# Patient Record
Sex: MALE | Race: WHITE | NOT HISPANIC OR LATINO
[De-identification: names, ages, dates, MRNs, and addresses within clinical notes are randomized per-mention and may not be internally consistent; named-entity substitution may affect disease eponyms.]

---

## 2017-01-12 ENCOUNTER — APPOINTMENT (OUTPATIENT)
Dept: RHEUMATOLOGY | Facility: CLINIC | Age: 72
End: 2017-01-12

## 2017-01-12 VITALS
WEIGHT: 170 LBS | DIASTOLIC BLOOD PRESSURE: 60 MMHG | HEART RATE: 112 BPM | BODY MASS INDEX: 23.8 KG/M2 | OXYGEN SATURATION: 97 % | HEIGHT: 71 IN | SYSTOLIC BLOOD PRESSURE: 148 MMHG

## 2017-01-13 ENCOUNTER — APPOINTMENT (OUTPATIENT)
Dept: GERIATRICS | Facility: CLINIC | Age: 72
End: 2017-01-13

## 2017-01-14 ENCOUNTER — INPATIENT (INPATIENT)
Facility: HOSPITAL | Age: 72
LOS: 5 days | Discharge: HOME | DRG: 871 | End: 2017-01-20
Attending: HOSPITALIST | Admitting: HOSPITALIST
Payer: MEDICARE

## 2017-01-14 VITALS
SYSTOLIC BLOOD PRESSURE: 140 MMHG | HEART RATE: 114 BPM | OXYGEN SATURATION: 98 % | RESPIRATION RATE: 18 BRPM | DIASTOLIC BLOOD PRESSURE: 75 MMHG

## 2017-01-14 DIAGNOSIS — R50.9 FEVER, UNSPECIFIED: ICD-10-CM

## 2017-01-14 DIAGNOSIS — D72.829 ELEVATED WHITE BLOOD CELL COUNT, UNSPECIFIED: ICD-10-CM

## 2017-01-14 DIAGNOSIS — I10 ESSENTIAL (PRIMARY) HYPERTENSION: ICD-10-CM

## 2017-01-14 DIAGNOSIS — F32.9 MAJOR DEPRESSIVE DISORDER, SINGLE EPISODE, UNSPECIFIED: ICD-10-CM

## 2017-01-14 DIAGNOSIS — F10.239 ALCOHOL DEPENDENCE WITH WITHDRAWAL, UNSPECIFIED: ICD-10-CM

## 2017-01-14 DIAGNOSIS — M10.9 GOUT, UNSPECIFIED: ICD-10-CM

## 2017-01-14 LAB
ALBUMIN SERPL ELPH-MCNC: 3.6 G/DL — SIGNIFICANT CHANGE UP (ref 3.3–5)
ALP SERPL-CCNC: 100 U/L — SIGNIFICANT CHANGE UP (ref 40–120)
ALT FLD-CCNC: 5 U/L RC — LOW (ref 10–45)
ANION GAP SERPL CALC-SCNC: 20 MMOL/L — HIGH (ref 5–17)
APPEARANCE UR: CLEAR — SIGNIFICANT CHANGE UP
APTT BLD: 31 SEC — SIGNIFICANT CHANGE UP (ref 27.5–37.4)
AST SERPL-CCNC: 16 U/L — SIGNIFICANT CHANGE UP (ref 10–40)
BASOPHILS # BLD AUTO: 0 K/UL — SIGNIFICANT CHANGE UP (ref 0–0.2)
BASOPHILS NFR BLD AUTO: 0.1 % — SIGNIFICANT CHANGE UP (ref 0–2)
BILIRUB SERPL-MCNC: 0.9 MG/DL — SIGNIFICANT CHANGE UP (ref 0.2–1.2)
BILIRUB UR-MCNC: NEGATIVE — SIGNIFICANT CHANGE UP
BUN SERPL-MCNC: 19 MG/DL — SIGNIFICANT CHANGE UP (ref 7–23)
CALCIUM SERPL-MCNC: 9.2 MG/DL — SIGNIFICANT CHANGE UP (ref 8.4–10.5)
CHLORIDE SERPL-SCNC: 95 MMOL/L — LOW (ref 96–108)
CK MB BLD-MCNC: 2.6 % — SIGNIFICANT CHANGE UP (ref 0–3.5)
CK MB CFR SERPL CALC: 1 NG/ML — SIGNIFICANT CHANGE UP (ref 0–6.7)
CK SERPL-CCNC: 38 U/L — SIGNIFICANT CHANGE UP (ref 30–200)
CO2 SERPL-SCNC: 19 MMOL/L — LOW (ref 22–31)
COLOR SPEC: YELLOW — SIGNIFICANT CHANGE UP
CREAT SERPL-MCNC: 1.18 MG/DL — SIGNIFICANT CHANGE UP (ref 0.5–1.3)
DIFF PNL FLD: NEGATIVE — SIGNIFICANT CHANGE UP
EOSINOPHIL # BLD AUTO: 0 K/UL — SIGNIFICANT CHANGE UP (ref 0–0.5)
EOSINOPHIL NFR BLD AUTO: 0.1 % — SIGNIFICANT CHANGE UP (ref 0–6)
ETHANOL SERPL-MCNC: SIGNIFICANT CHANGE UP MG/DL (ref 0–10)
GAS PNL BLDV: SIGNIFICANT CHANGE UP
GLUCOSE SERPL-MCNC: 152 MG/DL — HIGH (ref 70–99)
GLUCOSE UR QL: NEGATIVE — SIGNIFICANT CHANGE UP
HCT VFR BLD CALC: 31.4 % — LOW (ref 39–50)
HGB BLD-MCNC: 11.3 G/DL — LOW (ref 13–17)
INR BLD: 1.18 RATIO — HIGH (ref 0.88–1.16)
KETONES UR-MCNC: NEGATIVE — SIGNIFICANT CHANGE UP
LEUKOCYTE ESTERASE UR-ACNC: NEGATIVE — SIGNIFICANT CHANGE UP
LYMPHOCYTES # BLD AUTO: 12.6 % — LOW (ref 13–44)
LYMPHOCYTES # BLD AUTO: 2.2 K/UL — SIGNIFICANT CHANGE UP (ref 1–3.3)
MCHC RBC-ENTMCNC: 34.3 PG — HIGH (ref 27–34)
MCHC RBC-ENTMCNC: 35.9 GM/DL — SIGNIFICANT CHANGE UP (ref 32–36)
MCV RBC AUTO: 95.7 FL — SIGNIFICANT CHANGE UP (ref 80–100)
MONOCYTES # BLD AUTO: 1.1 K/UL — HIGH (ref 0–0.9)
MONOCYTES NFR BLD AUTO: 6.6 % — SIGNIFICANT CHANGE UP (ref 2–14)
NEUTROPHILS # BLD AUTO: 14 K/UL — HIGH (ref 1.8–7.4)
NEUTROPHILS NFR BLD AUTO: 80.6 % — HIGH (ref 43–77)
NITRITE UR-MCNC: NEGATIVE — SIGNIFICANT CHANGE UP
PH UR: 6 — SIGNIFICANT CHANGE UP (ref 4.8–8)
PLATELET # BLD AUTO: 210 K/UL — SIGNIFICANT CHANGE UP (ref 150–400)
POTASSIUM SERPL-MCNC: 4 MMOL/L — SIGNIFICANT CHANGE UP (ref 3.5–5.3)
POTASSIUM SERPL-SCNC: 4 MMOL/L — SIGNIFICANT CHANGE UP (ref 3.5–5.3)
PROT SERPL-MCNC: 7.1 G/DL — SIGNIFICANT CHANGE UP (ref 6–8.3)
PROT UR-MCNC: 100 MG/DL
PROTHROM AB SERPL-ACNC: 12.9 SEC — SIGNIFICANT CHANGE UP (ref 10–13.1)
RBC # BLD: 3.28 M/UL — LOW (ref 4.2–5.8)
RBC # FLD: 14 % — SIGNIFICANT CHANGE UP (ref 10.3–14.5)
SODIUM SERPL-SCNC: 134 MMOL/L — LOW (ref 135–145)
SP GR SPEC: 1.02 — SIGNIFICANT CHANGE UP (ref 1.01–1.02)
TROPONIN T SERPL-MCNC: <0.01 NG/ML — SIGNIFICANT CHANGE UP (ref 0–0.06)
UROBILINOGEN FLD QL: NEGATIVE — SIGNIFICANT CHANGE UP
WBC # BLD: 17.4 K/UL — HIGH (ref 3.8–10.5)
WBC # FLD AUTO: 17.4 K/UL — HIGH (ref 3.8–10.5)

## 2017-01-14 PROCEDURE — 71010: CPT | Mod: 26

## 2017-01-14 PROCEDURE — 99285 EMERGENCY DEPT VISIT HI MDM: CPT | Mod: 25,GC

## 2017-01-14 PROCEDURE — 93010 ELECTROCARDIOGRAM REPORT: CPT

## 2017-01-14 PROCEDURE — 99223 1ST HOSP IP/OBS HIGH 75: CPT

## 2017-01-14 RX ORDER — ONDANSETRON 8 MG/1
4 TABLET, FILM COATED ORAL EVERY 6 HOURS
Qty: 0 | Refills: 0 | Status: DISCONTINUED | OUTPATIENT
Start: 2017-01-14 | End: 2017-01-20

## 2017-01-14 RX ORDER — THIAMINE MONONITRATE (VIT B1) 100 MG
100 TABLET ORAL DAILY
Qty: 0 | Refills: 0 | Status: DISCONTINUED | OUTPATIENT
Start: 2017-01-14 | End: 2017-01-15

## 2017-01-14 RX ORDER — SODIUM CHLORIDE 9 MG/ML
1000 INJECTION, SOLUTION INTRAVENOUS
Qty: 0 | Refills: 0 | Status: DISCONTINUED | OUTPATIENT
Start: 2017-01-14 | End: 2017-01-17

## 2017-01-14 RX ORDER — ACETAMINOPHEN 500 MG
1000 TABLET ORAL ONCE
Qty: 0 | Refills: 0 | Status: COMPLETED | OUTPATIENT
Start: 2017-01-14 | End: 2017-01-14

## 2017-01-14 RX ORDER — ALLOPURINOL 300 MG
100 TABLET ORAL DAILY
Qty: 0 | Refills: 0 | Status: DISCONTINUED | OUTPATIENT
Start: 2017-01-14 | End: 2017-01-20

## 2017-01-14 RX ORDER — HEPARIN SODIUM 5000 [USP'U]/ML
5000 INJECTION INTRAVENOUS; SUBCUTANEOUS EVERY 8 HOURS
Qty: 0 | Refills: 0 | Status: DISCONTINUED | OUTPATIENT
Start: 2017-01-14 | End: 2017-01-15

## 2017-01-14 RX ORDER — CITALOPRAM 10 MG/1
10 TABLET, FILM COATED ORAL DAILY
Qty: 0 | Refills: 0 | Status: DISCONTINUED | OUTPATIENT
Start: 2017-01-14 | End: 2017-01-20

## 2017-01-14 RX ORDER — LOSARTAN POTASSIUM 100 MG/1
50 TABLET, FILM COATED ORAL DAILY
Qty: 0 | Refills: 0 | Status: DISCONTINUED | OUTPATIENT
Start: 2017-01-14 | End: 2017-01-20

## 2017-01-14 RX ORDER — ACETAMINOPHEN 500 MG
650 TABLET ORAL EVERY 6 HOURS
Qty: 0 | Refills: 0 | Status: DISCONTINUED | OUTPATIENT
Start: 2017-01-14 | End: 2017-01-16

## 2017-01-14 RX ORDER — SODIUM CHLORIDE 9 MG/ML
2000 INJECTION INTRAMUSCULAR; INTRAVENOUS; SUBCUTANEOUS ONCE
Qty: 0 | Refills: 0 | Status: COMPLETED | OUTPATIENT
Start: 2017-01-14 | End: 2017-01-14

## 2017-01-14 RX ORDER — SODIUM CHLORIDE 9 MG/ML
1000 INJECTION INTRAMUSCULAR; INTRAVENOUS; SUBCUTANEOUS
Qty: 0 | Refills: 0 | Status: DISCONTINUED | OUTPATIENT
Start: 2017-01-14 | End: 2017-01-17

## 2017-01-14 RX ORDER — FOLIC ACID 0.8 MG
1 TABLET ORAL DAILY
Qty: 0 | Refills: 0 | Status: DISCONTINUED | OUTPATIENT
Start: 2017-01-14 | End: 2017-01-20

## 2017-01-14 RX ORDER — IBUPROFEN 200 MG
400 TABLET ORAL EVERY 6 HOURS
Qty: 0 | Refills: 0 | Status: DISCONTINUED | OUTPATIENT
Start: 2017-01-14 | End: 2017-01-15

## 2017-01-14 RX ORDER — AMLODIPINE BESYLATE 2.5 MG/1
10 TABLET ORAL DAILY
Qty: 0 | Refills: 0 | Status: DISCONTINUED | OUTPATIENT
Start: 2017-01-14 | End: 2017-01-20

## 2017-01-14 RX ADMIN — Medication 650 MILLIGRAM(S): at 22:13

## 2017-01-14 RX ADMIN — HEPARIN SODIUM 5000 UNIT(S): 5000 INJECTION INTRAVENOUS; SUBCUTANEOUS at 22:13

## 2017-01-14 RX ADMIN — Medication 2 MILLIGRAM(S): at 14:53

## 2017-01-14 RX ADMIN — SODIUM CHLORIDE 2000 MILLILITER(S): 9 INJECTION INTRAMUSCULAR; INTRAVENOUS; SUBCUTANEOUS at 14:53

## 2017-01-14 RX ADMIN — Medication 400 MILLIGRAM(S): at 14:05

## 2017-01-14 RX ADMIN — SODIUM CHLORIDE 100 MILLILITER(S): 9 INJECTION, SOLUTION INTRAVENOUS at 23:06

## 2017-01-14 RX ADMIN — Medication 1000 MILLIGRAM(S): at 14:13

## 2017-01-14 NOTE — ED PROVIDER NOTE - OBJECTIVE STATEMENT
72M hx gout on prednisone presents with weakness and fever. Pt has been feeling weak for the past few days. Pt seen by Rheumatologist a few days ago for check up on gout without any issues. Since then patient has been having generalized weakness. Pt normally drinks a few drinks a day. Stopped a few days ago. No he is jittery, tachycardic and febril. No other complaints besides feeling jittery and weak

## 2017-01-14 NOTE — ED ADULT NURSE NOTE - OBJECTIVE STATEMENT
Pt presents to the ER A+Ox3 complaining of generalized weakness, right knee pain, left shoulder pain, intermittent nausea, vomiting x1, inability to ambulatem and decreased PO intake xThursday. Pt is tachypneic, tremors visible without arms extended. Pt denies chest pain, shortness of breath, palpitations, difficulty/painful urination. Pt states drinks every day one drink with dinner and drinks after dinner. Last drink 3-4 days ago. Pt presents to the ER A+Ox3 complaining of generalized weakness, low grade fever, right knee pain, left shoulder pain, intermittent nausea, vomiting x1, inability to ambulatem and decreased PO intake xThursday. Pt is tachypneic, tremors visible without arms extended. Pt denies chest pain, shortness of breath, palpitations, difficulty/painful urination. Pt states drinks every day one drink with dinner and drinks after dinner. Last drink 3-4 days ago. MD Russo at bedside; pt placed on CIWA precautions.

## 2017-01-14 NOTE — H&P ADULT. - ASSESSMENT
71 YO patient with h/o ETOH abuse, gout, admitted to the hospital with jittery feeling , malaise and was found to have leucocytosis, fever with no clear source of infection if any

## 2017-01-14 NOTE — ED ADULT NURSE REASSESSMENT NOTE - NS ED NURSE REASSESS COMMENT FT1
patient straight cathed for urine using aseptic technique, sterile equipment utilized. Drained 250 ml of concentrated, dark urine, free from sediment. Patient tolerated well. Second RN at bedside.  Patient states he is feeling well. Denies HA, abd pain, NVD, auditory, visual or tactile disturbances.  UA & C&S drawn and sent to lab.  Comfort and safety provided. Wife remains at bedside.

## 2017-01-14 NOTE — ED PROVIDER NOTE - ATTENDING CONTRIBUTION TO CARE
I performed a face to face evaluation of this patient and performed a history and physical examination on the patient.  I agree with the resident's history, physical examination, and plan of the patient. patient with malaise and knee pain. h/o etoh abuse not drinking x 203 days, now ciwa 9.  dX: etoh withdrawal. knee pain likely due to gout, not concerned for septic joint. will workup malaise.

## 2017-01-14 NOTE — H&P ADULT. - MUSCULOSKELETAL COMMENTS
Left knee healed surgical scar with no erythema or swelling noted, no gross swelling of the knee joints

## 2017-01-14 NOTE — H&P ADULT. - PROBLEM SELECTOR PLAN 2
Unclear of the source  Can be secondary to ETOH withdrawal, but will aggressively look for a source  will get Blood CX , Urine CX , Amylase, Lipase   f/up for any recurrence   If there is any fever with no source , might get Xrays of the knees

## 2017-01-14 NOTE — ED PROVIDER NOTE - MEDICAL DECISION MAKING DETAILS
72M hx gout and alcoholism presents with weakness tachycardia and fever. Will obtain labs ciwa admit

## 2017-01-14 NOTE — H&P ADULT. - PROBLEM SELECTOR PLAN 1
CIWA 9-10   will start IVF with Thiamine/ MVI/Folic Acid   will place him on ETOH Withdrawal protocol   get ETOH level   Will Start Lorazepam protocol

## 2017-01-14 NOTE — H&P ADULT. - HISTORY OF PRESENT ILLNESS
72M hx gout on prednisone presents with weakness and fever. Pt has been feeling weak for the past few days. Pt seen by Rheumatologist a few days ago for check up on gout without any issues. Since then patient has been having generalized weakness. Pt normally drinks a few drinks a day. Stopped a few days ago. No he is jittery, tachycardic and febril. No other complaints besides feeling jittery and weak    In ED :   Tm= 101.2F   HR=     BP : 125-140/65/75    RR: 18-26  SPO2 = 100% ( 2 L NC) 72M hx gout on prednisone which he completed one day ago, ETOH abuse with one glass of wine and one glass of liquor daily ( last drink was 4 days ago), HTN came to the hospital due to general weakness, feeling unwell and jittery.  Patient states that his knees were bothering him and he saw his rheumatologist a few days with no change in his medication, he states that he has been on low dose steroid for about a month for the gout.  He states to feel warm with no clear fever, no knee swelling,  no urinary complaint, no sick contact, no nausea, no vomiting, nodiarrhea, no current back pain, no headache.  He states that he stopped drinking 3-4 days ago because he did not feel well.  In the ED he was found to have fever, tachycardia and leucocytosis with no clear source.  Patient has received 2 L of NS and Received Lorazepam 2 mg IM X one as well as Acetaminophen IV.  When seen patient states that he feels ok except that he feels cold and jittery and has the hands shakes with no hallucinations, no vomiting.    In ED :   Tm= 101.2F   HR=     BP : 125-140/65/75    RR: 18-26  SPO2 = 100% ( 2 L NC)

## 2017-01-14 NOTE — ED PROVIDER NOTE - PROGRESS NOTE DETAILS
Dr. Reeder (Attending Physician)  Signout received from Dr. Parrish. Elevated WBC and fever to 101.2, but no source of infection CXR and Urine clear.  Tremulous and In EtOH withdrawal but do not suspect CNS infection.  Will admit.

## 2017-01-15 LAB
ALBUMIN SERPL ELPH-MCNC: 2.6 G/DL — LOW (ref 3.3–5)
ALBUMIN SERPL ELPH-MCNC: 3 G/DL — LOW (ref 3.3–5)
ALP SERPL-CCNC: 76 U/L — SIGNIFICANT CHANGE UP (ref 40–120)
ALP SERPL-CCNC: 89 U/L — SIGNIFICANT CHANGE UP (ref 40–120)
ALT FLD-CCNC: 6 U/L RC — LOW (ref 10–45)
ALT FLD-CCNC: <4 U/L — LOW (ref 10–45)
AMYLASE P1 CFR SERPL: 43 U/L — SIGNIFICANT CHANGE UP (ref 25–125)
ANION GAP SERPL CALC-SCNC: 19 MMOL/L — HIGH (ref 5–17)
ANION GAP SERPL CALC-SCNC: 21 MMOL/L — HIGH (ref 5–17)
APTT BLD: 33 SEC — SIGNIFICANT CHANGE UP (ref 27.5–37.4)
AST SERPL-CCNC: 19 U/L — SIGNIFICANT CHANGE UP (ref 10–40)
AST SERPL-CCNC: 21 U/L — SIGNIFICANT CHANGE UP (ref 10–40)
BASOPHILS # BLD AUTO: 0 K/UL — SIGNIFICANT CHANGE UP (ref 0–0.2)
BASOPHILS # BLD AUTO: 0.02 K/UL — SIGNIFICANT CHANGE UP (ref 0–0.2)
BASOPHILS NFR BLD AUTO: 0.2 % — SIGNIFICANT CHANGE UP (ref 0–2)
BASOPHILS NFR BLD AUTO: 0.3 % — SIGNIFICANT CHANGE UP (ref 0–2)
BILIRUB SERPL-MCNC: 0.6 MG/DL — SIGNIFICANT CHANGE UP (ref 0.2–1.2)
BILIRUB SERPL-MCNC: 0.8 MG/DL — SIGNIFICANT CHANGE UP (ref 0.2–1.2)
BLD GP AB SCN SERPL QL: NEGATIVE — SIGNIFICANT CHANGE UP
BUN SERPL-MCNC: 16 MG/DL — SIGNIFICANT CHANGE UP (ref 7–23)
BUN SERPL-MCNC: 18 MG/DL — SIGNIFICANT CHANGE UP (ref 7–23)
CALCIUM SERPL-MCNC: 8.6 MG/DL — SIGNIFICANT CHANGE UP (ref 8.4–10.5)
CALCIUM SERPL-MCNC: 8.7 MG/DL — SIGNIFICANT CHANGE UP (ref 8.4–10.5)
CHLORIDE SERPL-SCNC: 100 MMOL/L — SIGNIFICANT CHANGE UP (ref 96–108)
CHLORIDE SERPL-SCNC: 101 MMOL/L — SIGNIFICANT CHANGE UP (ref 96–108)
CHOLEST SERPL-MCNC: 113 MG/DL — SIGNIFICANT CHANGE UP (ref 10–199)
CO2 SERPL-SCNC: 15 MMOL/L — LOW (ref 22–31)
CO2 SERPL-SCNC: 16 MMOL/L — LOW (ref 22–31)
CREAT SERPL-MCNC: 0.94 MG/DL — SIGNIFICANT CHANGE UP (ref 0.5–1.3)
CREAT SERPL-MCNC: 1.18 MG/DL — SIGNIFICANT CHANGE UP (ref 0.5–1.3)
CULTURE RESULTS: NO GROWTH — SIGNIFICANT CHANGE UP
EOSINOPHIL # BLD AUTO: 0 K/UL — SIGNIFICANT CHANGE UP (ref 0–0.5)
EOSINOPHIL # BLD AUTO: 0.04 K/UL — SIGNIFICANT CHANGE UP (ref 0–0.5)
EOSINOPHIL NFR BLD AUTO: 0 % — SIGNIFICANT CHANGE UP (ref 0–6)
EOSINOPHIL NFR BLD AUTO: 0.3 % — SIGNIFICANT CHANGE UP (ref 0–6)
GLUCOSE SERPL-MCNC: 70 MG/DL — SIGNIFICANT CHANGE UP (ref 70–99)
GLUCOSE SERPL-MCNC: 82 MG/DL — SIGNIFICANT CHANGE UP (ref 70–99)
HCT VFR BLD CALC: 26.5 % — LOW (ref 39–50)
HCT VFR BLD CALC: 29.2 % — LOW (ref 39–50)
HDLC SERPL-MCNC: 59 MG/DL — SIGNIFICANT CHANGE UP (ref 40–125)
HGB BLD-MCNC: 9.3 G/DL — LOW (ref 13–17)
HGB BLD-MCNC: 9.8 G/DL — LOW (ref 13–17)
IMM GRANULOCYTES NFR BLD AUTO: 0.3 % — SIGNIFICANT CHANGE UP (ref 0–1.5)
INR BLD: 1.27 RATIO — HIGH (ref 0.88–1.16)
LIDOCAIN IGE QN: 32 U/L — SIGNIFICANT CHANGE UP (ref 7–60)
LIPID PNL WITH DIRECT LDL SERPL: 42 MG/DL — SIGNIFICANT CHANGE UP
LYMPHOCYTES # BLD AUTO: 19 % — SIGNIFICANT CHANGE UP (ref 13–44)
LYMPHOCYTES # BLD AUTO: 2.8 K/UL — SIGNIFICANT CHANGE UP (ref 1–3.3)
LYMPHOCYTES # BLD AUTO: 2.91 K/UL — SIGNIFICANT CHANGE UP (ref 1–3.3)
LYMPHOCYTES # BLD AUTO: 21.9 % — SIGNIFICANT CHANGE UP (ref 13–44)
MAGNESIUM SERPL-MCNC: 1.3 MG/DL — LOW (ref 1.6–2.6)
MAGNESIUM SERPL-MCNC: 1.8 MG/DL — SIGNIFICANT CHANGE UP (ref 1.6–2.6)
MCHC RBC-ENTMCNC: 32 PG — SIGNIFICANT CHANGE UP (ref 27–34)
MCHC RBC-ENTMCNC: 33.6 GM/DL — SIGNIFICANT CHANGE UP (ref 32–36)
MCHC RBC-ENTMCNC: 34.6 PG — HIGH (ref 27–34)
MCHC RBC-ENTMCNC: 35.2 GM/DL — SIGNIFICANT CHANGE UP (ref 32–36)
MCV RBC AUTO: 95.4 FL — SIGNIFICANT CHANGE UP (ref 80–100)
MCV RBC AUTO: 98.4 FL — SIGNIFICANT CHANGE UP (ref 80–100)
MONOCYTES # BLD AUTO: 1.08 K/UL — HIGH (ref 0–0.9)
MONOCYTES # BLD AUTO: 1.4 K/UL — HIGH (ref 0–0.9)
MONOCYTES NFR BLD AUTO: 8.1 % — SIGNIFICANT CHANGE UP (ref 2–14)
MONOCYTES NFR BLD AUTO: 9.8 % — SIGNIFICANT CHANGE UP (ref 2–14)
NEUTROPHILS # BLD AUTO: 10.3 K/UL — HIGH (ref 1.8–7.4)
NEUTROPHILS # BLD AUTO: 9.2 K/UL — HIGH (ref 1.8–7.4)
NEUTROPHILS NFR BLD AUTO: 69.2 % — SIGNIFICANT CHANGE UP (ref 43–77)
NEUTROPHILS NFR BLD AUTO: 70.8 % — SIGNIFICANT CHANGE UP (ref 43–77)
PHOSPHATE SERPL-MCNC: 2.4 MG/DL — LOW (ref 2.5–4.5)
PHOSPHATE SERPL-MCNC: 4 MG/DL — SIGNIFICANT CHANGE UP (ref 2.5–4.5)
PLATELET # BLD AUTO: 220 K/UL — SIGNIFICANT CHANGE UP (ref 150–400)
PLATELET # BLD AUTO: 266 K/UL — SIGNIFICANT CHANGE UP (ref 150–400)
POTASSIUM SERPL-MCNC: 3.8 MMOL/L — SIGNIFICANT CHANGE UP (ref 3.5–5.3)
POTASSIUM SERPL-MCNC: 3.9 MMOL/L — SIGNIFICANT CHANGE UP (ref 3.5–5.3)
POTASSIUM SERPL-SCNC: 3.8 MMOL/L — SIGNIFICANT CHANGE UP (ref 3.5–5.3)
POTASSIUM SERPL-SCNC: 3.9 MMOL/L — SIGNIFICANT CHANGE UP (ref 3.5–5.3)
PROT SERPL-MCNC: 6.1 G/DL — SIGNIFICANT CHANGE UP (ref 6–8.3)
PROT SERPL-MCNC: 6.4 G/DL — SIGNIFICANT CHANGE UP (ref 6–8.3)
PROTHROM AB SERPL-ACNC: 13.8 SEC — HIGH (ref 10–13.1)
RAPID RVP RESULT: SIGNIFICANT CHANGE UP
RBC # BLD: 2.69 M/UL — LOW (ref 4.2–5.8)
RBC # BLD: 3.06 M/UL — LOW (ref 4.2–5.8)
RBC # FLD: 13.5 % — SIGNIFICANT CHANGE UP (ref 10.3–14.5)
RBC # FLD: 14.3 % — SIGNIFICANT CHANGE UP (ref 10.3–14.5)
RH IG SCN BLD-IMP: POSITIVE — SIGNIFICANT CHANGE UP
SODIUM SERPL-SCNC: 136 MMOL/L — SIGNIFICANT CHANGE UP (ref 135–145)
SODIUM SERPL-SCNC: 136 MMOL/L — SIGNIFICANT CHANGE UP (ref 135–145)
SPECIMEN SOURCE: SIGNIFICANT CHANGE UP
TOTAL CHOLESTEROL/HDL RATIO MEASUREMENT: 1.9 RATIO — LOW (ref 3.4–9.6)
TRIGL SERPL-MCNC: 62 MG/DL — SIGNIFICANT CHANGE UP (ref 10–149)
WBC # BLD: 13.29 K/UL — HIGH (ref 3.8–10.5)
WBC # BLD: 14.5 K/UL — HIGH (ref 3.8–10.5)
WBC # FLD AUTO: 13.29 K/UL — HIGH (ref 3.8–10.5)
WBC # FLD AUTO: 14.5 K/UL — HIGH (ref 3.8–10.5)

## 2017-01-15 PROCEDURE — 70450 CT HEAD/BRAIN W/O DYE: CPT | Mod: 26

## 2017-01-15 PROCEDURE — 73522 X-RAY EXAM HIPS BI 3-4 VIEWS: CPT | Mod: 26

## 2017-01-15 PROCEDURE — 99233 SBSQ HOSP IP/OBS HIGH 50: CPT

## 2017-01-15 PROCEDURE — 90792 PSYCH DIAG EVAL W/MED SRVCS: CPT | Mod: GC

## 2017-01-15 PROCEDURE — 71010: CPT | Mod: 26

## 2017-01-15 PROCEDURE — 99223 1ST HOSP IP/OBS HIGH 75: CPT | Mod: GC

## 2017-01-15 RX ORDER — DEXMEDETOMIDINE HYDROCHLORIDE IN 0.9% SODIUM CHLORIDE 4 UG/ML
0.2 INJECTION INTRAVENOUS
Qty: 200 | Refills: 0 | Status: DISCONTINUED | OUTPATIENT
Start: 2017-01-15 | End: 2017-01-17

## 2017-01-15 RX ORDER — ACETAMINOPHEN 500 MG
1000 TABLET ORAL ONCE
Qty: 0 | Refills: 0 | Status: DISCONTINUED | OUTPATIENT
Start: 2017-01-15 | End: 2017-01-16

## 2017-01-15 RX ORDER — PIPERACILLIN AND TAZOBACTAM 4; .5 G/20ML; G/20ML
3.38 INJECTION, POWDER, LYOPHILIZED, FOR SOLUTION INTRAVENOUS ONCE
Qty: 0 | Refills: 0 | Status: DISCONTINUED | OUTPATIENT
Start: 2017-01-15 | End: 2017-01-16

## 2017-01-15 RX ORDER — ACETAMINOPHEN 500 MG
1000 TABLET ORAL ONCE
Qty: 0 | Refills: 0 | Status: COMPLETED | OUTPATIENT
Start: 2017-01-15 | End: 2017-01-15

## 2017-01-15 RX ORDER — SODIUM,POTASSIUM PHOSPHATES 278-250MG
1 POWDER IN PACKET (EA) ORAL ONCE
Qty: 0 | Refills: 0 | Status: COMPLETED | OUTPATIENT
Start: 2017-01-15 | End: 2017-01-15

## 2017-01-15 RX ORDER — METOPROLOL TARTRATE 50 MG
12.5 TABLET ORAL EVERY 12 HOURS
Qty: 0 | Refills: 0 | Status: DISCONTINUED | OUTPATIENT
Start: 2017-01-15 | End: 2017-01-20

## 2017-01-15 RX ORDER — METOPROLOL TARTRATE 50 MG
12.5 TABLET ORAL ONCE
Qty: 0 | Refills: 0 | Status: COMPLETED | OUTPATIENT
Start: 2017-01-15 | End: 2017-01-15

## 2017-01-15 RX ORDER — MAGNESIUM SULFATE 500 MG/ML
1 VIAL (ML) INJECTION ONCE
Qty: 0 | Refills: 0 | Status: COMPLETED | OUTPATIENT
Start: 2017-01-15 | End: 2017-01-15

## 2017-01-15 RX ORDER — METOPROLOL TARTRATE 50 MG
TABLET ORAL
Qty: 0 | Refills: 0 | Status: DISCONTINUED | OUTPATIENT
Start: 2017-01-15 | End: 2017-01-20

## 2017-01-15 RX ORDER — PIPERACILLIN AND TAZOBACTAM 4; .5 G/20ML; G/20ML
3.38 INJECTION, POWDER, LYOPHILIZED, FOR SOLUTION INTRAVENOUS EVERY 8 HOURS
Qty: 0 | Refills: 0 | Status: DISCONTINUED | OUTPATIENT
Start: 2017-01-15 | End: 2017-01-18

## 2017-01-15 RX ORDER — VANCOMYCIN HCL 1 G
1000 VIAL (EA) INTRAVENOUS EVERY 12 HOURS
Qty: 0 | Refills: 0 | Status: DISCONTINUED | OUTPATIENT
Start: 2017-01-15 | End: 2017-01-17

## 2017-01-15 RX ORDER — THIAMINE MONONITRATE (VIT B1) 100 MG
500 TABLET ORAL EVERY 8 HOURS
Qty: 0 | Refills: 0 | Status: COMPLETED | OUTPATIENT
Start: 2017-01-15 | End: 2017-01-18

## 2017-01-15 RX ORDER — ENOXAPARIN SODIUM 100 MG/ML
40 INJECTION SUBCUTANEOUS EVERY 24 HOURS
Qty: 0 | Refills: 0 | Status: DISCONTINUED | OUTPATIENT
Start: 2017-01-15 | End: 2017-01-20

## 2017-01-15 RX ADMIN — SODIUM CHLORIDE 100 MILLILITER(S): 9 INJECTION INTRAMUSCULAR; INTRAVENOUS; SUBCUTANEOUS at 22:46

## 2017-01-15 RX ADMIN — Medication 250 MILLIGRAM(S): at 22:45

## 2017-01-15 RX ADMIN — Medication 1 MILLIGRAM(S): at 12:24

## 2017-01-15 RX ADMIN — Medication 1 MILLIGRAM(S): at 10:22

## 2017-01-15 RX ADMIN — Medication 1 MILLIGRAM(S): at 09:46

## 2017-01-15 RX ADMIN — Medication 105 MILLIGRAM(S): at 22:55

## 2017-01-15 RX ADMIN — Medication 2 MILLIGRAM(S): at 19:17

## 2017-01-15 RX ADMIN — Medication 100 GRAM(S): at 12:24

## 2017-01-15 RX ADMIN — Medication 650 MILLIGRAM(S): at 05:19

## 2017-01-15 RX ADMIN — Medication 100 MILLIGRAM(S): at 12:24

## 2017-01-15 RX ADMIN — Medication 1 MILLIGRAM(S): at 16:32

## 2017-01-15 RX ADMIN — ENOXAPARIN SODIUM 40 MILLIGRAM(S): 100 INJECTION SUBCUTANEOUS at 12:24

## 2017-01-15 RX ADMIN — Medication 105 MILLIGRAM(S): at 15:55

## 2017-01-15 RX ADMIN — AMLODIPINE BESYLATE 10 MILLIGRAM(S): 2.5 TABLET ORAL at 05:19

## 2017-01-15 RX ADMIN — HEPARIN SODIUM 5000 UNIT(S): 5000 INJECTION INTRAVENOUS; SUBCUTANEOUS at 05:18

## 2017-01-15 RX ADMIN — Medication 2 MILLIGRAM(S): at 12:34

## 2017-01-15 RX ADMIN — Medication 400 MILLIGRAM(S): at 19:17

## 2017-01-15 RX ADMIN — DEXMEDETOMIDINE HYDROCHLORIDE IN 0.9% SODIUM CHLORIDE 3.75 MICROGRAM(S)/KG/HR: 4 INJECTION INTRAVENOUS at 22:46

## 2017-01-15 RX ADMIN — Medication 12.5 MILLIGRAM(S): at 11:11

## 2017-01-15 RX ADMIN — CITALOPRAM 10 MILLIGRAM(S): 10 TABLET, FILM COATED ORAL at 12:24

## 2017-01-15 RX ADMIN — Medication 1 TABLET(S): at 12:24

## 2017-01-15 RX ADMIN — SODIUM CHLORIDE 100 MILLILITER(S): 9 INJECTION INTRAMUSCULAR; INTRAVENOUS; SUBCUTANEOUS at 10:22

## 2017-01-15 RX ADMIN — PIPERACILLIN AND TAZOBACTAM 25 GRAM(S): 4; .5 INJECTION, POWDER, LYOPHILIZED, FOR SOLUTION INTRAVENOUS at 22:45

## 2017-01-15 RX ADMIN — LOSARTAN POTASSIUM 50 MILLIGRAM(S): 100 TABLET, FILM COATED ORAL at 05:19

## 2017-01-16 LAB
ALBUMIN SERPL ELPH-MCNC: 2.6 G/DL — LOW (ref 3.3–5)
ALP SERPL-CCNC: 74 U/L — SIGNIFICANT CHANGE UP (ref 40–120)
ALT FLD-CCNC: 4 U/L RC — LOW (ref 10–45)
ANION GAP SERPL CALC-SCNC: 17 MMOL/L — SIGNIFICANT CHANGE UP (ref 5–17)
AST SERPL-CCNC: 20 U/L — SIGNIFICANT CHANGE UP (ref 10–40)
BILIRUB SERPL-MCNC: 0.8 MG/DL — SIGNIFICANT CHANGE UP (ref 0.2–1.2)
BUN SERPL-MCNC: 19 MG/DL — SIGNIFICANT CHANGE UP (ref 7–23)
CALCIUM SERPL-MCNC: 8.6 MG/DL — SIGNIFICANT CHANGE UP (ref 8.4–10.5)
CHLORIDE SERPL-SCNC: 100 MMOL/L — SIGNIFICANT CHANGE UP (ref 96–108)
CO2 SERPL-SCNC: 17 MMOL/L — LOW (ref 22–31)
CREAT SERPL-MCNC: 1.22 MG/DL — SIGNIFICANT CHANGE UP (ref 0.5–1.3)
GLUCOSE 1H P GLC SERPL-MCNC: 77 MG/DL — SIGNIFICANT CHANGE UP (ref 70–134)
GLUCOSE SERPL-MCNC: 108 MG/DL — HIGH (ref 70–99)
HCT VFR BLD CALC: 30.7 % — LOW (ref 39–50)
HGB BLD-MCNC: 10.1 G/DL — LOW (ref 13–17)
MAGNESIUM SERPL-MCNC: 1.8 MG/DL — SIGNIFICANT CHANGE UP (ref 1.6–2.6)
MCHC RBC-ENTMCNC: 32.8 PG — SIGNIFICANT CHANGE UP (ref 27–34)
MCHC RBC-ENTMCNC: 33 GM/DL — SIGNIFICANT CHANGE UP (ref 32–36)
MCV RBC AUTO: 99.3 FL — SIGNIFICANT CHANGE UP (ref 80–100)
PHOSPHATE SERPL-MCNC: 4.3 MG/DL — SIGNIFICANT CHANGE UP (ref 2.5–4.5)
PLATELET # BLD AUTO: 244 K/UL — SIGNIFICANT CHANGE UP (ref 150–400)
POTASSIUM SERPL-MCNC: 4.4 MMOL/L — SIGNIFICANT CHANGE UP (ref 3.5–5.3)
POTASSIUM SERPL-SCNC: 4.4 MMOL/L — SIGNIFICANT CHANGE UP (ref 3.5–5.3)
PROT SERPL-MCNC: 6.1 G/DL — SIGNIFICANT CHANGE UP (ref 6–8.3)
RBC # BLD: 3.09 M/UL — LOW (ref 4.2–5.8)
RBC # FLD: 13.5 % — SIGNIFICANT CHANGE UP (ref 10.3–14.5)
SODIUM SERPL-SCNC: 134 MMOL/L — LOW (ref 135–145)
WBC # BLD: 16.3 K/UL — HIGH (ref 3.8–10.5)
WBC # FLD AUTO: 16.3 K/UL — HIGH (ref 3.8–10.5)

## 2017-01-16 PROCEDURE — 99291 CRITICAL CARE FIRST HOUR: CPT

## 2017-01-16 RX ORDER — ACETAMINOPHEN 500 MG
1000 TABLET ORAL ONCE
Qty: 0 | Refills: 0 | Status: COMPLETED | OUTPATIENT
Start: 2017-01-16 | End: 2017-01-16

## 2017-01-16 RX ADMIN — Medication 105 MILLIGRAM(S): at 21:24

## 2017-01-16 RX ADMIN — Medication 100 MILLIGRAM(S): at 13:03

## 2017-01-16 RX ADMIN — Medication 20 MILLIGRAM(S): at 05:52

## 2017-01-16 RX ADMIN — PIPERACILLIN AND TAZOBACTAM 25 GRAM(S): 4; .5 INJECTION, POWDER, LYOPHILIZED, FOR SOLUTION INTRAVENOUS at 14:28

## 2017-01-16 RX ADMIN — Medication 250 MILLIGRAM(S): at 17:36

## 2017-01-16 RX ADMIN — CITALOPRAM 10 MILLIGRAM(S): 10 TABLET, FILM COATED ORAL at 17:35

## 2017-01-16 RX ADMIN — ENOXAPARIN SODIUM 40 MILLIGRAM(S): 100 INJECTION SUBCUTANEOUS at 13:01

## 2017-01-16 RX ADMIN — Medication 400 MILLIGRAM(S): at 05:52

## 2017-01-16 RX ADMIN — Medication 1 TABLET(S): at 13:02

## 2017-01-16 RX ADMIN — Medication 105 MILLIGRAM(S): at 05:54

## 2017-01-16 RX ADMIN — PIPERACILLIN AND TAZOBACTAM 25 GRAM(S): 4; .5 INJECTION, POWDER, LYOPHILIZED, FOR SOLUTION INTRAVENOUS at 21:24

## 2017-01-16 RX ADMIN — Medication 1 MILLIGRAM(S): at 13:03

## 2017-01-16 RX ADMIN — Medication 250 MILLIGRAM(S): at 05:52

## 2017-01-16 RX ADMIN — Medication 20 MILLIGRAM(S): at 17:35

## 2017-01-16 RX ADMIN — PIPERACILLIN AND TAZOBACTAM 25 GRAM(S): 4; .5 INJECTION, POWDER, LYOPHILIZED, FOR SOLUTION INTRAVENOUS at 05:53

## 2017-01-16 RX ADMIN — Medication 105 MILLIGRAM(S): at 14:28

## 2017-01-16 RX ADMIN — Medication 12.5 MILLIGRAM(S): at 17:35

## 2017-01-17 LAB
ALBUMIN SERPL ELPH-MCNC: 2.5 G/DL — LOW (ref 3.3–5)
ALP SERPL-CCNC: 66 U/L — SIGNIFICANT CHANGE UP (ref 40–120)
ALT FLD-CCNC: 8 U/L RC — LOW (ref 10–45)
ANION GAP SERPL CALC-SCNC: 17 MMOL/L — SIGNIFICANT CHANGE UP (ref 5–17)
APTT BLD: 31 SEC — SIGNIFICANT CHANGE UP (ref 27.5–37.4)
AST SERPL-CCNC: 13 U/L — SIGNIFICANT CHANGE UP (ref 10–40)
BASOPHILS # BLD AUTO: 0 K/UL — SIGNIFICANT CHANGE UP (ref 0–0.2)
BASOPHILS NFR BLD AUTO: 0.2 % — SIGNIFICANT CHANGE UP (ref 0–2)
BILIRUB SERPL-MCNC: 0.2 MG/DL — SIGNIFICANT CHANGE UP (ref 0.2–1.2)
BUN SERPL-MCNC: 24 MG/DL — HIGH (ref 7–23)
CALCIUM SERPL-MCNC: 8.7 MG/DL — SIGNIFICANT CHANGE UP (ref 8.4–10.5)
CHLORIDE SERPL-SCNC: 100 MMOL/L — SIGNIFICANT CHANGE UP (ref 96–108)
CO2 SERPL-SCNC: 16 MMOL/L — LOW (ref 22–31)
CREAT SERPL-MCNC: 0.93 MG/DL — SIGNIFICANT CHANGE UP (ref 0.5–1.3)
EOSINOPHIL # BLD AUTO: 0 K/UL — SIGNIFICANT CHANGE UP (ref 0–0.5)
EOSINOPHIL NFR BLD AUTO: 0.1 % — SIGNIFICANT CHANGE UP (ref 0–6)
GLUCOSE SERPL-MCNC: 189 MG/DL — HIGH (ref 70–99)
HCT VFR BLD CALC: 28.3 % — LOW (ref 39–50)
HGB BLD-MCNC: 9.6 G/DL — LOW (ref 13–17)
INR BLD: 1.15 RATIO — SIGNIFICANT CHANGE UP (ref 0.88–1.16)
LYMPHOCYTES # BLD AUTO: 1.4 K/UL — SIGNIFICANT CHANGE UP (ref 1–3.3)
LYMPHOCYTES # BLD AUTO: 10.9 % — LOW (ref 13–44)
MAGNESIUM SERPL-MCNC: 1.8 MG/DL — SIGNIFICANT CHANGE UP (ref 1.6–2.6)
MCHC RBC-ENTMCNC: 32.8 PG — SIGNIFICANT CHANGE UP (ref 27–34)
MCHC RBC-ENTMCNC: 33.8 GM/DL — SIGNIFICANT CHANGE UP (ref 32–36)
MCV RBC AUTO: 97.2 FL — SIGNIFICANT CHANGE UP (ref 80–100)
MONOCYTES # BLD AUTO: 0.5 K/UL — SIGNIFICANT CHANGE UP (ref 0–0.9)
MONOCYTES NFR BLD AUTO: 3.8 % — SIGNIFICANT CHANGE UP (ref 2–14)
NEUTROPHILS # BLD AUTO: 10.7 K/UL — HIGH (ref 1.8–7.4)
NEUTROPHILS NFR BLD AUTO: 84.8 % — HIGH (ref 43–77)
PHOSPHATE SERPL-MCNC: 3.3 MG/DL — SIGNIFICANT CHANGE UP (ref 2.5–4.5)
PLATELET # BLD AUTO: 339 K/UL — SIGNIFICANT CHANGE UP (ref 150–400)
POTASSIUM SERPL-MCNC: 4.1 MMOL/L — SIGNIFICANT CHANGE UP (ref 3.5–5.3)
POTASSIUM SERPL-SCNC: 4.1 MMOL/L — SIGNIFICANT CHANGE UP (ref 3.5–5.3)
PROT SERPL-MCNC: 6.1 G/DL — SIGNIFICANT CHANGE UP (ref 6–8.3)
PROTHROM AB SERPL-ACNC: 12.5 SEC — SIGNIFICANT CHANGE UP (ref 10–13.1)
RBC # BLD: 2.91 M/UL — LOW (ref 4.2–5.8)
RBC # FLD: 12.8 % — SIGNIFICANT CHANGE UP (ref 10.3–14.5)
SODIUM SERPL-SCNC: 133 MMOL/L — LOW (ref 135–145)
VANCOMYCIN TROUGH SERPL-MCNC: 15.1 UG/ML — SIGNIFICANT CHANGE UP (ref 10–20)
WBC # BLD: 12.6 K/UL — HIGH (ref 3.8–10.5)
WBC # FLD AUTO: 12.6 K/UL — HIGH (ref 3.8–10.5)

## 2017-01-17 PROCEDURE — 99232 SBSQ HOSP IP/OBS MODERATE 35: CPT

## 2017-01-17 PROCEDURE — 99222 1ST HOSP IP/OBS MODERATE 55: CPT | Mod: GC

## 2017-01-17 PROCEDURE — 99233 SBSQ HOSP IP/OBS HIGH 50: CPT | Mod: GC

## 2017-01-17 RX ORDER — SODIUM CHLORIDE 9 MG/ML
1000 INJECTION INTRAMUSCULAR; INTRAVENOUS; SUBCUTANEOUS
Qty: 0 | Refills: 0 | Status: DISCONTINUED | OUTPATIENT
Start: 2017-01-17 | End: 2017-01-17

## 2017-01-17 RX ADMIN — Medication 250 MILLIGRAM(S): at 05:02

## 2017-01-17 RX ADMIN — Medication 105 MILLIGRAM(S): at 05:01

## 2017-01-17 RX ADMIN — CITALOPRAM 10 MILLIGRAM(S): 10 TABLET, FILM COATED ORAL at 13:12

## 2017-01-17 RX ADMIN — LOSARTAN POTASSIUM 50 MILLIGRAM(S): 100 TABLET, FILM COATED ORAL at 05:27

## 2017-01-17 RX ADMIN — Medication 0.5 MILLIGRAM(S): at 23:25

## 2017-01-17 RX ADMIN — Medication 12.5 MILLIGRAM(S): at 17:39

## 2017-01-17 RX ADMIN — Medication 20 MILLIGRAM(S): at 05:27

## 2017-01-17 RX ADMIN — Medication 105 MILLIGRAM(S): at 13:12

## 2017-01-17 RX ADMIN — Medication 1 MILLIGRAM(S): at 12:03

## 2017-01-17 RX ADMIN — Medication 105 MILLIGRAM(S): at 23:25

## 2017-01-17 RX ADMIN — PIPERACILLIN AND TAZOBACTAM 25 GRAM(S): 4; .5 INJECTION, POWDER, LYOPHILIZED, FOR SOLUTION INTRAVENOUS at 13:12

## 2017-01-17 RX ADMIN — ENOXAPARIN SODIUM 40 MILLIGRAM(S): 100 INJECTION SUBCUTANEOUS at 12:03

## 2017-01-17 RX ADMIN — Medication 1 TABLET(S): at 12:03

## 2017-01-17 RX ADMIN — Medication 0.5 MILLIGRAM(S): at 17:39

## 2017-01-17 RX ADMIN — SODIUM CHLORIDE 50 MILLILITER(S): 9 INJECTION INTRAMUSCULAR; INTRAVENOUS; SUBCUTANEOUS at 04:14

## 2017-01-17 RX ADMIN — PIPERACILLIN AND TAZOBACTAM 25 GRAM(S): 4; .5 INJECTION, POWDER, LYOPHILIZED, FOR SOLUTION INTRAVENOUS at 05:27

## 2017-01-17 RX ADMIN — Medication 12.5 MILLIGRAM(S): at 05:27

## 2017-01-17 RX ADMIN — PIPERACILLIN AND TAZOBACTAM 25 GRAM(S): 4; .5 INJECTION, POWDER, LYOPHILIZED, FOR SOLUTION INTRAVENOUS at 23:25

## 2017-01-17 RX ADMIN — Medication 20 MILLIGRAM(S): at 17:39

## 2017-01-17 RX ADMIN — AMLODIPINE BESYLATE 10 MILLIGRAM(S): 2.5 TABLET ORAL at 05:27

## 2017-01-17 RX ADMIN — Medication 100 MILLIGRAM(S): at 12:03

## 2017-01-18 LAB
ANION GAP SERPL CALC-SCNC: 16 MMOL/L — SIGNIFICANT CHANGE UP (ref 5–17)
BUN SERPL-MCNC: 30 MG/DL — HIGH (ref 7–23)
CALCIUM SERPL-MCNC: 9.2 MG/DL — SIGNIFICANT CHANGE UP (ref 8.4–10.5)
CHLORIDE SERPL-SCNC: 103 MMOL/L — SIGNIFICANT CHANGE UP (ref 96–108)
CO2 SERPL-SCNC: 17 MMOL/L — LOW (ref 22–31)
CREAT SERPL-MCNC: 1.08 MG/DL — SIGNIFICANT CHANGE UP (ref 0.5–1.3)
FERRITIN SERPL-MCNC: 296.7 NG/ML — SIGNIFICANT CHANGE UP (ref 30–400)
GLUCOSE SERPL-MCNC: 185 MG/DL — HIGH (ref 70–99)
HCT VFR BLD CALC: 26.1 % — LOW (ref 39–50)
HCT VFR BLD CALC: 26.3 % — LOW (ref 39–50)
HGB BLD-MCNC: 8.7 G/DL — LOW (ref 13–17)
HGB BLD-MCNC: 9 G/DL — LOW (ref 13–17)
IRON SATN MFR SERPL: 22 % — SIGNIFICANT CHANGE UP (ref 16–55)
IRON SATN MFR SERPL: 50 UG/DL — SIGNIFICANT CHANGE UP (ref 45–165)
MAGNESIUM SERPL-MCNC: 1.8 MG/DL — SIGNIFICANT CHANGE UP (ref 1.6–2.6)
MCHC RBC-ENTMCNC: 31 PG — SIGNIFICANT CHANGE UP (ref 27–34)
MCHC RBC-ENTMCNC: 32.7 PG — SIGNIFICANT CHANGE UP (ref 27–34)
MCHC RBC-ENTMCNC: 33.3 GM/DL — SIGNIFICANT CHANGE UP (ref 32–36)
MCHC RBC-ENTMCNC: 34.4 GM/DL — SIGNIFICANT CHANGE UP (ref 32–36)
MCV RBC AUTO: 92.9 FL — SIGNIFICANT CHANGE UP (ref 80–100)
MCV RBC AUTO: 95 FL — SIGNIFICANT CHANGE UP (ref 80–100)
PHOSPHATE SERPL-MCNC: 2.6 MG/DL — SIGNIFICANT CHANGE UP (ref 2.5–4.5)
PLATELET # BLD AUTO: 416 K/UL — HIGH (ref 150–400)
PLATELET # BLD AUTO: 417 K/UL — HIGH (ref 150–400)
POTASSIUM SERPL-MCNC: 4.1 MMOL/L — SIGNIFICANT CHANGE UP (ref 3.5–5.3)
POTASSIUM SERPL-SCNC: 4.1 MMOL/L — SIGNIFICANT CHANGE UP (ref 3.5–5.3)
RBC # BLD: 2.77 M/UL — LOW (ref 4.2–5.8)
RBC # BLD: 2.81 M/UL — LOW (ref 4.2–5.8)
RBC # FLD: 12.9 % — SIGNIFICANT CHANGE UP (ref 10.3–14.5)
RBC # FLD: 13.6 % — SIGNIFICANT CHANGE UP (ref 10.3–14.5)
SODIUM SERPL-SCNC: 136 MMOL/L — SIGNIFICANT CHANGE UP (ref 135–145)
TIBC SERPL-MCNC: 223 UG/DL — SIGNIFICANT CHANGE UP (ref 220–430)
UIBC SERPL-MCNC: 173 UG/DL — SIGNIFICANT CHANGE UP (ref 110–370)
WBC # BLD: 11.3 K/UL — HIGH (ref 3.8–10.5)
WBC # BLD: 12.69 K/UL — HIGH (ref 3.8–10.5)
WBC # FLD AUTO: 11.3 K/UL — HIGH (ref 3.8–10.5)
WBC # FLD AUTO: 12.69 K/UL — HIGH (ref 3.8–10.5)

## 2017-01-18 PROCEDURE — 99233 SBSQ HOSP IP/OBS HIGH 50: CPT | Mod: GC

## 2017-01-18 RX ORDER — MAGNESIUM SULFATE 500 MG/ML
1 VIAL (ML) INJECTION ONCE
Qty: 0 | Refills: 0 | Status: COMPLETED | OUTPATIENT
Start: 2017-01-18 | End: 2017-01-18

## 2017-01-18 RX ADMIN — Medication 0.5 MILLIGRAM(S): at 05:59

## 2017-01-18 RX ADMIN — Medication 100 GRAM(S): at 13:05

## 2017-01-18 RX ADMIN — PIPERACILLIN AND TAZOBACTAM 25 GRAM(S): 4; .5 INJECTION, POWDER, LYOPHILIZED, FOR SOLUTION INTRAVENOUS at 05:59

## 2017-01-18 RX ADMIN — AMLODIPINE BESYLATE 10 MILLIGRAM(S): 2.5 TABLET ORAL at 05:59

## 2017-01-18 RX ADMIN — Medication 12.5 MILLIGRAM(S): at 05:58

## 2017-01-18 RX ADMIN — Medication 1 MILLIGRAM(S): at 13:05

## 2017-01-18 RX ADMIN — Medication 1 TABLET(S): at 13:04

## 2017-01-18 RX ADMIN — Medication 100 MILLIGRAM(S): at 13:04

## 2017-01-18 RX ADMIN — Medication 0.5 MILLIGRAM(S): at 13:04

## 2017-01-18 RX ADMIN — CITALOPRAM 10 MILLIGRAM(S): 10 TABLET, FILM COATED ORAL at 13:04

## 2017-01-18 RX ADMIN — ENOXAPARIN SODIUM 40 MILLIGRAM(S): 100 INJECTION SUBCUTANEOUS at 13:05

## 2017-01-18 RX ADMIN — Medication 105 MILLIGRAM(S): at 05:59

## 2017-01-18 RX ADMIN — Medication 12.5 MILLIGRAM(S): at 18:28

## 2017-01-18 RX ADMIN — LOSARTAN POTASSIUM 50 MILLIGRAM(S): 100 TABLET, FILM COATED ORAL at 05:59

## 2017-01-18 RX ADMIN — Medication 20 MILLIGRAM(S): at 05:59

## 2017-01-18 RX ADMIN — Medication 0.5 MILLIGRAM(S): at 23:46

## 2017-01-18 NOTE — DISCHARGE NOTE ADULT - CARE PLAN
Principal Discharge DX:	Alcohol withdrawal  Goal:	Resolve  Instructions for follow-up, activity and diet:	You were brought to the hospital because you were experiencing symptoms of withdrawing from hospital. You were briefly in the Medical Intensive Care Unit for close monitoring and transferred to a general medicine floors. Your symptoms improved. You were seen by a , who provided you resources to help you cut down on drinking after discharge from the hospital.  Secondary Diagnosis:	Gout  Goal:	Resolve  Instructions for follow-up, activity and diet:	While you were in the hospital, you had a gout flare and was placed on steroids. Please continue with taking allopurinol at home. Please follow up with your rheumatologist outpatient for further monitoring. Principal Discharge DX:	Alcohol withdrawal  Goal:	Resolve  Instructions for follow-up, activity and diet:	You were brought to the hospital because you were experiencing symptoms of withdrawing from hospital. You were briefly in the Medical Intensive Care Unit for close monitoring and transferred to a general medicine floors. Your symptoms improved. You were seen by a , who provided you resources to help you cut down on drinking after discharge from the hospital, such as going to meetings.  Secondary Diagnosis:	Gout  Goal:	Resolve  Instructions for follow-up, activity and diet:	While you were in the hospital, you had a gout flare and was placed on steroids. Please continue tapering off of Prednisone as prescribed, and  your prescription for the taper at your pharmacy. Please continue with taking allopurinol at home. Please follow up with your rheumatologist outpatient for further monitoring. Principal Discharge DX:	Alcohol withdrawal  Goal:	Resolve  Instructions for follow-up, activity and diet:	You were brought to the hospital because you were experiencing symptoms of withdrawing from hospital. You were briefly in the Medical Intensive Care Unit for close monitoring and transferred to a general medicine floors. Your symptoms improved. You were seen by a , who provided you resources to help you cut down on drinking after discharge from the hospital, such as going to meetings. Please take your magnesium supplements (sent to the pharmacy) to replenish your magnesium levels.  Secondary Diagnosis:	Gout  Goal:	Resolve  Instructions for follow-up, activity and diet:	While you were in the hospital, you had a gout flare and was placed on steroids. Please continue tapering off of Prednisone as prescribed, and  your prescription for the taper at your pharmacy. Take 20 mg on 1/21 and take 10mg on 1/22, 1/23, and 1/24. Please continue with taking allopurinol at home. Please follow up with Dr. Neda Llamas at [address] [place] [address] within 1-2 weeks of discharge. Bring your discharge paperwork with you so she can adjust your steroid regimen as she sees fit. Principal Discharge DX:	Alcohol withdrawal  Goal:	Resolve  Instructions for follow-up, activity and diet:	You were brought to the hospital because you were experiencing symptoms of withdrawing from hospital. You were briefly in the Medical Intensive Care Unit for close monitoring and transferred to a general medicine floors. Your symptoms improved. You were seen by a , who provided you resources to help you cut down on drinking after discharge from the hospital, such as going to meetings. Please take your magnesium supplements (sent to the pharmacy) to replenish your magnesium levels.  Secondary Diagnosis:	Gout  Goal:	Resolve  Instructions for follow-up, activity and diet:	While you were in the hospital, you had a gout flare and was placed on steroids. Please continue tapering off of Prednisone as prescribed, and  your prescription for the taper at your pharmacy. Take 20 mg on 1/21 and take 10mg on 1/22, 1/23, and 1/24. Please continue with taking allopurinol at home. Please follow up with Dr. Neda Llamas at [address] [place] [address] within 1-2 weeks of discharge. Bring your discharge paperwork with you so she can adjust your steroid regimen as she sees fit.

## 2017-01-18 NOTE — DISCHARGE NOTE ADULT - CARE PROVIDERS DIRECT ADDRESSES
[email or web address]

## 2017-01-18 NOTE — DISCHARGE NOTE ADULT - PLAN OF CARE
Resolve You were brought to the hospital because you were experiencing symptoms of withdrawing from hospital. You were briefly in the Medical Intensive Care Unit for close monitoring and transferred to a general medicine floors. Your symptoms improved. You were seen by a , who provided you resources to help you cut down on drinking after discharge from the hospital. While you were in the hospital, you had a gout flare and was placed on steroids. Please continue with taking allopurinol at home. Please follow up with your rheumatologist outpatient for further monitoring. You were brought to the hospital because you were experiencing symptoms of withdrawing from hospital. You were briefly in the Medical Intensive Care Unit for close monitoring and transferred to a general medicine floors. Your symptoms improved. You were seen by a , who provided you resources to help you cut down on drinking after discharge from the hospital, such as going to meetings. While you were in the hospital, you had a gout flare and was placed on steroids. Please continue tapering off of Prednisone as prescribed, and  your prescription for the taper at your pharmacy. Please continue with taking allopurinol at home. Please follow up with your rheumatologist outpatient for further monitoring. You were brought to the hospital because you were experiencing symptoms of withdrawing from hospital. You were briefly in the Medical Intensive Care Unit for close monitoring and transferred to a general medicine floors. Your symptoms improved. You were seen by a , who provided you resources to help you cut down on drinking after discharge from the hospital, such as going to meetings. Please take your magnesium supplements (sent to the pharmacy) to replenish your magnesium levels. While you were in the hospital, you had a gout flare and was placed on steroids. Please continue tapering off of Prednisone as prescribed, and  your prescription for the taper at your pharmacy. Take 20 mg on 1/21 and take 10mg on 1/22, 1/23, and 1/24. Please continue with taking allopurinol at home. Please follow up with Dr. Neda Llamas at [address] [place] [address] within 1-2 weeks of discharge. Bring your discharge paperwork with you so she can adjust your steroid regimen as she sees fit.

## 2017-01-18 NOTE — DISCHARGE NOTE ADULT - CARE PROVIDER_API CALL
Neda Llamas), Internal Medicine  [address] [place] [address]  Phone: [phone]  Fax: [phone]

## 2017-01-18 NOTE — DISCHARGE NOTE ADULT - HOSPITAL COURSE
HPI		  72M hx gout on prednisone which he completed one day ago, ETOH abuse with one glass of wine and one glass of liquor daily ( last drink was 4 days ago), HTN came to the hospital due to general weakness, feeling unwell and jittery.  Patient states that his knees were bothering him and he saw his rheumatologist a few days with no change in his medication, he states that he has been on low dose steroid for about a month for the gout.  He states to feel warm with no clear fever, no knee swelling,  no urinary complaint, no sick contact, no nausea, no vomiting, nodiarrhea, no current back pain, no headache.  He states that he stopped drinking 3-4 days ago because he did not feel well.  In the ED he was found to have fever, tachycardia and leucocytosis with no clear source.  Patient has received 2 L of NS and Received Lorazepam 2 mg IM X one as well as Acetaminophen IV.  When seen patient states that he feels ok except that he feels cold and jittery and has the hands shakes with no hallucinations, no vomiting.    In ED :   Tm= 101.2F   HR=     BP : 125-140/65/75    RR: 18-26  SPO2 = 100% ( 2 L NC)  72M hx gout on prednisone presents with weakness and fever. Pt has been feeling weak for the past few days. Pt seen by Rheumatologist a few days ago for check up on gout without any issues. Since then patient has been having generalized weakness. Pt normally drinks a few drinks a day. Stopped a few days ago. No he is jittery, tachycardic and febril. No other complaints besides feeling jittery and weak    In ED :   Tm= 101.2F   HR=     BP : 125-140/65/75    RR: 18-26  SPO2 = 100% ( 2 L NC)    Hospital Course  Patient was initially transferred to the MICU for alcohol withdrawal and a CIWA score of 21. Patient did not end up requiring intubation in the MICU, and did not experience seizures. Pt was pancultured and put on Vanc/Zosyn for possible infectious cause of fever and elevated white count. However, all infectious workup returned negative. Patient was briefly on precedex in the MICU for agitation. Pt was also started on Solumedrol for a gout flare. Pt gradually returned to baseline and was transferred to the medicine floors for further monitoring. On the medicine floors, pt's CIWA decreased to 1. Antibiotics and steroids were discontinued as pt remained afebrile with negative blood cultures and gout flare had resolved. Pt was tapered off Ativan. As per family, pt reportedly in denial regarding his alcoholism and abuse. Social work was consulted to discuss resources available to help pt with drinking. HPI		  72M hx gout on prednisone which he completed one day ago, ETOH abuse with one glass of wine and one glass of liquor daily ( last drink was 4 days ago), HTN came to the hospital due to general weakness, feeling unwell and jittery.  Patient states that his knees were bothering him and he saw his rheumatologist a few days with no change in his medication, he states that he has been on low dose steroid for about a month for the gout.  He states to feel warm with no clear fever, no knee swelling,  no urinary complaint, no sick contact, no nausea, no vomiting, nodiarrhea, no current back pain, no headache.  He states that he stopped drinking 3-4 days ago because he did not feel well.  In the ED he was found to have fever, tachycardia and leucocytosis with no clear source.  Patient has received 2 L of NS and Received Lorazepam 2 mg IM X one as well as Acetaminophen IV.  When seen patient states that he feels ok except that he feels cold and jittery and has the hands shakes with no hallucinations, no vomiting.    In ED :   Tm= 101.2F   HR=     BP : 125-140/65/75    RR: 18-26  SPO2 = 100% ( 2 L NC)  72M hx gout on prednisone presents with weakness and fever. Pt has been feeling weak for the past few days. Pt seen by Rheumatologist a few days ago for check up on gout without any issues. Since then patient has been having generalized weakness. Pt normally drinks a few drinks a day. Stopped a few days ago. No he is jittery, tachycardic and febril. No other complaints besides feeling jittery and weak    In ED :   Tm= 101.2F   HR=     BP : 125-140/65/75    RR: 18-26  SPO2 = 100% ( 2 L NC)    Hospital Course  Patient was initially transferred to the MICU for alcohol withdrawal and a CIWA score of 21. Patient did not end up requiring intubation in the MICU, and did not experience seizures. Pt was pancultured and put on Vanc/Zosyn for possible infectious cause of fever and elevated white count. However, all infectious workup returned negative. Patient was briefly on precedex in the MICU for agitation. Pt was also started on Solumedrol for a gout flare. Pt gradually returned to baseline and was transferred to the medicine floors for further monitoring. On the medicine floors, pt's CIWA decreased to 1. Antibiotics and steroids were discontinued as pt remained afebrile with negative blood cultures and gout flare had resolved. Pt was tapered off Ativan. As per family, pt reportedly in denial regarding his alcoholism and abuse. Social work was consulted to discuss resources available to help pt with drinking. Patient willing to seek help and attend meetings for alcoholism. Pt started on Prednisone taper (20mg x 3 days and 10mg x 3 days) as pt is on chronic steroids. Pt will be discharged with taper. Pt seen by physical therapy, and will be sent home with a rolling walker. HPI		  72M hx gout on prednisone which he completed one day ago, ETOH abuse with one glass of wine and one glass of liquor daily ( last drink was 4 days ago), HTN came to the hospital due to general weakness, feeling unwell and jittery.  Patient states that his knees were bothering him and he saw his rheumatologist a few days with no change in his medication, he states that he has been on low dose steroid for about a month for the gout.  He states to feel warm with no clear fever, no knee swelling,  no urinary complaint, no sick contact, no nausea, no vomiting, nodiarrhea, no current back pain, no headache.  He states that he stopped drinking 3-4 days ago because he did not feel well.  In the ED he was found to have fever, tachycardia and leucocytosis with no clear source.  Patient has received 2 L of NS and Received Lorazepam 2 mg IM X one as well as Acetaminophen IV.  When seen patient states that he feels ok except that he feels cold and jittery and has the hands shakes with no hallucinations, no vomiting.    In ED :   Tm= 101.2F   HR=     BP : 125-140/65/75    RR: 18-26  SPO2 = 100% ( 2 L NC)  72M hx gout on prednisone presents with weakness and fever. Pt has been feeling weak for the past few days. Pt seen by Rheumatologist a few days ago for check up on gout without any issues. Since then patient has been having generalized weakness. Pt normally drinks a few drinks a day. Stopped a few days ago. No he is jittery, tachycardic and febril. No other complaints besides feeling jittery and weak    In ED :   Tm= 101.2F   HR=     BP : 125-140/65/75    RR: 18-26  SPO2 = 100% ( 2 L NC)    Hospital Course  Patient was initially transferred to the MICU for alcohol withdrawal and a CIWA score of 21. Patient did not end up requiring intubation in the MICU, and did not experience seizures. Pt was pancultured and put on Vanc/Zosyn for possible infectious cause of fever and elevated white count. However, all infectious workup returned negative. Patient was briefly on precedex in the MICU for agitation. Pt was also started on Solumedrol for a gout flare. Pt gradually returned to baseline and was transferred to the medicine floors for further monitoring. On the medicine floors, pt's CIWA decreased to 1. Antibiotics and steroids were discontinued as pt remained afebrile with negative blood cultures and gout flare had resolved. Pt was tapered off Ativan. As per family, pt reportedly in denial regarding his alcoholism and abuse. Social work was consulted to discuss resources available to help pt with drinking. Patient willing to seek help and attend meetings for alcoholism. Pt started on Prednisone taper (20mg x 3 days and 10mg x 3 days) as pt is on chronic steroids. Pt will be discharged with taper and PO magnesium tablets. Pt seen by physical therapy, and will be sent home with a rolling walker.

## 2017-01-18 NOTE — DISCHARGE NOTE ADULT - PATIENT PORTAL LINK FT
“You can access the Follow[employer] Patient Portal, offered by [facility], by registering with the following website: [email or web address]”

## 2017-01-18 NOTE — DISCHARGE NOTE ADULT - MEDICATION SUMMARY - MEDICATIONS TO TAKE
I will START or STAY ON the medications listed below when I get home from the hospital:    Rolling Walker   -- Diagnosis: Idiopathic gout, right elbow  ICD Code:M10.021   -- Indication: For Physical Therapy    predniSONE 10 mg oral tablet  -- 1 tab(s) by mouth once a day Take from 1/22-1/24  -- It is very important that you take or use this exactly as directed.  Do not skip doses or discontinue unless directed by your doctor.  Obtain medical advice before taking any non-prescription drugs as some may affect the action of this medication.  Take with food or milk.    -- Indication: For Gout    predniSONE 20 mg oral tablet  -- 1 tab(s) by mouth once a day until 1/21  -- It is very important that you take or use this exactly as directed.  Do not skip doses or discontinue unless directed by your doctor.  Obtain medical advice before taking any non-prescription drugs as some may affect the action of this medication.  Take with food or milk.    -- Indication: For Gout    losartan 50 mg oral tablet  -- 1 tab(s) by mouth once a day  -- Indication: For Hypertension    citalopram 10 mg oral tablet  -- 1 tab(s) by mouth once a day  -- Indication: For Depression, unspecified depression type    allopurinol 100 mg oral tablet  -- 100 milligram(s) by mouth once a day  -- Indication: For Gout    amLODIPine 10 mg oral tablet  -- 1 tab(s) by mouth once a day  -- Indication: For Hypertension    magnesium oxide 140 mg oral capsule  -- 1 cap(s) by mouth once a day  -- Indication: For Low magnesium

## 2017-01-18 NOTE — DISCHARGE NOTE ADULT - NS AS DC FOLLOWUP STROKE INST
Influenza vaccination (VIS Pub Date: August 19, 2014)/Smoking Cessation Influenza vaccination (VIS Pub Date: August 19, 2014) Smoking Cessation

## 2017-01-18 NOTE — DISCHARGE NOTE ADULT - ADDITIONAL INSTRUCTIONS
Please follow up with Dr. Neda Llamas within 1-2 weeks of discharge regarding your steroid regimen, and bring your discharge paperwork with you.

## 2017-01-19 LAB
ANION GAP SERPL CALC-SCNC: 16 MMOL/L — SIGNIFICANT CHANGE UP (ref 5–17)
BUN SERPL-MCNC: 29 MG/DL — HIGH (ref 7–23)
CALCIUM SERPL-MCNC: 9.2 MG/DL — SIGNIFICANT CHANGE UP (ref 8.4–10.5)
CHLORIDE SERPL-SCNC: 103 MMOL/L — SIGNIFICANT CHANGE UP (ref 96–108)
CO2 SERPL-SCNC: 20 MMOL/L — LOW (ref 22–31)
CREAT SERPL-MCNC: 0.99 MG/DL — SIGNIFICANT CHANGE UP (ref 0.5–1.3)
GLUCOSE SERPL-MCNC: 114 MG/DL — HIGH (ref 70–99)
HCT VFR BLD CALC: 25.7 % — LOW (ref 39–50)
HGB BLD-MCNC: 8.7 G/DL — LOW (ref 13–17)
MAGNESIUM SERPL-MCNC: 1.8 MG/DL — SIGNIFICANT CHANGE UP (ref 1.6–2.6)
MCHC RBC-ENTMCNC: 31.6 PG — SIGNIFICANT CHANGE UP (ref 27–34)
MCHC RBC-ENTMCNC: 33.9 GM/DL — SIGNIFICANT CHANGE UP (ref 32–36)
MCV RBC AUTO: 93.5 FL — SIGNIFICANT CHANGE UP (ref 80–100)
PLATELET # BLD AUTO: 523 K/UL — HIGH (ref 150–400)
POTASSIUM SERPL-MCNC: 3.8 MMOL/L — SIGNIFICANT CHANGE UP (ref 3.5–5.3)
POTASSIUM SERPL-SCNC: 3.8 MMOL/L — SIGNIFICANT CHANGE UP (ref 3.5–5.3)
RBC # BLD: 2.75 M/UL — LOW (ref 4.2–5.8)
RBC # FLD: 14 % — SIGNIFICANT CHANGE UP (ref 10.3–14.5)
SODIUM SERPL-SCNC: 139 MMOL/L — SIGNIFICANT CHANGE UP (ref 135–145)
WBC # BLD: 11.82 K/UL — HIGH (ref 3.8–10.5)
WBC # FLD AUTO: 11.82 K/UL — HIGH (ref 3.8–10.5)

## 2017-01-19 PROCEDURE — 99233 SBSQ HOSP IP/OBS HIGH 50: CPT | Mod: GC

## 2017-01-19 RX ADMIN — Medication 0.5 MILLIGRAM(S): at 11:44

## 2017-01-19 RX ADMIN — Medication 20 MILLIGRAM(S): at 17:35

## 2017-01-19 RX ADMIN — Medication 1 TABLET(S): at 11:44

## 2017-01-19 RX ADMIN — CITALOPRAM 10 MILLIGRAM(S): 10 TABLET, FILM COATED ORAL at 11:44

## 2017-01-19 RX ADMIN — Medication 12.5 MILLIGRAM(S): at 06:32

## 2017-01-19 RX ADMIN — AMLODIPINE BESYLATE 10 MILLIGRAM(S): 2.5 TABLET ORAL at 06:32

## 2017-01-19 RX ADMIN — Medication 100 MILLIGRAM(S): at 11:44

## 2017-01-19 RX ADMIN — Medication 1 MILLIGRAM(S): at 11:44

## 2017-01-19 RX ADMIN — LOSARTAN POTASSIUM 50 MILLIGRAM(S): 100 TABLET, FILM COATED ORAL at 06:32

## 2017-01-19 RX ADMIN — ENOXAPARIN SODIUM 40 MILLIGRAM(S): 100 INJECTION SUBCUTANEOUS at 11:44

## 2017-01-19 RX ADMIN — Medication 12.5 MILLIGRAM(S): at 17:35

## 2017-01-19 NOTE — PHYSICAL THERAPY INITIAL EVALUATION ADULT - GAIT DEVIATIONS NOTED, PT EVAL
decreased weight-shifting ability/decreased stride length/min unsteady, no LOB, decreased stance time L LE/decreased step length/decreased radha

## 2017-01-19 NOTE — PHYSICAL THERAPY INITIAL EVALUATION ADULT - ADDITIONAL COMMENTS
Pt admitted to MICU for alcohol withdrawal and CIWA score of >20. Blood and urine cx negative, CXR negative. + gout flare up

## 2017-01-19 NOTE — PHYSICAL THERAPY INITIAL EVALUATION ADULT - LIVES WITH, PROFILE
spouse/at home, lives in an apt with 10 steps and 1 railing. Pt was I without a device PTA at home, lives in an apt with 10 steps and 1 railing. Pt was I without a device PTA. Daughter lives next door/spouse

## 2017-01-19 NOTE — PHYSICAL THERAPY INITIAL EVALUATION ADULT - PERTINENT HX OF CURRENT PROBLEM, REHAB EVAL
Pt with h/o ETOH abuse with one glass of wine and one glass of liquor daily ( last drink was 4 days ago), HTN came to the hospital due to general weakness, feeling unwell and jittery.  Patient states that his knees were bothering him and he saw his rheumatologist a few days with no change in his medication, he states that he has been on low dose steroid for about a month for the gout. In ED, pt with tachycardia, fever, and leukocytosis with no clear source.

## 2017-01-20 VITALS
RESPIRATION RATE: 18 BRPM | HEART RATE: 86 BPM | OXYGEN SATURATION: 98 % | DIASTOLIC BLOOD PRESSURE: 64 MMHG | TEMPERATURE: 98 F | SYSTOLIC BLOOD PRESSURE: 159 MMHG

## 2017-01-20 LAB
ANION GAP SERPL CALC-SCNC: 13 MMOL/L — SIGNIFICANT CHANGE UP (ref 5–17)
BUN SERPL-MCNC: 26 MG/DL — HIGH (ref 7–23)
CALCIUM SERPL-MCNC: 8.9 MG/DL — SIGNIFICANT CHANGE UP (ref 8.4–10.5)
CHLORIDE SERPL-SCNC: 102 MMOL/L — SIGNIFICANT CHANGE UP (ref 96–108)
CO2 SERPL-SCNC: 22 MMOL/L — SIGNIFICANT CHANGE UP (ref 22–31)
CREAT SERPL-MCNC: 0.77 MG/DL — SIGNIFICANT CHANGE UP (ref 0.5–1.3)
CULTURE RESULTS: SIGNIFICANT CHANGE UP
CULTURE RESULTS: SIGNIFICANT CHANGE UP
GLUCOSE SERPL-MCNC: 152 MG/DL — HIGH (ref 70–99)
HCT VFR BLD CALC: 28.1 % — LOW (ref 39–50)
HGB BLD-MCNC: 9.4 G/DL — LOW (ref 13–17)
MAGNESIUM SERPL-MCNC: 1.6 MG/DL — SIGNIFICANT CHANGE UP (ref 1.6–2.6)
MCHC RBC-ENTMCNC: 31.6 PG — SIGNIFICANT CHANGE UP (ref 27–34)
MCHC RBC-ENTMCNC: 33.5 GM/DL — SIGNIFICANT CHANGE UP (ref 32–36)
MCV RBC AUTO: 94.6 FL — SIGNIFICANT CHANGE UP (ref 80–100)
PHOSPHATE SERPL-MCNC: 3.8 MG/DL — SIGNIFICANT CHANGE UP (ref 2.5–4.5)
PLATELET # BLD AUTO: 504 K/UL — HIGH (ref 150–400)
POTASSIUM SERPL-MCNC: 4.4 MMOL/L — SIGNIFICANT CHANGE UP (ref 3.5–5.3)
POTASSIUM SERPL-SCNC: 4.4 MMOL/L — SIGNIFICANT CHANGE UP (ref 3.5–5.3)
RBC # BLD: 2.97 M/UL — LOW (ref 4.2–5.8)
RBC # FLD: 13.9 % — SIGNIFICANT CHANGE UP (ref 10.3–14.5)
SODIUM SERPL-SCNC: 137 MMOL/L — SIGNIFICANT CHANGE UP (ref 135–145)
SPECIMEN SOURCE: SIGNIFICANT CHANGE UP
SPECIMEN SOURCE: SIGNIFICANT CHANGE UP
WBC # BLD: 9.57 K/UL — SIGNIFICANT CHANGE UP (ref 3.8–10.5)
WBC # FLD AUTO: 9.57 K/UL — SIGNIFICANT CHANGE UP (ref 3.8–10.5)

## 2017-01-20 PROCEDURE — 84484 ASSAY OF TROPONIN QUANT: CPT

## 2017-01-20 PROCEDURE — 80053 COMPREHEN METABOLIC PANEL: CPT

## 2017-01-20 PROCEDURE — 80307 DRUG TEST PRSMV CHEM ANLYZR: CPT

## 2017-01-20 PROCEDURE — 96374 THER/PROPH/DIAG INJ IV PUSH: CPT | Mod: XU

## 2017-01-20 PROCEDURE — 86850 RBC ANTIBODY SCREEN: CPT

## 2017-01-20 PROCEDURE — 83690 ASSAY OF LIPASE: CPT

## 2017-01-20 PROCEDURE — 82330 ASSAY OF CALCIUM: CPT

## 2017-01-20 PROCEDURE — 82728 ASSAY OF FERRITIN: CPT

## 2017-01-20 PROCEDURE — 82950 GLUCOSE TEST: CPT

## 2017-01-20 PROCEDURE — 70450 CT HEAD/BRAIN W/O DYE: CPT

## 2017-01-20 PROCEDURE — 82947 ASSAY GLUCOSE BLOOD QUANT: CPT

## 2017-01-20 PROCEDURE — 87040 BLOOD CULTURE FOR BACTERIA: CPT

## 2017-01-20 PROCEDURE — 99285 EMERGENCY DEPT VISIT HI MDM: CPT | Mod: 25

## 2017-01-20 PROCEDURE — 86900 BLOOD TYPING SEROLOGIC ABO: CPT

## 2017-01-20 PROCEDURE — 87633 RESP VIRUS 12-25 TARGETS: CPT

## 2017-01-20 PROCEDURE — 81001 URINALYSIS AUTO W/SCOPE: CPT

## 2017-01-20 PROCEDURE — 85610 PROTHROMBIN TIME: CPT

## 2017-01-20 PROCEDURE — 87798 DETECT AGENT NOS DNA AMP: CPT

## 2017-01-20 PROCEDURE — 97161 PT EVAL LOW COMPLEX 20 MIN: CPT

## 2017-01-20 PROCEDURE — 83735 ASSAY OF MAGNESIUM: CPT

## 2017-01-20 PROCEDURE — 82553 CREATINE MB FRACTION: CPT

## 2017-01-20 PROCEDURE — 82435 ASSAY OF BLOOD CHLORIDE: CPT

## 2017-01-20 PROCEDURE — 82550 ASSAY OF CK (CPK): CPT

## 2017-01-20 PROCEDURE — 71045 X-RAY EXAM CHEST 1 VIEW: CPT

## 2017-01-20 PROCEDURE — 82803 BLOOD GASES ANY COMBINATION: CPT

## 2017-01-20 PROCEDURE — 83605 ASSAY OF LACTIC ACID: CPT

## 2017-01-20 PROCEDURE — 83550 IRON BINDING TEST: CPT

## 2017-01-20 PROCEDURE — 93005 ELECTROCARDIOGRAM TRACING: CPT | Mod: XU

## 2017-01-20 PROCEDURE — 87486 CHLMYD PNEUM DNA AMP PROBE: CPT

## 2017-01-20 PROCEDURE — 51701 INSERT BLADDER CATHETER: CPT

## 2017-01-20 PROCEDURE — 80202 ASSAY OF VANCOMYCIN: CPT

## 2017-01-20 PROCEDURE — 87581 M.PNEUMON DNA AMP PROBE: CPT

## 2017-01-20 PROCEDURE — 87086 URINE CULTURE/COLONY COUNT: CPT

## 2017-01-20 PROCEDURE — 84100 ASSAY OF PHOSPHORUS: CPT

## 2017-01-20 PROCEDURE — 85027 COMPLETE CBC AUTOMATED: CPT

## 2017-01-20 PROCEDURE — 73522 X-RAY EXAM HIPS BI 3-4 VIEWS: CPT

## 2017-01-20 PROCEDURE — 96372 THER/PROPH/DIAG INJ SC/IM: CPT | Mod: XU

## 2017-01-20 PROCEDURE — 80048 BASIC METABOLIC PNL TOTAL CA: CPT

## 2017-01-20 PROCEDURE — 84132 ASSAY OF SERUM POTASSIUM: CPT

## 2017-01-20 PROCEDURE — 85730 THROMBOPLASTIN TIME PARTIAL: CPT

## 2017-01-20 PROCEDURE — 85014 HEMATOCRIT: CPT

## 2017-01-20 PROCEDURE — 99239 HOSP IP/OBS DSCHRG MGMT >30: CPT

## 2017-01-20 PROCEDURE — 84295 ASSAY OF SERUM SODIUM: CPT

## 2017-01-20 PROCEDURE — 86901 BLOOD TYPING SEROLOGIC RH(D): CPT

## 2017-01-20 PROCEDURE — 82150 ASSAY OF AMYLASE: CPT

## 2017-01-20 PROCEDURE — 80061 LIPID PANEL: CPT

## 2017-01-20 RX ORDER — MAGNESIUM OXIDE 400 MG ORAL TABLET 241.3 MG
1 TABLET ORAL
Qty: 30 | Refills: 0 | OUTPATIENT
Start: 2017-01-20 | End: 2017-02-19

## 2017-01-20 RX ORDER — MAGNESIUM SULFATE 500 MG/ML
2 VIAL (ML) INJECTION ONCE
Qty: 0 | Refills: 0 | Status: COMPLETED | OUTPATIENT
Start: 2017-01-20 | End: 2017-01-20

## 2017-01-20 RX ADMIN — Medication 50 GRAM(S): at 12:12

## 2017-01-20 RX ADMIN — AMLODIPINE BESYLATE 10 MILLIGRAM(S): 2.5 TABLET ORAL at 06:10

## 2017-01-20 RX ADMIN — Medication 100 MILLIGRAM(S): at 12:13

## 2017-01-20 RX ADMIN — Medication 1 TABLET(S): at 12:12

## 2017-01-20 RX ADMIN — ENOXAPARIN SODIUM 40 MILLIGRAM(S): 100 INJECTION SUBCUTANEOUS at 12:13

## 2017-01-20 RX ADMIN — Medication 1 MILLIGRAM(S): at 12:12

## 2017-01-20 RX ADMIN — Medication 12.5 MILLIGRAM(S): at 06:09

## 2017-01-20 RX ADMIN — Medication 20 MILLIGRAM(S): at 06:10

## 2017-01-20 RX ADMIN — LOSARTAN POTASSIUM 50 MILLIGRAM(S): 100 TABLET, FILM COATED ORAL at 06:09

## 2017-01-20 RX ADMIN — CITALOPRAM 10 MILLIGRAM(S): 10 TABLET, FILM COATED ORAL at 12:13

## 2017-01-24 ENCOUNTER — OUTPATIENT (OUTPATIENT)
Dept: OUTPATIENT SERVICES | Facility: HOSPITAL | Age: 72
LOS: 1 days | Discharge: HOME | End: 2017-01-24

## 2017-01-25 DIAGNOSIS — F10.20 ALCOHOL DEPENDENCE, UNCOMPLICATED: ICD-10-CM

## 2017-01-27 ENCOUNTER — APPOINTMENT (OUTPATIENT)
Dept: GERIATRICS | Facility: CLINIC | Age: 72
End: 2017-01-27

## 2017-01-27 VITALS
HEART RATE: 68 BPM | OXYGEN SATURATION: 98 % | SYSTOLIC BLOOD PRESSURE: 130 MMHG | DIASTOLIC BLOOD PRESSURE: 68 MMHG | WEIGHT: 157.05 LBS | BODY MASS INDEX: 21.99 KG/M2 | HEIGHT: 71 IN | RESPIRATION RATE: 16 BRPM | TEMPERATURE: 98.5 F

## 2017-01-27 RX ORDER — COLCHICINE 0.6 MG/1
0.6 TABLET, FILM COATED ORAL EVERY OTHER DAY
Qty: 45 | Refills: 0 | Status: DISCONTINUED | COMMUNITY
Start: 2017-01-12 | End: 2017-01-27

## 2017-01-28 PROBLEM — M10.9 GOUT, UNSPECIFIED: Chronic | Status: ACTIVE | Noted: 2017-01-14

## 2017-01-28 PROBLEM — I10 ESSENTIAL (PRIMARY) HYPERTENSION: Chronic | Status: ACTIVE | Noted: 2017-01-14

## 2017-01-28 PROBLEM — F32.9 MAJOR DEPRESSIVE DISORDER, SINGLE EPISODE, UNSPECIFIED: Chronic | Status: ACTIVE | Noted: 2017-01-14

## 2017-02-03 ENCOUNTER — OTHER (OUTPATIENT)
Age: 72
End: 2017-02-03

## 2017-02-08 ENCOUNTER — RX RENEWAL (OUTPATIENT)
Age: 72
End: 2017-02-08

## 2017-02-17 ENCOUNTER — APPOINTMENT (OUTPATIENT)
Dept: GERIATRICS | Facility: CLINIC | Age: 72
End: 2017-02-17

## 2017-02-17 ENCOUNTER — LABORATORY RESULT (OUTPATIENT)
Age: 72
End: 2017-02-17

## 2017-02-17 VITALS
BODY MASS INDEX: 21.87 KG/M2 | WEIGHT: 156.25 LBS | TEMPERATURE: 97.6 F | OXYGEN SATURATION: 93 % | HEART RATE: 103 BPM | RESPIRATION RATE: 17 BRPM | HEIGHT: 71 IN | SYSTOLIC BLOOD PRESSURE: 120 MMHG | DIASTOLIC BLOOD PRESSURE: 70 MMHG

## 2017-02-18 LAB
ANION GAP SERPL CALC-SCNC: 17 MMOL/L
BASOPHILS # BLD AUTO: 0.04 K/UL
BASOPHILS NFR BLD AUTO: 0.3 %
BUN SERPL-MCNC: 23 MG/DL
CALCIUM SERPL-MCNC: 9.8 MG/DL
CHLORIDE SERPL-SCNC: 100 MMOL/L
CO2 SERPL-SCNC: 22 MMOL/L
CREAT SERPL-MCNC: 1.4 MG/DL
EOSINOPHIL # BLD AUTO: 0.17 K/UL
EOSINOPHIL NFR BLD AUTO: 1.1 %
GLUCOSE SERPL-MCNC: 101 MG/DL
HCT VFR BLD CALC: 31.9 %
HGB BLD-MCNC: 10.5 G/DL
IMM GRANULOCYTES NFR BLD AUTO: 0.3 %
LYMPHOCYTES # BLD AUTO: 5.99 K/UL
LYMPHOCYTES NFR BLD AUTO: 39.3 %
MAN DIFF?: NORMAL
MCHC RBC-ENTMCNC: 31.3 PG
MCHC RBC-ENTMCNC: 32.9 GM/DL
MCV RBC AUTO: 94.9 FL
MONOCYTES # BLD AUTO: 1.15 K/UL
MONOCYTES NFR BLD AUTO: 7.6 %
NEUTROPHILS # BLD AUTO: 7.83 K/UL
NEUTROPHILS NFR BLD AUTO: 51.4 %
PLATELET # BLD AUTO: 475 K/UL
POTASSIUM SERPL-SCNC: 5 MMOL/L
RBC # BLD: 3.36 M/UL
RBC # FLD: 14.5 %
SODIUM SERPL-SCNC: 139 MMOL/L
URATE SERPL-MCNC: 8 MG/DL
WBC # FLD AUTO: 15.23 K/UL

## 2017-02-21 ENCOUNTER — RESULT REVIEW (OUTPATIENT)
Age: 72
End: 2017-02-21

## 2017-02-24 ENCOUNTER — RX RENEWAL (OUTPATIENT)
Age: 72
End: 2017-02-24

## 2017-03-07 ENCOUNTER — APPOINTMENT (OUTPATIENT)
Dept: RHEUMATOLOGY | Facility: CLINIC | Age: 72
End: 2017-03-07

## 2017-03-07 VITALS
HEART RATE: 103 BPM | DIASTOLIC BLOOD PRESSURE: 78 MMHG | HEIGHT: 71 IN | OXYGEN SATURATION: 96 % | SYSTOLIC BLOOD PRESSURE: 148 MMHG

## 2017-03-07 RX ORDER — PREDNISONE 5 MG/1
5 TABLET ORAL
Qty: 39 | Refills: 0 | Status: DISCONTINUED | COMMUNITY
Start: 2017-02-17 | End: 2017-03-07

## 2017-03-09 ENCOUNTER — APPOINTMENT (OUTPATIENT)
Dept: RHEUMATOLOGY | Facility: CLINIC | Age: 72
End: 2017-03-09

## 2017-03-09 VITALS
TEMPERATURE: 97.9 F | WEIGHT: 156 LBS | HEIGHT: 71 IN | DIASTOLIC BLOOD PRESSURE: 70 MMHG | SYSTOLIC BLOOD PRESSURE: 134 MMHG | BODY MASS INDEX: 21.84 KG/M2 | OXYGEN SATURATION: 97 % | HEART RATE: 66 BPM

## 2017-03-09 LAB
B PERT IGG+IGM PNL SER: NORMAL
COLOR FLD: NORMAL
CRYSTALS SNV QL MICRO: NORMAL
EOSINOPHIL # FLD MANUAL: 1 %
FLUID INTAKE SUBSTANCE CLASS: NORMAL
LYMPHOCYTES # FLD MANUAL: 20 %
MONOS+MACROS NFR FLD MANUAL: 14 %
NEUTS SEG # FLD MANUAL: 65 %
RBC # FLD MANUAL: ABNORMAL /UL
TOTAL CELLS COUNTED FLD: 790 /UL
TUBE TYPE: NORMAL

## 2017-03-09 RX ORDER — PREDNISONE 5 MG/1
5 TABLET ORAL
Qty: 50 | Refills: 0 | Status: DISCONTINUED | COMMUNITY
Start: 2017-03-07 | End: 2017-03-09

## 2017-03-10 LAB
ALBUMIN SERPL ELPH-MCNC: 4.1 G/DL
ALP BLD-CCNC: 94 U/L
ALT SERPL-CCNC: 10 U/L
ANION GAP SERPL CALC-SCNC: 20 MMOL/L
AST SERPL-CCNC: 17 U/L
BASOPHILS # BLD AUTO: 0.02 K/UL
BASOPHILS NFR BLD AUTO: 0.2 %
BILIRUB SERPL-MCNC: 0.2 MG/DL
BUN SERPL-MCNC: 37 MG/DL
CALCIUM SERPL-MCNC: 9.6 MG/DL
CHLORIDE SERPL-SCNC: 102 MMOL/L
CO2 SERPL-SCNC: 21 MMOL/L
CREAT SERPL-MCNC: 1.53 MG/DL
EOSINOPHIL # BLD AUTO: 0.03 K/UL
EOSINOPHIL NFR BLD AUTO: 0.2 %
GLUCOSE SERPL-MCNC: 122 MG/DL
HCT VFR BLD CALC: 31.8 %
HGB BLD-MCNC: 9.9 G/DL
IMM GRANULOCYTES NFR BLD AUTO: 0.2 %
LYMPHOCYTES # BLD AUTO: 3.54 K/UL
LYMPHOCYTES NFR BLD AUTO: 27.5 %
MAN DIFF?: NORMAL
MCHC RBC-ENTMCNC: 30.7 PG
MCHC RBC-ENTMCNC: 31.1 GM/DL
MCV RBC AUTO: 98.8 FL
MONOCYTES # BLD AUTO: 0.21 K/UL
MONOCYTES NFR BLD AUTO: 1.6 %
NEUTROPHILS # BLD AUTO: 9.05 K/UL
NEUTROPHILS NFR BLD AUTO: 70.3 %
PLATELET # BLD AUTO: 392 K/UL
POTASSIUM SERPL-SCNC: 5 MMOL/L
PROT SERPL-MCNC: 6.8 G/DL
RBC # BLD: 3.22 M/UL
RBC # FLD: 15.5 %
SODIUM SERPL-SCNC: 143 MMOL/L
URATE SERPL-MCNC: 8.3 MG/DL
WBC # FLD AUTO: 12.88 K/UL

## 2017-03-13 LAB — BACTERIA FLD CULT: NORMAL

## 2017-03-22 ENCOUNTER — APPOINTMENT (OUTPATIENT)
Dept: GERIATRICS | Facility: CLINIC | Age: 72
End: 2017-03-22

## 2017-03-22 ENCOUNTER — LABORATORY RESULT (OUTPATIENT)
Age: 72
End: 2017-03-22

## 2017-03-22 VITALS
SYSTOLIC BLOOD PRESSURE: 122 MMHG | DIASTOLIC BLOOD PRESSURE: 58 MMHG | HEIGHT: 71 IN | BODY MASS INDEX: 22.96 KG/M2 | OXYGEN SATURATION: 97 % | WEIGHT: 164 LBS | RESPIRATION RATE: 17 BRPM | HEART RATE: 94 BPM | TEMPERATURE: 99.3 F

## 2017-03-22 DIAGNOSIS — M54.17 RADICULOPATHY, LUMBOSACRAL REGION: ICD-10-CM

## 2017-03-22 RX ORDER — PREDNISONE 5 MG/1
5 TABLET ORAL
Qty: 30 | Refills: 0 | Status: DISCONTINUED | COMMUNITY
Start: 2017-03-09 | End: 2017-03-22

## 2017-03-23 LAB
25(OH)D3 SERPL-MCNC: 34.3 NG/ML
ALBUMIN SERPL ELPH-MCNC: 4 G/DL
ALP BLD-CCNC: 94 U/L
ALT SERPL-CCNC: 11 U/L
ANION GAP SERPL CALC-SCNC: 13 MMOL/L
APPEARANCE: CLEAR
AST SERPL-CCNC: 18 U/L
BASOPHILS # BLD AUTO: 0.02 K/UL
BASOPHILS NFR BLD AUTO: 0.1 %
BILIRUB SERPL-MCNC: 0.3 MG/DL
BILIRUBIN URINE: NEGATIVE
BLOOD URINE: NEGATIVE
BUN SERPL-MCNC: 19 MG/DL
CALCIUM SERPL-MCNC: 9.5 MG/DL
CHLORIDE SERPL-SCNC: 104 MMOL/L
CO2 SERPL-SCNC: 24 MMOL/L
COLOR: YELLOW
CREAT SERPL-MCNC: 1.4 MG/DL
CREAT SPEC-SCNC: 84 MG/DL
EOSINOPHIL # BLD AUTO: 0.11 K/UL
EOSINOPHIL NFR BLD AUTO: 0.7 %
FERRITIN SERPL-MCNC: 56.3 NG/ML
FOLATE SERPL-MCNC: 19.1 NG/ML
GLUCOSE QUALITATIVE U: NORMAL MG/DL
GLUCOSE SERPL-MCNC: 100 MG/DL
HBA1C MFR BLD HPLC: 5.7 %
HCT VFR BLD CALC: 33.2 %
HGB BLD-MCNC: 10.3 G/DL
IMM GRANULOCYTES NFR BLD AUTO: 0.2 %
IRON SERPL-MCNC: 37 UG/DL
KETONES URINE: NEGATIVE
LEUKOCYTE ESTERASE URINE: NEGATIVE
LYMPHOCYTES # BLD AUTO: 5.62 K/UL
LYMPHOCYTES NFR BLD AUTO: 35.2 %
MAGNESIUM SERPL-MCNC: 1.6 MG/DL
MAN DIFF?: NORMAL
MCHC RBC-ENTMCNC: 30.8 PG
MCHC RBC-ENTMCNC: 31 GM/DL
MCV RBC AUTO: 99.4 FL
MICROALBUMIN 24H UR DL<=1MG/L-MCNC: 3 MG/DL
MICROALBUMIN/CREAT 24H UR-RTO: 36 UG/MG
MONOCYTES # BLD AUTO: 1.17 K/UL
MONOCYTES NFR BLD AUTO: 7.3 %
NEUTROPHILS # BLD AUTO: 9.02 K/UL
NEUTROPHILS NFR BLD AUTO: 56.5 %
NITRITE URINE: NEGATIVE
PH URINE: 6.5
PHOSPHATE SERPL-MCNC: 4.1 MG/DL
PLATELET # BLD AUTO: 347 K/UL
POTASSIUM SERPL-SCNC: 4.6 MMOL/L
PROT SERPL-MCNC: 6.5 G/DL
PROTEIN URINE: NEGATIVE MG/DL
RBC # BLD: 3.34 M/UL
RBC # BLD: 3.34 M/UL
RBC # FLD: 16.5 %
RETICS # AUTO: 2.6 %
RETICS AGGREG/RBC NFR: 85.8 K/UL
SODIUM SERPL-SCNC: 141 MMOL/L
SPECIFIC GRAVITY URINE: 1.02
TRANSFERRIN SERPL-MCNC: 203 MG/DL
TSH SERPL-ACNC: 1.11 UIU/ML
URATE SERPL-MCNC: 7.2 MG/DL
UROBILINOGEN URINE: 1 MG/DL
VIT B12 SERPL-MCNC: 375 PG/ML
WBC # FLD AUTO: 15.97 K/UL

## 2017-03-27 LAB — METHYLMALONATE SERPL-SCNC: 322 NMOL/L

## 2017-04-06 ENCOUNTER — APPOINTMENT (OUTPATIENT)
Dept: RHEUMATOLOGY | Facility: CLINIC | Age: 72
End: 2017-04-06

## 2017-04-21 ENCOUNTER — APPOINTMENT (OUTPATIENT)
Dept: RHEUMATOLOGY | Facility: CLINIC | Age: 72
End: 2017-04-21

## 2017-04-21 VITALS
SYSTOLIC BLOOD PRESSURE: 124 MMHG | OXYGEN SATURATION: 98 % | DIASTOLIC BLOOD PRESSURE: 65 MMHG | WEIGHT: 164 LBS | HEART RATE: 72 BPM | HEIGHT: 71 IN | BODY MASS INDEX: 22.96 KG/M2

## 2017-04-26 ENCOUNTER — APPOINTMENT (OUTPATIENT)
Dept: GERIATRICS | Facility: CLINIC | Age: 72
End: 2017-04-26

## 2017-04-26 VITALS
DIASTOLIC BLOOD PRESSURE: 62 MMHG | HEART RATE: 89 BPM | OXYGEN SATURATION: 98 % | SYSTOLIC BLOOD PRESSURE: 122 MMHG | TEMPERATURE: 98 F | HEIGHT: 71 IN | BODY MASS INDEX: 23.1 KG/M2 | RESPIRATION RATE: 17 BRPM | WEIGHT: 165.04 LBS

## 2017-04-26 RX ORDER — MECOBALAMIN 1000 MCG
1 TABLET,CHEWABLE ORAL
Refills: 0 | Status: DISCONTINUED | COMMUNITY
Start: 2017-04-21 | End: 2017-04-26

## 2017-04-26 RX ORDER — CHLORHEXIDINE GLUCONATE 4 %
1000 LIQUID (ML) TOPICAL DAILY
Qty: 90 | Refills: 1 | Status: DISCONTINUED | COMMUNITY
Start: 2017-03-23 | End: 2017-04-26

## 2017-05-07 ENCOUNTER — FORM ENCOUNTER (OUTPATIENT)
Age: 72
End: 2017-05-07

## 2017-05-08 ENCOUNTER — APPOINTMENT (OUTPATIENT)
Dept: ULTRASOUND IMAGING | Facility: CLINIC | Age: 72
End: 2017-05-08

## 2017-05-08 ENCOUNTER — OUTPATIENT (OUTPATIENT)
Dept: OUTPATIENT SERVICES | Facility: HOSPITAL | Age: 72
LOS: 1 days | End: 2017-05-08
Payer: MEDICARE

## 2017-05-08 DIAGNOSIS — F17.200 NICOTINE DEPENDENCE, UNSPECIFIED, UNCOMPLICATED: ICD-10-CM

## 2017-05-08 PROCEDURE — 76775 US EXAM ABDO BACK WALL LIM: CPT

## 2017-05-09 ENCOUNTER — APPOINTMENT (OUTPATIENT)
Dept: GERIATRICS | Facility: CLINIC | Age: 72
End: 2017-05-09

## 2017-05-09 ENCOUNTER — FORM ENCOUNTER (OUTPATIENT)
Age: 72
End: 2017-05-09

## 2017-05-09 VITALS
HEIGHT: 71 IN | WEIGHT: 169 LBS | HEART RATE: 89 BPM | RESPIRATION RATE: 17 BRPM | DIASTOLIC BLOOD PRESSURE: 62 MMHG | BODY MASS INDEX: 23.66 KG/M2 | TEMPERATURE: 98.2 F | SYSTOLIC BLOOD PRESSURE: 130 MMHG | OXYGEN SATURATION: 97 %

## 2017-05-09 RX ORDER — GLUCOSAMINE/MSM/CHONDROIT SULF 500-166.6
1000 TABLET ORAL DAILY
Refills: 0 | Status: DISCONTINUED | COMMUNITY
Start: 2017-04-26 | End: 2017-05-09

## 2017-05-10 ENCOUNTER — APPOINTMENT (OUTPATIENT)
Dept: ULTRASOUND IMAGING | Facility: CLINIC | Age: 72
End: 2017-05-10

## 2017-05-10 ENCOUNTER — OUTPATIENT (OUTPATIENT)
Dept: OUTPATIENT SERVICES | Facility: HOSPITAL | Age: 72
LOS: 1 days | End: 2017-05-10
Payer: MEDICARE

## 2017-05-10 DIAGNOSIS — R60.0 LOCALIZED EDEMA: ICD-10-CM

## 2017-05-10 PROCEDURE — 93970 EXTREMITY STUDY: CPT

## 2017-05-23 ENCOUNTER — APPOINTMENT (OUTPATIENT)
Dept: GERIATRICS | Facility: CLINIC | Age: 72
End: 2017-05-23

## 2017-05-23 VITALS
DIASTOLIC BLOOD PRESSURE: 60 MMHG | RESPIRATION RATE: 15 BRPM | BODY MASS INDEX: 23.82 KG/M2 | WEIGHT: 170.13 LBS | OXYGEN SATURATION: 99 % | HEART RATE: 63 BPM | HEIGHT: 71 IN | SYSTOLIC BLOOD PRESSURE: 120 MMHG | TEMPERATURE: 98.6 F

## 2017-05-23 DIAGNOSIS — Z86.59 PERSONAL HISTORY OF OTHER MENTAL AND BEHAVIORAL DISORDERS: ICD-10-CM

## 2017-05-23 DIAGNOSIS — D53.9 NUTRITIONAL ANEMIA, UNSPECIFIED: ICD-10-CM

## 2017-05-23 DIAGNOSIS — R91.1 SOLITARY PULMONARY NODULE: ICD-10-CM

## 2017-05-30 ENCOUNTER — RX RENEWAL (OUTPATIENT)
Age: 72
End: 2017-05-30

## 2017-06-12 ENCOUNTER — OUTPATIENT (OUTPATIENT)
Dept: OUTPATIENT SERVICES | Facility: HOSPITAL | Age: 72
LOS: 1 days | End: 2017-06-12
Payer: MEDICARE

## 2017-06-12 ENCOUNTER — APPOINTMENT (OUTPATIENT)
Dept: CT IMAGING | Facility: CLINIC | Age: 72
End: 2017-06-12

## 2017-06-12 DIAGNOSIS — Z00.8 ENCOUNTER FOR OTHER GENERAL EXAMINATION: ICD-10-CM

## 2017-06-12 PROCEDURE — 71250 CT THORAX DX C-: CPT

## 2017-06-19 ENCOUNTER — LABORATORY RESULT (OUTPATIENT)
Age: 72
End: 2017-06-19

## 2017-06-25 ENCOUNTER — OUTPATIENT (OUTPATIENT)
Dept: OUTPATIENT SERVICES | Facility: HOSPITAL | Age: 72
LOS: 1 days | End: 2017-06-25
Payer: MEDICARE

## 2017-06-25 ENCOUNTER — APPOINTMENT (OUTPATIENT)
Dept: NUCLEAR MEDICINE | Facility: IMAGING CENTER | Age: 72
End: 2017-06-25

## 2017-06-25 DIAGNOSIS — Z00.8 ENCOUNTER FOR OTHER GENERAL EXAMINATION: ICD-10-CM

## 2017-06-25 PROCEDURE — 78815 PET IMAGE W/CT SKULL-THIGH: CPT

## 2017-06-25 PROCEDURE — A9552: CPT

## 2017-06-26 ENCOUNTER — APPOINTMENT (OUTPATIENT)
Dept: GERIATRICS | Facility: CLINIC | Age: 72
End: 2017-06-26

## 2017-06-28 ENCOUNTER — APPOINTMENT (OUTPATIENT)
Dept: RHEUMATOLOGY | Facility: CLINIC | Age: 72
End: 2017-06-28

## 2017-06-28 VITALS
HEIGHT: 71 IN | BODY MASS INDEX: 22.96 KG/M2 | SYSTOLIC BLOOD PRESSURE: 135 MMHG | WEIGHT: 164 LBS | HEART RATE: 84 BPM | DIASTOLIC BLOOD PRESSURE: 78 MMHG | OXYGEN SATURATION: 97 % | TEMPERATURE: 98.6 F

## 2017-06-28 DIAGNOSIS — Z71.89 OTHER SPECIFIED COUNSELING: ICD-10-CM

## 2017-06-28 DIAGNOSIS — M25.561 PAIN IN RIGHT KNEE: ICD-10-CM

## 2017-06-28 DIAGNOSIS — M25.572 PAIN IN LEFT ANKLE AND JOINTS OF LEFT FOOT: ICD-10-CM

## 2017-06-28 RX ORDER — LIDOCAINE HYDROCHLORIDE 10 MG/ML
1 INJECTION, SOLUTION INFILTRATION; PERINEURAL
Refills: 0 | Status: COMPLETED | OUTPATIENT
Start: 2017-06-28

## 2017-06-28 RX ORDER — METHYLPRED ACET/NACL,ISO-OS/PF 40 MG/ML
40 VIAL (ML) INJECTION
Qty: 1 | Refills: 0 | Status: COMPLETED | OUTPATIENT
Start: 2017-06-28

## 2017-06-28 RX ADMIN — LIDOCAINE HYDROCHLORIDE %: 10 INJECTION, SOLUTION INFILTRATION; PERINEURAL at 00:00

## 2017-06-28 RX ADMIN — METHYLPREDNISOLONE ACETATE 1 MG/ML: 40 INJECTION, SUSPENSION INTRA-ARTICULAR; INTRALESIONAL; INTRAMUSCULAR; SOFT TISSUE at 00:00

## 2017-07-07 ENCOUNTER — RX RENEWAL (OUTPATIENT)
Age: 72
End: 2017-07-07

## 2017-07-12 ENCOUNTER — APPOINTMENT (OUTPATIENT)
Dept: CT IMAGING | Facility: HOSPITAL | Age: 72
End: 2017-07-12

## 2017-07-18 ENCOUNTER — APPOINTMENT (OUTPATIENT)
Dept: THORACIC SURGERY | Facility: CLINIC | Age: 72
End: 2017-07-18

## 2017-07-18 VITALS
DIASTOLIC BLOOD PRESSURE: 75 MMHG | OXYGEN SATURATION: 99 % | BODY MASS INDEX: 22.96 KG/M2 | WEIGHT: 164 LBS | SYSTOLIC BLOOD PRESSURE: 143 MMHG | RESPIRATION RATE: 14 BRPM | HEART RATE: 60 BPM | HEIGHT: 71 IN

## 2017-07-31 ENCOUNTER — OUTPATIENT (OUTPATIENT)
Dept: OUTPATIENT SERVICES | Facility: HOSPITAL | Age: 72
LOS: 1 days | End: 2017-07-31
Payer: MEDICARE

## 2017-07-31 VITALS
RESPIRATION RATE: 16 BRPM | SYSTOLIC BLOOD PRESSURE: 140 MMHG | OXYGEN SATURATION: 99 % | HEIGHT: 70.5 IN | TEMPERATURE: 99 F | WEIGHT: 166.89 LBS | DIASTOLIC BLOOD PRESSURE: 80 MMHG | HEART RATE: 97 BPM

## 2017-07-31 DIAGNOSIS — R59.0 LOCALIZED ENLARGED LYMPH NODES: ICD-10-CM

## 2017-07-31 DIAGNOSIS — Z90.49 ACQUIRED ABSENCE OF OTHER SPECIFIED PARTS OF DIGESTIVE TRACT: Chronic | ICD-10-CM

## 2017-07-31 DIAGNOSIS — Z98.890 OTHER SPECIFIED POSTPROCEDURAL STATES: Chronic | ICD-10-CM

## 2017-07-31 DIAGNOSIS — Z90.89 ACQUIRED ABSENCE OF OTHER ORGANS: Chronic | ICD-10-CM

## 2017-07-31 LAB
BLD GP AB SCN SERPL QL: NEGATIVE — SIGNIFICANT CHANGE UP
BUN SERPL-MCNC: 28 MG/DL — HIGH (ref 7–23)
CALCIUM SERPL-MCNC: 9.9 MG/DL — SIGNIFICANT CHANGE UP (ref 8.4–10.5)
CHLORIDE SERPL-SCNC: 102 MMOL/L — SIGNIFICANT CHANGE UP (ref 98–107)
CO2 SERPL-SCNC: 22 MMOL/L — SIGNIFICANT CHANGE UP (ref 22–31)
CREAT SERPL-MCNC: 1.42 MG/DL — HIGH (ref 0.5–1.3)
GLUCOSE SERPL-MCNC: 99 MG/DL — SIGNIFICANT CHANGE UP (ref 70–99)
HCT VFR BLD CALC: 33.3 % — LOW (ref 39–50)
HGB BLD-MCNC: 10.7 G/DL — LOW (ref 13–17)
MCHC RBC-ENTMCNC: 30.4 PG — SIGNIFICANT CHANGE UP (ref 27–34)
MCHC RBC-ENTMCNC: 32.1 % — SIGNIFICANT CHANGE UP (ref 32–36)
MCV RBC AUTO: 94.6 FL — SIGNIFICANT CHANGE UP (ref 80–100)
NRBC # FLD: 0 — SIGNIFICANT CHANGE UP
PLATELET # BLD AUTO: 283 K/UL — SIGNIFICANT CHANGE UP (ref 150–400)
PMV BLD: 10.5 FL — SIGNIFICANT CHANGE UP (ref 7–13)
POTASSIUM SERPL-MCNC: 4.4 MMOL/L — SIGNIFICANT CHANGE UP (ref 3.5–5.3)
POTASSIUM SERPL-SCNC: 4.4 MMOL/L — SIGNIFICANT CHANGE UP (ref 3.5–5.3)
RBC # BLD: 3.52 M/UL — LOW (ref 4.2–5.8)
RBC # FLD: 16 % — HIGH (ref 10.3–14.5)
RH IG SCN BLD-IMP: POSITIVE — SIGNIFICANT CHANGE UP
SODIUM SERPL-SCNC: 139 MMOL/L — SIGNIFICANT CHANGE UP (ref 135–145)
WBC # BLD: 14.33 K/UL — HIGH (ref 3.8–10.5)
WBC # FLD AUTO: 14.33 K/UL — HIGH (ref 3.8–10.5)

## 2017-07-31 PROCEDURE — 93010 ELECTROCARDIOGRAM REPORT: CPT

## 2017-07-31 RX ORDER — SODIUM CHLORIDE 9 MG/ML
1000 INJECTION, SOLUTION INTRAVENOUS
Qty: 0 | Refills: 0 | Status: CANCELLED | OUTPATIENT
Start: 2017-08-18 | End: 2017-08-14

## 2017-07-31 RX ORDER — LOSARTAN POTASSIUM 100 MG/1
1 TABLET, FILM COATED ORAL
Qty: 0 | Refills: 0 | COMMUNITY

## 2017-07-31 RX ORDER — SODIUM CHLORIDE 9 MG/ML
3 INJECTION INTRAMUSCULAR; INTRAVENOUS; SUBCUTANEOUS EVERY 8 HOURS
Qty: 0 | Refills: 0 | Status: CANCELLED | OUTPATIENT
Start: 2017-08-18 | End: 2017-08-14

## 2017-07-31 RX ORDER — CITALOPRAM 10 MG/1
1 TABLET, FILM COATED ORAL
Qty: 0 | Refills: 0 | COMMUNITY

## 2017-07-31 RX ORDER — AMLODIPINE BESYLATE 2.5 MG/1
1 TABLET ORAL
Qty: 0 | Refills: 0 | COMMUNITY

## 2017-07-31 RX ORDER — ALLOPURINOL 300 MG
100 TABLET ORAL
Qty: 0 | Refills: 0 | COMMUNITY

## 2017-07-31 NOTE — H&P PST ADULT - RS GEN PE MLT RESP DETAILS PC
no rhonchi/no intercostal retractions/no chest wall tenderness/respirations non-labored/no subcutaneous emphysema/airway patent/clear to auscultation bilaterally/no rales/breath sounds equal/good air movement

## 2017-07-31 NOTE — H&P PST ADULT - LYMPHATIC
anterior cervical R/supraclavicular R/posterior cervical L/anterior cervical L/posterior cervical R/supraclavicular L

## 2017-07-31 NOTE — H&P PST ADULT - NEGATIVE GASTROINTESTINAL SYMPTOMS
no hiccoughs/no abdominal pain/no constipation/no vomiting/no flatulence/no melena/no change in bowel habits/no hematochezia/no diarrhea/no nausea

## 2017-07-31 NOTE — H&P PST ADULT - PROBLEM SELECTOR PLAN 1
Scheduled for bronchoscopy, right video assisted thoracoscopy, lung resection on 08/16/17. Pre op instructions, famotidine, chlorhexidine gluconate soap given and explained. Pt verbalized understanding.

## 2017-07-31 NOTE — H&P PST ADULT - NEGATIVE GENERAL GENITOURINARY SYMPTOMS
no bladder infections/no urine discoloration/no flank pain R/no gas in urine/no incontinence/no flank pain L/no dysuria/normal urinary frequency/no hematuria/no nocturia/no renal colic no hematuria/no urine discoloration/no flank pain R/no incontinence/no bladder infections/no renal colic/normal urinary frequency/no nocturia/no flank pain L/no gas in urine/no dysuria/no urinary hesitancy

## 2017-07-31 NOTE — H&P PST ADULT - NEGATIVE SKIN SYMPTOMS
no change in size/color of mole/no tumor/no dryness/no rash/no pitted nails/no itching/no brittle nails

## 2017-07-31 NOTE — H&P PST ADULT - NEGATIVE GENERAL SYMPTOMS
no malaise/no weight gain/no fatigue/no polyuria/no weight loss/no polydipsia/no sweating/no fever/no chills/no polyphagia

## 2017-07-31 NOTE — H&P PST ADULT - NEGATIVE OPHTHALMOLOGIC SYMPTOMS
no discharge L/no irritation R/no blurred vision R/no lacrimation R/no discharge R/no irritation L/no blurred vision L/no loss of vision L/no diplopia/no pain L/no photophobia/no loss of vision R/no pain R/no lacrimation L

## 2017-07-31 NOTE — H&P PST ADULT - NEGATIVE BREAST SYMPTOMS
no nipple discharge L/no breast lump L/no breast tenderness L/no nipple discharge R/no breast tenderness R/no breast lump R

## 2017-07-31 NOTE — H&P PST ADULT - HISTORY OF PRESENT ILLNESS
71 y/o  male with PMH: HTN, Gout  pulmonary nodule found incidentally on Ct scan of chest 2 months ago 71 y/o  male with PMH: HTN, Gout presents to PST for pre op evaluation with hx of pulmonary nodule found incidentally on Ct scan of chest 2 months ago. Now scheduled for bronchoscopy, right VAT, lung resection on 08/16/17

## 2017-07-31 NOTE — H&P PST ADULT - NSANTHOSAYNRD_GEN_A_CORE
No. MICHOACANO screening performed.  STOP BANG Legend: 0-2 = LOW Risk; 3-4 = INTERMEDIATE Risk; 5-8 = HIGH Risk

## 2017-07-31 NOTE — H&P PST ADULT - PMH
Alcohol abuse  Sober since 01/2016- attending AA meeting weekly  Depression, unspecified depression type    Essential hypertension    Gout    HTN (hypertension)    Localized enlarged lymph nodes    Solitary pulmonary nodule

## 2017-07-31 NOTE — H&P PST ADULT - NEGATIVE CARDIOVASCULAR SYMPTOMS
no peripheral edema/no chest pain/no dyspnea on exertion/no claudication/no orthopnea/no paroxysmal nocturnal dyspnea/no palpitations

## 2017-07-31 NOTE — H&P PST ADULT - PSH
H/O hemorrhoidectomy  1967  H/O left knee surgery  1995  History of appendectomy  1958  History of tonsillectomy  as a child

## 2017-08-01 ENCOUNTER — APPOINTMENT (OUTPATIENT)
Dept: CARDIOLOGY | Facility: CLINIC | Age: 72
End: 2017-08-01
Payer: MEDICARE

## 2017-08-01 VITALS
HEART RATE: 87 BPM | SYSTOLIC BLOOD PRESSURE: 132 MMHG | OXYGEN SATURATION: 98 % | TEMPERATURE: 98.7 F | DIASTOLIC BLOOD PRESSURE: 72 MMHG | BODY MASS INDEX: 23.24 KG/M2 | WEIGHT: 166 LBS | HEIGHT: 71 IN

## 2017-08-01 DIAGNOSIS — R91.1 SOLITARY PULMONARY NODULE: ICD-10-CM

## 2017-08-01 DIAGNOSIS — Z01.810 ENCOUNTER FOR PREPROCEDURAL CARDIOVASCULAR EXAMINATION: ICD-10-CM

## 2017-08-01 PROCEDURE — 99205 OFFICE O/P NEW HI 60 MIN: CPT

## 2017-08-03 ENCOUNTER — APPOINTMENT (OUTPATIENT)
Dept: MRI IMAGING | Facility: IMAGING CENTER | Age: 72
End: 2017-08-03
Payer: MEDICARE

## 2017-08-03 ENCOUNTER — OUTPATIENT (OUTPATIENT)
Dept: OUTPATIENT SERVICES | Facility: HOSPITAL | Age: 72
LOS: 1 days | End: 2017-08-03
Payer: MEDICARE

## 2017-08-03 DIAGNOSIS — R91.1 SOLITARY PULMONARY NODULE: ICD-10-CM

## 2017-08-03 DIAGNOSIS — Z98.890 OTHER SPECIFIED POSTPROCEDURAL STATES: Chronic | ICD-10-CM

## 2017-08-03 DIAGNOSIS — Z90.49 ACQUIRED ABSENCE OF OTHER SPECIFIED PARTS OF DIGESTIVE TRACT: Chronic | ICD-10-CM

## 2017-08-03 DIAGNOSIS — Z90.89 ACQUIRED ABSENCE OF OTHER ORGANS: Chronic | ICD-10-CM

## 2017-08-03 DIAGNOSIS — R59.0 LOCALIZED ENLARGED LYMPH NODES: ICD-10-CM

## 2017-08-03 PROBLEM — F10.10 ALCOHOL ABUSE, UNCOMPLICATED: Chronic | Status: ACTIVE | Noted: 2017-01-14

## 2017-08-03 PROCEDURE — 82565 ASSAY OF CREATININE: CPT

## 2017-08-03 PROCEDURE — 70553 MRI BRAIN STEM W/O & W/DYE: CPT | Mod: 26

## 2017-08-03 PROCEDURE — 70553 MRI BRAIN STEM W/O & W/DYE: CPT

## 2017-08-03 PROCEDURE — A9585: CPT

## 2017-08-04 ENCOUNTER — APPOINTMENT (OUTPATIENT)
Dept: CARDIOLOGY | Facility: CLINIC | Age: 72
End: 2017-08-04
Payer: MEDICARE

## 2017-08-04 PROCEDURE — 93306 TTE W/DOPPLER COMPLETE: CPT

## 2017-08-07 ENCOUNTER — APPOINTMENT (OUTPATIENT)
Dept: CARDIOLOGY | Facility: CLINIC | Age: 72
End: 2017-08-07
Payer: MEDICARE

## 2017-08-07 PROCEDURE — 93015 CV STRESS TEST SUPVJ I&R: CPT

## 2017-08-07 PROCEDURE — 78452 HT MUSCLE IMAGE SPECT MULT: CPT

## 2017-08-07 PROCEDURE — A9500: CPT

## 2017-08-07 RX ORDER — REGADENOSON 0.08 MG/ML
0.4 INJECTION, SOLUTION INTRAVENOUS
Qty: 1 | Refills: 0 | Status: COMPLETED | OUTPATIENT
Start: 2017-08-07

## 2017-08-07 RX ADMIN — REGADENOSON 0.4 MG/5ML: 0.08 INJECTION, SOLUTION INTRAVENOUS at 00:00

## 2017-08-11 RX ORDER — SODIUM CHLORIDE 9 MG/ML
1000 INJECTION, SOLUTION INTRAVENOUS
Qty: 0 | Refills: 0 | Status: DISCONTINUED | OUTPATIENT
Start: 2017-08-14 | End: 2017-08-14

## 2017-08-14 ENCOUNTER — RESULT REVIEW (OUTPATIENT)
Age: 72
End: 2017-08-14

## 2017-08-14 ENCOUNTER — APPOINTMENT (OUTPATIENT)
Dept: THORACIC SURGERY | Facility: HOSPITAL | Age: 72
End: 2017-08-14
Payer: MEDICARE

## 2017-08-14 ENCOUNTER — OUTPATIENT (OUTPATIENT)
Dept: INPATIENT UNIT | Facility: HOSPITAL | Age: 72
LOS: 1 days | Discharge: HOME | End: 2017-08-14
Payer: MEDICARE

## 2017-08-14 VITALS
HEIGHT: 70.5 IN | TEMPERATURE: 99 F | HEART RATE: 66 BPM | WEIGHT: 166.89 LBS | RESPIRATION RATE: 16 BRPM | SYSTOLIC BLOOD PRESSURE: 138 MMHG | OXYGEN SATURATION: 98 % | DIASTOLIC BLOOD PRESSURE: 71 MMHG

## 2017-08-14 VITALS
SYSTOLIC BLOOD PRESSURE: 126 MMHG | RESPIRATION RATE: 15 BRPM | HEART RATE: 57 BPM | DIASTOLIC BLOOD PRESSURE: 61 MMHG | OXYGEN SATURATION: 99 %

## 2017-08-14 DIAGNOSIS — Z98.890 OTHER SPECIFIED POSTPROCEDURAL STATES: Chronic | ICD-10-CM

## 2017-08-14 DIAGNOSIS — Z90.89 ACQUIRED ABSENCE OF OTHER ORGANS: Chronic | ICD-10-CM

## 2017-08-14 DIAGNOSIS — R59.0 LOCALIZED ENLARGED LYMPH NODES: ICD-10-CM

## 2017-08-14 DIAGNOSIS — Z90.49 ACQUIRED ABSENCE OF OTHER SPECIFIED PARTS OF DIGESTIVE TRACT: Chronic | ICD-10-CM

## 2017-08-14 LAB — RH IG SCN BLD-IMP: POSITIVE — SIGNIFICANT CHANGE UP

## 2017-08-14 PROCEDURE — 88305 TISSUE EXAM BY PATHOLOGIST: CPT | Mod: 26

## 2017-08-14 PROCEDURE — 88172 CYTP DX EVAL FNA 1ST EA SITE: CPT | Mod: 26

## 2017-08-14 PROCEDURE — 88364 INSITU HYBRIDIZATION (FISH): CPT | Mod: 26

## 2017-08-14 PROCEDURE — 88360 TUMOR IMMUNOHISTOCHEM/MANUAL: CPT | Mod: 26

## 2017-08-14 PROCEDURE — 88173 CYTOPATH EVAL FNA REPORT: CPT | Mod: 26

## 2017-08-14 PROCEDURE — 88365 INSITU HYBRIDIZATION (FISH): CPT | Mod: 26,59

## 2017-08-14 PROCEDURE — 88342 IMHCHEM/IMCYTCHM 1ST ANTB: CPT | Mod: 26,59

## 2017-08-14 PROCEDURE — 31652 BRONCH EBUS SAMPLNG 1/2 NODE: CPT | Mod: GC

## 2017-08-14 PROCEDURE — 88367 INSITU HYBRIDIZATION AUTO: CPT | Mod: 26

## 2017-08-14 PROCEDURE — 88341 IMHCHEM/IMCYTCHM EA ADD ANTB: CPT | Mod: 26,59

## 2017-08-14 RX ORDER — FENTANYL CITRATE 50 UG/ML
50 INJECTION INTRAVENOUS
Qty: 0 | Refills: 0 | Status: DISCONTINUED | OUTPATIENT
Start: 2017-08-14 | End: 2017-08-14

## 2017-08-14 RX ORDER — ONDANSETRON 8 MG/1
4 TABLET, FILM COATED ORAL ONCE
Qty: 0 | Refills: 0 | Status: DISCONTINUED | OUTPATIENT
Start: 2017-08-14 | End: 2017-08-14

## 2017-08-14 RX ORDER — FENTANYL CITRATE 50 UG/ML
25 INJECTION INTRAVENOUS
Qty: 0 | Refills: 0 | Status: DISCONTINUED | OUTPATIENT
Start: 2017-08-14 | End: 2017-08-14

## 2017-08-14 NOTE — ASU DISCHARGE PLAN (ADULT/PEDIATRIC). - MEDICATION SUMMARY - MEDICATIONS TO TAKE
I will START or STAY ON the medications listed below when I get home from the hospital:    losartan 50 mg oral tablet  -- 1 tab(s) by mouth once a day AM  -- Indication: For Hypertension    colchicine 0.6 mg oral tablet  -- 1 tab(s) by mouth 3 times a day  -- Indication: For Gout    allopurinol 100 mg oral tablet  -- 1 tab(s) by mouth 3 times a day  -- Indication: For Gout    metoprolol succinate 25 mg oral tablet, extended release  -- 1 tab(s) by mouth once a day AM  -- Indication: For Hypertension    Centrum oral tablet  -- 1 tab(s) by mouth once a day LD 8/7/2017  -- Indication: For Vitamin

## 2017-08-14 NOTE — BRIEF OPERATIVE NOTE - PROCEDURE
Endobronchial ultrasound (EBUS) during bronchoscopy  08/14/2017    Active  [facility][note 3]

## 2017-08-14 NOTE — ASU DISCHARGE PLAN (ADULT/PEDIATRIC). - INSTRUCTIONS
Follow up for surgery as discussed with Dr Fsoter.  Please call at anytime if you have any questions or concerns.

## 2017-08-15 ENCOUNTER — TRANSCRIPTION ENCOUNTER (OUTPATIENT)
Age: 72
End: 2017-08-15

## 2017-08-15 LAB — BLD GP AB SCN SERPL QL: NEGATIVE — SIGNIFICANT CHANGE UP

## 2017-08-16 ENCOUNTER — RESULT REVIEW (OUTPATIENT)
Age: 72
End: 2017-08-16

## 2017-08-16 ENCOUNTER — INPATIENT (INPATIENT)
Facility: HOSPITAL | Age: 72
LOS: 4 days | Discharge: HOME | End: 2017-08-21
Attending: THORACIC SURGERY (CARDIOTHORACIC VASCULAR SURGERY) | Admitting: THORACIC SURGERY (CARDIOTHORACIC VASCULAR SURGERY)
Payer: MEDICARE

## 2017-08-16 ENCOUNTER — APPOINTMENT (OUTPATIENT)
Dept: THORACIC SURGERY | Facility: HOSPITAL | Age: 72
End: 2017-08-16
Payer: MEDICARE

## 2017-08-16 VITALS
RESPIRATION RATE: 16 BRPM | HEART RATE: 65 BPM | OXYGEN SATURATION: 100 % | HEIGHT: 70.5 IN | WEIGHT: 166.89 LBS | TEMPERATURE: 99 F | DIASTOLIC BLOOD PRESSURE: 67 MMHG | SYSTOLIC BLOOD PRESSURE: 141 MMHG

## 2017-08-16 DIAGNOSIS — Z90.89 ACQUIRED ABSENCE OF OTHER ORGANS: Chronic | ICD-10-CM

## 2017-08-16 DIAGNOSIS — Z98.890 OTHER SPECIFIED POSTPROCEDURAL STATES: Chronic | ICD-10-CM

## 2017-08-16 DIAGNOSIS — R91.1 SOLITARY PULMONARY NODULE: ICD-10-CM

## 2017-08-16 DIAGNOSIS — Z90.49 ACQUIRED ABSENCE OF OTHER SPECIFIED PARTS OF DIGESTIVE TRACT: Chronic | ICD-10-CM

## 2017-08-16 LAB
HCT VFR BLD CALC: 30.6 % — LOW (ref 39–50)
HGB BLD-MCNC: 9.8 G/DL — LOW (ref 13–17)
MCHC RBC-ENTMCNC: 29.8 PG — SIGNIFICANT CHANGE UP (ref 27–34)
MCHC RBC-ENTMCNC: 32 % — SIGNIFICANT CHANGE UP (ref 32–36)
MCV RBC AUTO: 93 FL — SIGNIFICANT CHANGE UP (ref 80–100)
NRBC # FLD: 0 — SIGNIFICANT CHANGE UP
PLATELET # BLD AUTO: 266 K/UL — SIGNIFICANT CHANGE UP (ref 150–400)
PMV BLD: 10.8 FL — SIGNIFICANT CHANGE UP (ref 7–13)
RBC # BLD: 3.29 M/UL — LOW (ref 4.2–5.8)
RBC # FLD: 16 % — HIGH (ref 10.3–14.5)
WBC # BLD: 20.16 K/UL — HIGH (ref 3.8–10.5)
WBC # FLD AUTO: 20.16 K/UL — HIGH (ref 3.8–10.5)

## 2017-08-16 PROCEDURE — 88341 IMHCHEM/IMCYTCHM EA ADD ANTB: CPT | Mod: 26

## 2017-08-16 PROCEDURE — 32663 THORACOSCOPY W/LOBECTOMY: CPT | Mod: GC

## 2017-08-16 PROCEDURE — 88331 PATH CONSLTJ SURG 1 BLK 1SPC: CPT | Mod: 26

## 2017-08-16 PROCEDURE — 88309 TISSUE EXAM BY PATHOLOGIST: CPT | Mod: 26

## 2017-08-16 PROCEDURE — 71010: CPT | Mod: 26

## 2017-08-16 PROCEDURE — 88342 IMHCHEM/IMCYTCHM 1ST ANTB: CPT | Mod: 26

## 2017-08-16 PROCEDURE — 88305 TISSUE EXAM BY PATHOLOGIST: CPT | Mod: 26

## 2017-08-16 PROCEDURE — 32668 THORACOSCOPY W/W RESECT DIAG: CPT | Mod: GC

## 2017-08-16 PROCEDURE — 99233 SBSQ HOSP IP/OBS HIGH 50: CPT

## 2017-08-16 PROCEDURE — 31622 DX BRONCHOSCOPE/WASH: CPT | Mod: GC

## 2017-08-16 PROCEDURE — 88313 SPECIAL STAINS GROUP 2: CPT | Mod: 26

## 2017-08-16 PROCEDURE — 32674 THORACOSCOPY LYMPH NODE EXC: CPT | Mod: GC

## 2017-08-16 RX ORDER — NICOTINE POLACRILEX 2 MG
1 GUM BUCCAL DAILY
Qty: 0 | Refills: 0 | Status: DISCONTINUED | OUTPATIENT
Start: 2017-08-16 | End: 2017-08-21

## 2017-08-16 RX ORDER — SODIUM CHLORIDE 9 MG/ML
1000 INJECTION, SOLUTION INTRAVENOUS
Qty: 0 | Refills: 0 | Status: DISCONTINUED | OUTPATIENT
Start: 2017-08-16 | End: 2017-08-18

## 2017-08-16 RX ORDER — IPRATROPIUM/ALBUTEROL SULFATE 18-103MCG
3 AEROSOL WITH ADAPTER (GRAM) INHALATION EVERY 6 HOURS
Qty: 0 | Refills: 0 | Status: DISCONTINUED | OUTPATIENT
Start: 2017-08-16 | End: 2017-08-18

## 2017-08-16 RX ORDER — ONDANSETRON 8 MG/1
4 TABLET, FILM COATED ORAL ONCE
Qty: 0 | Refills: 0 | Status: DISCONTINUED | OUTPATIENT
Start: 2017-08-16 | End: 2017-08-21

## 2017-08-16 RX ORDER — NALOXONE HYDROCHLORIDE 4 MG/.1ML
0.1 SPRAY NASAL
Qty: 0 | Refills: 0 | Status: DISCONTINUED | OUTPATIENT
Start: 2017-08-16 | End: 2017-08-21

## 2017-08-16 RX ORDER — CHLORHEXIDINE GLUCONATE 213 G/1000ML
1 SOLUTION TOPICAL DAILY
Qty: 0 | Refills: 0 | Status: DISCONTINUED | OUTPATIENT
Start: 2017-08-16 | End: 2017-08-20

## 2017-08-16 RX ORDER — HYDROMORPHONE HYDROCHLORIDE 2 MG/ML
30 INJECTION INTRAMUSCULAR; INTRAVENOUS; SUBCUTANEOUS
Qty: 0 | Refills: 0 | Status: DISCONTINUED | OUTPATIENT
Start: 2017-08-16 | End: 2017-08-18

## 2017-08-16 RX ORDER — ALLOPURINOL 300 MG
100 TABLET ORAL
Qty: 0 | Refills: 0 | Status: DISCONTINUED | OUTPATIENT
Start: 2017-08-16 | End: 2017-08-17

## 2017-08-16 RX ORDER — DOCUSATE SODIUM 100 MG
100 CAPSULE ORAL THREE TIMES A DAY
Qty: 0 | Refills: 0 | Status: DISCONTINUED | OUTPATIENT
Start: 2017-08-16 | End: 2017-08-21

## 2017-08-16 RX ORDER — ONDANSETRON 8 MG/1
4 TABLET, FILM COATED ORAL EVERY 6 HOURS
Qty: 0 | Refills: 0 | Status: DISCONTINUED | OUTPATIENT
Start: 2017-08-16 | End: 2017-08-21

## 2017-08-16 RX ORDER — FAMOTIDINE 10 MG/ML
20 INJECTION INTRAVENOUS EVERY 12 HOURS
Qty: 0 | Refills: 0 | Status: DISCONTINUED | OUTPATIENT
Start: 2017-08-16 | End: 2017-08-21

## 2017-08-16 RX ORDER — HEPARIN SODIUM 5000 [USP'U]/ML
5000 INJECTION INTRAVENOUS; SUBCUTANEOUS EVERY 8 HOURS
Qty: 0 | Refills: 0 | Status: DISCONTINUED | OUTPATIENT
Start: 2017-08-16 | End: 2017-08-19

## 2017-08-16 RX ORDER — HYDROMORPHONE HYDROCHLORIDE 2 MG/ML
0.25 INJECTION INTRAMUSCULAR; INTRAVENOUS; SUBCUTANEOUS
Qty: 0 | Refills: 0 | Status: DISCONTINUED | OUTPATIENT
Start: 2017-08-16 | End: 2017-08-18

## 2017-08-16 RX ORDER — COLCHICINE 0.6 MG
0.6 TABLET ORAL THREE TIMES A DAY
Qty: 0 | Refills: 0 | Status: DISCONTINUED | OUTPATIENT
Start: 2017-08-16 | End: 2017-08-17

## 2017-08-16 RX ORDER — SENNA PLUS 8.6 MG/1
2 TABLET ORAL AT BEDTIME
Qty: 0 | Refills: 0 | Status: DISCONTINUED | OUTPATIENT
Start: 2017-08-16 | End: 2017-08-21

## 2017-08-16 RX ADMIN — SENNA PLUS 2 TABLET(S): 8.6 TABLET ORAL at 21:43

## 2017-08-16 RX ADMIN — HYDROMORPHONE HYDROCHLORIDE 30 MILLILITER(S): 2 INJECTION INTRAMUSCULAR; INTRAVENOUS; SUBCUTANEOUS at 19:31

## 2017-08-16 RX ADMIN — Medication 3 MILLILITER(S): at 21:37

## 2017-08-16 RX ADMIN — Medication 1 PATCH: at 21:48

## 2017-08-16 RX ADMIN — HEPARIN SODIUM 5000 UNIT(S): 5000 INJECTION INTRAVENOUS; SUBCUTANEOUS at 21:44

## 2017-08-16 RX ADMIN — HYDROMORPHONE HYDROCHLORIDE 30 MILLILITER(S): 2 INJECTION INTRAMUSCULAR; INTRAVENOUS; SUBCUTANEOUS at 18:30

## 2017-08-16 RX ADMIN — Medication 100 MILLIGRAM(S): at 21:44

## 2017-08-16 NOTE — ASU PATIENT PROFILE, ADULT - PMH
Alcohol abuse  Sober since 01/2016- attending AA meeting weekly  Depression, unspecified depression type    Essential hypertension    Gout    HTN (hypertension)    Localized enlarged lymph nodes    Solitary pulmonary nodule Alcohol abuse  Sober since 01/2016- attending AA meeting weekly  Depression, unspecified depression type    Essential hypertension    Gout    HTN (hypertension)    Localized enlarged lymph nodes    Solitary pulmonary nodule    TIA (transient ischemic attack)  1999

## 2017-08-16 NOTE — BRIEF OPERATIVE NOTE - PROCEDURE
Lobectomy, lung, using VATS, with bronchoscopy  08/16/2017  Single incision VATS.  RUL wedge resection followed by completion lobectomy.  Active  [facility]

## 2017-08-16 NOTE — PROGRESS NOTE ADULT - SUBJECTIVE AND OBJECTIVE BOX
RABIA HUGHES            MRN-[id number]         No Known Allergies                 71 y/o  male with PMH: HTN, Gout presents to [facility] for pre op evaluation with hx of pulmonary nodule found incidentally on Ct scan of chest 2 months ago. Now scheduled for bronchoscopy, right VAT, lung resection on 08/16/17       Procedure: Uniportal RVATS.  RUL wedge resection followed by completion lobectomy.  POD# 0    Home Medications:   * Patient Currently Takes Medications as of 31-Jul-2017 14:47 documented in Prescription Writer  · 	allopurinol 100 mg oral tablet: Last Dose Taken:  , 1 tab(s) orally 3 times a day  · 	metoprolol succinate 25 mg oral tablet, extended release: Last Dose Taken:  , 1 tab(s) orally once a day AM  · 	losartan 50 mg oral tablet: Last Dose Taken:  , 1 tab(s) orally once a day AM  · 	colchicine 0.6 mg oral tablet: Last Dose Taken:  , 1 tab(s) orally 3 times a day  · 	Centrum oral tablet: Last Dose Taken:  , 1 tab(s) orally once a day LD 8/7/2017    Issues:  Lung cancer - RUL  Postop pain  HTN  Gout  Hx of ETOH abuse      PAST MEDICAL & SURGICAL HISTORY:  TIA (transient ischemic attack): 1999  Localized enlarged lymph nodes  Solitary pulmonary nodule  HTN (hypertension)  Alcohol abuse: Sober since 01/2016- attending AA meeting weekly  Depression, unspecified depression type  Gout  Essential hypertension  H/O hemorrhoidectomy: 1967  History of tonsillectomy: as a child  History of appendectomy: 1958  H/O left knee surgery: 1995        ICU Vital Signs Last 24 Hrs  T(C): 35.9 (16 Aug 2017 17:55), Max: 37 (16 Aug 2017 10:43)  T(F): 96.6 (16 Aug 2017 17:55), Max: 98.6 (16 Aug 2017 10:43)  HR: 76 (16 Aug 2017 18:30) (65 - 87)  BP: 133/77 (16 Aug 2017 18:30) (133/77 - 145/96)  BP(mean): 89 (16 Aug 2017 18:30) (87 - 105)  ABP: --  ABP(mean): --  RR: 23 (16 Aug 2017 18:30) (16 - 25)  SpO2: 93% (16 Aug 2017 18:30) (93% - 100%)    I&O's Summary    16 Aug 2017 07:01  -  16 Aug 2017 18:55  --------------------------------------------------------  IN: 0 mL / OUT: 10 mL / NET: -10 mL      CAPILLARY BLOOD GLUCOSE      MEDICATIONS  (STANDING):  HYDROmorphone PCA (1 mG/mL) 30 milliLiter(s) PCA Continuous PCA Continuous  heparin  Injectable 5000 Unit(s) SubCutaneous every 8 hours  famotidine    Tablet 20 milliGRAM(s) Oral every 12 hours  docusate sodium 100 milliGRAM(s) Oral three times a day  senna 2 Tablet(s) Oral at bedtime  allopurinol 100 milliGRAM(s) Oral three times a day after meals  colchicine 0.6 milliGRAM(s) Oral three times a day  ALBUTerol/ipratropium for Nebulization 3 milliLiter(s) Nebulizer every 6 hours  nicotine - 21 mG/24Hr(s) Patch 1 patch Transdermal daily    MEDICATIONS  (PRN):  HYDROmorphone  Injectable 0.25 milliGRAM(s) IV Push every 10 minutes PRN Moderate Pain  ondansetron Injectable 4 milliGRAM(s) IV Push once PRN Nausea and/or Vomiting  HYDROmorphone PCA (1 mG/mL) Rescue Clinician Bolus 0.25 milliGRAM(s) IV Push every 15 minutes PRN for Pain Scale GREATER THAN 6  naloxone Injectable 0.1 milliGRAM(s) IV Push every 3 minutes PRN For ANY of the following changes in patient status:  A. RR LESS THAN 10 breaths per minute, B. Oxygen saturation LESS THAN 90%, C. Sedation score of 6  ondansetron Injectable 4 milliGRAM(s) IV Push every 6 hours PRN Nausea      Physical exam:                             General:               Pt is awake, alert, not in any distress                                                  Neuro:                  Nonfocal                             Cardiovascular:     S1 & S2, regular                           Respiratory:         Air entry is fair and equal on both sides, has bilateral conducted sounds                           GI:                       Soft, nondistended and nontender, Bowel sounds active                            Ext:                     No cyanosis or edema     Labs:                                                                           9.8    20.16 )-----------( 266      ( 16 Aug 2017 11:05 )             30.6              CXR: R-CT in place, small R-apical pneumo /space    Plan:    General: 72yMale  s/p Uniportal RVATS.  RUL wedge resection followed by completion lobectomy.  POD#0  progressing well, experiencing  pain with deep breathing.                             Neuro:                                         Pain control with PCA                             Cardiovascular:                                          Continue hemodynamic monitoring.    HTN: Monitor hemodynamic status and start Lopressor / Losartan                            Respiratory:                                         Pt is on 2 L  nasal canula,                                           Comfortable, not in any distress.                                         Using incentive spirometry                                          Monitor chest tube output                                         Chest tube to suction                                                                Continue bronchodilators, pulmonary toilet                            GI                                         On clear liquids                                          Continue GI prophylaxis with Pepcid                                          Continue Zofran / Reglan for nausea - PRN	                                                                 Renal:                                         Continue LR 30CC/hr                                         Monitor I/Os and electrolytes                                         Espinoza                                                  Hem/ Onc:                                                                                  Monitor chest tube output &  signs of bleeding.                                          Follow CBC in AM                           Infectious disease:                                            No signs of infection. Monitor for fever / leukocytosis.                                          All surgical incision / chest tube  sites look clean                            Endocrine                                             Continue Accu-Checks with coverage    Gout:     Restart Allopurinol and Colchicine    Pertinent clinical, laboratory, radiographic, hemodynamic, echocardiographic, respiratory data, microbiologic data and chart were reviewed and analyzed frequently throughout the course of the day and night  Patient seen, examined and plan discussed with CT Surgeon / CTICU team during rounds.    Pt's status discussed with family at bedside, updated status            Jj Ty MD

## 2017-08-16 NOTE — ASU PREOP CHECKLIST - COMMENTS
took allopurinol and metoprolol at 7am with sip of water took allopurinol,losartan and metoprolol at 7am with sip of water

## 2017-08-16 NOTE — BRIEF OPERATIVE NOTE - OPERATION/FINDINGS
Frozen section of wedge resection. suggestive of poorly differentiated malignancy with positive margin.

## 2017-08-17 ENCOUNTER — APPOINTMENT (OUTPATIENT)
Dept: RHEUMATOLOGY | Facility: CLINIC | Age: 72
End: 2017-08-17

## 2017-08-17 LAB
BASOPHILS # BLD AUTO: 0.03 K/UL — SIGNIFICANT CHANGE UP (ref 0–0.2)
BASOPHILS NFR BLD AUTO: 0.2 % — SIGNIFICANT CHANGE UP (ref 0–2)
BUN SERPL-MCNC: 28 MG/DL — HIGH (ref 7–23)
CALCIUM SERPL-MCNC: 8.7 MG/DL — SIGNIFICANT CHANGE UP (ref 8.4–10.5)
CHLORIDE SERPL-SCNC: 102 MMOL/L — SIGNIFICANT CHANGE UP (ref 98–107)
CO2 SERPL-SCNC: 21 MMOL/L — LOW (ref 22–31)
CREAT SERPL-MCNC: 1.38 MG/DL — HIGH (ref 0.5–1.3)
EOSINOPHIL # BLD AUTO: 0.02 K/UL — SIGNIFICANT CHANGE UP (ref 0–0.5)
EOSINOPHIL NFR BLD AUTO: 0.1 % — SIGNIFICANT CHANGE UP (ref 0–6)
GLUCOSE SERPL-MCNC: 167 MG/DL — HIGH (ref 70–99)
HCT VFR BLD CALC: 30.1 % — LOW (ref 39–50)
HGB BLD-MCNC: 9.9 G/DL — LOW (ref 13–17)
IMM GRANULOCYTES # BLD AUTO: 0.06 # — SIGNIFICANT CHANGE UP
IMM GRANULOCYTES NFR BLD AUTO: 0.4 % — SIGNIFICANT CHANGE UP (ref 0–1.5)
LYMPHOCYTES # BLD AUTO: 20.7 % — SIGNIFICANT CHANGE UP (ref 13–44)
LYMPHOCYTES # BLD AUTO: 3.08 K/UL — SIGNIFICANT CHANGE UP (ref 1–3.3)
MCHC RBC-ENTMCNC: 30.7 PG — SIGNIFICANT CHANGE UP (ref 27–34)
MCHC RBC-ENTMCNC: 32.9 % — SIGNIFICANT CHANGE UP (ref 32–36)
MCV RBC AUTO: 93.5 FL — SIGNIFICANT CHANGE UP (ref 80–100)
MONOCYTES # BLD AUTO: 1.43 K/UL — HIGH (ref 0–0.9)
MONOCYTES NFR BLD AUTO: 9.6 % — SIGNIFICANT CHANGE UP (ref 2–14)
NEUTROPHILS # BLD AUTO: 10.28 K/UL — HIGH (ref 1.8–7.4)
NEUTROPHILS NFR BLD AUTO: 69 % — SIGNIFICANT CHANGE UP (ref 43–77)
NRBC # FLD: 0 — SIGNIFICANT CHANGE UP
PLATELET # BLD AUTO: 243 K/UL — SIGNIFICANT CHANGE UP (ref 150–400)
PMV BLD: 10.1 FL — SIGNIFICANT CHANGE UP (ref 7–13)
POTASSIUM SERPL-MCNC: 4.6 MMOL/L — SIGNIFICANT CHANGE UP (ref 3.5–5.3)
POTASSIUM SERPL-SCNC: 4.6 MMOL/L — SIGNIFICANT CHANGE UP (ref 3.5–5.3)
RBC # BLD: 3.22 M/UL — LOW (ref 4.2–5.8)
RBC # FLD: 15.9 % — HIGH (ref 10.3–14.5)
SODIUM SERPL-SCNC: 137 MMOL/L — SIGNIFICANT CHANGE UP (ref 135–145)
WBC # BLD: 14.9 K/UL — HIGH (ref 3.8–10.5)
WBC # FLD AUTO: 14.9 K/UL — HIGH (ref 3.8–10.5)

## 2017-08-17 PROCEDURE — 71010: CPT | Mod: 26

## 2017-08-17 RX ORDER — ACETAMINOPHEN 500 MG
1000 TABLET ORAL ONCE
Qty: 0 | Refills: 0 | Status: COMPLETED | OUTPATIENT
Start: 2017-08-17 | End: 2017-08-17

## 2017-08-17 RX ORDER — ALLOPURINOL 300 MG
300 TABLET ORAL DAILY
Qty: 0 | Refills: 0 | Status: DISCONTINUED | OUTPATIENT
Start: 2017-08-18 | End: 2017-08-21

## 2017-08-17 RX ORDER — LOSARTAN POTASSIUM 100 MG/1
50 TABLET, FILM COATED ORAL DAILY
Qty: 0 | Refills: 0 | Status: DISCONTINUED | OUTPATIENT
Start: 2017-08-18 | End: 2017-08-18

## 2017-08-17 RX ORDER — COLCHICINE 0.6 MG
0.6 TABLET ORAL
Qty: 0 | Refills: 0 | Status: DISCONTINUED | OUTPATIENT
Start: 2017-08-19 | End: 2017-08-21

## 2017-08-17 RX ORDER — METOPROLOL TARTRATE 50 MG
25 TABLET ORAL
Qty: 0 | Refills: 0 | Status: DISCONTINUED | OUTPATIENT
Start: 2017-08-17 | End: 2017-08-21

## 2017-08-17 RX ORDER — ALLOPURINOL 300 MG
300 TABLET ORAL DAILY
Qty: 0 | Refills: 0 | Status: DISCONTINUED | OUTPATIENT
Start: 2017-08-17 | End: 2017-08-17

## 2017-08-17 RX ADMIN — CHLORHEXIDINE GLUCONATE 1 APPLICATION(S): 213 SOLUTION TOPICAL at 07:00

## 2017-08-17 RX ADMIN — HEPARIN SODIUM 5000 UNIT(S): 5000 INJECTION INTRAVENOUS; SUBCUTANEOUS at 05:31

## 2017-08-17 RX ADMIN — HEPARIN SODIUM 5000 UNIT(S): 5000 INJECTION INTRAVENOUS; SUBCUTANEOUS at 13:16

## 2017-08-17 RX ADMIN — FAMOTIDINE 20 MILLIGRAM(S): 10 INJECTION INTRAVENOUS at 17:54

## 2017-08-17 RX ADMIN — HEPARIN SODIUM 5000 UNIT(S): 5000 INJECTION INTRAVENOUS; SUBCUTANEOUS at 21:32

## 2017-08-17 RX ADMIN — Medication 3 MILLILITER(S): at 03:42

## 2017-08-17 RX ADMIN — Medication 3 MILLILITER(S): at 09:17

## 2017-08-17 RX ADMIN — Medication 100 MILLIGRAM(S): at 21:31

## 2017-08-17 RX ADMIN — Medication 100 MILLIGRAM(S): at 13:16

## 2017-08-17 RX ADMIN — Medication 25 MILLIGRAM(S): at 17:54

## 2017-08-17 RX ADMIN — Medication 400 MILLIGRAM(S): at 09:45

## 2017-08-17 RX ADMIN — SODIUM CHLORIDE 30 MILLILITER(S): 9 INJECTION, SOLUTION INTRAVENOUS at 19:34

## 2017-08-17 RX ADMIN — SODIUM CHLORIDE 30 MILLILITER(S): 9 INJECTION, SOLUTION INTRAVENOUS at 07:10

## 2017-08-17 RX ADMIN — Medication 100 MILLIGRAM(S): at 13:17

## 2017-08-17 RX ADMIN — Medication 100 MILLIGRAM(S): at 09:19

## 2017-08-17 RX ADMIN — HYDROMORPHONE HYDROCHLORIDE 30 MILLILITER(S): 2 INJECTION INTRAMUSCULAR; INTRAVENOUS; SUBCUTANEOUS at 19:23

## 2017-08-17 RX ADMIN — Medication 1 PATCH: at 21:32

## 2017-08-17 RX ADMIN — Medication 3 MILLILITER(S): at 15:07

## 2017-08-17 RX ADMIN — HYDROMORPHONE HYDROCHLORIDE 30 MILLILITER(S): 2 INJECTION INTRAMUSCULAR; INTRAVENOUS; SUBCUTANEOUS at 10:57

## 2017-08-17 RX ADMIN — Medication 25 MILLIGRAM(S): at 11:24

## 2017-08-17 RX ADMIN — Medication 100 MILLIGRAM(S): at 05:31

## 2017-08-17 RX ADMIN — Medication 3 MILLILITER(S): at 22:05

## 2017-08-17 RX ADMIN — HYDROMORPHONE HYDROCHLORIDE 30 MILLILITER(S): 2 INJECTION INTRAMUSCULAR; INTRAVENOUS; SUBCUTANEOUS at 07:04

## 2017-08-17 RX ADMIN — SENNA PLUS 2 TABLET(S): 8.6 TABLET ORAL at 21:31

## 2017-08-17 RX ADMIN — Medication 1000 MILLIGRAM(S): at 10:05

## 2017-08-17 RX ADMIN — FAMOTIDINE 20 MILLIGRAM(S): 10 INJECTION INTRAVENOUS at 05:31

## 2017-08-17 RX ADMIN — SODIUM CHLORIDE 30 MILLILITER(S): 9 INJECTION, SOLUTION INTRAVENOUS at 11:18

## 2017-08-17 RX ADMIN — Medication 1 PATCH: at 21:30

## 2017-08-17 NOTE — PROGRESS NOTE ADULT - SUBJECTIVE AND OBJECTIVE BOX
RABIA HUGHES            MRN-[id number]         No Known Allergies             71 y/o  male with PMH: HTN, Gout presents to [facility] for pre op evaluation with hx of pulmonary nodule found incidentally on Ct scan of chest 2 months ago. Now scheduled for bronchoscopy, right VAT, lung resection on 08/16/17       Procedure: Uniportal RVATS.  RUL wedge resection followed by completion lobectomy.  POD# 1    Home Medications:   * Patient Currently Takes Medications as of 31-Jul-2017 14:47 documented in Prescription Writer  · 	allopurinol 100 mg oral tablet: Last Dose Taken:  , 1 tab(s) orally 3 times a day  · 	metoprolol succinate 25 mg oral tablet, extended release: Last Dose Taken:  , 1 tab(s) orally once a day AM  · 	losartan 50 mg oral tablet: Last Dose Taken:  , 1 tab(s) orally once a day AM  · 	colchicine 0.6 mg oral tablet: Last Dose Taken:  , 1 tab(s) orally 3 times a day  · 	Centrum oral tablet: Last Dose Taken:  , 1 tab(s) orally once a day LD 8/7/2017    Issues:  Lung cancer - RUL  Postop pain  HTN  Gout  Hx of ETOH abuse      PAST MEDICAL & SURGICAL HISTORY:  TIA (transient ischemic attack): 1999  Localized enlarged lymph nodes  Solitary pulmonary nodule  HTN (hypertension)  Alcohol abuse: Sober since 01/2016- attending AA meeting weekly  Depression, unspecified depression type  Gout  Essential hypertension  H/O hemorrhoidectomy: 1967  History of tonsillectomy: as a child  History of appendectomy: 1958  H/O left knee surgery: 1995        ICU Vital Signs Last 24 Hrs  T(C): 36.1 (17 Aug 2017 04:00), Max: 37 (16 Aug 2017 10:43)  T(F): 97 (17 Aug 2017 04:00), Max: 98.6 (16 Aug 2017 10:43)  HR: 76 (17 Aug 2017 06:00) (65 - 87)  BP: 110/62 (17 Aug 2017 06:00) (110/62 - 158/68)  BP(mean): 73 (17 Aug 2017 06:00) (73 - 105)  ABP: --  ABP(mean): --  RR: 22 (17 Aug 2017 06:00) (12 - 25)  SpO2: 98% (17 Aug 2017 06:00) (92% - 100%)    I&O's Summary    16 Aug 2017 07:01  -  17 Aug 2017 06:49  --------------------------------------------------------  IN: 660 mL / OUT: 160 mL / NET: 500 mL      CAPILLARY BLOOD GLUCOSE          MEDICATIONS  (STANDING):  HYDROmorphone PCA (1 mG/mL) 30 milliLiter(s) PCA Continuous PCA Continuous  heparin  Injectable 5000 Unit(s) SubCutaneous every 8 hours  famotidine    Tablet 20 milliGRAM(s) Oral every 12 hours  docusate sodium 100 milliGRAM(s) Oral three times a day  senna 2 Tablet(s) Oral at bedtime  allopurinol 100 milliGRAM(s) Oral three times a day after meals  colchicine 0.6 milliGRAM(s) Oral three times a day  ALBUTerol/ipratropium for Nebulization 3 milliLiter(s) Nebulizer every 6 hours  nicotine - 21 mG/24Hr(s) Patch 1 patch Transdermal daily  lactated ringers. 1000 milliLiter(s) (30 mL/Hr) IV Continuous <Continuous>  chlorhexidine 4% Liquid 1 Application(s) Topical daily    MEDICATIONS  (PRN):  HYDROmorphone  Injectable 0.25 milliGRAM(s) IV Push every 10 minutes PRN Moderate Pain  ondansetron Injectable 4 milliGRAM(s) IV Push once PRN Nausea and/or Vomiting  HYDROmorphone PCA (1 mG/mL) Rescue Clinician Bolus 0.25 milliGRAM(s) IV Push every 15 minutes PRN for Pain Scale GREATER THAN 6  naloxone Injectable 0.1 milliGRAM(s) IV Push every 3 minutes PRN For ANY of the following changes in patient status:  A. RR LESS THAN 10 breaths per minute, B. Oxygen saturation LESS THAN 90%, C. Sedation score of 6  ondansetron Injectable 4 milliGRAM(s) IV Push every 6 hours PRN Nausea      Physical exam:   General:               Pt is awake, alert, not in any distress                                                  Neuro:                  Nonfocal                             Cardiovascular:     S1 & S2, regular                           Respiratory:         Air entry is fair and equal on both sides, has bilateral conducted sounds                           GI:                       Soft, nondistended and nontender, Bowel sounds active                            Ext:                     No cyanosis or edema     Labs:                                                                           9.9    14.90 )-----------( 243      ( 17 Aug 2017 05:15 )             30.1             08-17    137  |  102  |  28<H>  ----------------------------<  167<H>  4.6   |  21<L>  |  1.38<H>    Ca    8.7      17 Aug 2017 05:15                        CXR: Small R-apical pneumo / space. R-CT in place    Plan:    General: 72yMale  s/p Uniportal RVATS.  RUL wedge resection followed by completion lobectomy.  POD#1  progressing well, experiencing  pain with deep breathing.                             Neuro:                                         Pain control with PCA                             Cardiovascular:                                          Continue hemodynamic monitoring.    HTN: Monitor hemodynamic status and start Lopressor / Losartan                            Respiratory:                                         Pt is on 2 L  nasal canula,                                           Comfortable, not in any distress.                                         Using incentive spirometry                                          Monitor chest tube output                                         Chest tube to suction - No air leak                                                                Continue bronchodilators, pulmonary toilet                            GI                                         Regular diet                                          Continue GI prophylaxis with Pepcid                                          Continue Zofran / Reglan for nausea - PRN	                                                                 Renal:                                         Continue LR 30CC/hr                                         Monitor I/Os and electrolytes                                                  Hem/ Onc:                                                                                  Monitor chest tube output &  signs of bleeding.                                          Follow CBC in AM                           Infectious disease:                                            No signs of infection. Monitor for fever / leukocytosis.                                          All surgical incision / chest tube  sites look clean                            Endocrine                                             Continue Accu-Checks with coverage    Gout:     Restart Allopurinol and Colchicine    Pertinent clinical, laboratory, radiographic, hemodynamic, echocardiographic, respiratory data, microbiologic data and chart were reviewed and analyzed frequently throughout the course of the day and night  Patient seen, examined and plan discussed with CT Surgeon / CTICU team during rounds.    Pt's status discussed with family at bedside, updated status        Jj Ty MD

## 2017-08-17 NOTE — PROGRESS NOTE ADULT - SUBJECTIVE AND OBJECTIVE BOX
Anesthesia Pain Management Service    SUBJECTIVE: Patient is doing well with IV PCA and no significant problems reported.    Pain Scale Score	At rest: ___ 	With Activity: ___ 	[X ] Refer to charted pain scores    THERAPY:    [ ] IV PCA Morphine		[ ] 5 mg/mL	[ ] 1 mg/mL  [X ] IV PCA Hydromorphone	[ ] 5 mg/mL	[X ] 1 mg/mL  [ ] IV PCA Fentanyl		[ ] 50 micrograms/mL    Demand dose __0.2_ lockout __6_ (minutes) Continuous Rate _0__ Total: _4.65__  mg used (in past 24 hours)      MEDICATIONS  (STANDING):  HYDROmorphone PCA (1 mG/mL) 30 milliLiter(s) PCA Continuous PCA Continuous  heparin  Injectable 5000 Unit(s) SubCutaneous every 8 hours  famotidine    Tablet 20 milliGRAM(s) Oral every 12 hours  docusate sodium 100 milliGRAM(s) Oral three times a day  senna 2 Tablet(s) Oral at bedtime  allopurinol 100 milliGRAM(s) Oral three times a day after meals  colchicine 0.6 milliGRAM(s) Oral three times a day  ALBUTerol/ipratropium for Nebulization 3 milliLiter(s) Nebulizer every 6 hours  nicotine - 21 mG/24Hr(s) Patch 1 patch Transdermal daily  lactated ringers. 1000 milliLiter(s) (30 mL/Hr) IV Continuous <Continuous>  chlorhexidine 4% Liquid 1 Application(s) Topical daily  metoprolol 25 milliGRAM(s) Oral two times a day    MEDICATIONS  (PRN):  HYDROmorphone  Injectable 0.25 milliGRAM(s) IV Push every 10 minutes PRN Moderate Pain  ondansetron Injectable 4 milliGRAM(s) IV Push once PRN Nausea and/or Vomiting  HYDROmorphone PCA (1 mG/mL) Rescue Clinician Bolus 0.25 milliGRAM(s) IV Push every 15 minutes PRN for Pain Scale GREATER THAN 6  naloxone Injectable 0.1 milliGRAM(s) IV Push every 3 minutes PRN For ANY of the following changes in patient status:  A. RR LESS THAN 10 breaths per minute, B. Oxygen saturation LESS THAN 90%, C. Sedation score of 6  ondansetron Injectable 4 milliGRAM(s) IV Push every 6 hours PRN Nausea      OBJECTIVE:    Sedation Score:	[ X] Alert	[ ] Drowsy 	[ ] Arousable	[ ] Asleep	[ ] Unresponsive    Side Effects:	[X ] None	[ ] Nausea	[ ] Vomiting	[ ] Pruritus  		[ ] Other:    Vital Signs Last 24 Hrs  T(C): 36.8 (17 Aug 2017 12:43), Max: 36.8 (17 Aug 2017 11:01)  T(F): 98.3 (17 Aug 2017 12:43), Max: 98.3 (17 Aug 2017 11:01)  HR: 80 (17 Aug 2017 12:43) (68 - 87)  BP: 144/63 (17 Aug 2017 12:43) (110/62 - 158/68)  BP(mean): 84 (17 Aug 2017 10:00) (73 - 105)  RR: 18 (17 Aug 2017 12:43) (12 - 25)  SpO2: 95% (17 Aug 2017 12:43) (92% - 100%)    ASSESSMENT/ PLAN    Therapy to  be:	[ X] Continue   [ ] Discontinued   [ ] Change to prn Analgesics    Documentation and Verification of current medications:   [X] Done	[ ] Not done, not elligible    Comments:    Patient was seen 08-17-17 @ 14:55

## 2017-08-17 NOTE — PROGRESS NOTE ADULT - SUBJECTIVE AND OBJECTIVE BOX
72 M PMH HTN, Gout, ETOH abuse  Sober since 01/2016- attending AA meeting weekly, Depression, w SPN and enlarged LNs which were found incidentally on Ct scan of chest 2 months ago.   POD # 1 ([id number]): Uniportal RVATS.  RUL wedge resection followed by completion lobectomy.    Issues:  Lung cancer - RUL  Postop pain  HTN  Gout  Hx of ETOH abuse    Home Medications:   * Patient Currently Takes Medications as of 31-Jul-2017 14:47 documented in Prescription Writer  · 	allopurinol 100 mg oral tablet: Last Dose Taken:  , 1 tab(s) orally 3 times a day  · 	metoprolol succinate 25 mg oral tablet, extended release: Last Dose Taken:  , 1 tab(s) orally once a day AM  · 	losartan 50 mg oral tablet: Last Dose Taken:  , 1 tab(s) orally once a day AM  · 	colchicine 0.6 mg oral tablet: Last Dose Taken:  , 1 tab(s) orally 3 times a day  · 	Centrum oral tablet: Last Dose Taken:  , 1 tab(s) orally once a day LD 8/7/2017    MEDICATIONS  (STANDING):  HYDROmorphone PCA (1 mG/mL) 30 milliLiter(s) PCA Continuous PCA Continuous  heparin  Injectable 5000 Unit(s) SubCutaneous every 8 hours  famotidine    Tablet 20 milliGRAM(s) Oral every 12 hours  docusate sodium 100 milliGRAM(s) Oral three times a day  senna 2 Tablet(s) Oral at bedtime  allopurinol 100 milliGRAM(s) Oral three times a day after meals  colchicine 0.6 milliGRAM(s) Oral three times a day  ALBUTerol/ipratropium for Nebulization 3 milliLiter(s) Nebulizer every 6 hours  nicotine - 21 mG/24Hr(s) Patch 1 patch Transdermal daily  lactated ringers. 1000 milliLiter(s) (30 mL/Hr) IV Continuous <Continuous>  chlorhexidine 4% Liquid 1 Application(s) Topical daily  acetaminophen  IVPB. 1000 milliGRAM(s) IV Intermittent once    MEDICATIONS  (PRN):  HYDROmorphone  Injectable 0.25 milliGRAM(s) IV Push every 10 minutes PRN Moderate Pain  ondansetron Injectable 4 milliGRAM(s) IV Push once PRN Nausea and/or Vomiting  HYDROmorphone PCA (1 mG/mL) Rescue Clinician Bolus 0.25 milliGRAM(s) IV Push every 15 minutes PRN for Pain Scale GREATER THAN 6  naloxone Injectable 0.1 milliGRAM(s) IV Push every 3 minutes PRN For ANY of the following changes in patient status:  A. RR LESS THAN 10 breaths per minute, B. Oxygen saturation LESS THAN 90%, C. Sedation score of 6  ondansetron Injectable 4 milliGRAM(s) IV Push every 6 hours PRN Nausea    ICU Vital Signs Last 24 Hrs  T(C): 36.3 (17 Aug 2017 08:00), Max: 37 (16 Aug 2017 10:43)  T(F): 97.4 (17 Aug 2017 08:00), Max: 98.6 (16 Aug 2017 10:43)  HR: 85 (17 Aug 2017 10:00) (65 - 87)  BP: 142/63 (17 Aug 2017 10:00) (110/62 - 158/68)  BP(mean): 84 (17 Aug 2017 10:00) (73 - 105)  ABP: --  ABP(mean): --  RR: 21 (17 Aug 2017 10:00) (12 - 25)  SpO2: 95% (17 Aug 2017 10:00) (92% - 100%)    Physical exam:                           General:               Pt is awake, alert, not in any distress                                                  Neuro:                  Nonfocal                             Cardiovascular:     S1 & S2, regular                           Respiratory:         Air entry is fair and equal on both sides, has bilateral conducted sounds                           GI:                       Soft, nondistended and nontender, Bowel sounds active                            Ext:                     No cyanosis or edema     I&O's Summary    16 Aug 2017 07:01  -  17 Aug 2017 07:00  --------------------------------------------------------  IN: 660 mL / OUT: 235 mL / NET: 425 mL    17 Aug 2017 07:01  -  17 Aug 2017 10:14  --------------------------------------------------------  IN: 360 mL / OUT: 90 mL / NET: 270 mL    Labs:                                                                        9.9    14.90 )-----------( 243      ( 17 Aug 2017 05:15 )             30.1                            08-17    137  |  102  |  28<H>  ----------------------------<  167<H>  4.6   |  21<L>  |  1.38<H>    Ca    8.7      17 Aug 2017 05:15    CXR: Small R-apical pneumo / space. R-CT in place    Plan:  72 M PMH HTN, Gout, ETOH abuse  Sober since 01/2016- attending AA meeting weekly, Depression, w SPN and enlarged LNs which were found incidentally on Ct scan of chest 2 months ago.   POD # 1 ([id number]): Uniportal RVATS.  RUL wedge resection followed by completion lobectomy                             Neuro:                                         Pain control with PCA                             Cardiovascular:                                          Continue hemodynamic monitoring.    HTN: Monitor hemodynamic status and start Lopressor / Losartan                            Respiratory:                                         Pt is on 2 L  nasal canula,                                           Comfortable, not in any distress.                                         Using incentive spirometry                                          Monitor chest tube output                                         Chest tube to suction - No air leak                                                                Continue bronchodilators, pulmonary toilet                            GI                                         Regular diet                                          Continue GI prophylaxis with Pepcid                                          Continue Zofran / Reglan for nausea - PRN	                                                                 Renal:                                         Continue LR 30CC/hr                                         Monitor I/Os and electrolytes                                                  Hem/ Onc:                                                                                  Monitor chest tube output &  signs of bleeding.                                          Follow CBC in AM                           Infectious disease:                                            No signs of infection. Monitor for fever / leukocytosis.                                          All surgical incision / chest tube  sites look clean                            Endocrine                                             Continue Accu-Checks with coverage    Gout:     Restart Allopurinol and Colchicine    All clinical, lab, hemodynamic and radiographic data were reviewed.  Plan discussed with CTICU team and attending CT surgeon.     I have spent 45 minutes of critical care time with this patient between 7am and 7pm.    Cuong Evans MD

## 2017-08-17 NOTE — PROGRESS NOTE ADULT - SUBJECTIVE AND OBJECTIVE BOX
ANESTHESIA POSTOP CHECK    72y Male POSTOP DAY 1 S/P     Vital Signs Last 24 Hrs  T(C): 36.8 (17 Aug 2017 12:43), Max: 36.8 (17 Aug 2017 11:01)  T(F): 98.3 (17 Aug 2017 12:43), Max: 98.3 (17 Aug 2017 11:01)  HR: 80 (17 Aug 2017 12:43) (68 - 87)  BP: 144/63 (17 Aug 2017 12:43) (110/62 - 158/68)  BP(mean): 84 (17 Aug 2017 10:00) (73 - 105)  RR: 18 (17 Aug 2017 12:43) (12 - 25)  SpO2: 95% (17 Aug 2017 12:43) (92% - 100%)  I&O's Summary    16 Aug 2017 07:01  -  17 Aug 2017 07:00  --------------------------------------------------------  IN: 660 mL / OUT: 235 mL / NET: 425 mL    17 Aug 2017 07:01  -  17 Aug 2017 14:56  --------------------------------------------------------  IN: 360 mL / OUT: 490 mL / NET: -130 mL        [X ] NO APPARENT ANESTHESIA COMPLICATIONS      Comments:

## 2017-08-18 LAB
BUN SERPL-MCNC: 19 MG/DL — SIGNIFICANT CHANGE UP (ref 7–23)
BUN SERPL-MCNC: 21 MG/DL — SIGNIFICANT CHANGE UP (ref 7–23)
CALCIUM SERPL-MCNC: 9.5 MG/DL — SIGNIFICANT CHANGE UP (ref 8.4–10.5)
CALCIUM SERPL-MCNC: 9.6 MG/DL — SIGNIFICANT CHANGE UP (ref 8.4–10.5)
CHLORIDE SERPL-SCNC: 101 MMOL/L — SIGNIFICANT CHANGE UP (ref 98–107)
CHLORIDE SERPL-SCNC: 99 MMOL/L — SIGNIFICANT CHANGE UP (ref 98–107)
CO2 SERPL-SCNC: 25 MMOL/L — SIGNIFICANT CHANGE UP (ref 22–31)
CO2 SERPL-SCNC: 25 MMOL/L — SIGNIFICANT CHANGE UP (ref 22–31)
CREAT SERPL-MCNC: 1.34 MG/DL — HIGH (ref 0.5–1.3)
CREAT SERPL-MCNC: 1.43 MG/DL — HIGH (ref 0.5–1.3)
GLUCOSE SERPL-MCNC: 116 MG/DL — HIGH (ref 70–99)
GLUCOSE SERPL-MCNC: 122 MG/DL — HIGH (ref 70–99)
HCT VFR BLD CALC: 33.2 % — LOW (ref 39–50)
HCT VFR BLD CALC: 33.4 % — LOW (ref 39–50)
HGB BLD-MCNC: 10.7 G/DL — LOW (ref 13–17)
HGB BLD-MCNC: 10.9 G/DL — LOW (ref 13–17)
MAGNESIUM SERPL-MCNC: 1.5 MG/DL — LOW (ref 1.6–2.6)
MAGNESIUM SERPL-MCNC: 1.7 MG/DL — SIGNIFICANT CHANGE UP (ref 1.6–2.6)
MCHC RBC-ENTMCNC: 29.5 PG — SIGNIFICANT CHANGE UP (ref 27–34)
MCHC RBC-ENTMCNC: 29.9 PG — SIGNIFICANT CHANGE UP (ref 27–34)
MCHC RBC-ENTMCNC: 32.2 % — SIGNIFICANT CHANGE UP (ref 32–36)
MCHC RBC-ENTMCNC: 32.6 % — SIGNIFICANT CHANGE UP (ref 32–36)
MCV RBC AUTO: 91.5 FL — SIGNIFICANT CHANGE UP (ref 80–100)
MCV RBC AUTO: 91.8 FL — SIGNIFICANT CHANGE UP (ref 80–100)
NRBC # FLD: 0 — SIGNIFICANT CHANGE UP
NRBC # FLD: 0 — SIGNIFICANT CHANGE UP
PHOSPHATE SERPL-MCNC: 2.8 MG/DL — SIGNIFICANT CHANGE UP (ref 2.5–4.5)
PLATELET # BLD AUTO: 269 K/UL — SIGNIFICANT CHANGE UP (ref 150–400)
PLATELET # BLD AUTO: 294 K/UL — SIGNIFICANT CHANGE UP (ref 150–400)
PMV BLD: 10 FL — SIGNIFICANT CHANGE UP (ref 7–13)
PMV BLD: 10.6 FL — SIGNIFICANT CHANGE UP (ref 7–13)
POTASSIUM SERPL-MCNC: 4.1 MMOL/L — SIGNIFICANT CHANGE UP (ref 3.5–5.3)
POTASSIUM SERPL-MCNC: 4.4 MMOL/L — SIGNIFICANT CHANGE UP (ref 3.5–5.3)
POTASSIUM SERPL-SCNC: 4.1 MMOL/L — SIGNIFICANT CHANGE UP (ref 3.5–5.3)
POTASSIUM SERPL-SCNC: 4.4 MMOL/L — SIGNIFICANT CHANGE UP (ref 3.5–5.3)
RBC # BLD: 3.63 M/UL — LOW (ref 4.2–5.8)
RBC # BLD: 3.64 M/UL — LOW (ref 4.2–5.8)
RBC # FLD: 15.9 % — HIGH (ref 10.3–14.5)
RBC # FLD: 16.1 % — HIGH (ref 10.3–14.5)
SODIUM SERPL-SCNC: 138 MMOL/L — SIGNIFICANT CHANGE UP (ref 135–145)
SODIUM SERPL-SCNC: 138 MMOL/L — SIGNIFICANT CHANGE UP (ref 135–145)
WBC # BLD: 17.12 K/UL — HIGH (ref 3.8–10.5)
WBC # BLD: 17.34 K/UL — HIGH (ref 3.8–10.5)
WBC # FLD AUTO: 17.12 K/UL — HIGH (ref 3.8–10.5)
WBC # FLD AUTO: 17.34 K/UL — HIGH (ref 3.8–10.5)

## 2017-08-18 PROCEDURE — 71010: CPT | Mod: 26

## 2017-08-18 RX ORDER — OXYCODONE HYDROCHLORIDE 5 MG/1
5 TABLET ORAL EVERY 4 HOURS
Qty: 0 | Refills: 0 | Status: DISCONTINUED | OUTPATIENT
Start: 2017-08-18 | End: 2017-08-21

## 2017-08-18 RX ORDER — DILTIAZEM HCL 120 MG
10 CAPSULE, EXT RELEASE 24 HR ORAL
Qty: 125 | Refills: 0 | Status: DISCONTINUED | OUTPATIENT
Start: 2017-08-18 | End: 2017-08-19

## 2017-08-18 RX ORDER — METOPROLOL TARTRATE 50 MG
2.5 TABLET ORAL ONCE
Qty: 0 | Refills: 0 | Status: COMPLETED | OUTPATIENT
Start: 2017-08-18 | End: 2017-08-18

## 2017-08-18 RX ORDER — ACETAMINOPHEN 500 MG
650 TABLET ORAL EVERY 6 HOURS
Qty: 0 | Refills: 0 | Status: DISCONTINUED | OUTPATIENT
Start: 2017-08-18 | End: 2017-08-21

## 2017-08-18 RX ORDER — MAGNESIUM SULFATE 500 MG/ML
1 VIAL (ML) INJECTION ONCE
Qty: 0 | Refills: 0 | Status: COMPLETED | OUTPATIENT
Start: 2017-08-18 | End: 2017-08-18

## 2017-08-18 RX ORDER — METOPROLOL TARTRATE 50 MG
2.5 TABLET ORAL ONCE
Qty: 0 | Refills: 0 | Status: DISCONTINUED | OUTPATIENT
Start: 2017-08-18 | End: 2017-08-18

## 2017-08-18 RX ORDER — LEVALBUTEROL 1.25 MG/.5ML
0.63 SOLUTION, CONCENTRATE RESPIRATORY (INHALATION) EVERY 6 HOURS
Qty: 0 | Refills: 0 | Status: DISCONTINUED | OUTPATIENT
Start: 2017-08-18 | End: 2017-08-21

## 2017-08-18 RX ORDER — OXYCODONE HYDROCHLORIDE 5 MG/1
10 TABLET ORAL EVERY 4 HOURS
Qty: 0 | Refills: 0 | Status: DISCONTINUED | OUTPATIENT
Start: 2017-08-18 | End: 2017-08-21

## 2017-08-18 RX ORDER — DORNASE ALFA 1 MG/ML
2.5 SOLUTION RESPIRATORY (INHALATION)
Qty: 0 | Refills: 0 | Status: COMPLETED | OUTPATIENT
Start: 2017-08-18 | End: 2017-08-20

## 2017-08-18 RX ORDER — METOPROLOL TARTRATE 50 MG
25 TABLET ORAL ONCE
Qty: 0 | Refills: 0 | Status: COMPLETED | OUTPATIENT
Start: 2017-08-18 | End: 2017-08-18

## 2017-08-18 RX ADMIN — SODIUM CHLORIDE 30 MILLILITER(S): 9 INJECTION, SOLUTION INTRAVENOUS at 07:52

## 2017-08-18 RX ADMIN — FAMOTIDINE 20 MILLIGRAM(S): 10 INJECTION INTRAVENOUS at 05:06

## 2017-08-18 RX ADMIN — SENNA PLUS 2 TABLET(S): 8.6 TABLET ORAL at 21:15

## 2017-08-18 RX ADMIN — Medication 25 MILLIGRAM(S): at 05:07

## 2017-08-18 RX ADMIN — LEVALBUTEROL 0.63 MILLIGRAM(S): 1.25 SOLUTION, CONCENTRATE RESPIRATORY (INHALATION) at 09:50

## 2017-08-18 RX ADMIN — Medication 100 GRAM(S): at 00:16

## 2017-08-18 RX ADMIN — HYDROMORPHONE HYDROCHLORIDE 30 MILLILITER(S): 2 INJECTION INTRAMUSCULAR; INTRAVENOUS; SUBCUTANEOUS at 07:12

## 2017-08-18 RX ADMIN — Medication 10 MG/HR: at 09:04

## 2017-08-18 RX ADMIN — Medication 100 MILLIGRAM(S): at 21:15

## 2017-08-18 RX ADMIN — FAMOTIDINE 20 MILLIGRAM(S): 10 INJECTION INTRAVENOUS at 18:38

## 2017-08-18 RX ADMIN — LEVALBUTEROL 0.63 MILLIGRAM(S): 1.25 SOLUTION, CONCENTRATE RESPIRATORY (INHALATION) at 16:39

## 2017-08-18 RX ADMIN — Medication 1 PATCH: at 21:14

## 2017-08-18 RX ADMIN — Medication 100 GRAM(S): at 13:55

## 2017-08-18 RX ADMIN — Medication 25 MILLIGRAM(S): at 21:16

## 2017-08-18 RX ADMIN — HEPARIN SODIUM 5000 UNIT(S): 5000 INJECTION INTRAVENOUS; SUBCUTANEOUS at 13:57

## 2017-08-18 RX ADMIN — HEPARIN SODIUM 5000 UNIT(S): 5000 INJECTION INTRAVENOUS; SUBCUTANEOUS at 21:15

## 2017-08-18 RX ADMIN — Medication 300 MILLIGRAM(S): at 13:55

## 2017-08-18 RX ADMIN — DORNASE ALFA 2.5 MILLIGRAM(S): 1 SOLUTION RESPIRATORY (INHALATION) at 09:50

## 2017-08-18 RX ADMIN — Medication 2.5 MILLIGRAM(S): at 00:44

## 2017-08-18 RX ADMIN — Medication 10 MG/HR: at 19:22

## 2017-08-18 RX ADMIN — HEPARIN SODIUM 5000 UNIT(S): 5000 INJECTION INTRAVENOUS; SUBCUTANEOUS at 05:07

## 2017-08-18 RX ADMIN — Medication 1 PATCH: at 21:16

## 2017-08-18 RX ADMIN — Medication 100 MILLIGRAM(S): at 05:06

## 2017-08-18 RX ADMIN — LEVALBUTEROL 0.63 MILLIGRAM(S): 1.25 SOLUTION, CONCENTRATE RESPIRATORY (INHALATION) at 22:51

## 2017-08-18 RX ADMIN — LOSARTAN POTASSIUM 50 MILLIGRAM(S): 100 TABLET, FILM COATED ORAL at 05:07

## 2017-08-18 RX ADMIN — Medication 100 MILLIGRAM(S): at 13:56

## 2017-08-18 RX ADMIN — Medication 2.5 MILLIGRAM(S): at 00:13

## 2017-08-18 RX ADMIN — Medication 25 MILLIGRAM(S): at 07:52

## 2017-08-18 NOTE — PROGRESS NOTE ADULT - SUBJECTIVE AND OBJECTIVE BOX
Subjective:    Vital Signs: Hemodynamically stable  Vital Signs Last 24 Hrs  T(C): 37.2 (08-18-17 @ 15:54), Max: 37.3 (08-18-17 @ 07:50)  T(F): 99 (08-18-17 @ 15:54), Max: 99.1 (08-18-17 @ 07:50)  HR: 97 (08-18-17 @ 15:54) (82 - 137)  BP: 118/59 (08-18-17 @ 15:54) (103/56 - 158/70)  RR: 18 (08-18-17 @ 15:54) (17 - 19)  SpO2: 99% (08-18-17 @ 15:54) (88% - 99%) on 21% O2    Telemetry/Alarms: LISA->AF since 0005  today   General: WN/WD NAD  Neurology: Awake, nonfocal, WEBBER x 4  Eyes: Scleras clear, PERRLA/ EOMI, Gross vision intact  ENT:Gross hearing intact, grossly patent pharynx, no stridor  Neck: Neck supple, trachea midline, No JVD,   Respiratory: CTA B/L, No wheezing, rales, rhonchi  CV: RRR, S1S2, no murmurs, rubs or gallops  Abdominal: Soft, NT, ND +BS,   Extremities: No edema, + peripheral pulses, no calf pain or tenderness  Skin: No Rashes, Hematoma, Ecchymosis  Lymphatic: No Neck, axilla, groin LAD  Psych: Oriented x 3, normal affect  Incisions: No s-s of infection  Tubes:_> to WS=>  Relevant labs, radiology and Medications reviewed                        10.7   17.12 )-----------( 294      ( 18 Aug 2017 05:30 )             33.2     08-18    138  |  99  |  19  ----------------------------<  116<H>  4.1   |  25  |  1.34<H>    Ca    9.5      18 Aug 2017 05:30  Phos  2.8     08-18  Mg     1.7     08-18        MEDICATIONS  (STANDING):  heparin  Injectable 5000 Unit(s) SubCutaneous every 8 hours  famotidine    Tablet 20 milliGRAM(s) Oral every 12 hours  docusate sodium 100 milliGRAM(s) Oral three times a day  senna 2 Tablet(s) Oral at bedtime  nicotine - 21 mG/24Hr(s) Patch 1 patch Transdermal daily  chlorhexidine 4% Liquid 1 Application(s) Topical daily  metoprolol 25 milliGRAM(s) Oral two times a day  allopurinol 300 milliGRAM(s) Oral daily  diltiazem Infusion 10 mG/Hr (10 mL/Hr) IV Continuous <Continuous>  levalbuterol Inhalation 0.63 milliGRAM(s) Inhalation every 6 hours  dornase zaira Solution 2.5 milliGRAM(s) Inhalation two times a day  diltiazem    Tablet 30 milliGRAM(s) Oral every 6 hours    MEDICATIONS  (PRN):  ondansetron Injectable 4 milliGRAM(s) IV Push once PRN Nausea and/or Vomiting  naloxone Injectable 0.1 milliGRAM(s) IV Push every 3 minutes PRN For ANY of the following changes in patient status:  A. RR LESS THAN 10 breaths per minute, B. Oxygen saturation LESS THAN 90%, C. Sedation score of 6  ondansetron Injectable 4 milliGRAM(s) IV Push every 6 hours PRN Nausea  acetaminophen   Tablet. 650 milliGRAM(s) Oral every 6 hours PRN Mild Pain (1 - 3)  oxyCODONE    IR 5 milliGRAM(s) Oral every 4 hours PRN Moderate Pain (4 - 6)  oxyCODONE    IR 10 milliGRAM(s) Oral every 4 hours PRN Severe Pain (7 - 10)    Pertinent Physical Exam  I&O's Summary    17 Aug 2017 07:01  -  18 Aug 2017 07:00  --------------------------------------------------------  IN: 720 mL / OUT: 1510 mL / NET: -790 mL    18 Aug 2017 07:01  -  18 Aug 2017 17:57  --------------------------------------------------------  IN: 0 mL / OUT: 70 mL / NET: -70 mL        Assessment  72y Male  w/ PAST MEDICAL & SURGICAL HISTORY:  TIA (transient ischemic attack): 1999  Localized enlarged lymph nodes  Solitary pulmonary nodule  HTN (hypertension)  Alcohol abuse: Sober since 01/2016- attending AA meeting weekly  Depression, unspecified depression type  Gout  Essential hypertension  H/O hemorrhoidectomy: 1967  History of tonsillectomy: as a child  History of appendectomy: 1958  H/O left knee surgery: 1995  admitted with complaints of Patient is a 72y old  Male who presents with a chief complaint of .  On (Date), patient underwent Lobectomy, lung, using VATS, with bronchoscopy  . Postoperative course/issues:    PLAN  Neuro: Pain management  Pulm: Encourage coughing, deep breathing and use of incentive spirometry. Wean off supplemental oxygen as able. Daily CXR.   Cardio: Monitor telemetry/alarms  GI: Tolerating diet. Continue stool softeners.  Renal: monitor urine output, supplement electrolytes as needed  Vasc: Heparin SC/SCDs for DVT prophylaxis  Heme: Stable H/H. .   ID: Off antibiotics. Stable.  Therapy: OOB/ambulate  Tubes: D/C Chest tube w a follow    Disposition: Aim to D/C to home on  Discussed with Cardiothoracic Team at AM rounds. Subjective:    Vital Signs: Hemodynamically stable  Vital Signs Last 24 Hrs  T(C): 37.2 (08-18-17 @ 15:54), Max: 37.3 (08-18-17 @ 07:50)  T(F): 99 (08-18-17 @ 15:54), Max: 99.1 (08-18-17 @ 07:50)  HR: 97 (08-18-17 @ 15:54) (82 - 137)  BP: 118/59 (08-18-17 @ 15:54) (103/56 - 158/70)  RR: 18 (08-18-17 @ 15:54) (17 - 19)  SpO2: 99% (08-18-17 @ 15:54) (88% - 99%) on 21% O2    Telemetry/Alarms: LISA->AF since 0005  today   General: WN/WD NAD  Neurology: Awake, nonfocal, WEBBER x 4  Eyes: Scleras clear, PERRLA/ EOMI, Gross vision intact  ENT:Gross hearing intact, grossly patent pharynx, no stridor  Neck: Neck supple, trachea midline, No JVD,   Respiratory: CTA B/L, No wheezing, rales, rhonchi  CV: irreg. rate + rthyhm, S1S2, no murmurs, rubs or gallops  Abdominal: Soft, NT, ND +BS,   Extremities: No edema, + peripheral pulses, no calf pain or tenderness  Skin: No Rashes, Hematoma, Ecchymosis  Lymphatic: No Neck, axilla, groin LAD  Psych: Oriented x 3, normal affect  Incisions: No s-s of infection  Tubes:_> to WS=>  Relevant labs, radiology and Medications reviewed                        10.7   17.12 )-----------( 294      ( 18 Aug 2017 05:30 )             33.2     08-18    138  |  99  |  19  ----------------------------<  116<H>  4.1   |  25  |  1.34<H>    Ca    9.5      18 Aug 2017 05:30  Phos  2.8     08-18  Mg     1.7     08-18        MEDICATIONS  (STANDING):  heparin  Injectable 5000 Unit(s) SubCutaneous every 8 hours  famotidine    Tablet 20 milliGRAM(s) Oral every 12 hours  docusate sodium 100 milliGRAM(s) Oral three times a day  senna 2 Tablet(s) Oral at bedtime  nicotine - 21 mG/24Hr(s) Patch 1 patch Transdermal daily  chlorhexidine 4% Liquid 1 Application(s) Topical daily  metoprolol 25 milliGRAM(s) Oral two times a day  allopurinol 300 milliGRAM(s) Oral daily  diltiazem Infusion 10 mG/Hr (10 mL/Hr) IV Continuous <Continuous>  levalbuterol Inhalation 0.63 milliGRAM(s) Inhalation every 6 hours  dornase zaira Solution 2.5 milliGRAM(s) Inhalation two times a day  diltiazem    Tablet 30 milliGRAM(s) Oral every 6 hours    MEDICATIONS  (PRN):  ondansetron Injectable 4 milliGRAM(s) IV Push once PRN Nausea and/or Vomiting  naloxone Injectable 0.1 milliGRAM(s) IV Push every 3 minutes PRN For ANY of the following changes in patient status:  A. RR LESS THAN 10 breaths per minute, B. Oxygen saturation LESS THAN 90%, C. Sedation score of 6  ondansetron Injectable 4 milliGRAM(s) IV Push every 6 hours PRN Nausea  acetaminophen   Tablet. 650 milliGRAM(s) Oral every 6 hours PRN Mild Pain (1 - 3)  oxyCODONE    IR 5 milliGRAM(s) Oral every 4 hours PRN Moderate Pain (4 - 6)  oxyCODONE    IR 10 milliGRAM(s) Oral every 4 hours PRN Severe Pain (7 - 10)    Pertinent Physical Exam  I&O's Summary    17 Aug 2017 07:01  -  18 Aug 2017 07:00  --------------------------------------------------------  IN: 720 mL / OUT: 1510 mL / NET: -790 mL    18 Aug 2017 07:01  -  18 Aug 2017 17:57  --------------------------------------------------------  IN: 0 mL / OUT: 70 mL / NET: -70 mL        Assessment  72y Male  w/ PAST MEDICAL & SURGICAL HISTORY:  TIA (transient ischemic attack): 1999  Localized enlarged lymph nodes  Solitary pulmonary nodule  HTN (hypertension)  Alcohol abuse: Sober since 01/2016- attending AA meeting weekly  Depression, unspecified depression type  Gout  Essential hypertension  H/O hemorrhoidectomy: 1967  History of tonsillectomy: as a child  History of appendectomy: 1958  H/O left knee surgery: 1995  admitted with complaints of Patient is a 72y old  Male who presents with a chief complaint of .  On (Date), patient underwent Lobectomy, lung, using VATS, with bronchoscopy  Postoperative course/issues:    PLAN  Aggressive pulmonary toliet  Consult with cardiology  Neuro: Pain management  Pulm: Encourage coughing, deep breathing and use of incentive spirometry.   Daily CXR.   Cardio: Monitor telemetry/alarms  GI: Tolerating diet. Continue stool softeners.  Renal: monitor urine output, supplement electrolytes as needed  Vasc: Heparin SC/SCDs for DVT prophylaxis  Heme: Stable H/H. .   ID: Off antibiotics. Stable.  Therapy: OOB/ambulate  Tubes: D/C Chest tube w a follow    Disposition: Aim to D/C to home on  Discussed with Cardiothoracic Team at AM rounds. Subjective:    Vital Signs: Hemodynamically stable  Vital Signs Last 24 Hrs  T(C): 37.2 (08-18-17 @ 15:54), Max: 37.3 (08-18-17 @ 07:50)  T(F): 99 (08-18-17 @ 15:54), Max: 99.1 (08-18-17 @ 07:50)  HR: 97 (08-18-17 @ 15:54) (82 - 137)  BP: 118/59 (08-18-17 @ 15:54) (103/56 - 158/70)  RR: 18 (08-18-17 @ 15:54) (17 - 19)  SpO2: 99% (08-18-17 @ 15:54) (88% - 99%) on 21% O2    Telemetry/Alarms: LISA->AF since 0005  today   General: WN/WD NAD  Neurology: Awake, nonfocal, WEBBER x 4  Eyes: Scleras clear, PERRLA/ EOMI, Gross vision intact  ENT:Gross hearing intact, grossly patent pharynx, no stridor  Neck: Neck supple, trachea midline, No JVD,   Respiratory: CTA B/L, No wheezing, rales, rhonchi  CV: irreg. rate + rthyhm, S1S2, no murmurs, rubs or gallops  Abdominal: Soft, NT, ND +BS,   Extremities: No edema, + peripheral pulses, no calf pain or tenderness  Skin: No Rashes, Hematoma, Ecchymosis  Lymphatic: No Neck, axilla, groin LAD  Psych: Oriented x 3, normal affect  Incisions: No s-s of infection  Tubes:_> to WS=>  Relevant labs, radiology->Small right-sided pleural effusion. No pneumothorax. and Medications reviewed                        10.7   17.12 )-----------( 294      ( 18 Aug 2017 05:30 )             33.2     08-18    138  |  99  |  19  ----------------------------<  116<H>  4.1   |  25  |  1.34<H>    Ca    9.5      18 Aug 2017 05:30  Phos  2.8     08-18  Mg     1.7     08-18        MEDICATIONS  (STANDING):  heparin  Injectable 5000 Unit(s) SubCutaneous every 8 hours  famotidine    Tablet 20 milliGRAM(s) Oral every 12 hours  docusate sodium 100 milliGRAM(s) Oral three times a day  senna 2 Tablet(s) Oral at bedtime  nicotine - 21 mG/24Hr(s) Patch 1 patch Transdermal daily  chlorhexidine 4% Liquid 1 Application(s) Topical daily  metoprolol 25 milliGRAM(s) Oral two times a day  allopurinol 300 milliGRAM(s) Oral daily  diltiazem Infusion 10 mG/Hr (10 mL/Hr) IV Continuous <Continuous>  levalbuterol Inhalation 0.63 milliGRAM(s) Inhalation every 6 hours  dornase zaira Solution 2.5 milliGRAM(s) Inhalation two times a day  diltiazem    Tablet 30 milliGRAM(s) Oral every 6 hours    MEDICATIONS  (PRN):  ondansetron Injectable 4 milliGRAM(s) IV Push once PRN Nausea and/or Vomiting  naloxone Injectable 0.1 milliGRAM(s) IV Push every 3 minutes PRN For ANY of the following changes in patient status:  A. RR LESS THAN 10 breaths per minute, B. Oxygen saturation LESS THAN 90%, C. Sedation score of 6  ondansetron Injectable 4 milliGRAM(s) IV Push every 6 hours PRN Nausea  acetaminophen   Tablet. 650 milliGRAM(s) Oral every 6 hours PRN Mild Pain (1 - 3)  oxyCODONE    IR 5 milliGRAM(s) Oral every 4 hours PRN Moderate Pain (4 - 6)  oxyCODONE    IR 10 milliGRAM(s) Oral every 4 hours PRN Severe Pain (7 - 10)    Pertinent Physical Exam  I&O's Summary    17 Aug 2017 07:01  -  18 Aug 2017 07:00  --------------------------------------------------------  IN: 720 mL / OUT: 1510 mL / NET: -790 mL    18 Aug 2017 07:01  -  18 Aug 2017 17:57  --------------------------------------------------------  IN: 0 mL / OUT: 70 mL / NET: -70 mL        Assessment  72y Male  w/ PAST MEDICAL & SURGICAL HISTORY:  TIA (transient ischemic attack): 1999  Localized enlarged lymph nodes  Solitary pulmonary nodule  HTN (hypertension)  Alcohol abuse: Sober since 01/2016- attending AA meeting weekly  Depression, unspecified depression type  Gout  Essential hypertension  H/O hemorrhoidectomy: 1967  History of tonsillectomy: as a child  History of appendectomy: 1958  H/O left knee surgery: 1995  admitted with complaints of Patient is a 72y old  Male who presents with a chief complaint of .  On (Date), patient underwent Lobectomy, lung, using VATS, with bronchoscopy  Postoperative course/issues:8/17 Cozaar reordered; LISA MN-> rate controlled with Lop IV, Mg; Creat (1.4) and WBC elevated (17)    PLAN  Aggressive pulmonary toliet  Consult with cardiology  Neuro: Pain management  Pulm: Encourage coughing, deep breathing and use of incentive spirometry.   Daily CXR.   Cardio: Monitor telemetry/alarms  GI: Tolerating diet. Continue stool softeners.  Renal: monitor urine output, supplement electrolytes as needed  Vasc: Heparin SC/SCDs for DVT prophylaxis  Heme: Stable H/H. .   ID: Off antibiotics. Stable.  Therapy: OOB/ambulate  Tubes: D/C Chest tube w a follow    Disposition: Aim to D/C to home on  Discussed with Cardiothoracic Team at AM rounds. Subjective:    Vital Signs: Hemodynamically stable  Vital Signs Last 24 Hrs  T(C): 37.2 (08-18-17 @ 15:54), Max: 37.3 (08-18-17 @ 07:50)  T(F): 99 (08-18-17 @ 15:54), Max: 99.1 (08-18-17 @ 07:50)  HR: 97 (08-18-17 @ 15:54) (82 - 137)  BP: 118/59 (08-18-17 @ 15:54) (103/56 - 158/70)  RR: 18 (08-18-17 @ 15:54) (17 - 19)  SpO2: 99% (08-18-17 @ 15:54) (88% - 99%) on 21% O2    Telemetry/Alarms: LISA->AF since 0005  today   General: WN/WD NAD  Neurology: Awake, nonfocal, WEBBER x 4  Eyes: Scleras clear, PERRLA/ EOMI, Gross vision intact  ENT:Gross hearing intact, grossly patent pharynx, no stridor  Neck: Neck supple, trachea midline, No JVD,   Respiratory: CTA B/L, No wheezing, rales, rhonchi  CV: irreg. rate + rthyhm, S1S2, no murmurs, rubs or gallops  Abdominal: Soft, NT, ND +BS,   Extremities: No edema, + peripheral pulses, no calf pain or tenderness  Skin: No Rashes, Hematoma, Ecchymosis  Lymphatic: No Neck, axilla, groin LAD  Psych: Oriented x 3, normal affect  Incisions: No s-s of infection  Tubes:_> to WS=>  Relevant labs, radiology->Small right-sided pleural effusion. No pneumothorax. and Medications reviewed                        10.7   17.12 )-----------( 294      ( 18 Aug 2017 05:30 )             33.2     08-18    138  |  99  |  19  ----------------------------<  116<H>  4.1   |  25  |  1.34<H>    Ca    9.5      18 Aug 2017 05:30  Phos  2.8     08-18  Mg     1.7     08-18        MEDICATIONS  (STANDING):  heparin  Injectable 5000 Unit(s) SubCutaneous every 8 hours  famotidine    Tablet 20 milliGRAM(s) Oral every 12 hours  docusate sodium 100 milliGRAM(s) Oral three times a day  senna 2 Tablet(s) Oral at bedtime  nicotine - 21 mG/24Hr(s) Patch 1 patch Transdermal daily  chlorhexidine 4% Liquid 1 Application(s) Topical daily  metoprolol 25 milliGRAM(s) Oral two times a day  allopurinol 300 milliGRAM(s) Oral daily  diltiazem Infusion 10 mG/Hr (10 mL/Hr) IV Continuous <Continuous>  levalbuterol Inhalation 0.63 milliGRAM(s) Inhalation every 6 hours  dornase zaira Solution 2.5 milliGRAM(s) Inhalation two times a day  diltiazem    Tablet 30 milliGRAM(s) Oral every 6 hours    MEDICATIONS  (PRN):  ondansetron Injectable 4 milliGRAM(s) IV Push once PRN Nausea and/or Vomiting  naloxone Injectable 0.1 milliGRAM(s) IV Push every 3 minutes PRN For ANY of the following changes in patient status:  A. RR LESS THAN 10 breaths per minute, B. Oxygen saturation LESS THAN 90%, C. Sedation score of 6  ondansetron Injectable 4 milliGRAM(s) IV Push every 6 hours PRN Nausea  acetaminophen   Tablet. 650 milliGRAM(s) Oral every 6 hours PRN Mild Pain (1 - 3)  oxyCODONE    IR 5 milliGRAM(s) Oral every 4 hours PRN Moderate Pain (4 - 6)  oxyCODONE    IR 10 milliGRAM(s) Oral every 4 hours PRN Severe Pain (7 - 10)    Pertinent Physical Exam  I&O's Summary    17 Aug 2017 07:01  -  18 Aug 2017 07:00  --------------------------------------------------------  IN: 720 mL / OUT: 1510 mL / NET: -790 mL    18 Aug 2017 07:01  -  18 Aug 2017 17:57  --------------------------------------------------------  IN: 0 mL / OUT: 70 mL / NET: -70 mL        Assessment  72y Male  w/ PAST MEDICAL & SURGICAL HISTORY:  TIA (transient ischemic attack): 1999  Localized enlarged lymph nodes  Solitary pulmonary nodule  HTN (hypertension)  Alcohol abuse: Sober since 01/2016- attending AA meeting weekly  Depression, unspecified depression type  Gout  Essential hypertension  H/O hemorrhoidectomy: 1967  History of tonsillectomy: as a child  History of appendectomy: 1958  H/O left knee surgery: 1995  admitted with complaints of Patient is a 72y old  Male who presents with a chief complaint of .  On 8/16/17, patient underwent Lobectomy, lung, using VATS, with bronchoscopy  Postoperative course/issues:8/17 Cozaar reordered; LISA MN-> rate controlled with Lop IV, Mg; Creat (1.4) and WBC elevated (17). Cardizem drip started today as per cardiology remains in A.fib with a control rate of 90's to 100.    PLAN  Aggressive pulmonary toliet  Consult with cardiology  Neuro: Pain management  Pulm: Encourage coughing, deep breathing and use of incentive spirometry.   Daily CXR.   Cardio: Monitor telemetry/alarms  GI: Tolerating diet. Continue stool softeners.  Renal: monitor urine output, supplement electrolytes as needed  Vasc: Heparin SC/SCDs for DVT prophylaxis  Heme: Stable H/H. .   ID: Off antibiotics. Stable.  Therapy: OOB/ambulate  Tubes: D/C Chest tube w a follow    Disposition: Aim to D/C to home on  Discussed with Cardiothoracic Team at AM rounds. Subjective:    Vital Signs: Hemodynamically stable  Vital Signs Last 24 Hrs  T(C): 37.2 (08-18-17 @ 15:54), Max: 37.3 (08-18-17 @ 07:50)  T(F): 99 (08-18-17 @ 15:54), Max: 99.1 (08-18-17 @ 07:50)  HR: 97 (08-18-17 @ 15:54) (82 - 137)  BP: 118/59 (08-18-17 @ 15:54) (103/56 - 158/70)  RR: 18 (08-18-17 @ 15:54) (17 - 19)  SpO2: 99% (08-18-17 @ 15:54) (88% - 99%) on 21% O2    Telemetry/Alarms: LISA->AF since 0005  today   General: WN/WD NAD  Neurology: Awake, nonfocal, WEBBER x 4  Eyes: Scleras clear, PERRLA/ EOMI, Gross vision intact  ENT:Gross hearing intact, grossly patent pharynx, no stridor  Neck: Neck supple, trachea midline, No JVD,   Respiratory: CTA B/L, No wheezing, rales, rhonchi  CV: irreg. rate + rthyhm, S1S2, no murmurs, rubs or gallops  Abdominal: Soft, NT, ND +BS,   Extremities: No edema, + peripheral pulses, no calf pain or tenderness  Skin: No Rashes, Hematoma, Ecchymosis  Lymphatic: No Neck, axilla, groin LAD  Psych: Oriented x 3, normal affect  Incisions: No s-s of infection  Tubes:_> to WS=>  Relevant labs, radiology->Small right-sided pleural effusion. No pneumothorax. and Medications reviewed                        10.7   17.12 )-----------( 294      ( 18 Aug 2017 05:30 )             33.2     08-18    138  |  99  |  19  ----------------------------<  116<H>  4.1   |  25  |  1.34<H>    Ca    9.5      18 Aug 2017 05:30  Phos  2.8     08-18  Mg     1.7     08-18        MEDICATIONS  (STANDING):  heparin  Injectable 5000 Unit(s) SubCutaneous every 8 hours  famotidine    Tablet 20 milliGRAM(s) Oral every 12 hours  docusate sodium 100 milliGRAM(s) Oral three times a day  senna 2 Tablet(s) Oral at bedtime  nicotine - 21 mG/24Hr(s) Patch 1 patch Transdermal daily  chlorhexidine 4% Liquid 1 Application(s) Topical daily  metoprolol 25 milliGRAM(s) Oral two times a day  allopurinol 300 milliGRAM(s) Oral daily  diltiazem Infusion 10 mG/Hr (10 mL/Hr) IV Continuous <Continuous>  levalbuterol Inhalation 0.63 milliGRAM(s) Inhalation every 6 hours  dornase zaira Solution 2.5 milliGRAM(s) Inhalation two times a day  diltiazem    Tablet 30 milliGRAM(s) Oral every 6 hours    MEDICATIONS  (PRN):  ondansetron Injectable 4 milliGRAM(s) IV Push once PRN Nausea and/or Vomiting  naloxone Injectable 0.1 milliGRAM(s) IV Push every 3 minutes PRN For ANY of the following changes in patient status:  A. RR LESS THAN 10 breaths per minute, B. Oxygen saturation LESS THAN 90%, C. Sedation score of 6  ondansetron Injectable 4 milliGRAM(s) IV Push every 6 hours PRN Nausea  acetaminophen   Tablet. 650 milliGRAM(s) Oral every 6 hours PRN Mild Pain (1 - 3)  oxyCODONE    IR 5 milliGRAM(s) Oral every 4 hours PRN Moderate Pain (4 - 6)  oxyCODONE    IR 10 milliGRAM(s) Oral every 4 hours PRN Severe Pain (7 - 10)    Pertinent Physical Exam  I&O's Summary    17 Aug 2017 07:01  -  18 Aug 2017 07:00  --------------------------------------------------------  IN: 720 mL / OUT: 1510 mL / NET: -790 mL    18 Aug 2017 07:01  -  18 Aug 2017 17:57  --------------------------------------------------------  IN: 0 mL / OUT: 70 mL / NET: -70 mL        Assessment  72y Male  w/ PAST MEDICAL & SURGICAL HISTORY:  TIA (transient ischemic attack): 1999  Localized enlarged lymph nodes  Solitary pulmonary nodule  HTN (hypertension)  Alcohol abuse: Sober since 01/2016- attending AA meeting weekly  Depression, unspecified depression type  Gout  Essential hypertension  H/O hemorrhoidectomy: 1967  History of tonsillectomy: as a child  History of appendectomy: 1958  H/O left knee surgery: 1995  admitted with complaints of Patient is a 72y old  Male who presents with a chief complaint of .    PLAN  Aggressive pulmonary toliet  Consult with cardiology  Neuro: Pain management  Pulm: Encourage coughing, deep breathing and use of incentive spirometry.   Daily CXR.   Cardio: Monitor telemetry/alarms  GI: Tolerating diet. Continue stool softeners.  Renal: monitor urine output, supplement electrolytes as needed  Vasc: Heparin SC/SCDs for DVT prophylaxis  Heme: Stable H/H. .   ID: Off antibiotics. Stable.  Therapy: OOB/ambulate  Tubes: D/C Chest tube w a follow    Disposition: Aim to D/C to home on  Discussed with Cardiothoracic Team at AM rounds.

## 2017-08-18 NOTE — PROGRESS NOTE ADULT - SUBJECTIVE AND OBJECTIVE BOX
Anesthesia Pain Management Service    SUBJECTIVE: Pt now off IV PCA without problems reported.    Therapy:	  [ X] IV PCA	   [ ] Epidural           [ ] s/p Spinal Opoid              [ ] Postpartum infusion	  [ ] Patient controlled regional anesthesia (PCRA)    [ ] prn Analgesics    Allergies    No Known Allergies    Intolerances      MEDICATIONS  (STANDING):  heparin  Injectable 5000 Unit(s) SubCutaneous every 8 hours  famotidine    Tablet 20 milliGRAM(s) Oral every 12 hours  docusate sodium 100 milliGRAM(s) Oral three times a day  senna 2 Tablet(s) Oral at bedtime  nicotine - 21 mG/24Hr(s) Patch 1 patch Transdermal daily  lactated ringers. 1000 milliLiter(s) (30 mL/Hr) IV Continuous <Continuous>  chlorhexidine 4% Liquid 1 Application(s) Topical daily  metoprolol 25 milliGRAM(s) Oral two times a day  allopurinol 300 milliGRAM(s) Oral daily  losartan 50 milliGRAM(s) Oral daily  diltiazem Infusion 10 mG/Hr (10 mL/Hr) IV Continuous <Continuous>  levalbuterol Inhalation 0.63 milliGRAM(s) Inhalation every 6 hours  dornase zaira Solution 2.5 milliGRAM(s) Inhalation two times a day  diltiazem    Tablet 30 milliGRAM(s) Oral every 6 hours  magnesium sulfate  IVPB 1 Gram(s) IV Intermittent once    MEDICATIONS  (PRN):  ondansetron Injectable 4 milliGRAM(s) IV Push once PRN Nausea and/or Vomiting  naloxone Injectable 0.1 milliGRAM(s) IV Push every 3 minutes PRN For ANY of the following changes in patient status:  A. RR LESS THAN 10 breaths per minute, B. Oxygen saturation LESS THAN 90%, C. Sedation score of 6  ondansetron Injectable 4 milliGRAM(s) IV Push every 6 hours PRN Nausea  acetaminophen   Tablet. 650 milliGRAM(s) Oral every 6 hours PRN Mild Pain (1 - 3)  oxyCODONE    IR 5 milliGRAM(s) Oral every 4 hours PRN Moderate Pain (4 - 6)  oxyCODONE    IR 10 milliGRAM(s) Oral every 4 hours PRN Severe Pain (7 - 10)      OBJECTIVE:   [X] No new signs     [ ] Other:    Side Effects:  [X ] None			[ ] Other:    Assessment of Catheter Site:		[ ] Intact		[ ] Other:    ASSESSMENT/PLAN  [ ] Continue current therapy    [X ] Therapy changed to:    [ ] IV PCA       [ ] Epidural     [ X] prn Analgesics     Comments: Opioids or Adjuvent analgesics to be used at this point.    Patient was seen 08-18-17 @ 13:50

## 2017-08-18 NOTE — PROGRESS NOTE ADULT - SUBJECTIVE AND OBJECTIVE BOX
Day _3_ of Anesthesia Pain Management Service    Allergies    No Known Allergies    SUBJECTIVE: " I'm doing ok."    Pain Scale Score	At rest: _3/10_ 	With Activity: ___ 	[ ] Refer to charted pain scores    THERAPY:    [ ] IV PCA Morphine		[ ] 5 mg/mL	[ ] 1 mg/mL  [X] IV PCA Hydromorphone	[ ] 5 mg/mL	[X] 1 mg/mL  [ ] IV PCA Fentanyl		[ ] 50 micrograms/mL    Demand dose _0.2mg_ lockout _6_ (minutes) Continuous Rate _0_ Total: _2.4mg_  Daily      MEDICATIONS  (STANDING):  heparin  Injectable 5000 Unit(s) SubCutaneous every 8 hours  famotidine    Tablet 20 milliGRAM(s) Oral every 12 hours  docusate sodium 100 milliGRAM(s) Oral three times a day  senna 2 Tablet(s) Oral at bedtime  nicotine - 21 mG/24Hr(s) Patch 1 patch Transdermal daily  lactated ringers. 1000 milliLiter(s) (30 mL/Hr) IV Continuous <Continuous>  chlorhexidine 4% Liquid 1 Application(s) Topical daily  metoprolol 25 milliGRAM(s) Oral two times a day  allopurinol 300 milliGRAM(s) Oral daily  losartan 50 milliGRAM(s) Oral daily  diltiazem Infusion 10 mG/Hr (10 mL/Hr) IV Continuous <Continuous>  levalbuterol Inhalation 0.63 milliGRAM(s) Inhalation every 6 hours  dornase zaira Solution 2.5 milliGRAM(s) Inhalation two times a day  diltiazem    Tablet 30 milliGRAM(s) Oral every 6 hours  magnesium sulfate  IVPB 1 Gram(s) IV Intermittent once    MEDICATIONS  (PRN):  ondansetron Injectable 4 milliGRAM(s) IV Push once PRN Nausea and/or Vomiting  naloxone Injectable 0.1 milliGRAM(s) IV Push every 3 minutes PRN For ANY of the following changes in patient status:  A. RR LESS THAN 10 breaths per minute, B. Oxygen saturation LESS THAN 90%, C. Sedation score of 6  ondansetron Injectable 4 milliGRAM(s) IV Push every 6 hours PRN Nausea  acetaminophen   Tablet. 650 milliGRAM(s) Oral every 6 hours PRN Mild Pain (1 - 3)  oxyCODONE    IR 5 milliGRAM(s) Oral every 4 hours PRN Moderate Pain (4 - 6)  oxyCODONE    IR 10 milliGRAM(s) Oral every 4 hours PRN Severe Pain (7 - 10)      OBJECTIVE: A&Ox3, NAD, sitting up in chair    Sedation Score:	[X] Alert	[ ] Drowsy	[ ] Arousable	[ ] Asleep	[ ] Unresponsive    Side Effects:	[X] None	[ ] Nausea	[ ] Vomiting	[ ] Pruritus  		  [ ] Weakness		[ ] Numbness	[ ] Other:                          10.7   17.12 )-----------( 294      ( 18 Aug 2017 05:30 )             33.2       08-18    138  |  99  |  19  ----------------------------<  116<H>  4.1   |  25  |  1.34<H>    Ca    9.5      18 Aug 2017 05:30  Phos  2.8     08-18  Mg     1.7     08-18        ASSESSMENT/ PLAN    Therapy to  be:	[] Continue   [x] Discontinued   [x] Change to prn Analgesics    Documentation and Verification of current medications:  [X] Done	[ ] Not done, not eligible  [ ] Not done, reason not given    Comments:  Chest tube removed by CT surgery  Changed to oral analgesics

## 2017-08-18 NOTE — CONSULT NOTE ADULT - SUBJECTIVE AND OBJECTIVE BOX
CHIEF COMPLAINT:  i had surgery  HISTORY OF PRESENT ILLNESS:    72 with history as below s/p VATs now with Afib with RVR  given cardizem HR improving  denies any chest pain, sob, palpitation, dizziness or syncope.       PAST MEDICAL & SURGICAL HISTORY:  TIA (transient ischemic attack): 1999  Localized enlarged lymph nodes  Solitary pulmonary nodule  HTN (hypertension)  Alcohol abuse: Sober since 01/2016- attending AA meeting weekly  Depression, unspecified depression type  Gout  Essential hypertension  H/O hemorrhoidectomy: 1967  History of tonsillectomy: as a child  History of appendectomy: 1958  H/O left knee surgery: 1995          MEDICATIONS:  heparin  Injectable 5000 Unit(s) SubCutaneous every 8 hours  metoprolol 25 milliGRAM(s) Oral two times a day  losartan 50 milliGRAM(s) Oral daily  diltiazem Infusion 10 mG/Hr IV Continuous <Continuous>  diltiazem    Tablet 30 milliGRAM(s) Oral every 6 hours      levalbuterol Inhalation 0.63 milliGRAM(s) Inhalation every 6 hours  dornase zaira Solution 2.5 milliGRAM(s) Inhalation two times a day    ondansetron Injectable 4 milliGRAM(s) IV Push once PRN  ondansetron Injectable 4 milliGRAM(s) IV Push every 6 hours PRN  acetaminophen   Tablet. 650 milliGRAM(s) Oral every 6 hours PRN  oxyCODONE    IR 5 milliGRAM(s) Oral every 4 hours PRN  oxyCODONE    IR 10 milliGRAM(s) Oral every 4 hours PRN    famotidine    Tablet 20 milliGRAM(s) Oral every 12 hours  docusate sodium 100 milliGRAM(s) Oral three times a day  senna 2 Tablet(s) Oral at bedtime    allopurinol 300 milliGRAM(s) Oral daily    lactated ringers. 1000 milliLiter(s) IV Continuous <Continuous>  chlorhexidine 4% Liquid 1 Application(s) Topical daily      FAMILY HISTORY:      Non-contributory    SOCIAL HISTORY:    No tobacco, drugs or etoh    Allergies    No Known Allergies    Intolerances    	    REVIEW OF SYSTEMS:  as above  The rest of the 14 points ROS reviewed and except above they are unremarkable.        PHYSICAL EXAM:  T(C): 37.2 (08-18-17 @ 15:54), Max: 37.3 (08-18-17 @ 07:50)  HR: 97 (08-18-17 @ 15:54) (82 - 137)  BP: 118/59 (08-18-17 @ 15:54) (103/56 - 158/70)  RR: 18 (08-18-17 @ 15:54) (17 - 19)  SpO2: 99% (08-18-17 @ 15:54) (88% - 99%)  Wt(kg): --  I&O's Summary    17 Aug 2017 07:01  -  18 Aug 2017 07:00  --------------------------------------------------------  IN: 720 mL / OUT: 1510 mL / NET: -790 mL    18 Aug 2017 07:01  -  18 Aug 2017 16:53  --------------------------------------------------------  IN: 0 mL / OUT: 70 mL / NET: -70 mL        Appearance: Normal	  HEENT:   Normal oral mucosa, PERRL, EOMI	  Cardiovascular: Normal S1 S2,    Murmur:   Neck: JVP normal  Respiratory: Lungs clear to auscultation  Gastrointestinal:  Soft, Non-tender, + BS	  Skin: normal   Neuro: No gross deficits.   Psychiatry:  Mood & affect appropriate  Ext: No edema    TELEMETRY: 	    ECG:  	  RADIOLOGY:  OTHER: 	  	  LABS:	 	    CARDIAC MARKERS:                                  10.7   17.12 )-----------( 294      ( 18 Aug 2017 05:30 )             33.2     08-18    138  |  99  |  19  ----------------------------<  116<H>  4.1   |  25  |  1.34<H>    Ca    9.5      18 Aug 2017 05:30  Phos  2.8     08-18  Mg     1.7     08-18      proBNP:   Lipid Profile:   HgA1c:   TSH:

## 2017-08-18 NOTE — CONSULT NOTE ADULT - ASSESSMENT
Afib  likely post op related   HR is much better with cardizem   cont cardizem infusion  if continues to be in afib next 24 hours , would start a/c if no objection   obtain echo

## 2017-08-19 LAB
BUN SERPL-MCNC: 26 MG/DL — HIGH (ref 7–23)
CALCIUM SERPL-MCNC: 9.9 MG/DL — SIGNIFICANT CHANGE UP (ref 8.4–10.5)
CHLORIDE SERPL-SCNC: 98 MMOL/L — SIGNIFICANT CHANGE UP (ref 98–107)
CO2 SERPL-SCNC: 22 MMOL/L — SIGNIFICANT CHANGE UP (ref 22–31)
CREAT SERPL-MCNC: 1.44 MG/DL — HIGH (ref 0.5–1.3)
GLUCOSE SERPL-MCNC: 149 MG/DL — HIGH (ref 70–99)
HCT VFR BLD CALC: 32.3 % — LOW (ref 39–50)
HGB BLD-MCNC: 10.6 G/DL — LOW (ref 13–17)
MCHC RBC-ENTMCNC: 29.9 PG — SIGNIFICANT CHANGE UP (ref 27–34)
MCHC RBC-ENTMCNC: 32.8 % — SIGNIFICANT CHANGE UP (ref 32–36)
MCV RBC AUTO: 91.2 FL — SIGNIFICANT CHANGE UP (ref 80–100)
NRBC # FLD: 0 — SIGNIFICANT CHANGE UP
PLATELET # BLD AUTO: 299 K/UL — SIGNIFICANT CHANGE UP (ref 150–400)
PMV BLD: 10.7 FL — SIGNIFICANT CHANGE UP (ref 7–13)
POTASSIUM SERPL-MCNC: 4.6 MMOL/L — SIGNIFICANT CHANGE UP (ref 3.5–5.3)
POTASSIUM SERPL-SCNC: 4.6 MMOL/L — SIGNIFICANT CHANGE UP (ref 3.5–5.3)
RBC # BLD: 3.54 M/UL — LOW (ref 4.2–5.8)
RBC # FLD: 16.2 % — HIGH (ref 10.3–14.5)
SODIUM SERPL-SCNC: 136 MMOL/L — SIGNIFICANT CHANGE UP (ref 135–145)
WBC # BLD: 20.49 K/UL — HIGH (ref 3.8–10.5)
WBC # FLD AUTO: 20.49 K/UL — HIGH (ref 3.8–10.5)

## 2017-08-19 PROCEDURE — 93306 TTE W/DOPPLER COMPLETE: CPT | Mod: 26

## 2017-08-19 PROCEDURE — 71010: CPT | Mod: 26

## 2017-08-19 RX ORDER — POLYETHYLENE GLYCOL 3350 17 G/17G
17 POWDER, FOR SOLUTION ORAL DAILY
Qty: 0 | Refills: 0 | Status: DISCONTINUED | OUTPATIENT
Start: 2017-08-19 | End: 2017-08-21

## 2017-08-19 RX ORDER — DILTIAZEM HCL 120 MG
5 CAPSULE, EXT RELEASE 24 HR ORAL
Qty: 125 | Refills: 0 | Status: DISCONTINUED | OUTPATIENT
Start: 2017-08-19 | End: 2017-08-19

## 2017-08-19 RX ORDER — APIXABAN 2.5 MG/1
5 TABLET, FILM COATED ORAL EVERY 12 HOURS
Qty: 0 | Refills: 0 | Status: DISCONTINUED | OUTPATIENT
Start: 2017-08-19 | End: 2017-08-21

## 2017-08-19 RX ADMIN — DORNASE ALFA 2.5 MILLIGRAM(S): 1 SOLUTION RESPIRATORY (INHALATION) at 22:20

## 2017-08-19 RX ADMIN — FAMOTIDINE 20 MILLIGRAM(S): 10 INJECTION INTRAVENOUS at 05:15

## 2017-08-19 RX ADMIN — FAMOTIDINE 20 MILLIGRAM(S): 10 INJECTION INTRAVENOUS at 17:07

## 2017-08-19 RX ADMIN — HEPARIN SODIUM 5000 UNIT(S): 5000 INJECTION INTRAVENOUS; SUBCUTANEOUS at 05:15

## 2017-08-19 RX ADMIN — DORNASE ALFA 2.5 MILLIGRAM(S): 1 SOLUTION RESPIRATORY (INHALATION) at 10:14

## 2017-08-19 RX ADMIN — APIXABAN 5 MILLIGRAM(S): 2.5 TABLET, FILM COATED ORAL at 21:09

## 2017-08-19 RX ADMIN — Medication 25 MILLIGRAM(S): at 17:07

## 2017-08-19 RX ADMIN — Medication 5 MG/HR: at 05:36

## 2017-08-19 RX ADMIN — Medication 1 PATCH: at 21:10

## 2017-08-19 RX ADMIN — HEPARIN SODIUM 5000 UNIT(S): 5000 INJECTION INTRAVENOUS; SUBCUTANEOUS at 13:44

## 2017-08-19 RX ADMIN — LEVALBUTEROL 0.63 MILLIGRAM(S): 1.25 SOLUTION, CONCENTRATE RESPIRATORY (INHALATION) at 22:29

## 2017-08-19 RX ADMIN — Medication 1 TABLET(S): at 17:07

## 2017-08-19 RX ADMIN — Medication 100 MILLIGRAM(S): at 21:10

## 2017-08-19 RX ADMIN — Medication 100 MILLIGRAM(S): at 13:44

## 2017-08-19 RX ADMIN — SENNA PLUS 2 TABLET(S): 8.6 TABLET ORAL at 21:10

## 2017-08-19 RX ADMIN — Medication 100 MILLIGRAM(S): at 05:15

## 2017-08-19 RX ADMIN — Medication 0.6 MILLIGRAM(S): at 13:44

## 2017-08-19 RX ADMIN — LEVALBUTEROL 0.63 MILLIGRAM(S): 1.25 SOLUTION, CONCENTRATE RESPIRATORY (INHALATION) at 04:31

## 2017-08-19 RX ADMIN — LEVALBUTEROL 0.63 MILLIGRAM(S): 1.25 SOLUTION, CONCENTRATE RESPIRATORY (INHALATION) at 10:14

## 2017-08-19 RX ADMIN — Medication 300 MILLIGRAM(S): at 13:44

## 2017-08-19 RX ADMIN — Medication 25 MILLIGRAM(S): at 05:16

## 2017-08-19 RX ADMIN — LEVALBUTEROL 0.63 MILLIGRAM(S): 1.25 SOLUTION, CONCENTRATE RESPIRATORY (INHALATION) at 15:53

## 2017-08-19 NOTE — PROGRESS NOTE ADULT - SUBJECTIVE AND OBJECTIVE BOX
Subjective: Patient seen and examined. No new events except as noted.     SUBJECTIVE/ROS:  No chest pain, or sob.     MEDICATIONS:  MEDICATIONS  (STANDING):  heparin  Injectable 5000 Unit(s) SubCutaneous every 8 hours  famotidine    Tablet 20 milliGRAM(s) Oral every 12 hours  docusate sodium 100 milliGRAM(s) Oral three times a day  senna 2 Tablet(s) Oral at bedtime  nicotine - 21 mG/24Hr(s) Patch 1 patch Transdermal daily  chlorhexidine 4% Liquid 1 Application(s) Topical daily  metoprolol 25 milliGRAM(s) Oral two times a day  colchicine 0.6 milliGRAM(s) Oral every 3 days  allopurinol 300 milliGRAM(s) Oral daily  levalbuterol Inhalation 0.63 milliGRAM(s) Inhalation every 6 hours  dornase zaira Solution 2.5 milliGRAM(s) Inhalation two times a day  diltiazem    Tablet 30 milliGRAM(s) Oral every 6 hours  diltiazem Infusion 5 mG/Hr (5 mL/Hr) IV Continuous <Continuous>  polyethylene glycol 3350 17 Gram(s) Oral daily      PHYSICAL EXAM:  T(C): 36.9 (08-19-17 @ 08:00), Max: 37.2 (08-18-17 @ 15:54)  HR: 88 (08-19-17 @ 08:00) (79 - 101)  BP: 126/65 (08-19-17 @ 08:00) (103/56 - 145/79)  RR: 18 (08-19-17 @ 08:00) (17 - 19)  SpO2: 97% (08-19-17 @ 08:00) (93% - 99%)  Wt(kg): --  I&O's Summary    18 Aug 2017 07:01  -  19 Aug 2017 07:00  --------------------------------------------------------  IN: 320 mL / OUT: 270 mL / NET: 50 mL          Appearance: Normal	  HEENT:   Normal oral mucosa, PERRL, EOMI	  Cardiovascular: Normal S1 S2,    Murmur:   Neck: JVP normal  Respiratory: Lungs clear to auscultation  Gastrointestinal:  Soft, Non-tender, + BS	  Skin: normal   Neuro: No gross deficits.   Psychiatry:  Mood & affect appropriate  Ext: No edema        LABS:    CARDIAC MARKERS:                                10.6   20.49 )-----------( 299      ( 19 Aug 2017 05:13 )             32.3     08-18    138  |  99  |  19  ----------------------------<  116<H>  4.1   |  25  |  1.34<H>    Ca    9.5      18 Aug 2017 05:30  Phos  2.8     08-18  Mg     1.7     08-18      proBNP:   Lipid Profile:   HgA1c:   TSH:           TELEMETRY: 	    ECG:  	  RADIOLOGY:   DIAGNOSTIC TESTING:  Echocardiogram:  Catheterization:  Stress Test:    OTHER:

## 2017-08-19 NOTE — PROGRESS NOTE ADULT - ASSESSMENT
Afib  likely post op related   HR is much better with cardizem   may change cardizem to PO. and DC drip, would change to 60 mg every 6 hrs  start a/c if no objection   obtain echo

## 2017-08-19 NOTE — PROGRESS NOTE ADULT - ASSESSMENT
s/p RVATS, RUL wedge, completion RUL Lobectomy POD#3. Will round with Dr vergara to discuss plans for anticoagulation and management of PTX.  F/U echo, cont lopressor/cardizem, SQH for now, U/A ordered to assess WBC, pt needs pulmonary toilet.

## 2017-08-19 NOTE — PROGRESS NOTE ADULT - SUBJECTIVE AND OBJECTIVE BOX
pt with out c/o. Denies shortness of breath, no BM, no dysuria, still in afib, awaiting echo, with rate controlled on oral cardizem and lopressor. IV cardizem stopped this am.   s/p RVATS, RUL wedge, completion RUL Lobectomy POD#3, chest tube removed yesterday, call to make an appointment in 1-2 weeks. please have a chest xray done 1-2 days prior to your appointment and bring a copy of the xray with you to the appointment. cxr showed no obvious ptx yesterday. AM CXR showes small to moderate R PTX, sat's hi 90's and no symptoms.     Vital Signs Last 24 Hrs  T(C): 36.9 (19 Aug 2017 08:00), Max: 37.2 (18 Aug 2017 15:54)  T(F): 98.5 (19 Aug 2017 08:00), Max: 99 (18 Aug 2017 15:54)  HR: 88 (19 Aug 2017 08:00) (79 - 101)  BP: 126/65 (19 Aug 2017 08:00) (103/56 - 132/57)  BP(mean): --  RR: 18 (19 Aug 2017 08:00) (17 - 19)  SpO2: 97% (19 Aug 2017 08:00) (93% - 99%)    MEDICATIONS  (STANDING):  heparin  Injectable 5000 Unit(s) SubCutaneous every 8 hours  famotidine    Tablet 20 milliGRAM(s) Oral every 12 hours  docusate sodium 100 milliGRAM(s) Oral three times a day  senna 2 Tablet(s) Oral at bedtime  nicotine - 21 mG/24Hr(s) Patch 1 patch Transdermal daily  chlorhexidine 4% Liquid 1 Application(s) Topical daily  metoprolol 25 milliGRAM(s) Oral two times a day  colchicine 0.6 milliGRAM(s) Oral every 3 days  allopurinol 300 milliGRAM(s) Oral daily  levalbuterol Inhalation 0.63 milliGRAM(s) Inhalation every 6 hours  dornase zaira Solution 2.5 milliGRAM(s) Inhalation two times a day  diltiazem    Tablet 30 milliGRAM(s) Oral every 6 hours  diltiazem Infusion 5 mG/Hr (5 mL/Hr) IV Continuous <Continuous>  polyethylene glycol 3350 17 Gram(s) Oral daily    MEDICATIONS  (PRN):  ondansetron Injectable 4 milliGRAM(s) IV Push once PRN Nausea and/or Vomiting  naloxone Injectable 0.1 milliGRAM(s) IV Push every 3 minutes PRN For ANY of the following changes in patient status:  A. RR LESS THAN 10 breaths per minute, B. Oxygen saturation LESS THAN 90%, C. Sedation score of 6  ondansetron Injectable 4 milliGRAM(s) IV Push every 6 hours PRN Nausea  acetaminophen   Tablet. 650 milliGRAM(s) Oral every 6 hours PRN Mild Pain (1 - 3)  oxyCODONE    IR 5 milliGRAM(s) Oral every 4 hours PRN Moderate Pain (4 - 6)  oxyCODONE    IR 10 milliGRAM(s) Oral every 4 hours PRN Severe Pain                                        10.6   20.49 )-----------( 299      ( 19 Aug 2017 05:13 )             32.3   08-18    138  |  99  |  19  ----------------------------<  116<H>  4.1   |  25  |  1.34<H>    Ca    9.5      18 Aug 2017 05:30  Phos  2.8     08-18  Mg     1.7     08-18    General: WN/WD NAD  Neurology: A&Ox3, nonfocal, WEBBER x 4  Eyes: PERRLA/ EOMI, Gross vision intact  ENT/Neck: Neck supple, trachea midline, No JVD, Gross hearing intact  Respiratory: mild wheezing, right side, decreased BS at R base  CV: RRR, S1S2, no murmurs, rubs or gallops  Abdominal: Soft, NT, ND +BS,no BM   Extremities: No edema, + peripheral pulses  Skin: No Rashes, Hematoma, Ecchymosis  Incisions clean  IV sites clean

## 2017-08-20 ENCOUNTER — TRANSCRIPTION ENCOUNTER (OUTPATIENT)
Age: 72
End: 2017-08-20

## 2017-08-20 LAB
BUN SERPL-MCNC: 26 MG/DL — HIGH (ref 7–23)
CALCIUM SERPL-MCNC: 9.3 MG/DL — SIGNIFICANT CHANGE UP (ref 8.4–10.5)
CHLORIDE SERPL-SCNC: 100 MMOL/L — SIGNIFICANT CHANGE UP (ref 98–107)
CO2 SERPL-SCNC: 20 MMOL/L — LOW (ref 22–31)
CREAT SERPL-MCNC: 1.29 MG/DL — SIGNIFICANT CHANGE UP (ref 0.5–1.3)
GLUCOSE SERPL-MCNC: 116 MG/DL — HIGH (ref 70–99)
HCT VFR BLD CALC: 31.5 % — LOW (ref 39–50)
HGB BLD-MCNC: 10.4 G/DL — LOW (ref 13–17)
MCHC RBC-ENTMCNC: 30.2 PG — SIGNIFICANT CHANGE UP (ref 27–34)
MCHC RBC-ENTMCNC: 33 % — SIGNIFICANT CHANGE UP (ref 32–36)
MCV RBC AUTO: 91.6 FL — SIGNIFICANT CHANGE UP (ref 80–100)
NRBC # FLD: 0 — SIGNIFICANT CHANGE UP
PLATELET # BLD AUTO: 295 K/UL — SIGNIFICANT CHANGE UP (ref 150–400)
PMV BLD: 10.8 FL — SIGNIFICANT CHANGE UP (ref 7–13)
POTASSIUM SERPL-MCNC: 3.9 MMOL/L — SIGNIFICANT CHANGE UP (ref 3.5–5.3)
POTASSIUM SERPL-SCNC: 3.9 MMOL/L — SIGNIFICANT CHANGE UP (ref 3.5–5.3)
RBC # BLD: 3.44 M/UL — LOW (ref 4.2–5.8)
RBC # FLD: 16 % — HIGH (ref 10.3–14.5)
SODIUM SERPL-SCNC: 137 MMOL/L — SIGNIFICANT CHANGE UP (ref 135–145)
WBC # BLD: 17.84 K/UL — HIGH (ref 3.8–10.5)
WBC # FLD AUTO: 17.84 K/UL — HIGH (ref 3.8–10.5)

## 2017-08-20 PROCEDURE — 71010: CPT | Mod: 26

## 2017-08-20 RX ORDER — COLCHICINE 0.6 MG
1 TABLET ORAL
Qty: 0 | Refills: 0 | COMMUNITY

## 2017-08-20 RX ORDER — NICOTINE POLACRILEX 2 MG
1 GUM BUCCAL
Qty: 0 | Refills: 0 | COMMUNITY
Start: 2017-08-20

## 2017-08-20 RX ORDER — ALLOPURINOL 300 MG
1 TABLET ORAL
Qty: 0 | Refills: 0 | COMMUNITY

## 2017-08-20 RX ORDER — NICOTINE POLACRILEX 2 MG
1 GUM BUCCAL
Qty: 14 | Refills: 0 | OUTPATIENT
Start: 2017-08-20 | End: 2017-09-03

## 2017-08-20 RX ORDER — ALLOPURINOL 300 MG
1 TABLET ORAL
Qty: 30 | Refills: 0 | OUTPATIENT
Start: 2017-08-20 | End: 2017-09-19

## 2017-08-20 RX ORDER — APIXABAN 2.5 MG/1
1 TABLET, FILM COATED ORAL
Qty: 60 | Refills: 0 | OUTPATIENT
Start: 2017-08-20 | End: 2017-09-19

## 2017-08-20 RX ORDER — POLYETHYLENE GLYCOL 3350 17 G/17G
17 POWDER, FOR SOLUTION ORAL
Qty: 0 | Refills: 0 | COMMUNITY
Start: 2017-08-20

## 2017-08-20 RX ORDER — SENNA PLUS 8.6 MG/1
2 TABLET ORAL
Qty: 0 | Refills: 0 | COMMUNITY
Start: 2017-08-20

## 2017-08-20 RX ORDER — OXYCODONE HYDROCHLORIDE 5 MG/1
2 TABLET ORAL
Qty: 60 | Refills: 0 | OUTPATIENT
Start: 2017-08-20 | End: 2017-08-25

## 2017-08-20 RX ORDER — ACETAMINOPHEN 500 MG
2 TABLET ORAL
Qty: 0 | Refills: 0 | COMMUNITY
Start: 2017-08-20

## 2017-08-20 RX ORDER — METOPROLOL TARTRATE 50 MG
25 TABLET ORAL ONCE
Qty: 0 | Refills: 0 | Status: COMPLETED | OUTPATIENT
Start: 2017-08-20 | End: 2017-08-20

## 2017-08-20 RX ORDER — DOCUSATE SODIUM 100 MG
1 CAPSULE ORAL
Qty: 0 | Refills: 0 | COMMUNITY
Start: 2017-08-20

## 2017-08-20 RX ADMIN — FAMOTIDINE 20 MILLIGRAM(S): 10 INJECTION INTRAVENOUS at 17:10

## 2017-08-20 RX ADMIN — LEVALBUTEROL 0.63 MILLIGRAM(S): 1.25 SOLUTION, CONCENTRATE RESPIRATORY (INHALATION) at 11:24

## 2017-08-20 RX ADMIN — Medication 25 MILLIGRAM(S): at 15:08

## 2017-08-20 RX ADMIN — Medication 100 MILLIGRAM(S): at 13:57

## 2017-08-20 RX ADMIN — Medication 300 MILLIGRAM(S): at 12:17

## 2017-08-20 RX ADMIN — Medication 100 MILLIGRAM(S): at 06:13

## 2017-08-20 RX ADMIN — APIXABAN 5 MILLIGRAM(S): 2.5 TABLET, FILM COATED ORAL at 17:10

## 2017-08-20 RX ADMIN — POLYETHYLENE GLYCOL 3350 17 GRAM(S): 17 POWDER, FOR SOLUTION ORAL at 12:17

## 2017-08-20 RX ADMIN — SENNA PLUS 2 TABLET(S): 8.6 TABLET ORAL at 21:35

## 2017-08-20 RX ADMIN — DORNASE ALFA 2.5 MILLIGRAM(S): 1 SOLUTION RESPIRATORY (INHALATION) at 11:25

## 2017-08-20 RX ADMIN — LEVALBUTEROL 0.63 MILLIGRAM(S): 1.25 SOLUTION, CONCENTRATE RESPIRATORY (INHALATION) at 22:00

## 2017-08-20 RX ADMIN — Medication 25 MILLIGRAM(S): at 21:36

## 2017-08-20 RX ADMIN — LEVALBUTEROL 0.63 MILLIGRAM(S): 1.25 SOLUTION, CONCENTRATE RESPIRATORY (INHALATION) at 15:55

## 2017-08-20 RX ADMIN — Medication 1 TABLET(S): at 17:10

## 2017-08-20 RX ADMIN — FAMOTIDINE 20 MILLIGRAM(S): 10 INJECTION INTRAVENOUS at 06:14

## 2017-08-20 RX ADMIN — DORNASE ALFA 2.5 MILLIGRAM(S): 1 SOLUTION RESPIRATORY (INHALATION) at 21:50

## 2017-08-20 RX ADMIN — APIXABAN 5 MILLIGRAM(S): 2.5 TABLET, FILM COATED ORAL at 09:32

## 2017-08-20 RX ADMIN — Medication 1 TABLET(S): at 06:13

## 2017-08-20 RX ADMIN — Medication 1 PATCH: at 21:37

## 2017-08-20 RX ADMIN — Medication 100 MILLIGRAM(S): at 21:36

## 2017-08-20 RX ADMIN — Medication 25 MILLIGRAM(S): at 06:14

## 2017-08-20 RX ADMIN — Medication 1 PATCH: at 21:35

## 2017-08-20 NOTE — DISCHARGE NOTE ADULT - INSTRUCTIONS
Resume usual diet Any temperature greater than 100.4, or white discharge coming from surgical site call your surgeon. You may remove chest tube dressing in the shower and keep the area clean and dry. Continue to use incentive spirometer and ambulation.

## 2017-08-20 NOTE — DISCHARGE NOTE ADULT - MEDICATION SUMMARY - MEDICATIONS TO CHANGE
I will SWITCH the dose or number of times a day I take the medications listed below when I get home from the hospital:    allopurinol 100 mg oral tablet  -- 1 tab(s) by mouth 3 times a day    colchicine 0.6 mg oral tablet  -- 1 tab(s) by mouth 3 times a day

## 2017-08-20 NOTE — PROGRESS NOTE ADULT - SUBJECTIVE AND OBJECTIVE BOX
Subjective:    Vital Signs: Hemodynamically stable, and afebrile  Vital Signs Last 24 Hrs  T(C): 36.7 (08-20-17 @ 17:12), Max: 37.6 (08-20-17 @ 14:54)  T(F): 98.1 (08-20-17 @ 17:12), Max: 99.6 (08-20-17 @ 14:54)  HR: 113 (08-20-17 @ 17:12) (80 - 124)  BP: 118/74 (08-20-17 @ 17:12) (113/55 - 134/66)  RR: 18 (08-20-17 @ 17:12) (18 - 18)  SpO2: 97% (08-20-17 @ 17:12) (96% - 98%) on (O2)    Telemetry/Alarms: NSR  General: WN/WD NAD  Neurology: Awake, nonfocal, WEBBER x 4  Eyes: Scleras clear, PERRLA/ EOMI, Gross vision intact  ENT:Gross hearing intact, grossly patent pharynx, no stridor  Neck: Neck supple, trachea midline, No JVD,   Respiratory: Decreased Rt > Lt B/L,   CV:Irreg.rate and rhythm S1S2, Vent response incrs. to 150's with ambulation. At about 15:30, HR up to 120's while sitting asymptomatic.  Abdominal: Soft, NT, ND +BS,   Extremities: No edema, + peripheral pulses  Skin: No Rashes, Hematoma, Ecchymosis  Lymphatic: No Neck, axilla, groin LAD  Psych: Oriented x 3, normal affect  Incisions: No s-s of infection  Tubes: None  Relevant labs, radiology and Medications reviewed                        10.4   17.84 )-----------( 295      ( 20 Aug 2017 05:07 )             31.5     08-20    137  |  100  |  26<H>  ----------------------------<  116<H>  3.9   |  20<L>  |  1.29    Ca    9.3      20 Aug 2017 05:07        MEDICATIONS  (STANDING):  famotidine    Tablet 20 milliGRAM(s) Oral every 12 hours  docusate sodium 100 milliGRAM(s) Oral three times a day  senna 2 Tablet(s) Oral at bedtime  nicotine - 21 mG/24Hr(s) Patch 1 patch Transdermal daily  metoprolol 25 milliGRAM(s) Oral two times a day  colchicine 0.6 milliGRAM(s) Oral every 3 days  allopurinol 300 milliGRAM(s) Oral daily  levalbuterol Inhalation 0.63 milliGRAM(s) Inhalation every 6 hours  dornase zaira Solution 2.5 milliGRAM(s) Inhalation two times a day  polyethylene glycol 3350 17 Gram(s) Oral daily  amoxicillin  875 milliGRAM(s)/clavulanate 1 Tablet(s) Oral two times a day  apixaban 5 milliGRAM(s) Oral every 12 hours  diltiazem    Tablet 90 milliGRAM(s) Oral every 6 hours    MEDICATIONS  (PRN):  ondansetron Injectable 4 milliGRAM(s) IV Push once PRN Nausea and/or Vomiting  naloxone Injectable 0.1 milliGRAM(s) IV Push every 3 minutes PRN For ANY of the following changes in patient status:  A. RR LESS THAN 10 breaths per minute, B. Oxygen saturation LESS THAN 90%, C. Sedation score of 6  ondansetron Injectable 4 milliGRAM(s) IV Push every 6 hours PRN Nausea  acetaminophen   Tablet. 650 milliGRAM(s) Oral every 6 hours PRN Mild Pain (1 - 3)  oxyCODONE    IR 5 milliGRAM(s) Oral every 4 hours PRN Moderate Pain (4 - 6)  oxyCODONE    IR 10 milliGRAM(s) Oral every 4 hours PRN Severe Pain (7 - 10)    Pertinent Physical Exam  I&O's Summary    19 Aug 2017 07:01  -  20 Aug 2017 07:00  --------------------------------------------------------  IN: 0 mL / OUT: 250 mL / NET: -250 mL        Assessment  72y Male  w/ PAST MEDICAL & SURGICAL HISTORY:  TIA (transient ischemic attack): 1999  Localized enlarged lymph nodes  Solitary pulmonary nodule  HTN (hypertension)  Alcohol abuse: Sober since 01/2016- attending AA meeting weekly  Depression, unspecified depression type  Gout  Essential hypertension  H/O hemorrhoidectomy: 1967  History of tonsillectomy: as a child  History of appendectomy: 1958  H/O left knee surgery: 1995  admitted with complaints of Patient is a 72y old  Male who presents with a chief complaint of Lung Surgery (20 Aug 2017 12:55)  .  On (Date), patient underwent Lobectomy, lung, using VATS, with bronchoscopy  . Postoperative course/issues:    PLAN  Increase cardizem to 90mg every 6hrs and continue eliqus as per Dr Ward   Neuro: Pain management  Pulm: Encourage coughing, deep breathing and use of incentive spirometry. Wean off supplemental oxygen as able. Daily CXR.   Cardio: Monitor telemetry/alarms  GI: Tolerating diet. Continue stool softeners.  Renal: monitor urine output, supplement electrolytes as needed  Vasc: Heparin SC/SCDs for DVT prophylaxis  Heme: Stable H/H. .   ID: Off antibiotics. Stable.  Therapy: OOB/ambulate  Tubes: Monitor Chest tube output  Disposition: Aim to D/C to home on  Discussed with Cardiothoracic Team at AM rounds. Subjective:    Vital Signs: Hemodynamically stable, and afebrile  Vital Signs Last 24 Hrs  T(C): 36.7 (08-20-17 @ 17:12), Max: 37.6 (08-20-17 @ 14:54)  T(F): 98.1 (08-20-17 @ 17:12), Max: 99.6 (08-20-17 @ 14:54)  HR: 113 (08-20-17 @ 17:12) (80 - 124)  BP: 118/74 (08-20-17 @ 17:12) (113/55 - 134/66)  RR: 18 (08-20-17 @ 17:12) (18 - 18)  SpO2: 97% (08-20-17 @ 17:12) (96% - 98%) on (O2)    Telemetry/Alarms: NSR  General: WN/WD NAD  Neurology: Awake, nonfocal, WEBBER x 4  Eyes: Scleras clear, PERRLA/ EOMI, Gross vision intact  ENT:Gross hearing intact, grossly patent pharynx, no stridor  Neck: Neck supple, trachea midline, No JVD,   Respiratory: Decreased Rt > Lt B/L,   CV:Irreg.rate and rhythm S1S2, Vent response incrs. to 150's with ambulation. At about 15:30, HR up to 120's while sitting asymptomatic.  Abdominal: Soft, NT, ND +BS,   Extremities: No edema, + peripheral pulses  Skin: No Rashes, Hematoma, Ecchymosis  Lymphatic: No Neck, axilla, groin LAD  Psych: Oriented x 3, normal affect  Incisions: No s-s of infection  Tubes: None  Relevant labs, radiology and Medications reviewed                        10.4   17.84 )-----------( 295      ( 20 Aug 2017 05:07 )             31.5     08-20    137  |  100  |  26<H>  ----------------------------<  116<H>  3.9   |  20<L>  |  1.29    Ca    9.3      20 Aug 2017 05:07        MEDICATIONS  (STANDING):  famotidine    Tablet 20 milliGRAM(s) Oral every 12 hours  docusate sodium 100 milliGRAM(s) Oral three times a day  senna 2 Tablet(s) Oral at bedtime  nicotine - 21 mG/24Hr(s) Patch 1 patch Transdermal daily  metoprolol 25 milliGRAM(s) Oral two times a day  colchicine 0.6 milliGRAM(s) Oral every 3 days  allopurinol 300 milliGRAM(s) Oral daily  levalbuterol Inhalation 0.63 milliGRAM(s) Inhalation every 6 hours  dornase zaira Solution 2.5 milliGRAM(s) Inhalation two times a day  polyethylene glycol 3350 17 Gram(s) Oral daily  amoxicillin  875 milliGRAM(s)/clavulanate 1 Tablet(s) Oral two times a day  apixaban 5 milliGRAM(s) Oral every 12 hours  diltiazem    Tablet 90 milliGRAM(s) Oral every 6 hours    MEDICATIONS  (PRN):  ondansetron Injectable 4 milliGRAM(s) IV Push once PRN Nausea and/or Vomiting  naloxone Injectable 0.1 milliGRAM(s) IV Push every 3 minutes PRN For ANY of the following changes in patient status:  A. RR LESS THAN 10 breaths per minute, B. Oxygen saturation LESS THAN 90%, C. Sedation score of 6  ondansetron Injectable 4 milliGRAM(s) IV Push every 6 hours PRN Nausea  acetaminophen   Tablet. 650 milliGRAM(s) Oral every 6 hours PRN Mild Pain (1 - 3)  oxyCODONE    IR 5 milliGRAM(s) Oral every 4 hours PRN Moderate Pain (4 - 6)  oxyCODONE    IR 10 milliGRAM(s) Oral every 4 hours PRN Severe Pain (7 - 10)    Pertinent Physical Exam  I&O's Summary    19 Aug 2017 07:01  -  20 Aug 2017 07:00  --------------------------------------------------------  IN: 0 mL / OUT: 250 mL / NET: -250 mL        Assessment  72y Male  w/ PAST MEDICAL & SURGICAL HISTORY:  TIA (transient ischemic attack): 1999  Localized enlarged lymph nodes  Solitary pulmonary nodule  HTN (hypertension)  Alcohol abuse: Sober since 01/2016- attending AA meeting weekly  Depression, unspecified depression type  Gout  Essential hypertension  H/O hemorrhoidectomy: 1967  History of tonsillectomy: as a child  History of appendectomy: 1958  H/O left knee surgery: 1995  admitted with complaints of Patient is a 72y old  Male who presents with a chief complaint of Lung Surgery (20 Aug 2017 12:55)  .  On 8/16/17, patient underwent Lobectomy, lung, using VATS, with bronchoscopy  Postoperative course/issues:8/18 Cozaar reordered; LISA MN-> rate controlled with Lop IV, Mg; Creat (1.4) and WBC elevated (17). Cardizem drip started today as per cardiology remains in A.fib with a control rate of 90's to 100. 8/19 Started on PO abx. for leukocytosis and eliqus for the A. Fib. 8/20 HR incrs with ambulation to 150 and 120 while sitting at rest       PLAN  D/C cancelled  Increase cardizem to 90mg every 6hrs and continue eliqus as per Dr Ward   Neuro: Pain management  Pulm: Encourage coughing, deep breathing and use of incentive spirometry. Wean off supplemental oxygen as able. Daily CXR.   Cardio: Monitor telemetry/alarms  GI: Tolerating diet. Continue stool softeners.  Renal: monitor urine output, supplement electrolytes as needed  Vasc: Heparin SC/SCDs for DVT prophylaxis  Heme: Stable H/H. .   ID: Off antibiotics. Stable.  Therapy: OOB/ambulate  Tubes: Monitor Chest tube output  Disposition: Aim to D/C to home on  Discussed with Cardiothoracic Team at AM rounds. Subjective:    Vital Signs: Hemodynamically stable, and afebrile  Vital Signs Last 24 Hrs  T(C): 36.7 (08-20-17 @ 17:12), Max: 37.6 (08-20-17 @ 14:54)  T(F): 98.1 (08-20-17 @ 17:12), Max: 99.6 (08-20-17 @ 14:54)  HR: 113 (08-20-17 @ 17:12) (80 - 124)  BP: 118/74 (08-20-17 @ 17:12) (113/55 - 134/66)  RR: 18 (08-20-17 @ 17:12) (18 - 18)  SpO2: 97% (08-20-17 @ 17:12) (96% - 98%) on (O2)    Telemetry/Alarms: NSR  General: WN/WD NAD  Neurology: Awake, nonfocal, WEBBER x 4  Eyes: Scleras clear, PERRLA/ EOMI, Gross vision intact  ENT:Gross hearing intact, grossly patent pharynx, no stridor  Neck: Neck supple, trachea midline, No JVD,   Respiratory: Decreased Rt > Lt B/L,   CV:Irreg.rate and rhythm S1S2, Vent response incrs. to 150's with ambulation. At about 15:30, HR up to 120's while sitting asymptomatic.  Abdominal: Soft, NT, ND +BS,   Extremities: No edema, + peripheral pulses  Skin: No Rashes, Hematoma, Ecchymosis  Lymphatic: No Neck, axilla, groin LAD  Psych: Oriented x 3, normal affect  Incisions: No s-s of infection  Tubes: None  Relevant labs, radiology and Medications reviewed                        10.4   17.84 )-----------( 295      ( 20 Aug 2017 05:07 )             31.5     08-20    137  |  100  |  26<H>  ----------------------------<  116<H>  3.9   |  20<L>  |  1.29    Ca    9.3      20 Aug 2017 05:07        MEDICATIONS  (STANDING):  famotidine    Tablet 20 milliGRAM(s) Oral every 12 hours  docusate sodium 100 milliGRAM(s) Oral three times a day  senna 2 Tablet(s) Oral at bedtime  nicotine - 21 mG/24Hr(s) Patch 1 patch Transdermal daily  metoprolol 25 milliGRAM(s) Oral two times a day  colchicine 0.6 milliGRAM(s) Oral every 3 days  allopurinol 300 milliGRAM(s) Oral daily  levalbuterol Inhalation 0.63 milliGRAM(s) Inhalation every 6 hours  dornase zaira Solution 2.5 milliGRAM(s) Inhalation two times a day  polyethylene glycol 3350 17 Gram(s) Oral daily  amoxicillin  875 milliGRAM(s)/clavulanate 1 Tablet(s) Oral two times a day  apixaban 5 milliGRAM(s) Oral every 12 hours  diltiazem    Tablet 90 milliGRAM(s) Oral every 6 hours    MEDICATIONS  (PRN):  ondansetron Injectable 4 milliGRAM(s) IV Push once PRN Nausea and/or Vomiting  naloxone Injectable 0.1 milliGRAM(s) IV Push every 3 minutes PRN For ANY of the following changes in patient status:  A. RR LESS THAN 10 breaths per minute, B. Oxygen saturation LESS THAN 90%, C. Sedation score of 6  ondansetron Injectable 4 milliGRAM(s) IV Push every 6 hours PRN Nausea  acetaminophen   Tablet. 650 milliGRAM(s) Oral every 6 hours PRN Mild Pain (1 - 3)  oxyCODONE    IR 5 milliGRAM(s) Oral every 4 hours PRN Moderate Pain (4 - 6)  oxyCODONE    IR 10 milliGRAM(s) Oral every 4 hours PRN Severe Pain (7 - 10)    Pertinent Physical Exam  I&O's Summary    19 Aug 2017 07:01  -  20 Aug 2017 07:00  --------------------------------------------------------  IN: 0 mL / OUT: 250 mL / NET: -250 mL        Assessment  72y Male  w/ PAST MEDICAL & SURGICAL HISTORY:  TIA (transient ischemic attack): 1999  Localized enlarged lymph nodes  Solitary pulmonary nodule  HTN (hypertension)  Alcohol abuse: Sober since 01/2016- attending AA meeting weekly  Depression, unspecified depression type  Gout  Essential hypertension  H/O hemorrhoidectomy: 1967  History of tonsillectomy: as a child  History of appendectomy: 1958  H/O left knee surgery: 1995  admitted with complaints of Patient is a 72y old  Male who presents with a chief complaint of Lung Surgery (20 Aug 2017 12:55)  .  On 8/16/17, patient underwent Lobectomy, lung, using VATS, with bronchoscopy  Postoperative course/issues:8/18 Cozaar reordered; LISA MN-> rate controlled with Lop IV, Mg; Creat (1.4) and WBC elevated (17). Cardizem drip started today as per cardiology remains in A.fib with a control rate of 90's to 100. 8/19 Started on PO abx. for leukocytosis and eliquis for the A. Fib. 8/20 HR incrs with ambulation to 150 and 120 while sitting at rest       PLAN  D/C cancelled  Increase cardizem to 90mg every 6hrs and continue eliquis as per Dr Ward   Neuro: Pain management  Pulm: Encourage coughing, deep breathing and use of incentive spirometry.   Daily CXR.   Cardio: Monitor telemetry/alarms  GI: Tolerating diet. Continue stool softeners.  Renal: monitor urine output, supplement electrolytes as needed  Vasc: Heparin SC/SCDs for DVT prophylaxis  Heme: Stable H/H. .   ID: Off antibiotics. Stable.  Therapy: OOB/ambulate  Disposition: Aim to D/C to home on 8/21 or 22  Discussed with Cardiothoracic Team at AM rounds.

## 2017-08-20 NOTE — PROGRESS NOTE ADULT - SUBJECTIVE AND OBJECTIVE BOX
Subjective: Patient seen and examined. No new events except as noted.     SUBJECTIVE/ROS:  No chest pain, or sob.     MEDICATIONS:  MEDICATIONS  (STANDING):  famotidine    Tablet 20 milliGRAM(s) Oral every 12 hours  docusate sodium 100 milliGRAM(s) Oral three times a day  senna 2 Tablet(s) Oral at bedtime  nicotine - 21 mG/24Hr(s) Patch 1 patch Transdermal daily  metoprolol 25 milliGRAM(s) Oral two times a day  colchicine 0.6 milliGRAM(s) Oral every 3 days  allopurinol 300 milliGRAM(s) Oral daily  levalbuterol Inhalation 0.63 milliGRAM(s) Inhalation every 6 hours  dornase zaira Solution 2.5 milliGRAM(s) Inhalation two times a day  diltiazem    Tablet 30 milliGRAM(s) Oral every 6 hours  polyethylene glycol 3350 17 Gram(s) Oral daily  amoxicillin  875 milliGRAM(s)/clavulanate 1 Tablet(s) Oral two times a day  apixaban 5 milliGRAM(s) Oral every 12 hours      PHYSICAL EXAM:  T(C): 36.7 (08-20-17 @ 08:05), Max: 37.6 (08-19-17 @ 17:10)  HR: 93 (08-20-17 @ 08:05) (78 - 112)  BP: 125/66 (08-20-17 @ 08:05) (108/58 - 134/66)  RR: 18 (08-20-17 @ 08:05) (18 - 18)  SpO2: 97% (08-20-17 @ 08:05) (96% - 98%)  Wt(kg): --  I&O's Summary    19 Aug 2017 07:01  -  20 Aug 2017 07:00  --------------------------------------------------------  IN: 0 mL / OUT: 250 mL / NET: -250 mL          Appearance: Normal	  HEENT:   Normal oral mucosa, PERRL, EOMI	  Cardiovascular: Normal S1 S2,    Murmur:   Neck: JVP normal  Respiratory: Lungs clear to auscultation  Gastrointestinal:  Soft, Non-tender, + BS	  Skin: normal   Neuro: No gross deficits.   Psychiatry:  Mood & affect appropriate  Ext: No edema        LABS:    CARDIAC MARKERS:                                10.4   17.84 )-----------( 295      ( 20 Aug 2017 05:07 )             31.5     08-20    137  |  100  |  26<H>  ----------------------------<  116<H>  3.9   |  20<L>  |  1.29    Ca    9.3      20 Aug 2017 05:07      proBNP:   Lipid Profile:   HgA1c:   TSH:   < from: Transthoracic Echocardiogram (08.19.17 @ 12:18) >  1. Mitral annular calcification, otherwise normal mitral  valve. Mild mitral regurgitation.  2. Normal left ventricular internal dimensions and wall  thicknesses.  3. Normal left ventricular systolic function. No segmental  wall motion abnormalities.  4. Normal right ventricular size and function.  5. Normal tricuspid valve.    Minimal tricuspid  regurgitation.  6. Estimated pulmonary artery systolic pressure equals 40  mm Hg, assuming right atrial pressure equals 10  mm Hg,  consistent with mild pulmonary hypertension.  ------------------------------------------------------------------------  Confirmed on  8/19/2017 - 14:18:30 by CHAD Del Cid  -------------------------    < end of copied text >          TELEMETRY: 	    ECG:  	  RADIOLOGY:   DIAGNOSTIC TESTING:  Echocardiogram:  Catheterization:  Stress Test:    OTHER:

## 2017-08-20 NOTE — DISCHARGE NOTE ADULT - MEDICATION SUMMARY - MEDICATIONS TO TAKE
I will START or STAY ON the medications listed below when I get home from the hospital:    acetaminophen 325 mg oral tablet  -- 2 tab(s) by mouth every 6 hours, As needed, Mild Pain (1 - 3)  -- Indication: For Pain    oxyCODONE 5 mg oral tablet  -- 1 to 2 tab(s) by mouth every 4 to 6 hours, As Needed -Moderate to Severe Pain MDD:10  -- Indication: For Pain    losartan 50 mg oral tablet  -- 1 tab(s) by mouth once a day AM  -- Indication: For Home med    Cardizem  mg/24 hours oral capsule, extended release  -- 1 cap(s) by mouth once a day  -- Indication: For A Fib / Heart rate    apixaban 5 mg oral tablet  -- 1 tab(s) by mouth every 12 hours  -- Indication: For A. Fib/ Heart rate    allopurinol 300 mg oral tablet  -- 1 tab(s) by mouth once a day  -- Indication: For Home med    colchicine 0.6 mg oral tablet  -- 1 tab(s) by mouth Every three days  -- Indication: For Home med    metoprolol succinate 25 mg oral tablet, extended release  -- 1 tab(s) by mouth once a day AM  -- Indication: For Home med    docusate sodium 100 mg oral capsule  -- 1 cap(s) by mouth 3 times a day  -- Indication: For Constipation    polyethylene glycol 3350 oral powder for reconstitution  -- 17 gram(s) by mouth once a day  -- Indication: For Constipation    senna oral tablet  -- 2 tab(s) by mouth once a day (at bedtime)  -- Indication: For Constipation    amoxicillin-clavulanate 875 mg-125 mg oral tablet  -- 1 tab(s) by mouth 2 times a day  -- Indication: For Infiltrate    nicotine 21 mg/24 hr transdermal film, extended release  -- 1 patch by transdermal patch once a day  -- Indication: For Smoking cessation    Centrum oral tablet  -- 1 tab(s) by mouth once a day LD 8/7/2017  -- Indication: For Home med I will START or STAY ON the medications listed below when I get home from the hospital:    acetaminophen 325 mg oral tablet  -- 2 tab(s) by mouth every 6 hours, As needed, Mild Pain (1 - 3)  -- Indication: For Pain    oxyCODONE 5 mg oral tablet  -- 1 to 2 tab(s) by mouth every 4 to 6 hours, As Needed -Moderate to Severe Pain MDD:10  -- Indication: For pain    losartan 50 mg oral tablet  -- 1 tab(s) by mouth once a day AM  -- Indication: For Home med    Cardizem  mg/24 hours oral capsule, extended release  -- 1 cap(s) by mouth once a day  -- It is very important that you take or use this exactly as directed.  Do not skip doses or discontinue unless directed by your doctor.  Some non-prescription drugs may aggravate your condition.  Read all labels carefully.  If a warning appears, check with your doctor before taking.  Swallow whole.  Do not crush.    -- Indication: For heart rate control. Start on 8/22 in morning.     apixaban 5 mg oral tablet  -- 1 tab(s) by mouth every 12 hours  -- Indication: For A. Fib/ Heart rate    allopurinol 300 mg oral tablet  -- 1 tab(s) by mouth once a day  -- Indication: For gout    colchicine 0.6 mg oral tablet  -- 1 tab(s) by mouth Every three days  -- Indication: For gout    metoprolol succinate 25 mg oral tablet, extended release  -- 1 tab(s) by mouth once a day AM  -- Indication: For heart rate control, do not take at same time as Cardizem, spread out doses.     docusate sodium 100 mg oral capsule  -- 1 cap(s) by mouth 3 times a day  -- Indication: For Constipation    polyethylene glycol 3350 oral powder for reconstitution  -- 17 gram(s) by mouth once a day  -- Indication: For Constipation    senna oral tablet  -- 2 tab(s) by mouth once a day (at bedtime)  -- Indication: For Constipation    amoxicillin-clavulanate 875 mg-125 mg oral tablet  -- 1 tab(s) by mouth 2 times a day  -- Indication: For Antibiotic x 6 more days.     nicotine 21 mg/24 hr transdermal film, extended release  -- 1 patch by transdermal patch once a day  -- Indication: For Smoking cessation    Centrum oral tablet  -- 1 tab(s) by mouth once a day   -- Indication: For vitamin I will START or STAY ON the medications listed below when I get home from the hospital:    acetaminophen 325 mg oral tablet  -- 2 tab(s) by mouth every 6 hours, As needed, Mild Pain (1 - 3)  -- Indication: For Pain    oxyCODONE 5 mg oral tablet  -- 1 to 2 tab(s) by mouth every 4 to 6 hours, As Needed -Moderate to Severe Pain MDD:10  -- Indication: For pain    Cardizem  mg/24 hours oral capsule, extended release  -- 1 cap(s) by mouth once a day  -- It is very important that you take or use this exactly as directed.  Do not skip doses or discontinue unless directed by your doctor.  Some non-prescription drugs may aggravate your condition.  Read all labels carefully.  If a warning appears, check with your doctor before taking.  Swallow whole.  Do not crush.    -- Indication: For heart rate control. Start on 8/22 in morning.     apixaban 5 mg oral tablet  -- 1 tab(s) by mouth every 12 hours  -- Indication: For A. Fib/ Heart rate    allopurinol 300 mg oral tablet  -- 1 tab(s) by mouth once a day  -- Indication: For gout    colchicine 0.6 mg oral tablet  -- 1 tab(s) by mouth Every three days  -- Indication: For gout    metoprolol succinate 25 mg oral tablet, extended release  -- 1 tab(s) by mouth once a day AM  -- Indication: For heart rate control, do not take at same time as Cardizem, spread out doses.     docusate sodium 100 mg oral capsule  -- 1 cap(s) by mouth 3 times a day  -- Indication: For Constipation    polyethylene glycol 3350 oral powder for reconstitution  -- 17 gram(s) by mouth once a day  -- Indication: For Constipation    senna oral tablet  -- 2 tab(s) by mouth once a day (at bedtime)  -- Indication: For Constipation    amoxicillin-clavulanate 875 mg-125 mg oral tablet  -- 1 tab(s) by mouth 2 times a day  -- Indication: For Antibiotic x 6 more days.     nicotine 21 mg/24 hr transdermal film, extended release  -- 1 patch by transdermal patch once a day  -- Indication: For Smoking cessation    Centrum oral tablet  -- 1 tab(s) by mouth once a day   -- Indication: For vitamin

## 2017-08-20 NOTE — DISCHARGE NOTE ADULT - CARE PROVIDER_API CALL
Robson Foster), Surgery; Thoracic Surgery  [id number] [place] [address]  [place] [address]  Phone: [phone]  Fax: [phone]    Saad Ward), Cardiovascular Disease; Internal Medicine  [address] [address]  Phone: [phone]  Fax: [phone]

## 2017-08-20 NOTE — DISCHARGE NOTE ADULT - CARE PLAN
Principal Discharge DX:	Solitary pulmonary nodule  Goal:	Well healed wound  Instructions for follow-up, activity and diet:	Increase walking distance daily  Use spirometry as directed   Follow up with Dr Foster in 2 weeks, call  for an appointment. 1 to 2 days prior to your visit obtain a cxr and bring a copy of the film with you to the appointment  Follow up with Dr Ward in one week, call for an appointment   See your pcp in 1 to 2 weeks Principal Discharge DX:	Solitary pulmonary nodule  Goal:	s/p Lung resection. Goal is wound healing.  Instructions for follow-up, activity and diet:	Increase walking distance daily, walk 4-5 x per day. Increase as tolerated. You may climb stairs. You may remove all dressings tomorrow then begin to shower. You may leave wounds uncovered. Suture will be removed in office.   Use spirometry as directed   Follow up with Dr Foster in 2 weeks, call  for an appointment. 1 to 2 days prior to your visit obtain a  Chest Xray and bring a copy of the film with you to the appointment  Secondary Diagnosis:	Atrial fibrillation, unspecified type  Goal:	heart rate control  Instructions for follow-up, activity and diet:	Continue new heart medications and blood thinner as prescribed.  See Dr. Ward Cardiologist in 1-2 weeks

## 2017-08-20 NOTE — DISCHARGE NOTE ADULT - HOSPITAL COURSE
On 8/16/17 the Pt underwent an uniportal right Vats- RUL wedge resection. Post op the pt developed LISA at 140's to 160's and he was hemodynamically stable, a cardiology consult was done by Dr Ward. The Pt was place on a cardizem drip that controlled his heart rate 90's to 100. He was also started on PO cardizem. he developed a luekocytosis  and the Pt was started on PO antibotic times 10 days for a possible lung infiltrate. On POD# 4 hemodynamically stable with rate control a. fib., and afebrile the PT was d/c'd home with follow up instructions On 8/16/17 the Pt underwent an uniportal right Vats- RUL wedge resection. Post op the pt developed LISA at 140's to 160's and he was hemodynamically stable, a cardiology consult was done by Dr Ward. The Pt was place on a cardizem drip that controlled his heart rate 90's to 100. He was also started on PO cardizem. he developed a luekocytosis and congestion on CXR  and the Pt was started on PO antibiotic for 7 days course for a possible lung infiltrate. On POD# 4 pt with uncontrolled afib w RVR. Given extra dose of beta blocker and cardizem increased. HR now under better control. Orthostatic b/p within reasonable range. Pt. climb stairs today w HR in low 100s. VATS site c/d/i. Denies any chest pain or SOB. AMbulating frequently. Cleared for discharge by Dr. Foster.

## 2017-08-20 NOTE — DISCHARGE NOTE ADULT - CONDITIONS AT DISCHARGE
Pt is alert and orientedx4. Denies pain at this time. Tolerated PO intake. Tolerated ambulation Afib rate controlled on monitor. IV removed. Tele box removed. D/c instructions given to pt and family.

## 2017-08-20 NOTE — DISCHARGE NOTE ADULT - MEDICATION SUMMARY - MEDICATIONS TO STOP TAKING
I will STOP taking the medications listed below when I get home from the hospital:  None I will STOP taking the medications listed below when I get home from the hospital:    losartan 50 mg oral tablet  -- 1 tab(s) by mouth once a day AM

## 2017-08-20 NOTE — DISCHARGE NOTE ADULT - PATIENT PORTAL LINK FT
“You can access the [facility] Patient Portal, offered by [facility], by registering with the following website: [email or web address]”

## 2017-08-20 NOTE — DISCHARGE NOTE ADULT - CARE PROVIDERS DIRECT ADDRESSES
[email or web address]

## 2017-08-20 NOTE — DISCHARGE NOTE ADULT - NS AS ACTIVITY OBS
Stairs allowed/No Heavy lifting/straining/Sex allowed/Showering allowed/Walking-Indoors allowed/Walking-Outdoors allowed Walking-Outdoors allowed/Do not drive or operate machinery/Do not make important decisions/Showering allowed/Stairs allowed/No Heavy lifting/straining/Walking-Indoors allowed/Sex allowed

## 2017-08-21 VITALS
OXYGEN SATURATION: 98 % | SYSTOLIC BLOOD PRESSURE: 119 MMHG | TEMPERATURE: 98 F | HEART RATE: 81 BPM | DIASTOLIC BLOOD PRESSURE: 60 MMHG | RESPIRATION RATE: 18 BRPM

## 2017-08-21 LAB
BUN SERPL-MCNC: 30 MG/DL — HIGH (ref 7–23)
CALCIUM SERPL-MCNC: 9.5 MG/DL — SIGNIFICANT CHANGE UP (ref 8.4–10.5)
CHLORIDE SERPL-SCNC: 100 MMOL/L — SIGNIFICANT CHANGE UP (ref 98–107)
CO2 SERPL-SCNC: 22 MMOL/L — SIGNIFICANT CHANGE UP (ref 22–31)
CREAT SERPL-MCNC: 1.39 MG/DL — HIGH (ref 0.5–1.3)
GLUCOSE SERPL-MCNC: 121 MG/DL — HIGH (ref 70–99)
HCT VFR BLD CALC: 32.7 % — LOW (ref 39–50)
HGB BLD-MCNC: 10.7 G/DL — LOW (ref 13–17)
MAGNESIUM SERPL-MCNC: 1.9 MG/DL — SIGNIFICANT CHANGE UP (ref 1.6–2.6)
MCHC RBC-ENTMCNC: 30.3 PG — SIGNIFICANT CHANGE UP (ref 27–34)
MCHC RBC-ENTMCNC: 32.7 % — SIGNIFICANT CHANGE UP (ref 32–36)
MCV RBC AUTO: 92.6 FL — SIGNIFICANT CHANGE UP (ref 80–100)
NRBC # FLD: 0 — SIGNIFICANT CHANGE UP
PHOSPHATE SERPL-MCNC: 3.6 MG/DL — SIGNIFICANT CHANGE UP (ref 2.5–4.5)
PLATELET # BLD AUTO: 333 K/UL — SIGNIFICANT CHANGE UP (ref 150–400)
PMV BLD: 10.8 FL — SIGNIFICANT CHANGE UP (ref 7–13)
POTASSIUM SERPL-MCNC: 4.2 MMOL/L — SIGNIFICANT CHANGE UP (ref 3.5–5.3)
POTASSIUM SERPL-SCNC: 4.2 MMOL/L — SIGNIFICANT CHANGE UP (ref 3.5–5.3)
RBC # BLD: 3.53 M/UL — LOW (ref 4.2–5.8)
RBC # FLD: 15.9 % — HIGH (ref 10.3–14.5)
SODIUM SERPL-SCNC: 137 MMOL/L — SIGNIFICANT CHANGE UP (ref 135–145)
WBC # BLD: 17.38 K/UL — HIGH (ref 3.8–10.5)
WBC # FLD AUTO: 17.38 K/UL — HIGH (ref 3.8–10.5)

## 2017-08-21 PROCEDURE — 71010: CPT | Mod: 26

## 2017-08-21 PROCEDURE — 99238 HOSP IP/OBS DSCHRG MGMT 30/<: CPT

## 2017-08-21 RX ORDER — OXYCODONE HYDROCHLORIDE 5 MG/1
2 TABLET ORAL
Qty: 60 | Refills: 0 | OUTPATIENT
Start: 2017-08-21 | End: 2017-08-26

## 2017-08-21 RX ORDER — DILTIAZEM HCL 120 MG
1 CAPSULE, EXT RELEASE 24 HR ORAL
Qty: 0 | Refills: 0 | COMMUNITY

## 2017-08-21 RX ORDER — MULTIVIT-MIN/FERROUS GLUCONATE 9 MG/15 ML
1 LIQUID (ML) ORAL
Qty: 0 | Refills: 0 | COMMUNITY

## 2017-08-21 RX ORDER — LOSARTAN POTASSIUM 100 MG/1
1 TABLET, FILM COATED ORAL
Qty: 0 | Refills: 0 | COMMUNITY

## 2017-08-21 RX ORDER — DILTIAZEM HCL 120 MG
1 CAPSULE, EXT RELEASE 24 HR ORAL
Qty: 30 | Refills: 0 | OUTPATIENT
Start: 2017-08-21 | End: 2017-09-20

## 2017-08-21 RX ADMIN — Medication 25 MILLIGRAM(S): at 06:36

## 2017-08-21 RX ADMIN — LEVALBUTEROL 0.63 MILLIGRAM(S): 1.25 SOLUTION, CONCENTRATE RESPIRATORY (INHALATION) at 10:53

## 2017-08-21 RX ADMIN — Medication 1 TABLET(S): at 06:35

## 2017-08-21 RX ADMIN — Medication 300 MILLIGRAM(S): at 11:57

## 2017-08-21 RX ADMIN — Medication 100 MILLIGRAM(S): at 06:36

## 2017-08-21 RX ADMIN — FAMOTIDINE 20 MILLIGRAM(S): 10 INJECTION INTRAVENOUS at 06:36

## 2017-08-21 RX ADMIN — APIXABAN 5 MILLIGRAM(S): 2.5 TABLET, FILM COATED ORAL at 06:35

## 2017-08-21 NOTE — PROGRESS NOTE ADULT - PROVIDER SPECIALTY LIST ADULT
Anesthesia
CT Surgery
Critical Care
Pain Medicine
Thoracic Surgery
Thoracic Surgery
Cardiology
Critical Care
Critical Care

## 2017-08-21 NOTE — PROGRESS NOTE ADULT - ASSESSMENT
Afib  likely post op related   HR elevated with standing  pls obtain orthostatic vitals to make sure his BP does not drop. he may be hypovolemic  cont increased dose of cardizem for now, if no improvement we will consider BEBETO/DCCV  a/c with eliquis

## 2017-08-21 NOTE — PROGRESS NOTE ADULT - SUBJECTIVE AND OBJECTIVE BOX
Subjective: Patient seen and examined. No new events except as noted.     SUBJECTIVE/ROS:    No chest pain, or sob.   MEDICATIONS:  MEDICATIONS  (STANDING):  famotidine    Tablet 20 milliGRAM(s) Oral every 12 hours  docusate sodium 100 milliGRAM(s) Oral three times a day  senna 2 Tablet(s) Oral at bedtime  nicotine - 21 mG/24Hr(s) Patch 1 patch Transdermal daily  metoprolol 25 milliGRAM(s) Oral two times a day  colchicine 0.6 milliGRAM(s) Oral every 3 days  allopurinol 300 milliGRAM(s) Oral daily  levalbuterol Inhalation 0.63 milliGRAM(s) Inhalation every 6 hours  polyethylene glycol 3350 17 Gram(s) Oral daily  amoxicillin  875 milliGRAM(s)/clavulanate 1 Tablet(s) Oral two times a day  apixaban 5 milliGRAM(s) Oral every 12 hours  diltiazem    Tablet 90 milliGRAM(s) Oral every 6 hours      PHYSICAL EXAM:  T(C): 36.9 (08-21-17 @ 06:03), Max: 37.6 (08-20-17 @ 14:54)  HR: 120 (08-21-17 @ 07:02) (77 - 124)  BP: 114/55 (08-21-17 @ 07:02) (111/57 - 127/56)  RR: 18 (08-21-17 @ 06:03) (18 - 18)  SpO2: 96% (08-21-17 @ 06:03) (96% - 98%)  Wt(kg): --  I&O's Summary    20 Aug 2017 07:01  -  21 Aug 2017 07:00  --------------------------------------------------------  IN: 0 mL / OUT: 300 mL / NET: -300 mL          Appearance: Normal	  HEENT:   Normal oral mucosa, PERRL, EOMI	  Cardiovascular: Normal S1 S2,    Murmur:   Neck: JVP normal  Respiratory: Lungs clear to auscultation  Gastrointestinal:  Soft, Non-tender, + BS	  Skin: normal   Neuro: No gross deficits.   Psychiatry:  Mood & affect appropriate  Ext: No edema        LABS:    CARDIAC MARKERS:                                10.7   17.38 )-----------( 333      ( 21 Aug 2017 05:06 )             32.7     08-21    137  |  100  |  30<H>  ----------------------------<  121<H>  4.2   |  22  |  1.39<H>    Ca    9.5      21 Aug 2017 05:06  Phos  3.6     08-21  Mg     1.9     08-21      proBNP:   Lipid Profile:   HgA1c:   TSH:           TELEMETRY: 	    ECG:  	  RADIOLOGY:   DIAGNOSTIC TESTING:  Echocardiogram:  Catheterization:  Stress Test:    OTHER:

## 2017-08-22 LAB
NON-GYNECOLOGICAL CYTOLOGY STUDY: SIGNIFICANT CHANGE UP
NON-GYNECOLOGICAL CYTOLOGY STUDY: SIGNIFICANT CHANGE UP
SURGICAL PATHOLOGY STUDY: SIGNIFICANT CHANGE UP

## 2017-08-23 ENCOUNTER — APPOINTMENT (OUTPATIENT)
Dept: GERIATRICS | Facility: CLINIC | Age: 72
End: 2017-08-23

## 2017-08-23 ENCOUNTER — TRANSCRIPTION ENCOUNTER (OUTPATIENT)
Age: 72
End: 2017-08-23

## 2017-08-29 ENCOUNTER — APPOINTMENT (OUTPATIENT)
Dept: CARDIOLOGY | Facility: CLINIC | Age: 72
End: 2017-08-29

## 2017-09-01 ENCOUNTER — APPOINTMENT (OUTPATIENT)
Dept: RADIOLOGY | Facility: IMAGING CENTER | Age: 72
End: 2017-09-01
Payer: MEDICARE

## 2017-09-01 ENCOUNTER — OUTPATIENT (OUTPATIENT)
Dept: OUTPATIENT SERVICES | Facility: HOSPITAL | Age: 72
LOS: 1 days | End: 2017-09-01
Payer: MEDICARE

## 2017-09-01 DIAGNOSIS — Z98.890 OTHER SPECIFIED POSTPROCEDURAL STATES: Chronic | ICD-10-CM

## 2017-09-01 DIAGNOSIS — Z90.49 ACQUIRED ABSENCE OF OTHER SPECIFIED PARTS OF DIGESTIVE TRACT: Chronic | ICD-10-CM

## 2017-09-01 DIAGNOSIS — Z00.8 ENCOUNTER FOR OTHER GENERAL EXAMINATION: ICD-10-CM

## 2017-09-01 DIAGNOSIS — Z90.89 ACQUIRED ABSENCE OF OTHER ORGANS: Chronic | ICD-10-CM

## 2017-09-01 PROCEDURE — 71046 X-RAY EXAM CHEST 2 VIEWS: CPT

## 2017-09-01 PROCEDURE — 71020: CPT | Mod: 26

## 2017-09-05 ENCOUNTER — APPOINTMENT (OUTPATIENT)
Dept: THORACIC SURGERY | Facility: CLINIC | Age: 72
End: 2017-09-05
Payer: MEDICARE

## 2017-09-05 VITALS
DIASTOLIC BLOOD PRESSURE: 68 MMHG | HEIGHT: 71 IN | WEIGHT: 166 LBS | HEART RATE: 68 BPM | BODY MASS INDEX: 23.24 KG/M2 | OXYGEN SATURATION: 98 % | SYSTOLIC BLOOD PRESSURE: 159 MMHG | RESPIRATION RATE: 16 BRPM

## 2017-09-05 PROCEDURE — 99024 POSTOP FOLLOW-UP VISIT: CPT

## 2017-09-06 RX ORDER — METOPROLOL SUCCINATE 25 MG/1
25 TABLET, EXTENDED RELEASE ORAL DAILY
Qty: 30 | Refills: 1 | Status: DISCONTINUED | COMMUNITY
Start: 2017-05-09 | End: 2017-09-06

## 2017-09-12 ENCOUNTER — OUTPATIENT (OUTPATIENT)
Dept: OUTPATIENT SERVICES | Facility: HOSPITAL | Age: 72
LOS: 1 days | Discharge: HOME | End: 2017-09-12
Payer: MEDICARE

## 2017-09-12 ENCOUNTER — LABORATORY RESULT (OUTPATIENT)
Age: 72
End: 2017-09-12

## 2017-09-12 ENCOUNTER — APPOINTMENT (OUTPATIENT)
Dept: RHEUMATOLOGY | Facility: CLINIC | Age: 72
End: 2017-09-12
Payer: MEDICARE

## 2017-09-12 VITALS
SYSTOLIC BLOOD PRESSURE: 162 MMHG | TEMPERATURE: 98 F | HEIGHT: 71 IN | WEIGHT: 166 LBS | HEART RATE: 70 BPM | BODY MASS INDEX: 23.24 KG/M2 | RESPIRATION RATE: 16 BRPM | DIASTOLIC BLOOD PRESSURE: 78 MMHG | OXYGEN SATURATION: 98 %

## 2017-09-12 DIAGNOSIS — C34.90 MALIGNANT NEOPLASM OF UNSPECIFIED PART OF UNSPECIFIED BRONCHUS OR LUNG: ICD-10-CM

## 2017-09-12 DIAGNOSIS — Z98.890 OTHER SPECIFIED POSTPROCEDURAL STATES: Chronic | ICD-10-CM

## 2017-09-12 DIAGNOSIS — Z90.49 ACQUIRED ABSENCE OF OTHER SPECIFIED PARTS OF DIGESTIVE TRACT: Chronic | ICD-10-CM

## 2017-09-12 DIAGNOSIS — Z90.89 ACQUIRED ABSENCE OF OTHER ORGANS: Chronic | ICD-10-CM

## 2017-09-12 PROCEDURE — 99214 OFFICE O/P EST MOD 30 MIN: CPT

## 2017-09-13 ENCOUNTER — APPOINTMENT (OUTPATIENT)
Dept: HEMATOLOGY ONCOLOGY | Facility: CLINIC | Age: 72
End: 2017-09-13
Payer: MEDICARE

## 2017-09-13 VITALS
HEIGHT: 69.8 IN | RESPIRATION RATE: 16 BRPM | TEMPERATURE: 98.1 F | DIASTOLIC BLOOD PRESSURE: 62 MMHG | OXYGEN SATURATION: 100 % | WEIGHT: 168.43 LBS | BODY MASS INDEX: 24.39 KG/M2 | HEART RATE: 64 BPM | SYSTOLIC BLOOD PRESSURE: 164 MMHG

## 2017-09-13 DIAGNOSIS — Z78.9 OTHER SPECIFIED HEALTH STATUS: ICD-10-CM

## 2017-09-13 LAB
ALBUMIN SERPL ELPH-MCNC: 4 G/DL
ALP BLD-CCNC: 143 U/L
ALT SERPL-CCNC: 7 U/L
ANION GAP SERPL CALC-SCNC: 18 MMOL/L
AST SERPL-CCNC: 20 U/L
BASOPHILS # BLD AUTO: 0.04 K/UL
BASOPHILS NFR BLD AUTO: 0.3 %
BILIRUB SERPL-MCNC: 0.2 MG/DL
BUN SERPL-MCNC: 24 MG/DL
CALCIUM SERPL-MCNC: 9.9 MG/DL
CHLORIDE SERPL-SCNC: 100 MMOL/L
CO2 SERPL-SCNC: 22 MMOL/L
CREAT SERPL-MCNC: 1.48 MG/DL
EOSINOPHIL # BLD AUTO: 0.98 K/UL
EOSINOPHIL NFR BLD AUTO: 6.6 %
GLUCOSE SERPL-MCNC: 100 MG/DL
HCT VFR BLD CALC: 31.3 %
HGB BLD-MCNC: 9.8 G/DL
IMM GRANULOCYTES NFR BLD AUTO: 0.3 %
LYMPHOCYTES # BLD AUTO: 6.01 K/UL
LYMPHOCYTES NFR BLD AUTO: 40.7 %
MAN DIFF?: NORMAL
MCHC RBC-ENTMCNC: 29.5 PG
MCHC RBC-ENTMCNC: 31.3 GM/DL
MCV RBC AUTO: 94.3 FL
MONOCYTES # BLD AUTO: 1.67 K/UL
MONOCYTES NFR BLD AUTO: 11.3 %
NEUTROPHILS # BLD AUTO: 6.02 K/UL
NEUTROPHILS NFR BLD AUTO: 40.8 %
PLATELET # BLD AUTO: 293 K/UL
POTASSIUM SERPL-SCNC: 4.4 MMOL/L
PROT SERPL-MCNC: 6.9 G/DL
RBC # BLD: 3.32 M/UL
RBC # FLD: 16.7 %
SODIUM SERPL-SCNC: 140 MMOL/L
URATE SERPL-MCNC: 4.9 MG/DL
WBC # FLD AUTO: 14.77 K/UL

## 2017-09-13 PROCEDURE — 99205 OFFICE O/P NEW HI 60 MIN: CPT

## 2017-09-18 ENCOUNTER — APPOINTMENT (OUTPATIENT)
Dept: GERIATRICS | Facility: CLINIC | Age: 72
End: 2017-09-18
Payer: MEDICARE

## 2017-09-18 ENCOUNTER — RX RENEWAL (OUTPATIENT)
Age: 72
End: 2017-09-18

## 2017-09-18 VITALS
WEIGHT: 169.04 LBS | HEIGHT: 69.8 IN | TEMPERATURE: 98 F | SYSTOLIC BLOOD PRESSURE: 130 MMHG | DIASTOLIC BLOOD PRESSURE: 62 MMHG | HEART RATE: 68 BPM | BODY MASS INDEX: 24.48 KG/M2 | RESPIRATION RATE: 16 BRPM | OXYGEN SATURATION: 97 %

## 2017-09-18 DIAGNOSIS — R60.0 LOCALIZED EDEMA: ICD-10-CM

## 2017-09-18 DIAGNOSIS — M25.561 PAIN IN RIGHT KNEE: ICD-10-CM

## 2017-09-18 PROCEDURE — 99215 OFFICE O/P EST HI 40 MIN: CPT

## 2017-09-18 RX ORDER — APIXABAN 5 MG/1
5 TABLET, FILM COATED ORAL
Refills: 0 | Status: DISCONTINUED | COMMUNITY
End: 2017-09-18

## 2017-09-18 RX ORDER — TIOTROPIUM BROMIDE 18 UG/1
18 CAPSULE ORAL; RESPIRATORY (INHALATION) DAILY
Refills: 0 | Status: ACTIVE | COMMUNITY
Start: 2017-09-18

## 2017-09-18 RX ORDER — COLCHICINE 0.6 MG/1
0.6 CAPSULE ORAL
Qty: 30 | Refills: 0 | Status: DISCONTINUED | COMMUNITY
Start: 2017-03-09 | End: 2017-09-18

## 2017-09-18 RX ORDER — DILTIAZEM HYDROCHLORIDE 360 MG/1
360 CAPSULE, COATED, EXTENDED RELEASE ORAL
Refills: 0 | Status: ACTIVE | COMMUNITY

## 2017-09-19 ENCOUNTER — LABORATORY RESULT (OUTPATIENT)
Age: 72
End: 2017-09-19

## 2017-09-19 ENCOUNTER — RESULT REVIEW (OUTPATIENT)
Age: 72
End: 2017-09-19

## 2017-09-19 ENCOUNTER — OUTPATIENT (OUTPATIENT)
Dept: OUTPATIENT SERVICES | Facility: HOSPITAL | Age: 72
LOS: 1 days | Discharge: HOME | End: 2017-09-19

## 2017-09-19 ENCOUNTER — APPOINTMENT (OUTPATIENT)
Dept: INFUSION THERAPY | Facility: HOSPITAL | Age: 72
End: 2017-09-19

## 2017-09-19 DIAGNOSIS — Z90.89 ACQUIRED ABSENCE OF OTHER ORGANS: Chronic | ICD-10-CM

## 2017-09-19 DIAGNOSIS — Z98.890 OTHER SPECIFIED POSTPROCEDURAL STATES: Chronic | ICD-10-CM

## 2017-09-19 DIAGNOSIS — Z90.49 ACQUIRED ABSENCE OF OTHER SPECIFIED PARTS OF DIGESTIVE TRACT: Chronic | ICD-10-CM

## 2017-09-19 LAB
BASOPHILS # BLD AUTO: 0.1 K/UL — SIGNIFICANT CHANGE UP (ref 0–0.2)
BASOPHILS NFR BLD AUTO: 1 % — SIGNIFICANT CHANGE UP (ref 0–2)
BLASTS # FLD: 2 % — HIGH (ref 0–0)
EOSINOPHIL # BLD AUTO: 0.9 K/UL — HIGH (ref 0–0.5)
EOSINOPHIL NFR BLD AUTO: 7 % — HIGH (ref 0–6)
HCT VFR BLD CALC: 28.9 % — LOW (ref 39–50)
HGB BLD-MCNC: 9.8 G/DL — LOW (ref 13–17)
LYMPHOCYTES # BLD AUTO: 30 % — SIGNIFICANT CHANGE UP (ref 13–44)
LYMPHOCYTES # BLD AUTO: 5.7 K/UL — HIGH (ref 1–3.3)
MANUAL DIF COMMENT BLD-IMP: SIGNIFICANT CHANGE UP
MCHC RBC-ENTMCNC: 31.2 PG — SIGNIFICANT CHANGE UP (ref 27–34)
MCHC RBC-ENTMCNC: 33.8 G/DL — SIGNIFICANT CHANGE UP (ref 32–36)
MCV RBC AUTO: 92 FL — SIGNIFICANT CHANGE UP (ref 80–100)
MONOCYTES # BLD AUTO: 1.7 K/UL — HIGH (ref 0–0.9)
MONOCYTES NFR BLD AUTO: 15 % — HIGH (ref 2–14)
NEUTROPHILS # BLD AUTO: 6.4 K/UL — SIGNIFICANT CHANGE UP (ref 1.8–7.4)
NEUTROPHILS NFR BLD AUTO: 45 % — SIGNIFICANT CHANGE UP (ref 43–77)
PLAT MORPH BLD: NORMAL — SIGNIFICANT CHANGE UP
PLATELET # BLD AUTO: 218 K/UL — SIGNIFICANT CHANGE UP (ref 150–400)
RBC # BLD: 3.14 M/UL — LOW (ref 4.2–5.8)
RBC # FLD: 15.1 % — HIGH (ref 10.3–14.5)
RBC BLD AUTO: SIGNIFICANT CHANGE UP
WBC # BLD: 14.8 K/UL — HIGH (ref 3.8–10.5)
WBC # FLD AUTO: 14.8 K/UL — HIGH (ref 3.8–10.5)

## 2017-09-19 PROCEDURE — 85060 BLOOD SMEAR INTERPRETATION: CPT

## 2017-09-19 RX ORDER — METOPROLOL TARTRATE 50 MG
1 TABLET ORAL
Qty: 0 | Refills: 0 | COMMUNITY

## 2017-09-20 DIAGNOSIS — E86.0 DEHYDRATION: ICD-10-CM

## 2017-09-20 DIAGNOSIS — R11.2 NAUSEA WITH VOMITING, UNSPECIFIED: ICD-10-CM

## 2017-09-20 DIAGNOSIS — Z51.11 ENCOUNTER FOR ANTINEOPLASTIC CHEMOTHERAPY: ICD-10-CM

## 2017-09-22 ENCOUNTER — APPOINTMENT (OUTPATIENT)
Dept: RADIATION ONCOLOGY | Facility: CLINIC | Age: 72
End: 2017-09-22
Payer: MEDICARE

## 2017-09-22 VITALS
HEART RATE: 75 BPM | DIASTOLIC BLOOD PRESSURE: 68 MMHG | OXYGEN SATURATION: 99 % | SYSTOLIC BLOOD PRESSURE: 178 MMHG | TEMPERATURE: 97.8 F | HEIGHT: 69 IN | RESPIRATION RATE: 16 BRPM | BODY MASS INDEX: 25.27 KG/M2 | WEIGHT: 170.64 LBS

## 2017-09-22 PROCEDURE — 99205 OFFICE O/P NEW HI 60 MIN: CPT

## 2017-09-23 ENCOUNTER — MEDICATION RENEWAL (OUTPATIENT)
Age: 72
End: 2017-09-23

## 2017-09-23 DIAGNOSIS — K59.00 CONSTIPATION, UNSPECIFIED: ICD-10-CM

## 2017-09-26 ENCOUNTER — RESULT REVIEW (OUTPATIENT)
Age: 72
End: 2017-09-26

## 2017-09-26 ENCOUNTER — APPOINTMENT (OUTPATIENT)
Dept: INFUSION THERAPY | Facility: HOSPITAL | Age: 72
End: 2017-09-26

## 2017-09-26 ENCOUNTER — LABORATORY RESULT (OUTPATIENT)
Age: 72
End: 2017-09-26

## 2017-09-26 ENCOUNTER — APPOINTMENT (OUTPATIENT)
Dept: HEMATOLOGY ONCOLOGY | Facility: CLINIC | Age: 72
End: 2017-09-26
Payer: MEDICARE

## 2017-09-26 LAB
EOSINOPHIL # BLD AUTO: 0.5 K/UL — SIGNIFICANT CHANGE UP (ref 0–0.5)
EOSINOPHIL NFR BLD AUTO: 1 % — SIGNIFICANT CHANGE UP (ref 0–6)
HCT VFR BLD CALC: 28.8 % — LOW (ref 39–50)
HGB BLD-MCNC: 9.6 G/DL — LOW (ref 13–17)
LYMPHOCYTES # BLD AUTO: 38 % — SIGNIFICANT CHANGE UP (ref 13–44)
LYMPHOCYTES # BLD AUTO: 6.4 K/UL — HIGH (ref 1–3.3)
MCHC RBC-ENTMCNC: 30.9 PG — SIGNIFICANT CHANGE UP (ref 27–34)
MCHC RBC-ENTMCNC: 33.1 G/DL — SIGNIFICANT CHANGE UP (ref 32–36)
MCV RBC AUTO: 93.2 FL — SIGNIFICANT CHANGE UP (ref 80–100)
MONOCYTES # BLD AUTO: 0.7 K/UL — SIGNIFICANT CHANGE UP (ref 0–0.9)
MONOCYTES NFR BLD AUTO: 10 % — SIGNIFICANT CHANGE UP (ref 2–14)
NEUTROPHILS # BLD AUTO: 5.9 K/UL — SIGNIFICANT CHANGE UP (ref 1.8–7.4)
NEUTROPHILS NFR BLD AUTO: 51 % — SIGNIFICANT CHANGE UP (ref 43–77)
PLAT MORPH BLD: NORMAL — SIGNIFICANT CHANGE UP
PLATELET # BLD AUTO: 377 K/UL — SIGNIFICANT CHANGE UP (ref 150–400)
RBC # BLD: 3.09 M/UL — LOW (ref 4.2–5.8)
RBC # FLD: 14.9 % — HIGH (ref 10.3–14.5)
RBC BLD AUTO: SIGNIFICANT CHANGE UP
WBC # BLD: 13.5 K/UL — HIGH (ref 3.8–10.5)
WBC # FLD AUTO: 13.5 K/UL — HIGH (ref 3.8–10.5)

## 2017-09-26 PROCEDURE — 99214 OFFICE O/P EST MOD 30 MIN: CPT

## 2017-09-29 ENCOUNTER — MOBILE ON CALL (OUTPATIENT)
Age: 72
End: 2017-09-29

## 2017-10-02 ENCOUNTER — CLINICAL ADVICE (OUTPATIENT)
Age: 72
End: 2017-10-02

## 2017-10-03 ENCOUNTER — LABORATORY RESULT (OUTPATIENT)
Age: 72
End: 2017-10-03

## 2017-10-03 ENCOUNTER — RESULT REVIEW (OUTPATIENT)
Age: 72
End: 2017-10-03

## 2017-10-03 ENCOUNTER — APPOINTMENT (OUTPATIENT)
Dept: INFUSION THERAPY | Facility: HOSPITAL | Age: 72
End: 2017-10-03

## 2017-10-03 LAB
BASOPHILS # BLD AUTO: 0.1 K/UL — SIGNIFICANT CHANGE UP (ref 0–0.2)
EOSINOPHIL # BLD AUTO: 0.2 K/UL — SIGNIFICANT CHANGE UP (ref 0–0.5)
EOSINOPHIL NFR BLD AUTO: 4 % — SIGNIFICANT CHANGE UP (ref 0–6)
HCT VFR BLD CALC: 27.4 % — LOW (ref 39–50)
HGB BLD-MCNC: 9.2 G/DL — LOW (ref 13–17)
LYMPHOCYTES # BLD AUTO: 53 % — HIGH (ref 13–44)
LYMPHOCYTES # BLD AUTO: 7 K/UL — HIGH (ref 1–3.3)
MCHC RBC-ENTMCNC: 31.4 PG — SIGNIFICANT CHANGE UP (ref 27–34)
MCHC RBC-ENTMCNC: 33.7 G/DL — SIGNIFICANT CHANGE UP (ref 32–36)
MCV RBC AUTO: 93.4 FL — SIGNIFICANT CHANGE UP (ref 80–100)
MONOCYTES # BLD AUTO: 0.4 K/UL — SIGNIFICANT CHANGE UP (ref 0–0.9)
NEUTROPHILS # BLD AUTO: 3.8 K/UL — SIGNIFICANT CHANGE UP (ref 1.8–7.4)
NEUTROPHILS NFR BLD AUTO: 43 % — SIGNIFICANT CHANGE UP (ref 43–77)
PLAT MORPH BLD: NORMAL — SIGNIFICANT CHANGE UP
PLATELET # BLD AUTO: 398 K/UL — SIGNIFICANT CHANGE UP (ref 150–400)
RBC # BLD: 2.93 M/UL — LOW (ref 4.2–5.8)
RBC # FLD: 17.2 % — HIGH (ref 10.3–14.5)
RBC BLD AUTO: SIGNIFICANT CHANGE UP
WBC # BLD: 11.5 K/UL — HIGH (ref 3.8–10.5)
WBC # FLD AUTO: 11.5 K/UL — HIGH (ref 3.8–10.5)

## 2017-10-10 ENCOUNTER — LABORATORY RESULT (OUTPATIENT)
Age: 72
End: 2017-10-10

## 2017-10-10 ENCOUNTER — RESULT REVIEW (OUTPATIENT)
Age: 72
End: 2017-10-10

## 2017-10-10 ENCOUNTER — APPOINTMENT (OUTPATIENT)
Dept: INFUSION THERAPY | Facility: HOSPITAL | Age: 72
End: 2017-10-10

## 2017-10-10 LAB
EOSINOPHIL # BLD AUTO: 0 K/UL — SIGNIFICANT CHANGE UP (ref 0–0.5)
EOSINOPHIL NFR BLD AUTO: 1 % — SIGNIFICANT CHANGE UP (ref 0–6)
HCT VFR BLD CALC: 26.7 % — LOW (ref 39–50)
HGB BLD-MCNC: 9.2 G/DL — LOW (ref 13–17)
LYMPHOCYTES # BLD AUTO: 52 % — HIGH (ref 13–44)
LYMPHOCYTES # BLD AUTO: 7 K/UL — HIGH (ref 1–3.3)
MCHC RBC-ENTMCNC: 32.3 PG — SIGNIFICANT CHANGE UP (ref 27–34)
MCHC RBC-ENTMCNC: 34.5 G/DL — SIGNIFICANT CHANGE UP (ref 32–36)
MCV RBC AUTO: 93.6 FL — SIGNIFICANT CHANGE UP (ref 80–100)
METAMYELOCYTES # FLD: 1 % — HIGH (ref 0–0)
MONOCYTES # BLD AUTO: 0.7 K/UL — SIGNIFICANT CHANGE UP (ref 0–0.9)
MONOCYTES NFR BLD AUTO: 6 % — SIGNIFICANT CHANGE UP (ref 2–14)
MYELOCYTES NFR BLD: 2 % — HIGH (ref 0–0)
NEUTROPHILS # BLD AUTO: 4.8 K/UL — SIGNIFICANT CHANGE UP (ref 1.8–7.4)
NEUTROPHILS NFR BLD AUTO: 38 % — LOW (ref 43–77)
NRBC # BLD: 1 /100 — HIGH (ref 0–0)
PLAT MORPH BLD: NORMAL — SIGNIFICANT CHANGE UP
PLATELET # BLD AUTO: 343 K/UL — SIGNIFICANT CHANGE UP (ref 150–400)
RBC # BLD: 2.85 M/UL — LOW (ref 4.2–5.8)
RBC # FLD: 16.6 % — HIGH (ref 10.3–14.5)
RBC BLD AUTO: SIGNIFICANT CHANGE UP
WBC # BLD: 12.6 K/UL — HIGH (ref 3.8–10.5)
WBC # FLD AUTO: 12.6 K/UL — HIGH (ref 3.8–10.5)

## 2017-10-11 ENCOUNTER — OUTPATIENT (OUTPATIENT)
Dept: OUTPATIENT SERVICES | Facility: HOSPITAL | Age: 72
LOS: 1 days | Discharge: HOME | End: 2017-10-11

## 2017-10-11 DIAGNOSIS — Z98.890 OTHER SPECIFIED POSTPROCEDURAL STATES: Chronic | ICD-10-CM

## 2017-10-11 DIAGNOSIS — Z90.49 ACQUIRED ABSENCE OF OTHER SPECIFIED PARTS OF DIGESTIVE TRACT: Chronic | ICD-10-CM

## 2017-10-11 DIAGNOSIS — Z90.89 ACQUIRED ABSENCE OF OTHER ORGANS: Chronic | ICD-10-CM

## 2017-10-11 DIAGNOSIS — C34.90 MALIGNANT NEOPLASM OF UNSPECIFIED PART OF UNSPECIFIED BRONCHUS OR LUNG: ICD-10-CM

## 2017-10-13 ENCOUNTER — APPOINTMENT (OUTPATIENT)
Dept: HEMATOLOGY ONCOLOGY | Facility: CLINIC | Age: 72
End: 2017-10-13
Payer: MEDICARE

## 2017-10-13 VITALS
BODY MASS INDEX: 25.23 KG/M2 | WEIGHT: 170.86 LBS | TEMPERATURE: 98.1 F | SYSTOLIC BLOOD PRESSURE: 181 MMHG | HEART RATE: 74 BPM | DIASTOLIC BLOOD PRESSURE: 65 MMHG | RESPIRATION RATE: 16 BRPM | OXYGEN SATURATION: 99 %

## 2017-10-13 DIAGNOSIS — F10.10 ALCOHOL ABUSE, UNCOMPLICATED: ICD-10-CM

## 2017-10-13 PROCEDURE — 99215 OFFICE O/P EST HI 40 MIN: CPT

## 2017-10-17 ENCOUNTER — RESULT REVIEW (OUTPATIENT)
Age: 72
End: 2017-10-17

## 2017-10-17 ENCOUNTER — LABORATORY RESULT (OUTPATIENT)
Age: 72
End: 2017-10-17

## 2017-10-17 ENCOUNTER — APPOINTMENT (OUTPATIENT)
Dept: INFUSION THERAPY | Facility: HOSPITAL | Age: 72
End: 2017-10-17
Payer: MEDICARE

## 2017-10-17 LAB
BASOPHILS # BLD AUTO: 0.1 K/UL — SIGNIFICANT CHANGE UP (ref 0–0.2)
BASOPHILS NFR BLD AUTO: 1 % — SIGNIFICANT CHANGE UP (ref 0–2)
EOSINOPHIL # BLD AUTO: 0.2 K/UL — SIGNIFICANT CHANGE UP (ref 0–0.5)
HCT VFR BLD CALC: 26.1 % — LOW (ref 39–50)
HGB BLD-MCNC: 9 G/DL — LOW (ref 13–17)
LYMPHOCYTES # BLD AUTO: 51 % — HIGH (ref 13–44)
LYMPHOCYTES # BLD AUTO: 6.5 K/UL — HIGH (ref 1–3.3)
MCHC RBC-ENTMCNC: 32.2 PG — SIGNIFICANT CHANGE UP (ref 27–34)
MCHC RBC-ENTMCNC: 34.4 G/DL — SIGNIFICANT CHANGE UP (ref 32–36)
MCV RBC AUTO: 93.5 FL — SIGNIFICANT CHANGE UP (ref 80–100)
MONOCYTES # BLD AUTO: 0.7 K/UL — SIGNIFICANT CHANGE UP (ref 0–0.9)
MONOCYTES NFR BLD AUTO: 8 % — SIGNIFICANT CHANGE UP (ref 2–14)
MYELOCYTES NFR BLD: 1 % — HIGH (ref 0–0)
NEUTROPHILS # BLD AUTO: 4.7 K/UL — SIGNIFICANT CHANGE UP (ref 1.8–7.4)
NEUTROPHILS NFR BLD AUTO: 39 % — LOW (ref 43–77)
PLAT MORPH BLD: NORMAL — SIGNIFICANT CHANGE UP
PLATELET # BLD AUTO: 284 K/UL — SIGNIFICANT CHANGE UP (ref 150–400)
RBC # BLD: 2.79 M/UL — LOW (ref 4.2–5.8)
RBC # FLD: 16.2 % — HIGH (ref 10.3–14.5)
RBC BLD AUTO: SIGNIFICANT CHANGE UP
WBC # BLD: 12.3 K/UL — HIGH (ref 3.8–10.5)
WBC # FLD AUTO: 12.3 K/UL — HIGH (ref 3.8–10.5)

## 2017-10-17 PROCEDURE — G0008: CPT

## 2017-10-18 DIAGNOSIS — R11.2 NAUSEA WITH VOMITING, UNSPECIFIED: ICD-10-CM

## 2017-10-18 DIAGNOSIS — Z51.11 ENCOUNTER FOR ANTINEOPLASTIC CHEMOTHERAPY: ICD-10-CM

## 2017-10-18 DIAGNOSIS — E86.0 DEHYDRATION: ICD-10-CM

## 2017-10-19 ENCOUNTER — CLINICAL ADVICE (OUTPATIENT)
Age: 72
End: 2017-10-19

## 2017-10-24 ENCOUNTER — RESULT REVIEW (OUTPATIENT)
Age: 72
End: 2017-10-24

## 2017-10-24 ENCOUNTER — OUTPATIENT (OUTPATIENT)
Dept: OUTPATIENT SERVICES | Facility: HOSPITAL | Age: 72
LOS: 1 days | End: 2017-10-24
Payer: MEDICARE

## 2017-10-24 ENCOUNTER — APPOINTMENT (OUTPATIENT)
Dept: INFUSION THERAPY | Facility: HOSPITAL | Age: 72
End: 2017-10-24

## 2017-10-24 ENCOUNTER — LABORATORY RESULT (OUTPATIENT)
Age: 72
End: 2017-10-24

## 2017-10-24 DIAGNOSIS — Z90.49 ACQUIRED ABSENCE OF OTHER SPECIFIED PARTS OF DIGESTIVE TRACT: Chronic | ICD-10-CM

## 2017-10-24 DIAGNOSIS — Z90.89 ACQUIRED ABSENCE OF OTHER ORGANS: Chronic | ICD-10-CM

## 2017-10-24 DIAGNOSIS — Z98.890 OTHER SPECIFIED POSTPROCEDURAL STATES: Chronic | ICD-10-CM

## 2017-10-24 LAB
BASOPHILS # BLD AUTO: 0.1 K/UL — SIGNIFICANT CHANGE UP (ref 0–0.2)
BASOPHILS NFR BLD AUTO: 1.2 % — SIGNIFICANT CHANGE UP (ref 0–2)
BLD GP AB SCN SERPL QL: NEGATIVE — SIGNIFICANT CHANGE UP
EOSINOPHIL # BLD AUTO: 0.2 K/UL — SIGNIFICANT CHANGE UP (ref 0–0.5)
EOSINOPHIL NFR BLD AUTO: 1.9 % — SIGNIFICANT CHANGE UP (ref 0–6)
HCT VFR BLD CALC: 22.9 % — LOW (ref 39–50)
HGB BLD-MCNC: 8.3 G/DL — LOW (ref 13–17)
LYMPHOCYTES # BLD AUTO: 5.8 K/UL — HIGH (ref 1–3.3)
LYMPHOCYTES # BLD AUTO: 52.8 % — HIGH (ref 13–44)
MCHC RBC-ENTMCNC: 33.3 PG — SIGNIFICANT CHANGE UP (ref 27–34)
MCHC RBC-ENTMCNC: 36.3 G/DL — HIGH (ref 32–36)
MCV RBC AUTO: 91.7 FL — SIGNIFICANT CHANGE UP (ref 80–100)
MONOCYTES # BLD AUTO: 0.6 K/UL — SIGNIFICANT CHANGE UP (ref 0–0.9)
MONOCYTES NFR BLD AUTO: 5.4 % — SIGNIFICANT CHANGE UP (ref 2–14)
NEUTROPHILS # BLD AUTO: 4.2 K/UL — SIGNIFICANT CHANGE UP (ref 1.8–7.4)
NEUTROPHILS NFR BLD AUTO: 38.6 % — LOW (ref 43–77)
PLATELET # BLD AUTO: 226 K/UL — SIGNIFICANT CHANGE UP (ref 150–400)
RBC # BLD: 2.49 M/UL — LOW (ref 4.2–5.8)
RBC # FLD: 16.7 % — HIGH (ref 10.3–14.5)
RH IG SCN BLD-IMP: POSITIVE — SIGNIFICANT CHANGE UP
WBC # BLD: 10.9 K/UL — HIGH (ref 3.8–10.5)
WBC # FLD AUTO: 10.9 K/UL — HIGH (ref 3.8–10.5)

## 2017-10-25 DIAGNOSIS — C34.90 MALIGNANT NEOPLASM OF UNSPECIFIED PART OF UNSPECIFIED BRONCHUS OR LUNG: ICD-10-CM

## 2017-10-26 ENCOUNTER — APPOINTMENT (OUTPATIENT)
Dept: INFUSION THERAPY | Facility: HOSPITAL | Age: 72
End: 2017-10-26

## 2017-10-27 DIAGNOSIS — Z51.89 ENCOUNTER FOR OTHER SPECIFIED AFTERCARE: ICD-10-CM

## 2017-10-30 ENCOUNTER — OTHER (OUTPATIENT)
Age: 72
End: 2017-10-30

## 2017-10-31 ENCOUNTER — RESULT REVIEW (OUTPATIENT)
Age: 72
End: 2017-10-31

## 2017-10-31 ENCOUNTER — APPOINTMENT (OUTPATIENT)
Dept: INFUSION THERAPY | Facility: HOSPITAL | Age: 72
End: 2017-10-31

## 2017-10-31 LAB
BASOPHILS # BLD AUTO: 0.1 K/UL — SIGNIFICANT CHANGE UP (ref 0–0.2)
BLASTS # FLD: 2 % — HIGH (ref 0–0)
EOSINOPHIL # BLD AUTO: 0 K/UL — SIGNIFICANT CHANGE UP (ref 0–0.5)
HCT VFR BLD CALC: 30.1 % — LOW (ref 39–50)
HGB BLD-MCNC: 10.6 G/DL — LOW (ref 13–17)
LYMPHOCYTES # BLD AUTO: 44 % — SIGNIFICANT CHANGE UP (ref 13–44)
LYMPHOCYTES # BLD AUTO: 6.3 K/UL — HIGH (ref 1–3.3)
MCHC RBC-ENTMCNC: 32.4 PG — SIGNIFICANT CHANGE UP (ref 27–34)
MCHC RBC-ENTMCNC: 35.3 G/DL — SIGNIFICANT CHANGE UP (ref 32–36)
MCV RBC AUTO: 91.9 FL — SIGNIFICANT CHANGE UP (ref 80–100)
MONOCYTES # BLD AUTO: 0.9 K/UL — SIGNIFICANT CHANGE UP (ref 0–0.9)
MONOCYTES NFR BLD AUTO: 2 % — SIGNIFICANT CHANGE UP (ref 2–14)
NEUTROPHILS # BLD AUTO: 5.5 K/UL — SIGNIFICANT CHANGE UP (ref 1.8–7.4)
NEUTROPHILS NFR BLD AUTO: 50 % — SIGNIFICANT CHANGE UP (ref 43–77)
NEUTS BAND # BLD: 2 % — SIGNIFICANT CHANGE UP (ref 0–8)
NRBC # BLD: 1 /100 — HIGH (ref 0–0)
PLAT MORPH BLD: NORMAL — SIGNIFICANT CHANGE UP
PLATELET # BLD AUTO: 275 K/UL — SIGNIFICANT CHANGE UP (ref 150–400)
RBC # BLD: 3.27 M/UL — LOW (ref 4.2–5.8)
RBC # FLD: 17.1 % — HIGH (ref 10.3–14.5)
RBC BLD AUTO: SIGNIFICANT CHANGE UP
WBC # BLD: 12.8 K/UL — HIGH (ref 3.8–10.5)
WBC # FLD AUTO: 12.8 K/UL — HIGH (ref 3.8–10.5)

## 2017-11-07 ENCOUNTER — RESULT REVIEW (OUTPATIENT)
Age: 72
End: 2017-11-07

## 2017-11-07 ENCOUNTER — APPOINTMENT (OUTPATIENT)
Dept: INFUSION THERAPY | Facility: HOSPITAL | Age: 72
End: 2017-11-07

## 2017-11-07 ENCOUNTER — LABORATORY RESULT (OUTPATIENT)
Age: 72
End: 2017-11-07

## 2017-11-07 LAB
BASOPHILS # BLD AUTO: 0.1 K/UL — SIGNIFICANT CHANGE UP (ref 0–0.2)
BASOPHILS NFR BLD AUTO: 1 % — SIGNIFICANT CHANGE UP (ref 0–2)
EOSINOPHIL # BLD AUTO: 0.1 K/UL — SIGNIFICANT CHANGE UP (ref 0–0.5)
EOSINOPHIL NFR BLD AUTO: 1.1 % — SIGNIFICANT CHANGE UP (ref 0–6)
HCT VFR BLD CALC: 27.8 % — LOW (ref 39–50)
HGB BLD-MCNC: 9.8 G/DL — LOW (ref 13–17)
LYMPHOCYTES # BLD AUTO: 5.9 K/UL — HIGH (ref 1–3.3)
LYMPHOCYTES # BLD AUTO: 58.2 % — HIGH (ref 13–44)
MCHC RBC-ENTMCNC: 32.6 PG — SIGNIFICANT CHANGE UP (ref 27–34)
MCHC RBC-ENTMCNC: 35.3 G/DL — SIGNIFICANT CHANGE UP (ref 32–36)
MCV RBC AUTO: 92.2 FL — SIGNIFICANT CHANGE UP (ref 80–100)
MONOCYTES # BLD AUTO: 0.5 K/UL — SIGNIFICANT CHANGE UP (ref 0–0.9)
MONOCYTES NFR BLD AUTO: 4.6 % — SIGNIFICANT CHANGE UP (ref 2–14)
NEUTROPHILS # BLD AUTO: 3.6 K/UL — SIGNIFICANT CHANGE UP (ref 1.8–7.4)
NEUTROPHILS NFR BLD AUTO: 35.1 % — LOW (ref 43–77)
PLATELET # BLD AUTO: 255 K/UL — SIGNIFICANT CHANGE UP (ref 150–400)
RBC # BLD: 3.02 M/UL — LOW (ref 4.2–5.8)
RBC # FLD: 17 % — HIGH (ref 10.3–14.5)
WBC # BLD: 10.2 K/UL — SIGNIFICANT CHANGE UP (ref 3.8–10.5)
WBC # FLD AUTO: 10.2 K/UL — SIGNIFICANT CHANGE UP (ref 3.8–10.5)

## 2017-11-09 ENCOUNTER — OUTPATIENT (OUTPATIENT)
Dept: OUTPATIENT SERVICES | Facility: HOSPITAL | Age: 72
LOS: 1 days | Discharge: HOME | End: 2017-11-09

## 2017-11-09 DIAGNOSIS — Z98.890 OTHER SPECIFIED POSTPROCEDURAL STATES: Chronic | ICD-10-CM

## 2017-11-09 DIAGNOSIS — Z90.89 ACQUIRED ABSENCE OF OTHER ORGANS: Chronic | ICD-10-CM

## 2017-11-09 DIAGNOSIS — C34.90 MALIGNANT NEOPLASM OF UNSPECIFIED PART OF UNSPECIFIED BRONCHUS OR LUNG: ICD-10-CM

## 2017-11-09 DIAGNOSIS — Z90.49 ACQUIRED ABSENCE OF OTHER SPECIFIED PARTS OF DIGESTIVE TRACT: Chronic | ICD-10-CM

## 2017-11-14 ENCOUNTER — APPOINTMENT (OUTPATIENT)
Dept: INFUSION THERAPY | Facility: HOSPITAL | Age: 72
End: 2017-11-14

## 2017-11-14 ENCOUNTER — RESULT REVIEW (OUTPATIENT)
Age: 72
End: 2017-11-14

## 2017-11-14 ENCOUNTER — LABORATORY RESULT (OUTPATIENT)
Age: 72
End: 2017-11-14

## 2017-11-14 LAB
HCT VFR BLD CALC: 26.2 % — LOW (ref 39–50)
HGB BLD-MCNC: 9.4 G/DL — LOW (ref 13–17)
MCHC RBC-ENTMCNC: 32.5 PG — SIGNIFICANT CHANGE UP (ref 27–34)
MCHC RBC-ENTMCNC: 35.7 G/DL — SIGNIFICANT CHANGE UP (ref 32–36)
MCV RBC AUTO: 91 FL — SIGNIFICANT CHANGE UP (ref 80–100)
PLATELET # BLD AUTO: 291 K/UL — SIGNIFICANT CHANGE UP (ref 150–400)
RBC # BLD: 2.88 M/UL — LOW (ref 4.2–5.8)
RBC # FLD: 17.8 % — HIGH (ref 10.3–14.5)
WBC # BLD: 13 K/UL — HIGH (ref 3.8–10.5)
WBC # FLD AUTO: 13 K/UL — HIGH (ref 3.8–10.5)

## 2017-11-15 DIAGNOSIS — E86.0 DEHYDRATION: ICD-10-CM

## 2017-11-15 DIAGNOSIS — Z51.11 ENCOUNTER FOR ANTINEOPLASTIC CHEMOTHERAPY: ICD-10-CM

## 2017-11-15 DIAGNOSIS — R11.2 NAUSEA WITH VOMITING, UNSPECIFIED: ICD-10-CM

## 2017-11-16 ENCOUNTER — APPOINTMENT (OUTPATIENT)
Dept: HEMATOLOGY ONCOLOGY | Facility: CLINIC | Age: 72
End: 2017-11-16
Payer: MEDICARE

## 2017-11-16 VITALS
OXYGEN SATURATION: 98 % | WEIGHT: 169.75 LBS | HEART RATE: 90 BPM | RESPIRATION RATE: 16 BRPM | TEMPERATURE: 97.6 F | SYSTOLIC BLOOD PRESSURE: 173 MMHG | BODY MASS INDEX: 25.07 KG/M2 | DIASTOLIC BLOOD PRESSURE: 70 MMHG

## 2017-11-16 PROCEDURE — 99214 OFFICE O/P EST MOD 30 MIN: CPT

## 2017-11-21 ENCOUNTER — RESULT REVIEW (OUTPATIENT)
Age: 72
End: 2017-11-21

## 2017-11-21 ENCOUNTER — LABORATORY RESULT (OUTPATIENT)
Age: 72
End: 2017-11-21

## 2017-11-21 ENCOUNTER — APPOINTMENT (OUTPATIENT)
Dept: INFUSION THERAPY | Facility: HOSPITAL | Age: 72
End: 2017-11-21

## 2017-11-21 LAB
ANISOCYTOSIS BLD QL: SLIGHT — SIGNIFICANT CHANGE UP
BASO STIPL BLD QL SMEAR: PRESENT — SIGNIFICANT CHANGE UP
BASOPHILS # BLD AUTO: 0.3 K/UL — HIGH (ref 0–0.2)
BLD GP AB SCN SERPL QL: NEGATIVE — SIGNIFICANT CHANGE UP
DACRYOCYTES BLD QL SMEAR: SLIGHT — SIGNIFICANT CHANGE UP
ELLIPTOCYTES BLD QL SMEAR: SLIGHT — SIGNIFICANT CHANGE UP
EOSINOPHIL # BLD AUTO: 0.2 K/UL — SIGNIFICANT CHANGE UP (ref 0–0.5)
HCT VFR BLD CALC: 25 % — LOW (ref 39–50)
HGB BLD-MCNC: 8.7 G/DL — LOW (ref 13–17)
LYMPHOCYTES # BLD AUTO: 60 % — HIGH (ref 13–44)
LYMPHOCYTES # BLD AUTO: 7.7 K/UL — HIGH (ref 1–3.3)
MACROCYTES BLD QL: SLIGHT — SIGNIFICANT CHANGE UP
MCHC RBC-ENTMCNC: 32.4 PG — SIGNIFICANT CHANGE UP (ref 27–34)
MCHC RBC-ENTMCNC: 35 G/DL — SIGNIFICANT CHANGE UP (ref 32–36)
MCV RBC AUTO: 92.7 FL — SIGNIFICANT CHANGE UP (ref 80–100)
MICROCYTES BLD QL: SLIGHT — SIGNIFICANT CHANGE UP
MONOCYTES # BLD AUTO: 0.7 K/UL — SIGNIFICANT CHANGE UP (ref 0–0.9)
MONOCYTES NFR BLD AUTO: 4 % — SIGNIFICANT CHANGE UP (ref 2–14)
MYELOCYTES NFR BLD: 1 % — HIGH (ref 0–0)
NEUTROPHILS # BLD AUTO: 4.1 K/UL — SIGNIFICANT CHANGE UP (ref 1.8–7.4)
NEUTROPHILS NFR BLD AUTO: 35 % — LOW (ref 43–77)
NRBC # BLD: 1 /100 — HIGH (ref 0–0)
PLAT MORPH BLD: NORMAL — SIGNIFICANT CHANGE UP
PLATELET # BLD AUTO: 309 K/UL — SIGNIFICANT CHANGE UP (ref 150–400)
POIKILOCYTOSIS BLD QL AUTO: SLIGHT — SIGNIFICANT CHANGE UP
POLYCHROMASIA BLD QL SMEAR: SLIGHT — SIGNIFICANT CHANGE UP
RBC # BLD: 2.69 M/UL — LOW (ref 4.2–5.8)
RBC # FLD: 18.5 % — HIGH (ref 10.3–14.5)
RBC BLD AUTO: ABNORMAL
RH IG SCN BLD-IMP: POSITIVE — SIGNIFICANT CHANGE UP
WBC # BLD: 13 K/UL — HIGH (ref 3.8–10.5)
WBC # FLD AUTO: 13 K/UL — HIGH (ref 3.8–10.5)

## 2017-11-21 PROCEDURE — 86901 BLOOD TYPING SEROLOGIC RH(D): CPT

## 2017-11-21 PROCEDURE — 86850 RBC ANTIBODY SCREEN: CPT

## 2017-11-21 PROCEDURE — 86900 BLOOD TYPING SEROLOGIC ABO: CPT

## 2017-11-21 PROCEDURE — 86923 COMPATIBILITY TEST ELECTRIC: CPT

## 2017-11-22 ENCOUNTER — APPOINTMENT (OUTPATIENT)
Dept: INFUSION THERAPY | Facility: HOSPITAL | Age: 72
End: 2017-11-22

## 2017-11-24 DIAGNOSIS — Z51.89 ENCOUNTER FOR OTHER SPECIFIED AFTERCARE: ICD-10-CM

## 2017-11-28 ENCOUNTER — APPOINTMENT (OUTPATIENT)
Dept: INFUSION THERAPY | Facility: HOSPITAL | Age: 72
End: 2017-11-28

## 2017-11-28 ENCOUNTER — RESULT REVIEW (OUTPATIENT)
Age: 72
End: 2017-11-28

## 2017-11-28 ENCOUNTER — LABORATORY RESULT (OUTPATIENT)
Age: 72
End: 2017-11-28

## 2017-11-28 LAB
ANISOCYTOSIS BLD QL: SLIGHT — SIGNIFICANT CHANGE UP
BASOPHILS # BLD AUTO: 0.2 K/UL — SIGNIFICANT CHANGE UP (ref 0–0.2)
DACRYOCYTES BLD QL SMEAR: SLIGHT — SIGNIFICANT CHANGE UP
ELLIPTOCYTES BLD QL SMEAR: SLIGHT — SIGNIFICANT CHANGE UP
EOSINOPHIL # BLD AUTO: 0 K/UL — SIGNIFICANT CHANGE UP (ref 0–0.5)
EOSINOPHIL NFR BLD AUTO: 1 % — SIGNIFICANT CHANGE UP (ref 0–6)
HCT VFR BLD CALC: 30.5 % — LOW (ref 39–50)
HGB BLD-MCNC: 11 G/DL — LOW (ref 13–17)
HYPOCHROMIA BLD QL: SLIGHT — SIGNIFICANT CHANGE UP
LYMPHOCYTES # BLD AUTO: 53 % — HIGH (ref 13–44)
LYMPHOCYTES # BLD AUTO: 6.6 K/UL — HIGH (ref 1–3.3)
MACROCYTES BLD QL: SLIGHT — SIGNIFICANT CHANGE UP
MCHC RBC-ENTMCNC: 33.4 PG — SIGNIFICANT CHANGE UP (ref 27–34)
MCHC RBC-ENTMCNC: 36 G/DL — SIGNIFICANT CHANGE UP (ref 32–36)
MCV RBC AUTO: 92.9 FL — SIGNIFICANT CHANGE UP (ref 80–100)
MICROCYTES BLD QL: SLIGHT — SIGNIFICANT CHANGE UP
MONOCYTES # BLD AUTO: 0.4 K/UL — SIGNIFICANT CHANGE UP (ref 0–0.9)
MONOCYTES NFR BLD AUTO: 7 % — SIGNIFICANT CHANGE UP (ref 2–14)
NEUTROPHILS # BLD AUTO: 3.4 K/UL — SIGNIFICANT CHANGE UP (ref 1.8–7.4)
NEUTROPHILS NFR BLD AUTO: 37 % — LOW (ref 43–77)
NEUTS BAND # BLD: 2 % — SIGNIFICANT CHANGE UP (ref 0–8)
PLAT MORPH BLD: NORMAL — SIGNIFICANT CHANGE UP
PLATELET # BLD AUTO: 262 K/UL — SIGNIFICANT CHANGE UP (ref 150–400)
POIKILOCYTOSIS BLD QL AUTO: SLIGHT — SIGNIFICANT CHANGE UP
POLYCHROMASIA BLD QL SMEAR: SLIGHT — SIGNIFICANT CHANGE UP
RBC # BLD: 3.28 M/UL — LOW (ref 4.2–5.8)
RBC # FLD: 17.3 % — HIGH (ref 10.3–14.5)
RBC BLD AUTO: ABNORMAL
SCHISTOCYTES BLD QL AUTO: SLIGHT — SIGNIFICANT CHANGE UP
WBC # BLD: 10.7 K/UL — HIGH (ref 3.8–10.5)
WBC # FLD AUTO: 10.7 K/UL — HIGH (ref 3.8–10.5)

## 2017-12-04 ENCOUNTER — APPOINTMENT (OUTPATIENT)
Dept: CT IMAGING | Facility: CLINIC | Age: 72
End: 2017-12-04
Payer: MEDICARE

## 2017-12-04 ENCOUNTER — OUTPATIENT (OUTPATIENT)
Dept: OUTPATIENT SERVICES | Facility: HOSPITAL | Age: 72
LOS: 1 days | End: 2017-12-04
Payer: MEDICARE

## 2017-12-04 DIAGNOSIS — Z98.890 OTHER SPECIFIED POSTPROCEDURAL STATES: Chronic | ICD-10-CM

## 2017-12-04 DIAGNOSIS — Z90.89 ACQUIRED ABSENCE OF OTHER ORGANS: Chronic | ICD-10-CM

## 2017-12-04 DIAGNOSIS — Z90.49 ACQUIRED ABSENCE OF OTHER SPECIFIED PARTS OF DIGESTIVE TRACT: Chronic | ICD-10-CM

## 2017-12-04 DIAGNOSIS — C78.00 SECONDARY MALIGNANT NEOPLASM OF UNSPECIFIED LUNG: ICD-10-CM

## 2017-12-04 PROCEDURE — 71250 CT THORAX DX C-: CPT | Mod: 26

## 2017-12-04 PROCEDURE — 71250 CT THORAX DX C-: CPT

## 2017-12-05 ENCOUNTER — RESULT REVIEW (OUTPATIENT)
Age: 72
End: 2017-12-05

## 2017-12-05 ENCOUNTER — LABORATORY RESULT (OUTPATIENT)
Age: 72
End: 2017-12-05

## 2017-12-05 ENCOUNTER — APPOINTMENT (OUTPATIENT)
Dept: THORACIC SURGERY | Facility: CLINIC | Age: 72
End: 2017-12-05
Payer: MEDICARE

## 2017-12-05 ENCOUNTER — APPOINTMENT (OUTPATIENT)
Dept: INFUSION THERAPY | Facility: HOSPITAL | Age: 72
End: 2017-12-05

## 2017-12-05 VITALS
DIASTOLIC BLOOD PRESSURE: 82 MMHG | WEIGHT: 163 LBS | OXYGEN SATURATION: 100 % | RESPIRATION RATE: 16 BRPM | BODY MASS INDEX: 24.07 KG/M2 | SYSTOLIC BLOOD PRESSURE: 142 MMHG | HEART RATE: 100 BPM

## 2017-12-05 LAB
BASOPHILS # BLD AUTO: 0.1 K/UL — SIGNIFICANT CHANGE UP (ref 0–0.2)
BASOPHILS NFR BLD AUTO: 0.7 % — SIGNIFICANT CHANGE UP (ref 0–2)
EOSINOPHIL # BLD AUTO: 0 K/UL — SIGNIFICANT CHANGE UP (ref 0–0.5)
EOSINOPHIL NFR BLD AUTO: 0 % — SIGNIFICANT CHANGE UP (ref 0–6)
HCT VFR BLD CALC: 28.3 % — LOW (ref 39–50)
HGB BLD-MCNC: 10.2 G/DL — LOW (ref 13–17)
LYMPHOCYTES # BLD AUTO: 5.8 K/UL — HIGH (ref 1–3.3)
LYMPHOCYTES # BLD AUTO: 62.2 % — HIGH (ref 13–44)
MCHC RBC-ENTMCNC: 33.3 PG — SIGNIFICANT CHANGE UP (ref 27–34)
MCHC RBC-ENTMCNC: 36.1 G/DL — HIGH (ref 32–36)
MCV RBC AUTO: 92.2 FL — SIGNIFICANT CHANGE UP (ref 80–100)
MONOCYTES # BLD AUTO: 0.5 K/UL — SIGNIFICANT CHANGE UP (ref 0–0.9)
MONOCYTES NFR BLD AUTO: 4.9 % — SIGNIFICANT CHANGE UP (ref 2–14)
NEUTROPHILS # BLD AUTO: 3 K/UL — SIGNIFICANT CHANGE UP (ref 1.8–7.4)
NEUTROPHILS NFR BLD AUTO: 32.2 % — LOW (ref 43–77)
PLATELET # BLD AUTO: 223 K/UL — SIGNIFICANT CHANGE UP (ref 150–400)
RBC # BLD: 3.07 M/UL — LOW (ref 4.2–5.8)
RBC # FLD: 17.4 % — HIGH (ref 10.3–14.5)
WBC # BLD: 9.4 K/UL — SIGNIFICANT CHANGE UP (ref 3.8–10.5)
WBC # FLD AUTO: 9.4 K/UL — SIGNIFICANT CHANGE UP (ref 3.8–10.5)

## 2017-12-05 PROCEDURE — 99215 OFFICE O/P EST HI 40 MIN: CPT

## 2017-12-06 ENCOUNTER — OUTPATIENT (OUTPATIENT)
Dept: OUTPATIENT SERVICES | Facility: HOSPITAL | Age: 72
LOS: 1 days | Discharge: HOME | End: 2017-12-06

## 2017-12-06 DIAGNOSIS — Z90.89 ACQUIRED ABSENCE OF OTHER ORGANS: Chronic | ICD-10-CM

## 2017-12-06 DIAGNOSIS — C34.90 MALIGNANT NEOPLASM OF UNSPECIFIED PART OF UNSPECIFIED BRONCHUS OR LUNG: ICD-10-CM

## 2017-12-06 DIAGNOSIS — Z90.49 ACQUIRED ABSENCE OF OTHER SPECIFIED PARTS OF DIGESTIVE TRACT: Chronic | ICD-10-CM

## 2017-12-06 DIAGNOSIS — Z98.890 OTHER SPECIFIED POSTPROCEDURAL STATES: Chronic | ICD-10-CM

## 2017-12-07 ENCOUNTER — APPOINTMENT (OUTPATIENT)
Dept: THORACIC SURGERY | Facility: HOSPITAL | Age: 72
End: 2017-12-07
Payer: MEDICARE

## 2017-12-07 ENCOUNTER — OUTPATIENT (OUTPATIENT)
Dept: OUTPATIENT SERVICES | Facility: HOSPITAL | Age: 72
LOS: 1 days | Discharge: HOME | End: 2017-12-07
Payer: MEDICARE

## 2017-12-07 ENCOUNTER — APPOINTMENT (OUTPATIENT)
Dept: RHEUMATOLOGY | Facility: CLINIC | Age: 72
End: 2017-12-07

## 2017-12-07 ENCOUNTER — RESULT REVIEW (OUTPATIENT)
Age: 72
End: 2017-12-07

## 2017-12-07 VITALS
HEART RATE: 95 BPM | SYSTOLIC BLOOD PRESSURE: 150 MMHG | RESPIRATION RATE: 16 BRPM | OXYGEN SATURATION: 99 % | DIASTOLIC BLOOD PRESSURE: 67 MMHG

## 2017-12-07 VITALS
HEIGHT: 71 IN | OXYGEN SATURATION: 98 % | HEART RATE: 84 BPM | WEIGHT: 162.92 LBS | SYSTOLIC BLOOD PRESSURE: 144 MMHG | DIASTOLIC BLOOD PRESSURE: 74 MMHG | TEMPERATURE: 98 F | RESPIRATION RATE: 16 BRPM

## 2017-12-07 DIAGNOSIS — Z98.890 OTHER SPECIFIED POSTPROCEDURAL STATES: Chronic | ICD-10-CM

## 2017-12-07 DIAGNOSIS — J90 PLEURAL EFFUSION, NOT ELSEWHERE CLASSIFIED: ICD-10-CM

## 2017-12-07 DIAGNOSIS — Z90.89 ACQUIRED ABSENCE OF OTHER ORGANS: Chronic | ICD-10-CM

## 2017-12-07 DIAGNOSIS — Z90.49 ACQUIRED ABSENCE OF OTHER SPECIFIED PARTS OF DIGESTIVE TRACT: Chronic | ICD-10-CM

## 2017-12-07 LAB
GAS PNL BLDV: 143 MMOL/L — SIGNIFICANT CHANGE UP (ref 136–146)
GLUCOSE BLDV-MCNC: 92 — SIGNIFICANT CHANGE UP (ref 70–99)
POTASSIUM BLDV-SCNC: 4.4 MMOL/L — SIGNIFICANT CHANGE UP (ref 3.4–4.5)

## 2017-12-07 PROCEDURE — 88305 TISSUE EXAM BY PATHOLOGIST: CPT | Mod: 26

## 2017-12-07 PROCEDURE — 88112 CYTOPATH CELL ENHANCE TECH: CPT | Mod: 26

## 2017-12-07 PROCEDURE — 88341 IMHCHEM/IMCYTCHM EA ADD ANTB: CPT | Mod: 26

## 2017-12-07 PROCEDURE — 88342 IMHCHEM/IMCYTCHM 1ST ANTB: CPT | Mod: 26

## 2017-12-07 PROCEDURE — 32555 ASPIRATE PLEURA W/ IMAGING: CPT

## 2017-12-07 RX ORDER — SODIUM CHLORIDE 9 MG/ML
1000 INJECTION, SOLUTION INTRAVENOUS
Qty: 0 | Refills: 0 | Status: DISCONTINUED | OUTPATIENT
Start: 2017-12-07 | End: 2017-12-22

## 2017-12-07 NOTE — BRIEF OPERATIVE NOTE - PROCEDURE
<<-----Click on this checkbox to enter Procedure Thoracentesis by physician  12/07/2017  Right Thoracentisis  Active  CHARLES

## 2017-12-07 NOTE — H&P PST ADULT - ASSESSMENT
73 y/o  male with PMH: HTN, Gout presents to PST for pre op evaluation with hx of pulmonary nodule found incidentally on Ct scan of chest 2 months ago. s/p right VAT, lung resection on 08/16/17  for drainage of right pleural effusion

## 2017-12-07 NOTE — H&P PST ADULT - PMH
Alcohol abuse  Sober since 01/2016- attending AA meeting weekly  Depression, unspecified depression type    Essential hypertension    Gout    HTN (hypertension)    Localized enlarged lymph nodes    Solitary pulmonary nodule    TIA (transient ischemic attack)  1999

## 2017-12-07 NOTE — ASU PREOP CHECKLIST - 3.
bilateral arms with generalized reddish spotty rash- has had for about 5 days and states he wsa told its from the chemo- not itchy bilateral arms with generalized reddish spotty rash- has had for about 5 days and states he was told its from the chemo- not itchy

## 2017-12-07 NOTE — H&P PST ADULT - HISTORY OF PRESENT ILLNESS
73 y/o  male with PMH: HTN, Gout presents to PST for pre op evaluation with hx of pulmonary nodule found incidentally on Ct scan of chest 2 months ago. s/p, right VAT, lung resection on 08/16/17     Pt now with Right pleural effusion

## 2017-12-07 NOTE — ASU DISCHARGE PLAN (ADULT/PEDIATRIC). - NOTIFY
Shortness of breath/Bleeding that does not stop/Swelling that continues/Fever greater than 101 Bleeding that does not stop/Pain not relieved by Medications/Shortness of breath/Increased Irritability or Sluggishness/Persistent Nausea and Vomiting/Swelling that continues/Fever greater than 101/Inability to Tolerate Liquids or Foods

## 2017-12-07 NOTE — ASU DISCHARGE PLAN (ADULT/PEDIATRIC). - MEDICATION SUMMARY - MEDICATIONS TO TAKE
I will START or STAY ON the medications listed below when I get home from the hospital:    Cardizem  mg/24 hours oral capsule, extended release  -- 1 cap(s) by mouth once a day  -- It is very important that you take or use this exactly as directed.  Do not skip doses or discontinue unless directed by your doctor.  Some non-prescription drugs may aggravate your condition.  Read all labels carefully.  If a warning appears, check with your doctor before taking.  Swallow whole.  Do not crush.    -- Indication: For Hypertension    Eliquis 5 mg oral tablet  -- orally once a day  -- Indication: For Anticoagulation    prochlorperazine 10 mg oral tablet  -- 1 tab(s) by mouth 3 times a day, As Needed  -- Indication: For Nausea    colchicine 0.6 mg oral tablet  -- 1 tab(s) by mouth Every three days  -- Indication: For Gout    Centrum oral tablet  -- 1 tab(s) by mouth once a day   -- Indication: For Supplemant I will START or STAY ON the medications listed below when I get home from the hospital:    Cardizem  mg/24 hours oral capsule, extended release  -- 1 cap(s) by mouth once a day  -- It is very important that you take or use this exactly as directed.  Do not skip doses or discontinue unless directed by your doctor.  Some non-prescription drugs may aggravate your condition.  Read all labels carefully.  If a warning appears, check with your doctor before taking.  Swallow whole.  Do not crush.    -- Indication: For Hypertension    Eliquis 5 mg oral tablet  -- orally once a day  -- Indication: For Anticoagulation Start 0n 12/8/17    prochlorperazine 10 mg oral tablet  -- 1 tab(s) by mouth 3 times a day, As Needed  -- Indication: For Nausea    colchicine 0.6 mg oral tablet  -- 1 tab(s) by mouth Every three days  -- Indication: For Gout    Centrum oral tablet  -- 1 tab(s) by mouth once a day   -- Indication: For Supplemant

## 2017-12-07 NOTE — ASU DISCHARGE PLAN (ADULT/PEDIATRIC). - INSTRUCTIONS
Call to make an appointment for next month. Please obtain a chest xray and bring a copy of it to your visit.  Please call at anytime if you have any questions or concerns. Do not take pain medication on an empty stomach.  Increase fluids and fiber in diet to prevent constipation.

## 2017-12-08 ENCOUNTER — TRANSCRIPTION ENCOUNTER (OUTPATIENT)
Age: 72
End: 2017-12-08

## 2017-12-11 ENCOUNTER — APPOINTMENT (OUTPATIENT)
Dept: GERIATRICS | Facility: CLINIC | Age: 72
End: 2017-12-11
Payer: MEDICARE

## 2017-12-11 VITALS
SYSTOLIC BLOOD PRESSURE: 140 MMHG | WEIGHT: 162.19 LBS | HEIGHT: 69 IN | DIASTOLIC BLOOD PRESSURE: 70 MMHG | OXYGEN SATURATION: 98 % | RESPIRATION RATE: 15 BRPM | BODY MASS INDEX: 24.02 KG/M2 | TEMPERATURE: 97.9 F | HEART RATE: 83 BPM

## 2017-12-11 PROCEDURE — 99214 OFFICE O/P EST MOD 30 MIN: CPT

## 2017-12-11 RX ORDER — PROCHLORPERAZINE MALEATE 10 MG/1
10 TABLET ORAL
Qty: 90 | Refills: 3 | Status: DISCONTINUED | COMMUNITY
Start: 2017-09-18 | End: 2017-12-11

## 2017-12-11 RX ORDER — ACETAMINOPHEN 500 MG/1
500 TABLET, COATED ORAL
Refills: 0 | Status: DISCONTINUED | COMMUNITY
Start: 2017-09-18 | End: 2017-12-11

## 2017-12-11 RX ORDER — LORATADINE 10 MG
17 TABLET,DISINTEGRATING ORAL
Qty: 1 | Refills: 0 | Status: DISCONTINUED | COMMUNITY
Start: 2017-09-23 | End: 2017-12-11

## 2017-12-12 ENCOUNTER — APPOINTMENT (OUTPATIENT)
Dept: HEMATOLOGY ONCOLOGY | Facility: CLINIC | Age: 72
End: 2017-12-12
Payer: MEDICARE

## 2017-12-12 VITALS
HEART RATE: 119 BPM | SYSTOLIC BLOOD PRESSURE: 166 MMHG | TEMPERATURE: 97.7 F | RESPIRATION RATE: 17 BRPM | DIASTOLIC BLOOD PRESSURE: 76 MMHG | OXYGEN SATURATION: 99 % | WEIGHT: 160.93 LBS | BODY MASS INDEX: 23.77 KG/M2

## 2017-12-12 PROCEDURE — 99215 OFFICE O/P EST HI 40 MIN: CPT | Mod: PD

## 2017-12-13 LAB — NON-GYNECOLOGICAL CYTOLOGY STUDY: SIGNIFICANT CHANGE UP

## 2017-12-15 ENCOUNTER — APPOINTMENT (OUTPATIENT)
Dept: INFUSION THERAPY | Facility: HOSPITAL | Age: 72
End: 2017-12-15

## 2017-12-15 ENCOUNTER — INPATIENT (INPATIENT)
Facility: HOSPITAL | Age: 72
LOS: 6 days | Discharge: NURSING, REHAB OR LONG-TERM CARE | End: 2017-12-22
Attending: HOSPITALIST | Admitting: HOSPITALIST
Payer: MEDICARE

## 2017-12-15 VITALS
DIASTOLIC BLOOD PRESSURE: 76 MMHG | OXYGEN SATURATION: 97 % | RESPIRATION RATE: 18 BRPM | SYSTOLIC BLOOD PRESSURE: 137 MMHG | HEART RATE: 110 BPM | TEMPERATURE: 102 F

## 2017-12-15 DIAGNOSIS — Z98.890 OTHER SPECIFIED POSTPROCEDURAL STATES: Chronic | ICD-10-CM

## 2017-12-15 DIAGNOSIS — M1A.9XX0 CHRONIC GOUT, UNSPECIFIED, WITHOUT TOPHUS (TOPHI): ICD-10-CM

## 2017-12-15 DIAGNOSIS — Z90.89 ACQUIRED ABSENCE OF OTHER ORGANS: Chronic | ICD-10-CM

## 2017-12-15 DIAGNOSIS — R53.81 OTHER MALAISE: ICD-10-CM

## 2017-12-15 DIAGNOSIS — Z29.9 ENCOUNTER FOR PROPHYLACTIC MEASURES, UNSPECIFIED: ICD-10-CM

## 2017-12-15 DIAGNOSIS — Z90.49 ACQUIRED ABSENCE OF OTHER SPECIFIED PARTS OF DIGESTIVE TRACT: Chronic | ICD-10-CM

## 2017-12-15 DIAGNOSIS — I48.2 CHRONIC ATRIAL FIBRILLATION: ICD-10-CM

## 2017-12-15 DIAGNOSIS — F32.9 MAJOR DEPRESSIVE DISORDER, SINGLE EPISODE, UNSPECIFIED: ICD-10-CM

## 2017-12-15 DIAGNOSIS — A41.9 SEPSIS, UNSPECIFIED ORGANISM: ICD-10-CM

## 2017-12-15 DIAGNOSIS — I10 ESSENTIAL (PRIMARY) HYPERTENSION: ICD-10-CM

## 2017-12-15 DIAGNOSIS — C91.10 CHRONIC LYMPHOCYTIC LEUKEMIA OF B-CELL TYPE NOT HAVING ACHIEVED REMISSION: ICD-10-CM

## 2017-12-15 DIAGNOSIS — C44.92 SQUAMOUS CELL CARCINOMA OF SKIN, UNSPECIFIED: ICD-10-CM

## 2017-12-15 LAB
ALBUMIN SERPL ELPH-MCNC: 3.4 G/DL — SIGNIFICANT CHANGE UP (ref 3.3–5)
ALP SERPL-CCNC: 110 U/L — SIGNIFICANT CHANGE UP (ref 40–120)
ALT FLD-CCNC: 7 U/L — SIGNIFICANT CHANGE UP (ref 4–41)
APPEARANCE UR: CLEAR — SIGNIFICANT CHANGE UP
APTT BLD: 32.5 SEC — SIGNIFICANT CHANGE UP (ref 27.5–37.4)
AST SERPL-CCNC: 20 U/L — SIGNIFICANT CHANGE UP (ref 4–40)
B PERT DNA SPEC QL NAA+PROBE: SIGNIFICANT CHANGE UP
BACTERIA # UR AUTO: SIGNIFICANT CHANGE UP
BASE EXCESS BLDV CALC-SCNC: -1.1 MMOL/L — SIGNIFICANT CHANGE UP
BASOPHILS # BLD AUTO: 0.01 K/UL — SIGNIFICANT CHANGE UP (ref 0–0.2)
BASOPHILS NFR BLD AUTO: 0.1 % — SIGNIFICANT CHANGE UP (ref 0–2)
BILIRUB SERPL-MCNC: 0.4 MG/DL — SIGNIFICANT CHANGE UP (ref 0.2–1.2)
BILIRUB UR-MCNC: NEGATIVE — SIGNIFICANT CHANGE UP
BLOOD GAS VENOUS - CREATININE: 1.33 MG/DL — HIGH (ref 0.5–1.3)
BLOOD UR QL VISUAL: HIGH
BUN SERPL-MCNC: 18 MG/DL — SIGNIFICANT CHANGE UP (ref 7–23)
C PNEUM DNA SPEC QL NAA+PROBE: NOT DETECTED — SIGNIFICANT CHANGE UP
CALCIUM SERPL-MCNC: 8.8 MG/DL — SIGNIFICANT CHANGE UP (ref 8.4–10.5)
CHLORIDE BLDV-SCNC: 103 MMOL/L — SIGNIFICANT CHANGE UP (ref 96–108)
CHLORIDE SERPL-SCNC: 99 MMOL/L — SIGNIFICANT CHANGE UP (ref 98–107)
CK SERPL-CCNC: 24 U/L — LOW (ref 30–200)
CO2 SERPL-SCNC: 22 MMOL/L — SIGNIFICANT CHANGE UP (ref 22–31)
COLOR SPEC: YELLOW — SIGNIFICANT CHANGE UP
CREAT SERPL-MCNC: 1.42 MG/DL — HIGH (ref 0.5–1.3)
EOSINOPHIL # BLD AUTO: 0.02 K/UL — SIGNIFICANT CHANGE UP (ref 0–0.5)
EOSINOPHIL NFR BLD AUTO: 0.2 % — SIGNIFICANT CHANGE UP (ref 0–6)
FLUAV H1 2009 PAND RNA SPEC QL NAA+PROBE: NOT DETECTED — SIGNIFICANT CHANGE UP
FLUAV H1 RNA SPEC QL NAA+PROBE: NOT DETECTED — SIGNIFICANT CHANGE UP
FLUAV H3 RNA SPEC QL NAA+PROBE: NOT DETECTED — SIGNIFICANT CHANGE UP
FLUAV SUBTYP SPEC NAA+PROBE: SIGNIFICANT CHANGE UP
FLUBV RNA SPEC QL NAA+PROBE: NOT DETECTED — SIGNIFICANT CHANGE UP
GAS PNL BLDV: 134 MMOL/L — LOW (ref 136–146)
GLUCOSE BLDV-MCNC: 175 — HIGH (ref 70–99)
GLUCOSE SERPL-MCNC: 176 MG/DL — HIGH (ref 70–99)
GLUCOSE UR-MCNC: NEGATIVE — SIGNIFICANT CHANGE UP
HADV DNA SPEC QL NAA+PROBE: NOT DETECTED — SIGNIFICANT CHANGE UP
HCO3 BLDV-SCNC: 24 MMOL/L — SIGNIFICANT CHANGE UP (ref 20–27)
HCOV 229E RNA SPEC QL NAA+PROBE: NOT DETECTED — SIGNIFICANT CHANGE UP
HCOV HKU1 RNA SPEC QL NAA+PROBE: NOT DETECTED — SIGNIFICANT CHANGE UP
HCOV NL63 RNA SPEC QL NAA+PROBE: NOT DETECTED — SIGNIFICANT CHANGE UP
HCOV OC43 RNA SPEC QL NAA+PROBE: NOT DETECTED — SIGNIFICANT CHANGE UP
HCT VFR BLD CALC: 26.3 % — LOW (ref 39–50)
HCT VFR BLDV CALC: 29.4 % — LOW (ref 39–51)
HGB BLD-MCNC: 8.8 G/DL — LOW (ref 13–17)
HGB BLDV-MCNC: 9.5 G/DL — LOW (ref 13–17)
HMPV RNA SPEC QL NAA+PROBE: NOT DETECTED — SIGNIFICANT CHANGE UP
HPIV1 RNA SPEC QL NAA+PROBE: NOT DETECTED — SIGNIFICANT CHANGE UP
HPIV2 RNA SPEC QL NAA+PROBE: NOT DETECTED — SIGNIFICANT CHANGE UP
HPIV3 RNA SPEC QL NAA+PROBE: NOT DETECTED — SIGNIFICANT CHANGE UP
HPIV4 RNA SPEC QL NAA+PROBE: NOT DETECTED — SIGNIFICANT CHANGE UP
HYALINE CASTS # UR AUTO: SIGNIFICANT CHANGE UP (ref 0–?)
IMM GRANULOCYTES # BLD AUTO: 0.12 # — SIGNIFICANT CHANGE UP
IMM GRANULOCYTES NFR BLD AUTO: 1.3 % — SIGNIFICANT CHANGE UP (ref 0–1.5)
INR BLD: 1.21 — HIGH (ref 0.88–1.17)
KETONES UR-MCNC: NEGATIVE — SIGNIFICANT CHANGE UP
LACTATE BLDV-MCNC: 2.1 MMOL/L — HIGH (ref 0.5–2)
LEUKOCYTE ESTERASE UR-ACNC: NEGATIVE — SIGNIFICANT CHANGE UP
LYMPHOCYTES # BLD AUTO: 2.84 K/UL — SIGNIFICANT CHANGE UP (ref 1–3.3)
LYMPHOCYTES # BLD AUTO: 30.7 % — SIGNIFICANT CHANGE UP (ref 13–44)
M PNEUMO DNA SPEC QL NAA+PROBE: NOT DETECTED — SIGNIFICANT CHANGE UP
MCHC RBC-ENTMCNC: 31.7 PG — SIGNIFICANT CHANGE UP (ref 27–34)
MCHC RBC-ENTMCNC: 33.5 % — SIGNIFICANT CHANGE UP (ref 32–36)
MCV RBC AUTO: 94.6 FL — SIGNIFICANT CHANGE UP (ref 80–100)
MONOCYTES # BLD AUTO: 1.41 K/UL — HIGH (ref 0–0.9)
MONOCYTES NFR BLD AUTO: 15.2 % — HIGH (ref 2–14)
MUCOUS THREADS # UR AUTO: SIGNIFICANT CHANGE UP
NEUTROPHILS # BLD AUTO: 4.85 K/UL — SIGNIFICANT CHANGE UP (ref 1.8–7.4)
NEUTROPHILS NFR BLD AUTO: 52.5 % — SIGNIFICANT CHANGE UP (ref 43–77)
NITRITE UR-MCNC: NEGATIVE — SIGNIFICANT CHANGE UP
NRBC # FLD: 0.03 — SIGNIFICANT CHANGE UP
PCO2 BLDV: 34 MMHG — LOW (ref 41–51)
PH BLDV: 7.44 PH — HIGH (ref 7.32–7.43)
PH UR: 6 — SIGNIFICANT CHANGE UP (ref 4.6–8)
PLATELET # BLD AUTO: 323 K/UL — SIGNIFICANT CHANGE UP (ref 150–400)
PMV BLD: 9.7 FL — SIGNIFICANT CHANGE UP (ref 7–13)
PO2 BLDV: 44 MMHG — HIGH (ref 35–40)
POTASSIUM BLDV-SCNC: 4.3 MMOL/L — SIGNIFICANT CHANGE UP (ref 3.4–4.5)
POTASSIUM SERPL-MCNC: 4.6 MMOL/L — SIGNIFICANT CHANGE UP (ref 3.5–5.3)
POTASSIUM SERPL-SCNC: 4.6 MMOL/L — SIGNIFICANT CHANGE UP (ref 3.5–5.3)
PROT SERPL-MCNC: 6.5 G/DL — SIGNIFICANT CHANGE UP (ref 6–8.3)
PROT UR-MCNC: 100 MG/DL — SIGNIFICANT CHANGE UP
PROTHROM AB SERPL-ACNC: 14 SEC — HIGH (ref 9.8–13.1)
RBC # BLD: 2.78 M/UL — LOW (ref 4.2–5.8)
RBC # FLD: 20.2 % — HIGH (ref 10.3–14.5)
RBC CASTS # UR COMP ASSIST: >50 — HIGH (ref 0–?)
RSV RNA SPEC QL NAA+PROBE: NOT DETECTED — SIGNIFICANT CHANGE UP
RV+EV RNA SPEC QL NAA+PROBE: NOT DETECTED — SIGNIFICANT CHANGE UP
SAO2 % BLDV: 78.9 % — SIGNIFICANT CHANGE UP (ref 60–85)
SODIUM SERPL-SCNC: 138 MMOL/L — SIGNIFICANT CHANGE UP (ref 135–145)
SP GR SPEC: 1.02 — SIGNIFICANT CHANGE UP (ref 1–1.04)
TROPONIN T SERPL-MCNC: < 0.06 NG/ML — SIGNIFICANT CHANGE UP (ref 0–0.06)
UROBILINOGEN FLD QL: 1 MG/DL — SIGNIFICANT CHANGE UP
WBC # BLD: 9.25 K/UL — SIGNIFICANT CHANGE UP (ref 3.8–10.5)
WBC # FLD AUTO: 9.25 K/UL — SIGNIFICANT CHANGE UP (ref 3.8–10.5)
WBC UR QL: SIGNIFICANT CHANGE UP (ref 0–?)

## 2017-12-15 PROCEDURE — 71010: CPT | Mod: 26

## 2017-12-15 PROCEDURE — 99223 1ST HOSP IP/OBS HIGH 75: CPT | Mod: GC

## 2017-12-15 RX ORDER — TIOTROPIUM BROMIDE 18 UG/1
1 CAPSULE ORAL; RESPIRATORY (INHALATION) DAILY
Qty: 0 | Refills: 0 | Status: DISCONTINUED | OUTPATIENT
Start: 2017-12-15 | End: 2017-12-22

## 2017-12-15 RX ORDER — ACETAMINOPHEN 500 MG
1000 TABLET ORAL ONCE
Qty: 0 | Refills: 0 | Status: COMPLETED | OUTPATIENT
Start: 2017-12-15 | End: 2017-12-15

## 2017-12-15 RX ORDER — MULTIVIT-MIN/FERROUS GLUCONATE 9 MG/15 ML
1 LIQUID (ML) ORAL DAILY
Qty: 0 | Refills: 0 | Status: DISCONTINUED | OUTPATIENT
Start: 2017-12-15 | End: 2017-12-22

## 2017-12-15 RX ORDER — ENOXAPARIN SODIUM 100 MG/ML
40 INJECTION SUBCUTANEOUS DAILY
Qty: 0 | Refills: 0 | Status: DISCONTINUED | OUTPATIENT
Start: 2017-12-15 | End: 2017-12-15

## 2017-12-15 RX ORDER — PROCHLORPERAZINE MALEATE 5 MG
10 TABLET ORAL THREE TIMES A DAY
Qty: 0 | Refills: 0 | Status: DISCONTINUED | OUTPATIENT
Start: 2017-12-15 | End: 2017-12-22

## 2017-12-15 RX ORDER — CEFEPIME 1 G/1
1000 INJECTION, POWDER, FOR SOLUTION INTRAMUSCULAR; INTRAVENOUS ONCE
Qty: 0 | Refills: 0 | Status: COMPLETED | OUTPATIENT
Start: 2017-12-15 | End: 2017-12-15

## 2017-12-15 RX ORDER — VANCOMYCIN HCL 1 G
1000 VIAL (EA) INTRAVENOUS ONCE
Qty: 0 | Refills: 0 | Status: COMPLETED | OUTPATIENT
Start: 2017-12-15 | End: 2017-12-15

## 2017-12-15 RX ORDER — APIXABAN 2.5 MG/1
5 TABLET, FILM COATED ORAL EVERY 12 HOURS
Qty: 0 | Refills: 0 | Status: DISCONTINUED | OUTPATIENT
Start: 2017-12-16 | End: 2017-12-22

## 2017-12-15 RX ORDER — SODIUM CHLORIDE 9 MG/ML
2000 INJECTION INTRAMUSCULAR; INTRAVENOUS; SUBCUTANEOUS ONCE
Qty: 0 | Refills: 0 | Status: COMPLETED | OUTPATIENT
Start: 2017-12-15 | End: 2017-12-15

## 2017-12-15 RX ORDER — ALLOPURINOL 300 MG
300 TABLET ORAL DAILY
Qty: 0 | Refills: 0 | Status: DISCONTINUED | OUTPATIENT
Start: 2017-12-15 | End: 2017-12-22

## 2017-12-15 RX ORDER — MIRTAZAPINE 45 MG/1
7.5 TABLET, ORALLY DISINTEGRATING ORAL AT BEDTIME
Qty: 0 | Refills: 0 | Status: DISCONTINUED | OUTPATIENT
Start: 2017-12-15 | End: 2017-12-22

## 2017-12-15 RX ORDER — COLCHICINE 0.6 MG
1 TABLET ORAL
Qty: 0 | Refills: 0 | COMMUNITY

## 2017-12-15 RX ADMIN — Medication 400 MILLIGRAM(S): at 14:21

## 2017-12-15 RX ADMIN — SODIUM CHLORIDE 2000 MILLILITER(S): 9 INJECTION INTRAMUSCULAR; INTRAVENOUS; SUBCUTANEOUS at 14:21

## 2017-12-15 RX ADMIN — CEFEPIME 100 MILLIGRAM(S): 1 INJECTION, POWDER, FOR SOLUTION INTRAMUSCULAR; INTRAVENOUS at 14:56

## 2017-12-15 RX ADMIN — MIRTAZAPINE 7.5 MILLIGRAM(S): 45 TABLET, ORALLY DISINTEGRATING ORAL at 22:32

## 2017-12-15 RX ADMIN — Medication 250 MILLIGRAM(S): at 15:41

## 2017-12-15 NOTE — H&P ADULT - NSHPPHYSICALEXAM_GEN_ALL_CORE
GENERAL: No acute distress, under-developed  HEAD:  Atraumatic, Normocephalic  ENT: EOMI, PERRLA, conjunctiva and sclera clear, Neck supple, No JVD, moist mucosa  CHEST/LUNG: right base wheezing  BACK: No spinal tenderness  HEART: Regular rate and rhythm; No murmurs, rubs, or gallops  ABDOMEN: Soft, Nontender, Nondistended; Bowel sounds present  EXTREMITIES:  No clubbing, cyanosis, or edema  PSYCH: Nl behavior, nl affect  NEUROLOGY: AAOx3, non-focal, cranial nerves intact  SKIN: Normal color, No rashes or lesions GENERAL: No acute distress, under-developed  HEAD:  Atraumatic, Normocephalic  ENT: EOMI, PERRLA, conjunctiva and sclera clear, Neck supple, No JVD, moist mucosa  CHEST/LUNG: right base wheezing  BACK: No spinal tenderness  HEART: Regular rate and rhythm; No murmurs, rubs, or gallops  ABDOMEN: Soft, Nontender, Nondistended; Bowel sounds present  EXTREMITIES:  No clubbing, cyanosis, or edema  PSYCH: A&Ox3, Nl behavior, nl affect  NEUROLOGY: non-focal, cranial nerves II-XII grossly intact, moving all extremities  SKIN: Normal color; +papular slightly erythematous lesions over left anterior arm with scaling

## 2017-12-15 NOTE — ED PROVIDER NOTE - OBJECTIVE STATEMENT
72M PMH lung ca, CLL, on chemo last 12/5, s/p thoracentesis 12/7, afib on eliquis, p/w generalized weakness since yesterday associated with chills and nasal congestion. Pt denies any nausea, vomiting, abdominal pain, dysuria, chest pain, SOB, cough. Pt endorses left arm rash over past 2 weeks that is non-pruritic.    Onc: Jodie  PMD: Nakia Ruiz

## 2017-12-15 NOTE — ED PROVIDER NOTE - ATTENDING CONTRIBUTION TO CARE
I have seen and evaluated the patient face to face, endorse the HPI, PE, as edited and/or reviewed by me as needed and have indicated my contribution to care in the MDM section.

## 2017-12-15 NOTE — H&P ADULT - HISTORY OF PRESENT ILLNESS
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 Pt denies any nausea, vomiting, abdominal pain, dysuria, chest pain, SOB, c. Pt endorses left arm rash over past 2 weeks that is non-pruritic.    In ED, VS T 101.8-102.3, -->96, /76, RR 18, sp02 97 on RA. He recieved 2L bolus, vanc 1g, cefepime 1g, acetaminophen IV 1g. 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 He reports most recently having a thoracentesis on 12/7 with Dr .  Pt denies any nausea, vomiting, abdominal pain, dysuria, chest pain, SOB, c. Pt endorses left arm rash over past 2 weeks that is non-pruritic.    In ED, VS T 101.8-102.3, -->96, /76, RR 18, sp02 97 on RA. He recieved 2L bolus, vanc 1g, cefepime 1g, acetaminophen IV 1g. 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 He reports most recently having a thoracentesis on 12/7 with Dr Foster. He states he has been feeling generalized weakness the past 3 days, requiring bed rest. Today he was unable to get up to the rest room and required assistance from his wife. His wife called the cancer center and they decided to come to the hospital. He denies n/v, h/a, diarrhea, chest pain, dyspnea, abd pain, extremity pain, new nodules.    In ED, VS T 101.8-102.3, -->96, /76, RR 18, sp02 97 on RA. He received 2L bolus, vanc 1g, cefepime 1g, acetaminophen IV 1g. 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 He reports most recently having a thoracentesis on 12/7 with Dr Foster. He states he has been feeling generalized weakness the past 3 days, requiring bed rest. Today he was unable to get up to the rest room and required assistance from his wife. His wife called the cancer center and they decided to come to the hospital. He denies n/v, h/a, diarrhea, chest pain, dyspnea, abd pain, extremity pain, new nodules. No cough or recent travel.    In ED, VS T 101.8-102.3, -->96, /76, RR 18, sp02 97 on RA. He received 2L bolus, vanc 1g, cefepime 1g, acetaminophen IV 1g.

## 2017-12-15 NOTE — ED ADULT TRIAGE NOTE - CHIEF COMPLAINT QUOTE
Pt brought in by EMS from home complaining of weakness, fever and chills x3 days. Pt has a hx of Lung CA, last chemo was a week ago. Pt febrile in triage. Pt denies chest pain, SOB.

## 2017-12-15 NOTE — H&P ADULT - NSHPLABSRESULTS_GEN_ALL_CORE
Labs notable for Hg of 8.8 and sCr of 1.42.  UA negative. CXR: no acute processes.     Labs             8.8    9.25  )-----------( 323      ( 15 Dec 2017 14:10 )             26.3     12-15    138  |  99  |  18  ----------------------------<  176<H>  4.6   |  22  |  1.42<H>    Ca    8.8      15 Dec 2017 14:11    TPro  6.5  /  Alb  3.4  /  TBili  0.4  /  DBili  x   /  AST  20  /  ALT  7   /  AlkPhos  110  12-15    PT/INR - ( 15 Dec 2017 14:10 )   PT: 14.0 SEC;   INR: 1.21          PTT - ( 15 Dec 2017 14:10 )  PTT:32.5 SEC    CARDIAC MARKERS ( 15 Dec 2017 14:11 )  x     / < 0.06 ng/mL / 24 u/L / x     / x          Urinalysis Basic - ( 15 Dec 2017 17:02 )    Color: YELLOW / Appearance: CLEAR / S.019 / pH: 6.0  Gluc: NEGATIVE / Ketone: NEGATIVE  / Bili: NEGATIVE / Urobili: 1 mg/dL   Blood: SMALL / Protein: 100 mg/dL / Nitrite: NEGATIVE   Leuk Esterase: NEGATIVE / RBC: >50 / WBC 2-5   Sq Epi: x / Non Sq Epi: x / Bacteria: FEW      CXR:  Xray Chest 1 View AP- PORTABLE-Urgent (12.15.17 @ 14:16)  Impression: No acute pulmonary disease.    EKG - pending    All labs reviewed by me. All imaging reviewed by me. Labs notable for Hg of 8.8 and sCr of 1.42.  UA without nitrites, LE, WBCs.     CXR personally reviewed: no acute pulmonary process    Labs             8.8    9.25  )-----------( 323      ( 15 Dec 2017 14:10 )             26.3     12-15    138  |  99  |  18  ----------------------------<  176<H>  4.6   |  22  |  1.42<H>    Ca    8.8      15 Dec 2017 14:11    TPro  6.5  /  Alb  3.4  /  TBili  0.4  /  DBili  x   /  AST  20  /  ALT  7   /  AlkPhos  110  12-15    PT/INR - ( 15 Dec 2017 14:10 )   PT: 14.0 SEC;   INR: 1.21          PTT - ( 15 Dec 2017 14:10 )  PTT:32.5 SEC    CARDIAC MARKERS ( 15 Dec 2017 14:11 )  x     / < 0.06 ng/mL / 24 u/L / x     / x          Urinalysis Basic - ( 15 Dec 2017 17:02 )    Color: YELLOW / Appearance: CLEAR / S.019 / pH: 6.0  Gluc: NEGATIVE / Ketone: NEGATIVE  / Bili: NEGATIVE / Urobili: 1 mg/dL   Blood: SMALL / Protein: 100 mg/dL / Nitrite: NEGATIVE   Leuk Esterase: NEGATIVE / RBC: >50 / WBC 2-5   Sq Epi: x / Non Sq Epi: x / Bacteria: FEW      CXR:  Xray Chest 1 View AP- PORTABLE-Urgent (.15.17 @ 14:16)  Impression: No acute pulmonary disease.    EKG - pending    All labs reviewed by me. All imaging reviewed by me.

## 2017-12-15 NOTE — H&P ADULT - PROBLEM SELECTOR PLAN 5
- c/w allopurinol 300mg daily HR at goal at present. ECG pending.  - c/w cardizem, start at 30q8, uptitrate to 60 q8hr  - f/u EKG  - c/w Eliquis 5mg BID

## 2017-12-15 NOTE — H&P ADULT - ASSESSMENT
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

## 2017-12-15 NOTE — H&P ADULT - PROBLEM SELECTOR PLAN 1
pt with 3d of malaise and found to have fever and tachycardia concerning for sepsis 2/2 viral infection. No leukocytosis or neutropenia. RVP negative. CXR no acute processes.  - s/p cefepime and vanc x1  - will monitor off abx  - f/u blood cultures  - f/u urine cx  - cont to monitor, if shows signs of worsening infections (fever, tachycardia, hypotension) restart abx  - Pt with 3d of malaise, found to have fever and tachycardia. No localizing symptoms for source of infection. CXR without PNA. UA without e/o PNA. BCx in lab. Presentation likely represents sepsis 2/2 viral infection. He received vancomycin and cefepime in the ED. Although patient is on chemotherapy, he is not neutropenic. qSOFA of 0; patient not in high risk mortality group of sepsis at this time. No urgency for initiating empiric abx.    - will monitor off abx  - f/u blood cultures  - f/u urine cx  - cont to monitor, if shows signs of worsening infection (hypotension, tachypnea, altered mental status) restart abx

## 2017-12-15 NOTE — H&P ADULT - PROBLEM SELECTOR PLAN 8
Diet: dash diet  DVT ppx: c/w eliquis 5mg BID BP stable and at goal  - c/w diltiazem 30q8h, uptritate to 60q8

## 2017-12-15 NOTE — H&P ADULT - PROBLEM SELECTOR PLAN 4
currently NSR  - c/w cardizem, start at 30q8, uptitrate to 60 q8hr  - f/u EKG  - c/w Eliquis 5mg BID No leukopenia or thrombocytopenia. Anemia consistent with previous values. No concern for blast crisis at this time.   - cont to trend cbc  - f/u with Dr Feliciano

## 2017-12-15 NOTE — H&P ADULT - PROBLEM SELECTOR PROBLEM 6
Depression, unspecified depression type Chronic gout without tophus, unspecified cause, unspecified site

## 2017-12-15 NOTE — H&P ADULT - PROBLEM SELECTOR PLAN 3
No leukopenia or thrombocytopenia. Anemia consistent with previous values. No concern for blast crisis at this time.   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 Followed by Dr Feliciano.  - f/u with onc service

## 2017-12-15 NOTE — H&P ADULT - NSHPREVIEWOFSYSTEMS_GEN_ALL_CORE
REVIEW OF SYSTEMS:    CONSTITUTIONAL: as per HPI  EYES/ENT: No visual changes;  No vertigo or throat pain   NECK: No pain or stiffness  RESPIRATORY: No cough, wheezing, hemoptysis; No shortness of breath  CARDIOVASCULAR: No chest pain or palpitations  GASTROINTESTINAL: No abdominal or epigastric pain. No nausea, vomiting, or hematemesis; No diarrhea or constipation. No melena or hematochezia.  GENITOURINARY: No dysuria, frequency or hematuria  NEUROLOGICAL: No numbness or weakness  SKIN: No itching, burning, rashes, or lesions   All other review of systems is negative unless indicated above. REVIEW OF SYSTEMS:    CONSTITUTIONAL: +malaise, no subjective fevers  EYES/ENT: No visual changes;  No vertigo or throat pain   NECK: No pain or stiffness  RESPIRATORY: No cough, wheezing, hemoptysis; No shortness of breath  CARDIOVASCULAR: No chest pain or palpitations  GASTROINTESTINAL: No abdominal or epigastric pain. No nausea, vomiting, or hematemesis; No diarrhea or constipation. No melena or hematochezia.  GENITOURINARY: No dysuria, frequency or hematuria  NEUROLOGICAL: No numbness or weakness  SKIN: No itching, burning, rashes, or lesions   PSYCHIATRY: No depression, anxiety, suicidal ideation, or hallucinations  All other review of systems is negative unless indicated above.

## 2017-12-15 NOTE — ED PROVIDER NOTE - MEDICAL DECISION MAKING DETAILS
72M PMH CLL, Lung Ca, on chemo, s/p thoracentesis p/w sepsis of unclear etiology. Will check CBC CMP Blood cultures UA RVP, IVF, Abx, admit Ruiz: 72M PMH CLL, Lung Ca, on chemo, s/p thoracentesis p/w sepsis of unclear etiology. Will check CBC CMP Blood cultures UA RVP, IVF, Abx, admit

## 2017-12-15 NOTE — H&P ADULT - PROBLEM SELECTOR PLAN 2
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 Followed by Dr Feliciano.  - f/u with onc service Papular erythematous lesions with scaling over left arm, concerning for dermatitis, possibly atopic. Patient states it is worse after chemo. Improves with Cortisone per patient report. Not consistent with cellulitis. Can c/w topical hydrocortisone to left arm, outpatient dermatology f/u.

## 2017-12-16 ENCOUNTER — TRANSCRIPTION ENCOUNTER (OUTPATIENT)
Age: 72
End: 2017-12-16

## 2017-12-16 DIAGNOSIS — R31.29 OTHER MICROSCOPIC HEMATURIA: ICD-10-CM

## 2017-12-16 DIAGNOSIS — L30.9 DERMATITIS, UNSPECIFIED: ICD-10-CM

## 2017-12-16 DIAGNOSIS — R22.31 LOCALIZED SWELLING, MASS AND LUMP, RIGHT UPPER LIMB: ICD-10-CM

## 2017-12-16 LAB
APPEARANCE UR: CLEAR — SIGNIFICANT CHANGE UP
B PERT DNA SPEC QL NAA+PROBE: SIGNIFICANT CHANGE UP
BASOPHILS # BLD AUTO: 0.02 K/UL — SIGNIFICANT CHANGE UP (ref 0–0.2)
BASOPHILS NFR BLD AUTO: 0.2 % — SIGNIFICANT CHANGE UP (ref 0–2)
BILIRUB UR-MCNC: NEGATIVE — SIGNIFICANT CHANGE UP
BLOOD UR QL VISUAL: NEGATIVE — SIGNIFICANT CHANGE UP
BUN SERPL-MCNC: 15 MG/DL — SIGNIFICANT CHANGE UP (ref 7–23)
C PNEUM DNA SPEC QL NAA+PROBE: NOT DETECTED — SIGNIFICANT CHANGE UP
CALCIUM SERPL-MCNC: 8.2 MG/DL — LOW (ref 8.4–10.5)
CHLORIDE SERPL-SCNC: 102 MMOL/L — SIGNIFICANT CHANGE UP (ref 98–107)
CO2 SERPL-SCNC: 20 MMOL/L — LOW (ref 22–31)
COLOR SPEC: YELLOW — SIGNIFICANT CHANGE UP
CREAT SERPL-MCNC: 1.11 MG/DL — SIGNIFICANT CHANGE UP (ref 0.5–1.3)
EOSINOPHIL # BLD AUTO: 0.08 K/UL — SIGNIFICANT CHANGE UP (ref 0–0.5)
EOSINOPHIL NFR BLD AUTO: 0.9 % — SIGNIFICANT CHANGE UP (ref 0–6)
FLUAV H1 2009 PAND RNA SPEC QL NAA+PROBE: NOT DETECTED — SIGNIFICANT CHANGE UP
FLUAV H1 RNA SPEC QL NAA+PROBE: NOT DETECTED — SIGNIFICANT CHANGE UP
FLUAV H3 RNA SPEC QL NAA+PROBE: NOT DETECTED — SIGNIFICANT CHANGE UP
FLUAV SUBTYP SPEC NAA+PROBE: SIGNIFICANT CHANGE UP
FLUBV RNA SPEC QL NAA+PROBE: NOT DETECTED — SIGNIFICANT CHANGE UP
GLUCOSE SERPL-MCNC: 89 MG/DL — SIGNIFICANT CHANGE UP (ref 70–99)
GLUCOSE UR-MCNC: NEGATIVE — SIGNIFICANT CHANGE UP
HADV DNA SPEC QL NAA+PROBE: NOT DETECTED — SIGNIFICANT CHANGE UP
HCOV 229E RNA SPEC QL NAA+PROBE: NOT DETECTED — SIGNIFICANT CHANGE UP
HCOV HKU1 RNA SPEC QL NAA+PROBE: NOT DETECTED — SIGNIFICANT CHANGE UP
HCOV NL63 RNA SPEC QL NAA+PROBE: NOT DETECTED — SIGNIFICANT CHANGE UP
HCOV OC43 RNA SPEC QL NAA+PROBE: NOT DETECTED — SIGNIFICANT CHANGE UP
HCT VFR BLD CALC: 22.5 % — LOW (ref 39–50)
HGB BLD-MCNC: 7.4 G/DL — LOW (ref 13–17)
HMPV RNA SPEC QL NAA+PROBE: NOT DETECTED — SIGNIFICANT CHANGE UP
HPIV1 RNA SPEC QL NAA+PROBE: NOT DETECTED — SIGNIFICANT CHANGE UP
HPIV2 RNA SPEC QL NAA+PROBE: NOT DETECTED — SIGNIFICANT CHANGE UP
HPIV3 RNA SPEC QL NAA+PROBE: NOT DETECTED — SIGNIFICANT CHANGE UP
HPIV4 RNA SPEC QL NAA+PROBE: NOT DETECTED — SIGNIFICANT CHANGE UP
IMM GRANULOCYTES # BLD AUTO: 0.1 # — SIGNIFICANT CHANGE UP
IMM GRANULOCYTES NFR BLD AUTO: 1.1 % — SIGNIFICANT CHANGE UP (ref 0–1.5)
KETONES UR-MCNC: NEGATIVE — SIGNIFICANT CHANGE UP
LEUKOCYTE ESTERASE UR-ACNC: NEGATIVE — SIGNIFICANT CHANGE UP
LYMPHOCYTES # BLD AUTO: 3.51 K/UL — HIGH (ref 1–3.3)
LYMPHOCYTES # BLD AUTO: 37.4 % — SIGNIFICANT CHANGE UP (ref 13–44)
M PNEUMO DNA SPEC QL NAA+PROBE: NOT DETECTED — SIGNIFICANT CHANGE UP
MAGNESIUM SERPL-MCNC: 1.2 MG/DL — LOW (ref 1.6–2.6)
MCHC RBC-ENTMCNC: 31.5 PG — SIGNIFICANT CHANGE UP (ref 27–34)
MCHC RBC-ENTMCNC: 32.9 % — SIGNIFICANT CHANGE UP (ref 32–36)
MCV RBC AUTO: 95.7 FL — SIGNIFICANT CHANGE UP (ref 80–100)
MONOCYTES # BLD AUTO: 1.54 K/UL — HIGH (ref 0–0.9)
MONOCYTES NFR BLD AUTO: 16.4 % — HIGH (ref 2–14)
MUCOUS THREADS # UR AUTO: SIGNIFICANT CHANGE UP
NEUTROPHILS # BLD AUTO: 4.14 K/UL — SIGNIFICANT CHANGE UP (ref 1.8–7.4)
NEUTROPHILS NFR BLD AUTO: 44 % — SIGNIFICANT CHANGE UP (ref 43–77)
NITRITE UR-MCNC: NEGATIVE — SIGNIFICANT CHANGE UP
NRBC # FLD: 0 — SIGNIFICANT CHANGE UP
PH UR: 6 — SIGNIFICANT CHANGE UP (ref 4.6–8)
PHOSPHATE SERPL-MCNC: 3.1 MG/DL — SIGNIFICANT CHANGE UP (ref 2.5–4.5)
PLATELET # BLD AUTO: 269 K/UL — SIGNIFICANT CHANGE UP (ref 150–400)
PMV BLD: 9.8 FL — SIGNIFICANT CHANGE UP (ref 7–13)
POTASSIUM SERPL-MCNC: 4 MMOL/L — SIGNIFICANT CHANGE UP (ref 3.5–5.3)
POTASSIUM SERPL-SCNC: 4 MMOL/L — SIGNIFICANT CHANGE UP (ref 3.5–5.3)
PROT UR-MCNC: 30 MG/DL — HIGH
RBC # BLD: 2.35 M/UL — LOW (ref 4.2–5.8)
RBC # FLD: 20.1 % — HIGH (ref 10.3–14.5)
RBC CASTS # UR COMP ASSIST: SIGNIFICANT CHANGE UP (ref 0–?)
REVIEW TO FOLLOW: YES — SIGNIFICANT CHANGE UP
RSV RNA SPEC QL NAA+PROBE: NOT DETECTED — SIGNIFICANT CHANGE UP
RV+EV RNA SPEC QL NAA+PROBE: NOT DETECTED — SIGNIFICANT CHANGE UP
SODIUM SERPL-SCNC: 136 MMOL/L — SIGNIFICANT CHANGE UP (ref 135–145)
SP GR SPEC: 1.01 — SIGNIFICANT CHANGE UP (ref 1–1.04)
SPECIMEN SOURCE: SIGNIFICANT CHANGE UP
SPECIMEN SOURCE: SIGNIFICANT CHANGE UP
UROBILINOGEN FLD QL: NORMAL MG/DL — SIGNIFICANT CHANGE UP
WBC # BLD: 9.39 K/UL — SIGNIFICANT CHANGE UP (ref 3.8–10.5)
WBC # FLD AUTO: 9.39 K/UL — SIGNIFICANT CHANGE UP (ref 3.8–10.5)
WBC UR QL: SIGNIFICANT CHANGE UP (ref 0–?)

## 2017-12-16 PROCEDURE — 99233 SBSQ HOSP IP/OBS HIGH 50: CPT | Mod: GC

## 2017-12-16 PROCEDURE — 99222 1ST HOSP IP/OBS MODERATE 55: CPT | Mod: GC

## 2017-12-16 RX ORDER — MAGNESIUM SULFATE 500 MG/ML
2 VIAL (ML) INJECTION ONCE
Qty: 0 | Refills: 0 | Status: COMPLETED | OUTPATIENT
Start: 2017-12-16 | End: 2017-12-16

## 2017-12-16 RX ORDER — PIPERACILLIN AND TAZOBACTAM 4; .5 G/20ML; G/20ML
3.38 INJECTION, POWDER, LYOPHILIZED, FOR SOLUTION INTRAVENOUS EVERY 8 HOURS
Qty: 0 | Refills: 0 | Status: DISCONTINUED | OUTPATIENT
Start: 2017-12-16 | End: 2017-12-16

## 2017-12-16 RX ORDER — ACETAMINOPHEN 500 MG
650 TABLET ORAL ONCE
Qty: 0 | Refills: 0 | Status: COMPLETED | OUTPATIENT
Start: 2017-12-16 | End: 2017-12-16

## 2017-12-16 RX ORDER — PIPERACILLIN AND TAZOBACTAM 4; .5 G/20ML; G/20ML
3.38 INJECTION, POWDER, LYOPHILIZED, FOR SOLUTION INTRAVENOUS EVERY 8 HOURS
Qty: 0 | Refills: 0 | Status: DISCONTINUED | OUTPATIENT
Start: 2017-12-16 | End: 2017-12-17

## 2017-12-16 RX ADMIN — APIXABAN 5 MILLIGRAM(S): 2.5 TABLET, FILM COATED ORAL at 05:25

## 2017-12-16 RX ADMIN — PIPERACILLIN AND TAZOBACTAM 25 GRAM(S): 4; .5 INJECTION, POWDER, LYOPHILIZED, FOR SOLUTION INTRAVENOUS at 14:05

## 2017-12-16 RX ADMIN — Medication 650 MILLIGRAM(S): at 05:24

## 2017-12-16 RX ADMIN — Medication 650 MILLIGRAM(S): at 23:39

## 2017-12-16 RX ADMIN — Medication 50 GRAM(S): at 09:49

## 2017-12-16 RX ADMIN — TIOTROPIUM BROMIDE 1 CAPSULE(S): 18 CAPSULE ORAL; RESPIRATORY (INHALATION) at 09:56

## 2017-12-16 RX ADMIN — PIPERACILLIN AND TAZOBACTAM 25 GRAM(S): 4; .5 INJECTION, POWDER, LYOPHILIZED, FOR SOLUTION INTRAVENOUS at 23:39

## 2017-12-16 RX ADMIN — APIXABAN 5 MILLIGRAM(S): 2.5 TABLET, FILM COATED ORAL at 17:33

## 2017-12-16 RX ADMIN — Medication 1 TABLET(S): at 11:37

## 2017-12-16 RX ADMIN — Medication 300 MILLIGRAM(S): at 11:37

## 2017-12-16 NOTE — DISCHARGE NOTE ADULT - MEDICATION SUMMARY - MEDICATIONS TO TAKE
I will START or STAY ON the medications listed below when I get home from the hospital:    Cardizem  mg/24 hours oral capsule, extended release  -- 1 cap(s) by mouth once a day  -- It is very important that you take or use this exactly as directed.  Do not skip doses or discontinue unless directed by your doctor.  Some non-prescription drugs may aggravate your condition.  Read all labels carefully.  If a warning appears, check with your doctor before taking.  Swallow whole.  Do not crush.    -- Indication: For Chronic atrial fibrillation    Eliquis 5 mg oral tablet  -- orally 2 times a day  -- Indication: For Chronic atrial fibrillation    prochlorperazine 10 mg oral tablet  -- 1 tab(s) by mouth 3 times a day, As Needed  -- Indication: For Nausea    allopurinol 300 mg oral tablet  -- 1 tab(s) by mouth once a day  -- Indication: For Chronic gout without tophus, unspecified cause, unspecified site    Spiriva 18 mcg inhalation capsule  -- 1 cap(s) inhaled once a day  -- Indication: For Reactive airway disease    Levaquin 750 mg oral tablet  -- 1 tab(s) by mouth every 24 hours   -- Avoid prolonged or excessive exposure to direct and/or artificial sunlight while taking this medication.  Do not take dairy products, antacids, or iron preparations within one hour of this medication.  Finish all this medication unless otherwise directed by prescriber.  May cause drowsiness or dizziness.  Medication should be taken with plenty of water.    -- Indication: For Fever, unspecified fever cause    Centrum oral tablet  -- 1 tab(s) by mouth once a day   -- Indication: For Supplements I will START or STAY ON the medications listed below when I get home from the hospital:    Cardizem  mg/24 hours oral capsule, extended release  -- 1 cap(s) by mouth once a day  -- It is very important that you take or use this exactly as directed.  Do not skip doses or discontinue unless directed by your doctor.  Some non-prescription drugs may aggravate your condition.  Read all labels carefully.  If a warning appears, check with your doctor before taking.  Swallow whole.  Do not crush.    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

## 2017-12-16 NOTE — DISCHARGE NOTE ADULT - PROVIDER TOKENS
TOKEN:'[id number]',TOKEN:'[id number]' TOKEN:'[id number]:'[id number]',TOKEN:'[id number]'

## 2017-12-16 NOTE — PROGRESS NOTE ADULT - PROBLEM SELECTOR PLAN 6
No leukopenia or thrombocytopenia. Anemia consistent with previous values. No concern for blast crisis at this time.   - cont to trend cbc  - f/u with Dr Feliciano  - f/u IgA, IgG, IgM  - consider transfusion as Hg decreased to 7.8 from baseline ~10

## 2017-12-16 NOTE — PROGRESS NOTE ADULT - PROBLEM SELECTOR PLAN 3
Right knuckle nodule with erythema and tenderness  - concern for gout flare vs cellulitis vs Right knuckle nodule with erythema and tenderness  - concern for gout flare vs cellulitis

## 2017-12-16 NOTE — PROGRESS NOTE ADULT - PROBLEM SELECTOR PLAN 4
Papular erythematous lesions with scaling over left arm, concerning for dermatitis, possibly atopic. Patient states it is worse after chemo. Improves with Cortisone per patient report. Not consistent with cellulitis. Can c/w topical hydrocortisone to left arm, outpatient dermatology f/u.

## 2017-12-16 NOTE — DISCHARGE NOTE ADULT - NS AS ACTIVITY OBS
Walking-Outdoors allowed/Driving allowed/Walking-Indoors allowed/Bathing allowed/Showering allowed/Sex allowed/Stairs allowed

## 2017-12-16 NOTE — PROGRESS NOTE ADULT - ATTENDING COMMENTS
Agree with resident's A&P as above, with edits made as necessary. Briefly, pt is a 71yo M w/ CLL and stage III NSCLC on chemotherapy a/w fever and malaise. No infectious source yet identified and patient is not neutropenic. Oncology input appreciated. Will continue with empiric abx for now given recent pulmonary findings requiring thoracentesis (though CXR here is WNL).    Will plan on c/w zosyn for now empirically, with de-escalation in coming days if no infectious etiologies identified.    Repeat RVP. Check quantitative Ig's in setting of known CLL, as pt may have impaired adaptive immunity, predisposing to acute infections.    Anemic today, no evidence of bleeding or hemolysis (normal bilirubin). Will plan on tranfusing one unit of PRBCs in AM. Monitor closely for signs of hemolytic anemia, which can also be seen in CLL patients. Agree with resident's A&P as above, with edits made as necessary. Briefly, pt is a 71yo M w/ CLL and stage III NSCLC on chemotherapy a/w fever and malaise. No infectious source yet identified and patient is not neutropenic. Oncology input appreciated. Will continue with empiric abx for now given recent pulmonary findings requiring thoracentesis (though CXR here is WNL).    Will plan on c/w zosyn for now empirically, with de-escalation in coming days if no infectious etiologies identified.    Repeat RVP. Check quantitative Ig's in setting of known CLL, as pt may have impaired adaptive immunity, predisposing to acute infections.    Anemic today, no evidence of bleeding or hemolysis (normal bilirubin). Will plan on tranfusing one unit of PRBCs in AM. Monitor closely for signs of hemolytic anemia, which can also be seen in CLL patients.    UA with +blood. Repeat UA, will send for cytology. If persistently febrile, may require CT abdomen to r/o underlying stone or other pathology.

## 2017-12-16 NOTE — PROGRESS NOTE ADULT - SUBJECTIVE AND OBJECTIVE BOX
Jon Churchill MD  Medicine Team 4  Pager: [phone]  After 7PM on weekdays, and after 12PM on weekends, please page [phone]      Patient is a 72y old  Male who presents with a chief complaint of       SUBJECTIVE / OVERNIGHT EVENTS:      MEDICATIONS  (STANDING):  allopurinol 300 milliGRAM(s) Oral daily  apixaban 5 milliGRAM(s) Oral every 12 hours  diltiazem    Tablet 30 milliGRAM(s) Oral every 6 hours  mirtazapine 7.5 milliGRAM(s) Oral at bedtime  multivitamin/mineral Chewable Tablet (AquADEKs) 1 Tablet(s) Chew daily  tiotropium 18 MICROgram(s) Capsule 1 Capsule(s) Inhalation daily    MEDICATIONS  (PRN):  prochlorperazine   Tablet 10 milliGRAM(s) Oral three times a day PRN nausea/vomiting      T(C): 38.1 (17 @ 05:14), Max: 39.1 (12-15-17 @ 14:30)  HR: 94 (17 @ 05:14) (80 - 110)  BP: 143/78 (17 @ 05:14) (137/73 - 148/63)  RR: 18 (17 @ 05:14) (16 - 18)  SpO2: 97% (17 @ 05:14) (97% - 100%)  CAPILLARY BLOOD GLUCOSE        I&O's Summary      PHYSICAL EXAM  GENERAL: NAD, well-developed  HEAD:  Atraumatic, Normocephalic  EYES: EOMI, PERRLA, conjunctiva and sclera clear  NECK: Supple, No JVD  CHEST/LUNG: Clear to auscultation bilaterally; No wheeze  HEART: Regular rate and rhythm; No murmurs, rubs, or gallops  ABDOMEN: Soft, Nontender, Nondistended; Bowel sounds present  EXTREMITIES:  2+ Peripheral Pulses, No clubbing, cyanosis, or edema  PSYCH: AAOx3  SKIN: No rashes or lesions    LABS:                        8.8    9.25  )-----------( 323      ( 15 Dec 2017 14:10 )             26.3     12-15    138  |  99  |  18  ----------------------------<  176<H>  4.6   |  22  |  1.42<H>    Ca    8.8      15 Dec 2017 14:11    TPro  6.5  /  Alb  3.4  /  TBili  0.4  /  DBili  x   /  AST  20  /  ALT  7   /  AlkPhos  110  12-15    PT/INR - ( 15 Dec 2017 14:10 )   PT: 14.0 SEC;   INR: 1.21          PTT - ( 15 Dec 2017 14:10 )  PTT:32.5 SEC  CARDIAC MARKERS ( 15 Dec 2017 14:11 )  x     / < 0.06 ng/mL / 24 u/L / x     / x          Urinalysis Basic - ( 15 Dec 2017 17:02 )    Color: YELLOW / Appearance: CLEAR / S.019 / pH: 6.0  Gluc: NEGATIVE / Ketone: NEGATIVE  / Bili: NEGATIVE / Urobili: 1 mg/dL   Blood: SMALL / Protein: 100 mg/dL / Nitrite: NEGATIVE   Leuk Esterase: NEGATIVE / RBC: >50 / WBC 2-5   Sq Epi: x / Non Sq Epi: x / Bacteria: FEW        RADIOLOGY & ADDITIONAL TESTS:    Imaging Personally Reviewed:  Consultant(s) Notes Reviewed:    Care Discussed with Consultants/Other Providers: Jon Churchill MD  Medicine Team 4  Pager: [phone]  After 7PM on weekdays, and after 12PM on weekends, please page [phone]      Patient is a 72y old  Male who presents with a chief complaint of generalized weakness      SUBJECTIVE / OVERNIGHT EVENTS: Pt with fever to 100.5 o/n. Pt reports nasal congestion. Denies h/a, dyspnea, chest pain, abd pain, dysuria.       MEDICATIONS  (STANDING):  allopurinol 300 milliGRAM(s) Oral daily  apixaban 5 milliGRAM(s) Oral every 12 hours  diltiazem    Tablet 30 milliGRAM(s) Oral every 6 hours  mirtazapine 7.5 milliGRAM(s) Oral at bedtime  multivitamin/mineral Chewable Tablet (AquADEKs) 1 Tablet(s) Chew daily  tiotropium 18 MICROgram(s) Capsule 1 Capsule(s) Inhalation daily    MEDICATIONS  (PRN):  prochlorperazine   Tablet 10 milliGRAM(s) Oral three times a day PRN nausea/vomiting      T(C): 38.1 (17 @ 05:14), Max: 39.1 (12-15-17 @ 14:30)  HR: 94 (17 @ 05:14) (80 - 110)  BP: 143/78 (17 @ 05:14) (137/73 - 148/63)  RR: 18 (17 @ 05:14) (16 - 18)  SpO2: 97% (17 @ 05:14) (97% - 100%)  CAPILLARY BLOOD GLUCOSE        I&O's Summary      PHYSICAL EXAM  GENERAL: NAD, well-developed  HEAD:  Atraumatic, Normocephalic  EYES: EOMI, PERRLA, conjunctiva and sclera clear  NECK: Supple, No JVD  CHEST/LUNG: Clear to auscultation bilaterally; No wheeze  HEART: Regular rate and rhythm; No murmurs, rubs, or gallops  ABDOMEN: Soft, Nontender, Nondistended; Bowel sounds present  EXTREMITIES:  2+ Peripheral Pulses, Right 3rd PCP joint nodule with erythema and tenderness  PSYCH: AAOx3  SKIN: No rashes or lesions    LABS:                                 7.4    9.39  )-----------( 269      ( 16 Dec 2017 06:37 )             22.5     12-    136  |  102  |  15  ----------------------------<  89  4.0   |  20<L>  |  1.11    Ca    8.2<L>      16 Dec 2017 06:37  Phos  3.1     12-  Mg     1.2     -    TPro  6.5  /  Alb  3.4  /  TBili  0.4  /  DBili  x   /  AST  20  /  ALT  7   /  AlkPhos  110  12-15    PT/INR - ( 15 Dec 2017 14:10 )   PT: 14.0 SEC;   INR: 1.21          PTT - ( 15 Dec 2017 14:10 )  PTT:32.5 SEC  CARDIAC MARKERS ( 15 Dec 2017 14:11 )  x     / < 0.06 ng/mL / 24 u/L / x     / x          Urinalysis Basic - ( 15 Dec 2017 17:02 )    Color: YELLOW / Appearance: CLEAR / S.019 / pH: 6.0  Gluc: NEGATIVE / Ketone: NEGATIVE  / Bili: NEGATIVE / Urobili: 1 mg/dL   Blood: SMALL / Protein: 100 mg/dL / Nitrite: NEGATIVE   Leuk Esterase: NEGATIVE / RBC: >50 / WBC 2-5   Sq Epi: x / Non Sq Epi: x / Bacteria: FEW          RADIOLOGY & ADDITIONAL TESTS:    Imaging Personally Reviewed:  Consultant(s) Notes Reviewed:    Care Discussed with Consultants/Other Providers: Jon Churchill MD  Medicine Team 4  Pager: [phone]  After 7PM on weekdays, and after 12PM on weekends, please page [phone]      Patient is a 72y old  Male who presents with a chief complaint of generalized weakness      SUBJECTIVE / OVERNIGHT EVENTS: Pt with fever to 100.5 o/n. Pt reports nasal congestion. Denies h/a, dyspnea, chest pain, abd pain, dysuria.       MEDICATIONS  (STANDING):  allopurinol 300 milliGRAM(s) Oral daily  apixaban 5 milliGRAM(s) Oral every 12 hours  diltiazem    Tablet 30 milliGRAM(s) Oral every 6 hours  mirtazapine 7.5 milliGRAM(s) Oral at bedtime  multivitamin/mineral Chewable Tablet (AquADEKs) 1 Tablet(s) Chew daily  tiotropium 18 MICROgram(s) Capsule 1 Capsule(s) Inhalation daily    MEDICATIONS  (PRN):  prochlorperazine   Tablet 10 milliGRAM(s) Oral three times a day PRN nausea/vomiting      T(C): 38.1 (17 @ 05:14), Max: 39.1 (12-15-17 @ 14:30)  HR: 94 (17 @ 05:14) (80 - 110)  BP: 143/78 (17 @ 05:14) (137/73 - 148/63)  RR: 18 (17 @ 05:14) (16 - 18)  SpO2: 97% (17 @ 05:14) (97% - 100%)  CAPILLARY BLOOD GLUCOSE        I&O's Summary      PHYSICAL EXAM  GENERAL: NAD, well-developed  HEAD:  Atraumatic, Normocephalic  EYES: EOMI, PERRLA, conjunctiva and sclera clear  NECK: Supple, No JVD  CHEST/LUNG: Clear to auscultation bilaterally; No wheeze  HEART: Regular rate and rhythm; No murmurs, rubs, or gallops  ABDOMEN: Soft, Nontender, Nondistended; Bowel sounds present  EXTREMITIES:  2+ Peripheral Pulses, Right 3rd MCP joint nodule with erythema and tenderness  PSYCH: AAOx3  SKIN: No rashes or lesions    LABS:                                 7.4    9.39  )-----------( 269      ( 16 Dec 2017 06:37 )             22.5     12-    136  |  102  |  15  ----------------------------<  89  4.0   |  20<L>  |  1.11    Ca    8.2<L>      16 Dec 2017 06:37  Phos  3.1     12-  Mg     1.2     -    TPro  6.5  /  Alb  3.4  /  TBili  0.4  /  DBili  x   /  AST  20  /  ALT  7   /  AlkPhos  110  12-15    PT/INR - ( 15 Dec 2017 14:10 )   PT: 14.0 SEC;   INR: 1.21          PTT - ( 15 Dec 2017 14:10 )  PTT:32.5 SEC  CARDIAC MARKERS ( 15 Dec 2017 14:11 )  x     / < 0.06 ng/mL / 24 u/L / x     / x          Urinalysis Basic - ( 15 Dec 2017 17:02 )    Color: YELLOW / Appearance: CLEAR / S.019 / pH: 6.0  Gluc: NEGATIVE / Ketone: NEGATIVE  / Bili: NEGATIVE / Urobili: 1 mg/dL   Blood: SMALL / Protein: 100 mg/dL / Nitrite: NEGATIVE   Leuk Esterase: NEGATIVE / RBC: >50 / WBC 2-5   Sq Epi: x / Non Sq Epi: x / Bacteria: FEW          RADIOLOGY & ADDITIONAL TESTS:    Imaging Personally Reviewed:  Consultant(s) Notes Reviewed:  Oncology  Care Discussed with Consultants/Other Providers:

## 2017-12-16 NOTE — DISCHARGE NOTE ADULT - CARE PROVIDER_API CALL
Nakia Ruiz), Geriatric Medicine; Internal Medicine  [address] [address]  Phone: [phone]  Fax: [phone]    Pravin Feliciano; MBBS), Hematology; [facility]; Medical Oncology  [address] [place], [place] [id number]  Phone: [phone]  Fax: [phone] Nakia Ruiz), Geriatric Medicine; Internal Medicine  [address] [address]  Phone: [phone]  Fax: [phone]    Pravin Feliciano; MBBS), Hematology; [facility]; Medical Oncology  [address] [place], [place] [id number]  Phone: [phone]  Fax: [phone]    Evelia Martins), Infectious Disease; Internal Medicine  [address] [place] [address]  Phone: [phone]  Fax: [phone]

## 2017-12-16 NOTE — DISCHARGE NOTE ADULT - PATIENT PORTAL LINK FT
“You can access the FollowHealth Patient Portal, offered by [facility], by registering with the following website: [email or web address]”

## 2017-12-16 NOTE — DISCHARGE NOTE ADULT - CARE PROVIDERS DIRECT ADDRESSES
[email or web address]

## 2017-12-16 NOTE — DISCHARGE NOTE ADULT - SECONDARY DIAGNOSIS.
Squamous cell carcinoma CLL (chronic lymphocytic leukemia) Chronic atrial fibrillation Gout, unspecified cause, unspecified chronicity, unspecified site Essential hypertension Hematuria, microscopic

## 2017-12-16 NOTE — PROGRESS NOTE ADULT - PROBLEM SELECTOR PLAN 5
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 Followed by Dr Feliciano.  - f/u with onc service

## 2017-12-16 NOTE — DISCHARGE NOTE ADULT - MEDICATION SUMMARY - MEDICATIONS TO STOP TAKING
I will STOP taking the medications listed below when I get home from the hospital:    colchicine 0.6 mg oral tablet  -- 1 tab(s) by mouth Every three days

## 2017-12-16 NOTE — PROGRESS NOTE ADULT - PROBLEM SELECTOR PLAN 7
HR at goal at present  - c/w cardizem, start at 30q8, uptitrate to 60 q8hr  - f/u EKG  - c/w Eliquis 5mg BID

## 2017-12-16 NOTE — PROGRESS NOTE ADULT - PROBLEM SELECTOR PLAN 1
Pt with 3d of malaise, found to have fever and tachycardia. No localizing symptoms for source of infection. CXR without PNA. UA without e/o UTI. BCx in lab. Presentation likely represents sepsis 2/2 viral infection. He received vancomycin and cefepime in the ED. Although patient is on chemotherapy, he is not neutropenic. qSOFA of 0; patient not in high risk mortality group of sepsis at this time. No urgency for initiating empiric abx.  - f/u blood cultures  - f/u urine cx  - febrile o/n, start zosyn Pt with 3d of malaise, found to have fever and tachycardia. No localizing symptoms for source of infection. CXR without PNA. UA without e/o UTI. BCx in lab. Presentation likely represents sepsis 2/2 viral infection. He received vancomycin and cefepime in the ED. Although patient is on chemotherapy, he is not neutropenic. qSOFA of 0; patient not in high risk mortality group of sepsis at this time. No urgency for initiating empiric abx.  - f/u blood cultures  - f/u urine cx  - febrile o/n, start zosyn  - f/u repeat RVP and UA

## 2017-12-16 NOTE — DISCHARGE NOTE ADULT - CARE PLAN
Principal Discharge DX:	Upper respiratory infection, viral  Goal:	Resolution  Instructions for follow-up, activity and diet:	You presented to the hospital with generalized weakness and fevers likely due to a viral upper respiratory infection. You received IV fluids and antibiotics. Blood cultures showed no bacterial growth. Urinalysis showed no evidence of Urinary tract infection. Chest xray showed no signs of pneumonia. Please follow up with your primary care doctor Dr Ruiz for to ensure resolution. If you feels signs of dizziness, weakness, worsening weakness, or fever, please return to the ED.  Secondary Diagnosis:	Squamous cell carcinoma  Goal:	Management  Instructions for follow-up, activity and diet:	You were found to have squamous cell carcinoma on outpatient exams and are seen by Dr Gutierrez. You were seen by the Hematology/Oncology team. Chest xray showed clear lungs. Please continue to follow with your primary care doctor, Dr Ruiz, and Dr Gutierrez for continued management.  Secondary Diagnosis:	CLL (chronic lymphocytic leukemia)  Goal:	Management  Instructions for follow-up, activity and diet:	You were to have chronic lymphocytic leukemia on outpatient exams. Your blood tests showed low hemoglobin. A blood transfusion was advised by the Heme/Onc team. You received 1 unit of packed red blood cells. Please continue to follow up with Dr Gutierrez.  Secondary Diagnosis:	Chronic atrial fibrillation  Goal:	Management  Instructions for follow-up, activity and diet:	Please continue with your home medications. Please follow up with your primary care doctor for continued management.  Secondary Diagnosis:	Gout, unspecified cause, unspecified chronicity, unspecified site  Goal:	Management  Instructions for follow-up, activity and diet:	Please continue with your home medications. Please follow up with your primary care doctor for continued management.  Secondary Diagnosis:	Essential hypertension  Goal:	Management  Instructions for follow-up, activity and diet:	Your blood pressure remained well controlled during this hospitalization. Please continue with your home medications. Please follow up with your primary care doctor for continued management.  Secondary Diagnosis:	Hematuria, microscopic  Goal:	Management  Instructions for follow-up, activity and diet:	You were found to have blood in your urine on urinalysis. A repeat urinalysis showed no further blood in the urine. Please follow up with your primary care doctor for further evaluation. If you experience blood in the urine, please inform your primary care doctor and your oncologist. Principal Discharge DX:	Upper respiratory infection, viral  Goal:	Resolution  Instructions for follow-up, activity and diet:	You presented to the hospital with generalized weakness and fevers likely due to a viral upper respiratory infection. You received IV fluids and antibiotics. Blood cultures showed no bacterial growth. Urinalysis showed no evidence of Urinary tract infection. Chest xray showed no signs of pneumonia. Please follow up with your primary care doctor Dr Ruiz for to ensure resolution. If you feels signs of dizziness, weakness, worsening weakness, or fever, please return to the ED. Please take Levaquin as prescribed for 5 more days.  Secondary Diagnosis:	Squamous cell carcinoma  Goal:	Management  Instructions for follow-up, activity and diet:	You were found to have squamous cell carcinoma on outpatient exams and are seen by Dr Gutierrez. You were seen by the Hematology/Oncology team. Chest xray showed clear lungs. Please continue to follow with your primary care doctor, Dr Ruiz, and Dr Gutierrez for continued management.  Secondary Diagnosis:	CLL (chronic lymphocytic leukemia)  Goal:	Management  Instructions for follow-up, activity and diet:	You were to have chronic lymphocytic leukemia on outpatient exams. Your blood tests showed low hemoglobin. A blood transfusion was advised by the Heme/Onc team. You received 1 unit of packed red blood cells. Please continue to follow up with Dr Gutierrez.  Secondary Diagnosis:	Chronic atrial fibrillation  Goal:	Management  Instructions for follow-up, activity and diet:	Please continue with your home medications. Please follow up with your primary care doctor for continued management.  Secondary Diagnosis:	Gout, unspecified cause, unspecified chronicity, unspecified site  Goal:	Management  Instructions for follow-up, activity and diet:	Please continue with your home medications. Please follow up with your primary care doctor for continued management.  Secondary Diagnosis:	Essential hypertension  Goal:	Management  Instructions for follow-up, activity and diet:	Your blood pressure remained well controlled during this hospitalization. Please continue with your home medications. Please follow up with your primary care doctor for continued management.  Secondary Diagnosis:	Hematuria, microscopic  Goal:	Management  Instructions for follow-up, activity and diet:	You were found to have blood in your urine on urinalysis. A repeat urinalysis showed no further blood in the urine. Please follow up with your primary care doctor for further evaluation. If you experience blood in the urine, please inform your primary care doctor and your oncologist. Principal Discharge DX:	Upper respiratory infection, viral  Goal:	Resolution  Instructions for follow-up, activity and diet:	You presented to the hospital with generalized weakness and fevers likely due to a viral upper respiratory infection. You received IV fluids and antibiotics. Blood cultures showed no bacterial growth. Urinalysis showed no evidence of Urinary tract infection. Chest xray showed no signs of pneumonia. Please follow up with your primary care doctor Dr Ruiz for to ensure resolution. CT scans of the chest and abdomen showed no new sources of infection. If you feels signs of dizziness, weakness, worsening weakness, or fever, please return to the ED. You will require 2 more days of IV meropenem, an IV antibiotic to complete a 5 day course of Meropenem IV.  Secondary Diagnosis:	Squamous cell carcinoma  Goal:	Management  Instructions for follow-up, activity and diet:	You were found to have squamous cell carcinoma on outpatient exams and are seen by Dr Gutierrez. You were seen by the Hematology/Oncology team. Chest xray showed clear lungs. A Chest CT scan showed improving pleural effusion. Please continue to follow with your primary care doctor, Dr Ruiz, and Dr Gutierrez for continued management.  Secondary Diagnosis:	CLL (chronic lymphocytic leukemia)  Goal:	Management  Instructions for follow-up, activity and diet:	You were to have chronic lymphocytic leukemia on outpatient exams. Your blood tests showed low hemoglobin. A blood transfusion was advised by the Heme/Onc team. You received 1 unit of packed red blood cells. Please continue to follow up with Dr Gutierrez.  Secondary Diagnosis:	Chronic atrial fibrillation  Goal:	Management  Instructions for follow-up, activity and diet:	Please continue with your home medications. Please follow up with your primary care doctor for continued management.  Secondary Diagnosis:	Gout, unspecified cause, unspecified chronicity, unspecified site  Goal:	Management  Instructions for follow-up, activity and diet:	Please continue with your home medications. Please follow up with your primary care doctor for continued management.  Secondary Diagnosis:	Essential hypertension  Goal:	Management  Instructions for follow-up, activity and diet:	Your blood pressure remained well controlled during this hospitalization. Please continue with your home medications. Please follow up with your primary care doctor for continued management.  Secondary Diagnosis:	Hematuria, microscopic  Goal:	Management  Instructions for follow-up, activity and diet:	You were found to have blood in your urine on urinalysis. A repeat urinalysis showed no further blood in the urine. Please follow up with your primary care doctor for further evaluation. If you experience blood in the urine, please inform your primary care doctor and your oncologist. Principal Discharge DX:	Pyelonephritis  Goal:	Resolution  Instructions for follow-up, activity and diet:	You presented to the hospital with generalized weakness and fevers. You had  some stranding seen around right kidney suspicious for kidney infection. You received IV fluids and antibiotics. Blood cultures showed no bacterial growth. Urinalysis showed no evidence of Urinary tract infection. Chest xray showed no signs of pneumonia. Please follow up with your primary care doctor Dr Ruiz for to ensure resolution. CT scans of the chest and abdomen showed no new sources of infection. If you feels signs of dizziness, weakness, worsening weakness, or fever, please return to the ED. You will require 2 more days of IV meropenem, an IV antibiotic to complete a 5 day course of Meropenem IV. Followup with Infectious disease doctor: Dr. Martins, see number below.  Secondary Diagnosis:	Squamous cell carcinoma  Goal:	Management  Instructions for follow-up, activity and diet:	You were found to have squamous cell carcinoma on outpatient exams and are seen by Dr Gutierrez. You were seen by the Hematology/Oncology team. Chest xray showed clear lungs. A Chest CT scan showed improving pleural effusion. Please continue to follow with your primary care doctor, Dr Ruiz, and Dr Gutierrez for continued management.  Secondary Diagnosis:	CLL (chronic lymphocytic leukemia)  Goal:	Management  Instructions for follow-up, activity and diet:	You were to have chronic lymphocytic leukemia on outpatient exams. Your blood tests showed low hemoglobin. A blood transfusion was advised by the Heme/Onc team. You received 1 unit of packed red blood cells. Please continue to follow up with Dr Gutierrez.  Secondary Diagnosis:	Chronic atrial fibrillation  Goal:	Management  Instructions for follow-up, activity and diet:	Please continue with your home medications. Please follow up with your primary care doctor for continued management.  Secondary Diagnosis:	Gout, unspecified cause, unspecified chronicity, unspecified site  Goal:	Management  Instructions for follow-up, activity and diet:	Please continue with your home medications. Please follow up with your primary care doctor for continued management.  Secondary Diagnosis:	Essential hypertension  Goal:	Management  Instructions for follow-up, activity and diet:	Your blood pressure remained well controlled during this hospitalization. Please continue with your home medications. Please follow up with your primary care doctor for continued management.  Secondary Diagnosis:	Hematuria, microscopic  Goal:	Management  Instructions for follow-up, activity and diet:	You were found to have blood in your urine on urinalysis. A repeat urinalysis showed no further blood in the urine. Please follow up with your primary care doctor for further evaluation. If you experience blood in the urine, please inform your primary care doctor and your oncologist.

## 2017-12-16 NOTE — DISCHARGE NOTE ADULT - PLAN OF CARE
Resolution You presented to the hospital with generalized weakness and fevers likely due to a viral upper respiratory infection. You received IV fluids and antibiotics. Blood cultures showed no bacterial growth. Urinalysis showed no evidence of Urinary tract infection. Chest xray showed no signs of pneumonia. Please follow up with your primary care doctor Dr Sara ba to ensure resolution. If you feels signs of dizziness, weakness, worsening weakness, or fever, please return to the ED. Management You were found to have squamous cell carcinoma on outpatient exams and are seen by Dr Gutierrez. You were seen by the Hematology/Oncology team. Chest xray showed clear lungs. Please continue to follow with your primary care doctor, Dr Ruiz, and Dr Gutierrez for continued management. You were to have chronic lymphocytic leukemia on outpatient exams. Your blood tests showed low hemoglobin. A blood transfusion was advised by the Heme/Onc team. You received 1 unit of packed red blood cells. Please continue to follow up with Dr Gutierrez. Please continue with your home medications. Please follow up with your primary care doctor for continued management. Your blood pressure remained well controlled during this hospitalization. Please continue with your home medications. Please follow up with your primary care doctor for continued management. You were found to have blood in your urine on urinalysis. A repeat urinalysis showed no further blood in the urine. Please follow up with your primary care doctor for further evaluation. If you experience blood in the urine, please inform your primary care doctor and your oncologist. You presented to the hospital with generalized weakness and fevers likely due to a viral upper respiratory infection. You received IV fluids and antibiotics. Blood cultures showed no bacterial growth. Urinalysis showed no evidence of Urinary tract infection. Chest xray showed no signs of pneumonia. Please follow up with your primary care doctor Dr Ruiz for to ensure resolution. If you feels signs of dizziness, weakness, worsening weakness, or fever, please return to the ED. Please take Levaquin as prescribed for 5 more days. You presented to the hospital with generalized weakness and fevers likely due to a viral upper respiratory infection. You received IV fluids and antibiotics. Blood cultures showed no bacterial growth. Urinalysis showed no evidence of Urinary tract infection. Chest xray showed no signs of pneumonia. Please follow up with your primary care doctor Dr Ruiz for to ensure resolution. CT scans of the chest and abdomen showed no new sources of infection. If you feels signs of dizziness, weakness, worsening weakness, or fever, please return to the ED. You will require 2 more days of IV meropenem, an IV antibiotic to complete a 5 day course of Meropenem IV. You were found to have squamous cell carcinoma on outpatient exams and are seen by Dr Gutierrez. You were seen by the Hematology/Oncology team. Chest xray showed clear lungs. A Chest CT scan showed improving pleural effusion. Please continue to follow with your primary care doctor, Dr Ruiz, and Dr Gutierrez for continued management. You presented to the hospital with generalized weakness and fevers. You had  some stranding seen around right kidney suspicious for kidney infection. You received IV fluids and antibiotics. Blood cultures showed no bacterial growth. Urinalysis showed no evidence of Urinary tract infection. Chest xray showed no signs of pneumonia. Please follow up with your primary care doctor Dr Ruiz for to ensure resolution. CT scans of the chest and abdomen showed no new sources of infection. If you feels signs of dizziness, weakness, worsening weakness, or fever, please return to the ED. You will require 2 more days of IV meropenem, an IV antibiotic to complete a 5 day course of Meropenem IV. Followup with Infectious disease doctor: Dr. Martins, see number below.

## 2017-12-16 NOTE — DISCHARGE NOTE ADULT - ADDITIONAL INSTRUCTIONS
Please follow up with your primary care doctor, Dr Ruiz within the next two weeks for continued management. Please follow up with Dr Gutierrez, your oncologist, for continued management of your chemotherapy and further management. Please follow up with your primary care doctor, Dr Ruiz within the next two weeks for continued management. Please follow up with Dr Gutierrez, your oncologist, for continued management of your chemotherapy and further management.    Please complete 2 more days of IV Meropenem, 1000mg every 8 hrs.

## 2017-12-16 NOTE — CONSULT NOTE ADULT - SUBJECTIVE AND OBJECTIVE BOX
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 He reports most recently having a thoracentesis on 12/7 with Dr Foster for which cytology has returned positive for malignant cells.  He states he has been feeling generalized weakness the past 3 days, requiring bed rest. Today he was unable to get up to the rest room and required assistance from his wife. His wife called the cancer center and they decided to come to the hospital. He denies n/v, h/a, diarrhea, chest pain, dyspnea, abd pain, extremity pain, new nodules. No cough or recent travel.    In ED, VS T 101.8-102.3, -->96, /76, RR 18, sp02 97 on RA. He received 2L bolus, vanc 1g, cefepime 1g, acetaminophen IV 1g.     Allergies    No Known Allergies    Intolerances        MEDICATIONS  (STANDING):  allopurinol 300 milliGRAM(s) Oral daily  apixaban 5 milliGRAM(s) Oral every 12 hours  diltiazem    Tablet 30 milliGRAM(s) Oral every 6 hours  mirtazapine 7.5 milliGRAM(s) Oral at bedtime  multivitamin/mineral Chewable Tablet (AquADEKs) 1 Tablet(s) Chew daily  piperacillin/tazobactam IVPB. 3.375 Gram(s) IV Intermittent every 8 hours  tiotropium 18 MICROgram(s) Capsule 1 Capsule(s) Inhalation daily    MEDICATIONS  (PRN):  prochlorperazine   Tablet 10 milliGRAM(s) Oral three times a day PRN nausea/vomiting      PAST MEDICAL & SURGICAL HISTORY:  TIA (transient ischemic attack): 1999  Localized enlarged lymph nodes  Solitary pulmonary nodule  HTN (hypertension)  Alcohol abuse: Sober since 01/2016- attending AA meeting weekly  Depression, unspecified depression type  Gout  Essential hypertension  H/O hemorrhoidectomy: 1967  History of tonsillectomy: as a child  History of appendectomy: 1958  H/O left knee surgery: 1995      FAMILY HISTORY:  No pertinent family history in first degree relatives      SOCIAL HISTORY: No EtOH, no tobacco    REVIEW OF SYSTEMS:    CONSTITUTIONAL: + weakness, fevers  EYES/ENT: + rhinitis, sinus congestion. No visual changes;  No vertigo or throat pain   NECK: No pain or stiffness  RESPIRATORY: No cough, wheezing, hemoptysis; No shortness of breath  CARDIOVASCULAR: No chest pain or palpitations  GASTROINTESTINAL: No abdominal or epigastric pain. No nausea, vomiting, or hematemesis; No diarrhea or constipation. No melena or hematochezia.  GENITOURINARY: No dysuria, frequency or hematuria  NEUROLOGICAL: No numbness or weakness  SKIN: gouty arthritis of elbows  All other review of systems is negative unless indicated above.    Height (cm): 180.34 (12-15 @ 19:20)  Weight (kg): 74.8 (12-15 @ 19:20)  BMI (kg/m2): 23 (12-15 @ 19:20)  BSA (m2): 1.94 (12-15 @ 19:20)    T(F): 100.5 (12-16-17 @ 05:14), Max: 102.3 (12-15-17 @ 14:30)  HR: 94 (12-16-17 @ 05:14)  BP: 143/78 (12-16-17 @ 05:14)  RR: 18 (12-16-17 @ 05:14)  SpO2: 97% (12-16-17 @ 05:14)  Wt(kg): --    GENERAL: NAD, well-developed, elderly gentleman  HEAD:  Atraumatic, Normocephalic  EYES: EOMI, PERRLA, conjunctiva and sclera clear  NECK: Supple, No JVD  CHEST/LUNG: Clear to auscultation bilaterally; No wheeze  HEART: Regular rate and rhythm; No murmurs, rubs, or gallops  ABDOMEN: Soft, Nontender, Nondistended; Bowel sounds present  EXTREMITIES:  2+ Peripheral Pulses, No clubbing, cyanosis, or edema  NEUROLOGY: non-focal  SKIN: edematous and erythematic 3rd MCP of right hand                          7.4    9.39  )-----------( 269      ( 16 Dec 2017 06:37 )             22.5       12-16    136  |  102  |  15  ----------------------------<  89  4.0   |  20<L>  |  1.11    Ca    8.2<L>      16 Dec 2017 06:37  Phos  3.1     12-16  Mg     1.2     12-16    TPro  6.5  /  Alb  3.4  /  TBili  0.4  /  DBili  x   /  AST  20  /  ALT  7   /  AlkPhos  110  12-15      Magnesium, Serum: 1.2 mg/dL (12-16 @ 06:37)  Phosphorus Level, Serum: 3.1 mg/dL (12-16 @ 06:37)

## 2017-12-16 NOTE — PROGRESS NOTE ADULT - PROBLEM SELECTOR PLAN 2
- RBCs on UA, concern for mets vs cystitis  - UA with no signs of UTI  - repeat UA with cytology  - monitor h/h  - f/u IgA, igG, igM

## 2017-12-17 DIAGNOSIS — R50.9 FEVER, UNSPECIFIED: ICD-10-CM

## 2017-12-17 LAB
BACTERIA UR CULT: SIGNIFICANT CHANGE UP
BASOPHILS # BLD AUTO: 0.03 K/UL — SIGNIFICANT CHANGE UP (ref 0–0.2)
BASOPHILS NFR BLD AUTO: 0.2 % — SIGNIFICANT CHANGE UP (ref 0–2)
BLD GP AB SCN SERPL QL: NEGATIVE — SIGNIFICANT CHANGE UP
BUN SERPL-MCNC: 16 MG/DL — SIGNIFICANT CHANGE UP (ref 7–23)
CALCIUM SERPL-MCNC: 8.6 MG/DL — SIGNIFICANT CHANGE UP (ref 8.4–10.5)
CHLORIDE SERPL-SCNC: 99 MMOL/L — SIGNIFICANT CHANGE UP (ref 98–107)
CO2 SERPL-SCNC: 22 MMOL/L — SIGNIFICANT CHANGE UP (ref 22–31)
CREAT SERPL-MCNC: 1.23 MG/DL — SIGNIFICANT CHANGE UP (ref 0.5–1.3)
EOSINOPHIL # BLD AUTO: 0.07 K/UL — SIGNIFICANT CHANGE UP (ref 0–0.5)
EOSINOPHIL NFR BLD AUTO: 0.6 % — SIGNIFICANT CHANGE UP (ref 0–6)
GLUCOSE SERPL-MCNC: 98 MG/DL — SIGNIFICANT CHANGE UP (ref 70–99)
HCT VFR BLD CALC: 24.1 % — LOW (ref 39–50)
HGB BLD-MCNC: 7.8 G/DL — LOW (ref 13–17)
IGA FLD-MCNC: 129 MG/DL — SIGNIFICANT CHANGE UP (ref 70–400)
IGG FLD-MCNC: 456 MG/DL — LOW (ref 700–1600)
IGM SERPL-MCNC: 55 MG/DL — SIGNIFICANT CHANGE UP (ref 40–230)
IMM GRANULOCYTES # BLD AUTO: 0.07 # — SIGNIFICANT CHANGE UP
IMM GRANULOCYTES NFR BLD AUTO: 0.6 % — SIGNIFICANT CHANGE UP (ref 0–1.5)
LYMPHOCYTES # BLD AUTO: 38.1 % — SIGNIFICANT CHANGE UP (ref 13–44)
LYMPHOCYTES # BLD AUTO: 4.64 K/UL — HIGH (ref 1–3.3)
MAGNESIUM SERPL-MCNC: 1.5 MG/DL — LOW (ref 1.6–2.6)
MCHC RBC-ENTMCNC: 31.2 PG — SIGNIFICANT CHANGE UP (ref 27–34)
MCHC RBC-ENTMCNC: 32.4 % — SIGNIFICANT CHANGE UP (ref 32–36)
MCV RBC AUTO: 96.4 FL — SIGNIFICANT CHANGE UP (ref 80–100)
MONOCYTES # BLD AUTO: 1.58 K/UL — HIGH (ref 0–0.9)
MONOCYTES NFR BLD AUTO: 13 % — SIGNIFICANT CHANGE UP (ref 2–14)
NEUTROPHILS # BLD AUTO: 5.78 K/UL — SIGNIFICANT CHANGE UP (ref 1.8–7.4)
NEUTROPHILS NFR BLD AUTO: 47.5 % — SIGNIFICANT CHANGE UP (ref 43–77)
NRBC # FLD: 0 — SIGNIFICANT CHANGE UP
PHOSPHATE SERPL-MCNC: 3.8 MG/DL — SIGNIFICANT CHANGE UP (ref 2.5–4.5)
PLATELET # BLD AUTO: 292 K/UL — SIGNIFICANT CHANGE UP (ref 150–400)
PMV BLD: 9.8 FL — SIGNIFICANT CHANGE UP (ref 7–13)
POTASSIUM SERPL-MCNC: 3.8 MMOL/L — SIGNIFICANT CHANGE UP (ref 3.5–5.3)
POTASSIUM SERPL-SCNC: 3.8 MMOL/L — SIGNIFICANT CHANGE UP (ref 3.5–5.3)
RBC # BLD: 2.5 M/UL — LOW (ref 4.2–5.8)
RBC # FLD: 19.9 % — HIGH (ref 10.3–14.5)
RH IG SCN BLD-IMP: POSITIVE — SIGNIFICANT CHANGE UP
SODIUM SERPL-SCNC: 136 MMOL/L — SIGNIFICANT CHANGE UP (ref 135–145)
SPECIMEN SOURCE: SIGNIFICANT CHANGE UP
SPECIMEN SOURCE: SIGNIFICANT CHANGE UP
WBC # BLD: 12.17 K/UL — HIGH (ref 3.8–10.5)
WBC # FLD AUTO: 12.17 K/UL — HIGH (ref 3.8–10.5)

## 2017-12-17 PROCEDURE — 99239 HOSP IP/OBS DSCHRG MGMT >30: CPT

## 2017-12-17 RX ORDER — DOCUSATE SODIUM 100 MG
100 CAPSULE ORAL THREE TIMES A DAY
Qty: 0 | Refills: 0 | Status: DISCONTINUED | OUTPATIENT
Start: 2017-12-17 | End: 2017-12-22

## 2017-12-17 RX ORDER — VANCOMYCIN HCL 1 G
1000 VIAL (EA) INTRAVENOUS EVERY 12 HOURS
Qty: 0 | Refills: 0 | Status: DISCONTINUED | OUTPATIENT
Start: 2017-12-17 | End: 2017-12-17

## 2017-12-17 RX ORDER — MAGNESIUM SULFATE 500 MG/ML
2 VIAL (ML) INJECTION ONCE
Qty: 0 | Refills: 0 | Status: COMPLETED | OUTPATIENT
Start: 2017-12-17 | End: 2017-12-17

## 2017-12-17 RX ORDER — VANCOMYCIN HCL 1 G
1000 VIAL (EA) INTRAVENOUS EVERY 24 HOURS
Qty: 0 | Refills: 0 | Status: DISCONTINUED | OUTPATIENT
Start: 2017-12-17 | End: 2017-12-17

## 2017-12-17 RX ADMIN — Medication 100 MILLIGRAM(S): at 13:42

## 2017-12-17 RX ADMIN — Medication 50 GRAM(S): at 11:28

## 2017-12-17 RX ADMIN — TIOTROPIUM BROMIDE 1 CAPSULE(S): 18 CAPSULE ORAL; RESPIRATORY (INHALATION) at 13:42

## 2017-12-17 RX ADMIN — MIRTAZAPINE 7.5 MILLIGRAM(S): 45 TABLET, ORALLY DISINTEGRATING ORAL at 21:18

## 2017-12-17 RX ADMIN — APIXABAN 5 MILLIGRAM(S): 2.5 TABLET, FILM COATED ORAL at 17:55

## 2017-12-17 RX ADMIN — Medication 250 MILLIGRAM(S): at 12:22

## 2017-12-17 RX ADMIN — PIPERACILLIN AND TAZOBACTAM 25 GRAM(S): 4; .5 INJECTION, POWDER, LYOPHILIZED, FOR SOLUTION INTRAVENOUS at 05:30

## 2017-12-17 RX ADMIN — APIXABAN 5 MILLIGRAM(S): 2.5 TABLET, FILM COATED ORAL at 05:30

## 2017-12-17 RX ADMIN — MIRTAZAPINE 7.5 MILLIGRAM(S): 45 TABLET, ORALLY DISINTEGRATING ORAL at 01:50

## 2017-12-17 RX ADMIN — Medication 1 TABLET(S): at 14:38

## 2017-12-17 RX ADMIN — Medication 300 MILLIGRAM(S): at 11:28

## 2017-12-17 RX ADMIN — Medication 100 MILLIGRAM(S): at 21:18

## 2017-12-17 NOTE — PROGRESS NOTE ADULT - SUBJECTIVE AND OBJECTIVE BOX
Patient is a 72y old  Male who presents with a chief complaint of weakness (16 Dec 2017 14:16)      SUBJECTIVE / OVERNIGHT EVENTS: Pt febrile to 103 last night, blood culture x2, urine culture drawn. Pt denies CP, SOB, nausea, vomiting, abd pain, cough, URI sxs, dysuria, dizziness, HA. Denies localizing symptoms.     MEDICATIONS  (STANDING):  allopurinol 300 milliGRAM(s) Oral daily  apixaban 5 milliGRAM(s) Oral every 12 hours  diltiazem    Tablet 30 milliGRAM(s) Oral every 6 hours  magnesium sulfate  IVPB 2 Gram(s) IV Intermittent once  mirtazapine 7.5 milliGRAM(s) Oral at bedtime  multivitamin/mineral Chewable Tablet (AquADEKs) 1 Tablet(s) Chew daily  piperacillin/tazobactam IVPB. 3.375 Gram(s) IV Intermittent every 8 hours  tiotropium 18 MICROgram(s) Capsule 1 Capsule(s) Inhalation daily  vancomycin  IVPB 1000 milliGRAM(s) IV Intermittent every 12 hours    MEDICATIONS  (PRN):  prochlorperazine   Tablet 10 milliGRAM(s) Oral three times a day PRN nausea/vomiting        CAPILLARY BLOOD GLUCOSE        I&O's Summary    Vital Signs Last 24 Hrs  T(C): 36.9 (17 Dec 2017 05:26), Max: 39.4 (16 Dec 2017 22:24)  T(F): 98.4 (17 Dec 2017 05:26), Max: 102.9 (16 Dec 2017 22:24)  HR: 88 (17 Dec 2017 05:) (88 - 100)  BP: 116/50 (17 Dec 2017 05:26) (116/50 - 161/82)  BP(mean): --  RR: 18 (17 Dec 2017 05:26) (18 - 19)  SpO2: 100% (17 Dec 2017 05:) (98% - 100%)    PHYSICAL EXAM:  GENERAL: NAD, well-developed  HEAD:  Atraumatic, Normocephalic  EYES: EOMI, PERRLA, conjunctiva and sclera clear  NECK: Supple, No JVD  CHEST/LUNG: Clear to auscultation bilaterally; No wheeze  HEART: Regular rate and rhythm; No murmurs, rubs, or gallops  ABDOMEN: Soft, Nontender, Nondistended; Bowel sounds present  EXTREMITIES:  2+ Peripheral Pulses, Right 3rd MCP joint nodule with erythema and tenderness  PSYCH: AAOx3  SKIN: No rashes or lesions    LABS:                        7.8    12. )-----------( 292      ( 17 Dec 2017 07:00 )             24.1         136  |  99  |  16  ----------------------------<  98  3.8   |  22  |  1.23    Ca    8.6      17 Dec 2017 07:00  Phos  3.8       Mg     1.5         TPro  6.5  /  Alb  3.4  /  TBili  0.4  /  DBili  x   /  AST  20  /  ALT  7   /  AlkPhos  110  12-15    PT/INR - ( 15 Dec 2017 14:10 )   PT: 14.0 SEC;   INR: 1.21          PTT - ( 15 Dec 2017 14:10 )  PTT:32.5 SEC  CARDIAC MARKERS ( 15 Dec 2017 14:11 )  x     / < 0.06 ng/mL / 24 u/L / x     / x          Urinalysis Basic - ( 16 Dec 2017 15:28 )    Color: YELLOW / Appearance: CLEAR / S.012 / pH: 6.0  Gluc: NEGATIVE / Ketone: NEGATIVE  / Bili: NEGATIVE / Urobili: NORMAL mg/dL   Blood: NEGATIVE / Protein: 30 mg/dL / Nitrite: NEGATIVE   Leuk Esterase: NEGATIVE / RBC: 0-2 / WBC 0-2   Sq Epi: x / Non Sq Epi: x / Bacteria: x        Consultant(s) Notes Reviewed:  Heme/onc

## 2017-12-17 NOTE — PROGRESS NOTE ADULT - PROBLEM SELECTOR PLAN 3
Right knuckle nodule with erythema and tenderness  - concern for gout flare vs cellulitis  -Continue to monitor

## 2017-12-17 NOTE — PROGRESS NOTE ADULT - PROBLEM SELECTOR PLAN 1
Pt with 3d of malaise, found to have fever and tachycardia. No localizing symptoms for source of infection. CXR without PNA. UA without e/o UTI. BCx negative to date. Presentation likely represents sepsis 2/2 viral infection vs PNA. He received vancomycin and cefepime in the ED. Although patient is on chemotherapy, he is not neutropenic.   - Blood cultures negative to date  - f/u urine cx  - Persistent fever and sepsis despite zosyn and supportive care, will initiate vancomycin for broad spectrum coverage, de-escalate within 24-48 hours if afebrile, clinically improves  - RVP negative

## 2017-12-17 NOTE — PROGRESS NOTE ADULT - ATTENDING COMMENTS
Agree with resident's A&P as above, with edits made as necessary. Briefly, pt is a 71yo M w/ CLL and stage III NSCLC on chemotherapy a/w fever and malaise.     Counts remain stable without neutropenia. Mild increase in WBC today is likely reflective of marrow recovery after chemotherapy more than infectious.    Patient is eager to go home. Given lack of infectious source identified, will discharge home on levaquin 500mg PO daily x5 days, with plans for close outpatient follow-up with Dr. Feliciano of oncology. One unit of PRBCs to be transfused prior to discharge.

## 2017-12-18 DIAGNOSIS — E87.1 HYPO-OSMOLALITY AND HYPONATREMIA: ICD-10-CM

## 2017-12-18 LAB
APPEARANCE UR: CLEAR — SIGNIFICANT CHANGE UP
BACTERIA UR CULT: SIGNIFICANT CHANGE UP
BASOPHILS # BLD AUTO: 0.03 K/UL — SIGNIFICANT CHANGE UP (ref 0–0.2)
BASOPHILS NFR BLD AUTO: 0.2 % — SIGNIFICANT CHANGE UP (ref 0–2)
BILIRUB UR-MCNC: NEGATIVE — SIGNIFICANT CHANGE UP
BLOOD UR QL VISUAL: HIGH
BUN SERPL-MCNC: 19 MG/DL — SIGNIFICANT CHANGE UP (ref 7–23)
CALCIUM SERPL-MCNC: 8.7 MG/DL — SIGNIFICANT CHANGE UP (ref 8.4–10.5)
CHLORIDE SERPL-SCNC: 97 MMOL/L — LOW (ref 98–107)
CO2 SERPL-SCNC: 21 MMOL/L — LOW (ref 22–31)
COLOR SPEC: YELLOW — SIGNIFICANT CHANGE UP
CREAT SERPL-MCNC: 1.15 MG/DL — SIGNIFICANT CHANGE UP (ref 0.5–1.3)
EOSINOPHIL # BLD AUTO: 0.04 K/UL — SIGNIFICANT CHANGE UP (ref 0–0.5)
EOSINOPHIL NFR BLD AUTO: 0.3 % — SIGNIFICANT CHANGE UP (ref 0–6)
GLUCOSE SERPL-MCNC: 89 MG/DL — SIGNIFICANT CHANGE UP (ref 70–99)
GLUCOSE UR-MCNC: NEGATIVE — SIGNIFICANT CHANGE UP
GRAN CASTS # UR COMP ASSIST: SIGNIFICANT CHANGE UP
HCT VFR BLD CALC: 26.5 % — LOW (ref 39–50)
HGB BLD-MCNC: 9.3 G/DL — LOW (ref 13–17)
HYALINE CASTS # UR AUTO: SIGNIFICANT CHANGE UP (ref 0–?)
IMM GRANULOCYTES # BLD AUTO: 0.14 # — SIGNIFICANT CHANGE UP
IMM GRANULOCYTES NFR BLD AUTO: 1.1 % — SIGNIFICANT CHANGE UP (ref 0–1.5)
KETONES UR-MCNC: NEGATIVE — SIGNIFICANT CHANGE UP
LEUKOCYTE ESTERASE UR-ACNC: NEGATIVE — SIGNIFICANT CHANGE UP
LYMPHOCYTES # BLD AUTO: 32.6 % — SIGNIFICANT CHANGE UP (ref 13–44)
LYMPHOCYTES # BLD AUTO: 4.13 K/UL — HIGH (ref 1–3.3)
MAGNESIUM SERPL-MCNC: 1.4 MG/DL — LOW (ref 1.6–2.6)
MCHC RBC-ENTMCNC: 32.7 PG — SIGNIFICANT CHANGE UP (ref 27–34)
MCHC RBC-ENTMCNC: 35.1 % — SIGNIFICANT CHANGE UP (ref 32–36)
MCV RBC AUTO: 93.3 FL — SIGNIFICANT CHANGE UP (ref 80–100)
MONOCYTES # BLD AUTO: 1.64 K/UL — HIGH (ref 0–0.9)
MONOCYTES NFR BLD AUTO: 12.9 % — SIGNIFICANT CHANGE UP (ref 2–14)
MUCOUS THREADS # UR AUTO: SIGNIFICANT CHANGE UP
NEUTROPHILS # BLD AUTO: 6.69 K/UL — SIGNIFICANT CHANGE UP (ref 1.8–7.4)
NEUTROPHILS NFR BLD AUTO: 52.9 % — SIGNIFICANT CHANGE UP (ref 43–77)
NITRITE UR-MCNC: NEGATIVE — SIGNIFICANT CHANGE UP
NRBC # FLD: 0 — SIGNIFICANT CHANGE UP
PH UR: 5.5 — SIGNIFICANT CHANGE UP (ref 4.6–8)
PHOSPHATE SERPL-MCNC: 3 MG/DL — SIGNIFICANT CHANGE UP (ref 2.5–4.5)
PLATELET # BLD AUTO: 269 K/UL — SIGNIFICANT CHANGE UP (ref 150–400)
PMV BLD: 9.8 FL — SIGNIFICANT CHANGE UP (ref 7–13)
POTASSIUM SERPL-MCNC: 4.2 MMOL/L — SIGNIFICANT CHANGE UP (ref 3.5–5.3)
POTASSIUM SERPL-SCNC: 4.2 MMOL/L — SIGNIFICANT CHANGE UP (ref 3.5–5.3)
PROT UR-MCNC: 100 MG/DL — SIGNIFICANT CHANGE UP
RBC # BLD: 2.84 M/UL — LOW (ref 4.2–5.8)
RBC # FLD: 19.3 % — HIGH (ref 10.3–14.5)
RBC CASTS # UR COMP ASSIST: SIGNIFICANT CHANGE UP (ref 0–?)
REVIEW TO FOLLOW: YES — SIGNIFICANT CHANGE UP
SODIUM SERPL-SCNC: 132 MMOL/L — LOW (ref 135–145)
SP GR SPEC: 1.02 — SIGNIFICANT CHANGE UP (ref 1–1.04)
SPECIMEN SOURCE: SIGNIFICANT CHANGE UP
SQUAMOUS # UR AUTO: SIGNIFICANT CHANGE UP
UROBILINOGEN FLD QL: 4 MG/DL — HIGH
WBC # BLD: 12.67 K/UL — HIGH (ref 3.8–10.5)
WBC # FLD AUTO: 12.67 K/UL — HIGH (ref 3.8–10.5)
WBC UR QL: HIGH (ref 0–?)

## 2017-12-18 PROCEDURE — 99232 SBSQ HOSP IP/OBS MODERATE 35: CPT | Mod: GC

## 2017-12-18 PROCEDURE — 71260 CT THORAX DX C+: CPT | Mod: 26

## 2017-12-18 PROCEDURE — 74177 CT ABD & PELVIS W/CONTRAST: CPT | Mod: 26

## 2017-12-18 RX ORDER — MAGNESIUM SULFATE 500 MG/ML
2 VIAL (ML) INJECTION ONCE
Qty: 0 | Refills: 0 | Status: COMPLETED | OUTPATIENT
Start: 2017-12-18 | End: 2017-12-18

## 2017-12-18 RX ORDER — PIPERACILLIN AND TAZOBACTAM 4; .5 G/20ML; G/20ML
3.38 INJECTION, POWDER, LYOPHILIZED, FOR SOLUTION INTRAVENOUS EVERY 8 HOURS
Qty: 0 | Refills: 0 | Status: DISCONTINUED | OUTPATIENT
Start: 2017-12-18 | End: 2017-12-20

## 2017-12-18 RX ORDER — SODIUM CHLORIDE 9 MG/ML
1000 INJECTION INTRAMUSCULAR; INTRAVENOUS; SUBCUTANEOUS
Qty: 0 | Refills: 0 | Status: DISCONTINUED | OUTPATIENT
Start: 2017-12-18 | End: 2017-12-20

## 2017-12-18 RX ORDER — ACETAMINOPHEN 500 MG
650 TABLET ORAL ONCE
Qty: 0 | Refills: 0 | Status: COMPLETED | OUTPATIENT
Start: 2017-12-18 | End: 2017-12-18

## 2017-12-18 RX ADMIN — SODIUM CHLORIDE 50 MILLILITER(S): 9 INJECTION INTRAMUSCULAR; INTRAVENOUS; SUBCUTANEOUS at 15:07

## 2017-12-18 RX ADMIN — APIXABAN 5 MILLIGRAM(S): 2.5 TABLET, FILM COATED ORAL at 17:32

## 2017-12-18 RX ADMIN — Medication 50 GRAM(S): at 09:35

## 2017-12-18 RX ADMIN — TIOTROPIUM BROMIDE 1 CAPSULE(S): 18 CAPSULE ORAL; RESPIRATORY (INHALATION) at 09:34

## 2017-12-18 RX ADMIN — Medication 100 MILLIGRAM(S): at 22:43

## 2017-12-18 RX ADMIN — MIRTAZAPINE 7.5 MILLIGRAM(S): 45 TABLET, ORALLY DISINTEGRATING ORAL at 22:43

## 2017-12-18 RX ADMIN — PIPERACILLIN AND TAZOBACTAM 25 GRAM(S): 4; .5 INJECTION, POWDER, LYOPHILIZED, FOR SOLUTION INTRAVENOUS at 15:08

## 2017-12-18 RX ADMIN — Medication 1 TABLET(S): at 12:29

## 2017-12-18 RX ADMIN — Medication 650 MILLIGRAM(S): at 14:50

## 2017-12-18 RX ADMIN — APIXABAN 5 MILLIGRAM(S): 2.5 TABLET, FILM COATED ORAL at 05:41

## 2017-12-18 RX ADMIN — Medication 100 MILLIGRAM(S): at 05:41

## 2017-12-18 RX ADMIN — PIPERACILLIN AND TAZOBACTAM 25 GRAM(S): 4; .5 INJECTION, POWDER, LYOPHILIZED, FOR SOLUTION INTRAVENOUS at 22:43

## 2017-12-18 RX ADMIN — Medication 300 MILLIGRAM(S): at 12:28

## 2017-12-18 RX ADMIN — Medication 100 MILLIGRAM(S): at 12:29

## 2017-12-18 NOTE — PROGRESS NOTE ADULT - PROBLEM SELECTOR PLAN 5
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 Followed by Dr Feliciano.  - f/u with onc service Papular erythematous lesions with scaling over left arm, concerning for dermatitis, possibly atopic. Patient states it is worse after chemo. Improves with Cortisone per patient report. Not consistent with cellulitis. Can c/w topical hydrocortisone to left arm, outpatient dermatology f/u.

## 2017-12-18 NOTE — PROGRESS NOTE ADULT - PROBLEM SELECTOR PLAN 8
BP stable and at goal  - c/w diltiazem 30q8h, uptritate to 60q8 HR at goal at present  - c/w cardizem, start at 30q8, uptitrate to 60 q8hr  - c/w Eliquis 5mg BID

## 2017-12-18 NOTE — PROGRESS NOTE ADULT - PROBLEM SELECTOR PLAN 6
No leukopenia or thrombocytopenia. Anemia consistent with previous values. No concern for blast crisis at this time.   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 Followed by Dr Feliciano.  - f/u with onc service

## 2017-12-18 NOTE — PROGRESS NOTE ADULT - PROBLEM SELECTOR PLAN 2
- RBCs on UA, concern for mets vs cystitis  - UA with no signs of UTI, repeat UA negative  - monitor h/h  - IgA, igM wnl, IgG low Pt with 3d of malaise, found to have fever and tachycardia. No localizing symptoms for source of infection. CXR without PNA. UA without e/o UTI. BCx negative to date. Presentation likely represents sepsis 2/2 viral infection vs PNA. He received vancomycin and cefepime in the ED. Although patient is on chemotherapy, he is not neutropenic.   - Blood cultures 12/15 NGTD, 12/16 NGTD  - f/u urine cx  - Persistent fever and sepsis despite zosyn and supportive care, received zosyn x1  - RVP negative  - plan to d/c with levaquin x5d as no source identified  - leukocytosis likely 2/2 chemo  -  s/p 1U PRBC, hg 7.8 -->9.3

## 2017-12-18 NOTE — PROGRESS NOTE ADULT - PROBLEM SELECTOR PLAN 3
Right knuckle nodule with erythema and tenderness  - concern for gout flare vs cellulitis  -Continue to monitor - RBCs on UA, concern for mets vs cystitis  - UA with no signs of UTI, repeat UA negative  - monitor h/h  - IgA, igM wnl, IgG low

## 2017-12-18 NOTE — PHYSICAL THERAPY INITIAL EVALUATION ADULT - ADDITIONAL COMMENTS
Pt. owns a rolling walker and straight cane but typically ambulates independently without assistive device.    Pt. left in bed post-PT in NAD, per pt. request, with all lines/tubes intact & call bell within reach.  WILLIAM schwartz.

## 2017-12-18 NOTE — PROGRESS NOTE ADULT - PROBLEM SELECTOR PLAN 4
Papular erythematous lesions with scaling over left arm, concerning for dermatitis, possibly atopic. Patient states it is worse after chemo. Improves with Cortisone per patient report. Not consistent with cellulitis. Can c/w topical hydrocortisone to left arm, outpatient dermatology f/u. Right knuckle nodule with erythema and tenderness  - concern for gout flare vs cellulitis  -Continue to monitor

## 2017-12-18 NOTE — PHYSICAL THERAPY INITIAL EVALUATION ADULT - CRITERIA FOR SKILLED THERAPEUTIC INTERVENTIONS
Inpatient restorative rehab/anticipated discharge recommendation/therapy frequency/rehab potential/impairments found/predicted duration of therapy intervention

## 2017-12-18 NOTE — PHYSICAL THERAPY INITIAL EVALUATION ADULT - PERTINENT HX OF CURRENT PROBLEM, REHAB EVAL
Pt. is a 71 y/o male admitted to [facility] on 12/15/17 with a dx of sepsis and generalized weakness x 3 days.  PT consult request secondary to deconditioning with anticipated discharge for today.  H/O TIA.  (-) CXR.

## 2017-12-18 NOTE — PROGRESS NOTE ADULT - ATTENDING COMMENTS
patient seen and examined by bedside, case d/w resident, agree with the above finding and plan   pt is a 73yo M w/ CLL and stage III NSCLC on chemotherapy a/w fever and malaise.   Counts remain stable without neutropenia. Mild increase in WBC  is likely reflective of marrow recovery after chemotherapy more than infectious.  Pt s/p 1 unit PRBC H/H appropriately improved   . Given lack of infectious source identified, will discharge on levaquin 500mg PO daily x5 days, with plans for close outpatient follow-up with Dr. Feliciano of oncology. .  PT todal noted, recommend Rehab

## 2017-12-18 NOTE — PROGRESS NOTE ADULT - PROBLEM SELECTOR PLAN 7
HR at goal at present  - c/w cardizem, start at 30q8, uptitrate to 60 q8hr  - c/w Eliquis 5mg BID No leukopenia or thrombocytopenia. Anemia consistent with previous values. No concern for blast crisis at this time.   - cont to trend cbc  - f/u with Dr Feliciano  -  IgA, IgM nml, IgG low  -  s/p 1U PRBC, hg 7.8 -->9.3

## 2017-12-18 NOTE — PROGRESS NOTE ADULT - SUBJECTIVE AND OBJECTIVE BOX
Jon Churchill MD  Medicine Team 4  Pager: [phone]  After 7PM on weekdays, and after 12PM on weekends, please page [phone]    Patient is a 72y old  Male who presents with a chief complaint of weakness (16 Dec 2017 14:16)        SUBJECTIVE / OVERNIGHT EVENTS: Pt s/p 1UPRBC, no fever o/n.       MEDICATIONS  (STANDING):  allopurinol 300 milliGRAM(s) Oral daily  apixaban 5 milliGRAM(s) Oral every 12 hours  diltiazem    Tablet 30 milliGRAM(s) Oral every 6 hours  docusate sodium 100 milliGRAM(s) Oral three times a day  mirtazapine 7.5 milliGRAM(s) Oral at bedtime  multivitamin/mineral Chewable Tablet (AquADEKs) 1 Tablet(s) Chew daily  tiotropium 18 MICROgram(s) Capsule 1 Capsule(s) Inhalation daily    MEDICATIONS  (PRN):  prochlorperazine   Tablet 10 milliGRAM(s) Oral three times a day PRN nausea/vomiting      T(C): 36.6 (17 @ 05:22), Max: 37.5 (17 @ 11:25)  HR: 96 (17 @ 05:22) (89 - 99)  BP: 136/78 (17 @ 05:22) (135/81 - 147/82)  RR: 18 (17 @ 05:22) (17 - 18)  SpO2: 99% (17 @ 05:22) (99% - 100%)  CAPILLARY BLOOD GLUCOSE        I&O's Summary      PHYSICAL EXAM  GENERAL: NAD, well-developed  HEAD:  Atraumatic, Normocephalic  EYES: EOMI, PERRLA, conjunctiva and sclera clear  NECK: Supple, No JVD  CHEST/LUNG: Clear to auscultation bilaterally; No wheeze  HEART: Regular rate and rhythm; No murmurs, rubs, or gallops  ABDOMEN: Soft, Nontender, Nondistended; Bowel sounds present  EXTREMITIES:  2+ Peripheral Pulses, No clubbing, cyanosis, or edema  PSYCH: AAOx3  SKIN: No rashes or lesions    LABS:                        9.3    12.67 )-----------( 269      ( 18 Dec 2017 06:35 )             26.5     12-17    136  |  99  |  16  ----------------------------<  98  3.8   |  22  |  1.23    Ca    8.6      17 Dec 2017 07:00  Phos  3.8     12-17  Mg     1.5     12-17            Urinalysis Basic - ( 16 Dec 2017 15:28 )    Color: YELLOW / Appearance: CLEAR / S.012 / pH: 6.0  Gluc: NEGATIVE / Ketone: NEGATIVE  / Bili: NEGATIVE / Urobili: NORMAL mg/dL   Blood: NEGATIVE / Protein: 30 mg/dL / Nitrite: NEGATIVE   Leuk Esterase: NEGATIVE / RBC: 0-2 / WBC 0-2   Sq Epi: x / Non Sq Epi: x / Bacteria: x        RADIOLOGY & ADDITIONAL TESTS:    Imaging Personally Reviewed:  Consultant(s) Notes Reviewed:    Care Discussed with Consultants/Other Providers: Jon Churchill MD  Medicine Team 4  Pager: [phone]  After 7PM on weekdays, and after 12PM on weekends, please page [phone]    Patient is a 72y old  Male who presents with a chief complaint of weakness (16 Dec 2017 14:16)        SUBJECTIVE / OVERNIGHT EVENTS: Pt s/p 1UPRBC, no fever o/n. Pt denies h/a, fever, chest pain, n/v/d, abd pain, extremity pain.      MEDICATIONS  (STANDING):  allopurinol 300 milliGRAM(s) Oral daily  apixaban 5 milliGRAM(s) Oral every 12 hours  diltiazem    Tablet 30 milliGRAM(s) Oral every 6 hours  docusate sodium 100 milliGRAM(s) Oral three times a day  mirtazapine 7.5 milliGRAM(s) Oral at bedtime  multivitamin/mineral Chewable Tablet (AquADEKs) 1 Tablet(s) Chew daily  tiotropium 18 MICROgram(s) Capsule 1 Capsule(s) Inhalation daily    MEDICATIONS  (PRN):  prochlorperazine   Tablet 10 milliGRAM(s) Oral three times a day PRN nausea/vomiting      T(C): 36.6 (17 @ 05:22), Max: 37.5 (17 @ 11:25)  HR: 96 (17 @ 05:22) (89 - 99)  BP: 136/78 (17 @ 05:22) (135/81 - 147/82)  RR: 18 (17 @ 05:22) (17 - 18)  SpO2: 99% (17 @ 05:22) (99% - 100%)  CAPILLARY BLOOD GLUCOSE        I&O's Summary      PHYSICAL EXAM  GENERAL: NAD, well-developed  HEAD:  Atraumatic, Normocephalic  EYES: EOMI, PERRLA, conjunctiva and sclera clear  NECK: Supple, No JVD  CHEST/LUNG: Clear to auscultation bilaterally; No wheeze  HEART: Regular rate and rhythm; No murmurs, rubs, or gallops  ABDOMEN: Soft, Nontender, Nondistended; Bowel sounds present  EXTREMITIES:  2+ Peripheral Pulses, No clubbing, cyanosis, or edema  PSYCH: AAOx3  SKIN: R 3rd digit PCP with less erythema    LABS:                        9.3    12.67 )-----------( 269      ( 18 Dec 2017 06:35 )             26.5     12-17    136  |  99  |  16  ----------------------------<  98  3.8   |  22  |  1.23    Ca    8.6      17 Dec 2017 07:00  Phos  3.8     12-17  Mg     1.5     17            Urinalysis Basic - ( 16 Dec 2017 15:28 )    Color: YELLOW / Appearance: CLEAR / S.012 / pH: 6.0  Gluc: NEGATIVE / Ketone: NEGATIVE  / Bili: NEGATIVE / Urobili: NORMAL mg/dL   Blood: NEGATIVE / Protein: 30 mg/dL / Nitrite: NEGATIVE   Leuk Esterase: NEGATIVE / RBC: 0-2 / WBC 0-2   Sq Epi: x / Non Sq Epi: x / Bacteria: x        RADIOLOGY & ADDITIONAL TESTS:    Imaging Personally Reviewed:  Consultant(s) Notes Reviewed:    Care Discussed with Consultants/Other Providers:

## 2017-12-19 DIAGNOSIS — A41.9 SEPSIS, UNSPECIFIED ORGANISM: ICD-10-CM

## 2017-12-19 DIAGNOSIS — N12 TUBULO-INTERSTITIAL NEPHRITIS, NOT SPECIFIED AS ACUTE OR CHRONIC: ICD-10-CM

## 2017-12-19 LAB
BASOPHILS # BLD AUTO: 0.03 K/UL — SIGNIFICANT CHANGE UP (ref 0–0.2)
BASOPHILS NFR BLD AUTO: 0.3 % — SIGNIFICANT CHANGE UP (ref 0–2)
BUN SERPL-MCNC: 18 MG/DL — SIGNIFICANT CHANGE UP (ref 7–23)
CALCIUM SERPL-MCNC: 8.6 MG/DL — SIGNIFICANT CHANGE UP (ref 8.4–10.5)
CHLORIDE SERPL-SCNC: 99 MMOL/L — SIGNIFICANT CHANGE UP (ref 98–107)
CO2 SERPL-SCNC: 21 MMOL/L — LOW (ref 22–31)
CREAT SERPL-MCNC: 1.23 MG/DL — SIGNIFICANT CHANGE UP (ref 0.5–1.3)
EOSINOPHIL # BLD AUTO: 0.06 K/UL — SIGNIFICANT CHANGE UP (ref 0–0.5)
EOSINOPHIL NFR BLD AUTO: 0.5 % — SIGNIFICANT CHANGE UP (ref 0–6)
GLUCOSE SERPL-MCNC: 101 MG/DL — HIGH (ref 70–99)
HCT VFR BLD CALC: 25.1 % — LOW (ref 39–50)
HGB BLD-MCNC: 8.6 G/DL — LOW (ref 13–17)
IMM GRANULOCYTES # BLD AUTO: 0.13 # — SIGNIFICANT CHANGE UP
IMM GRANULOCYTES NFR BLD AUTO: 1.1 % — SIGNIFICANT CHANGE UP (ref 0–1.5)
LYMPHOCYTES # BLD AUTO: 3.85 K/UL — HIGH (ref 1–3.3)
LYMPHOCYTES # BLD AUTO: 32.1 % — SIGNIFICANT CHANGE UP (ref 13–44)
MAGNESIUM SERPL-MCNC: 1.7 MG/DL — SIGNIFICANT CHANGE UP (ref 1.6–2.6)
MANUAL SMEAR VERIFICATION: SIGNIFICANT CHANGE UP
MCHC RBC-ENTMCNC: 32.2 PG — SIGNIFICANT CHANGE UP (ref 27–34)
MCHC RBC-ENTMCNC: 34.3 % — SIGNIFICANT CHANGE UP (ref 32–36)
MCV RBC AUTO: 94 FL — SIGNIFICANT CHANGE UP (ref 80–100)
MONOCYTES # BLD AUTO: 1.39 K/UL — HIGH (ref 0–0.9)
MONOCYTES NFR BLD AUTO: 11.6 % — SIGNIFICANT CHANGE UP (ref 2–14)
MORPHOLOGY BLD-IMP: NORMAL — SIGNIFICANT CHANGE UP
NEUTROPHILS # BLD AUTO: 6.53 K/UL — SIGNIFICANT CHANGE UP (ref 1.8–7.4)
NEUTROPHILS NFR BLD AUTO: 54.4 % — SIGNIFICANT CHANGE UP (ref 43–77)
NRBC # FLD: 0 — SIGNIFICANT CHANGE UP
PHOSPHATE SERPL-MCNC: 3.6 MG/DL — SIGNIFICANT CHANGE UP (ref 2.5–4.5)
PLATELET # BLD AUTO: 265 K/UL — SIGNIFICANT CHANGE UP (ref 150–400)
PLATELET COUNT - ESTIMATE: NORMAL — SIGNIFICANT CHANGE UP
PMV BLD: 9.8 FL — SIGNIFICANT CHANGE UP (ref 7–13)
POTASSIUM SERPL-MCNC: 3.9 MMOL/L — SIGNIFICANT CHANGE UP (ref 3.5–5.3)
POTASSIUM SERPL-SCNC: 3.9 MMOL/L — SIGNIFICANT CHANGE UP (ref 3.5–5.3)
RBC # BLD: 2.67 M/UL — LOW (ref 4.2–5.8)
RBC # FLD: 19 % — HIGH (ref 10.3–14.5)
SODIUM SERPL-SCNC: 133 MMOL/L — LOW (ref 135–145)
SPECIMEN SOURCE: SIGNIFICANT CHANGE UP
SPECIMEN SOURCE: SIGNIFICANT CHANGE UP
WBC # BLD: 11.99 K/UL — HIGH (ref 3.8–10.5)
WBC # FLD AUTO: 11.99 K/UL — HIGH (ref 3.8–10.5)

## 2017-12-19 PROCEDURE — 99222 1ST HOSP IP/OBS MODERATE 55: CPT | Mod: GC

## 2017-12-19 PROCEDURE — 99233 SBSQ HOSP IP/OBS HIGH 50: CPT | Mod: GC

## 2017-12-19 RX ORDER — ACETAMINOPHEN 500 MG
325 TABLET ORAL ONCE
Qty: 0 | Refills: 0 | Status: COMPLETED | OUTPATIENT
Start: 2017-12-19 | End: 2017-12-19

## 2017-12-19 RX ORDER — ACETAMINOPHEN 500 MG
650 TABLET ORAL EVERY 6 HOURS
Qty: 0 | Refills: 0 | Status: DISCONTINUED | OUTPATIENT
Start: 2017-12-19 | End: 2017-12-22

## 2017-12-19 RX ORDER — POLYETHYLENE GLYCOL 3350 17 G/17G
17 POWDER, FOR SOLUTION ORAL DAILY
Qty: 0 | Refills: 0 | Status: DISCONTINUED | OUTPATIENT
Start: 2017-12-19 | End: 2017-12-22

## 2017-12-19 RX ADMIN — APIXABAN 5 MILLIGRAM(S): 2.5 TABLET, FILM COATED ORAL at 17:59

## 2017-12-19 RX ADMIN — POLYETHYLENE GLYCOL 3350 17 GRAM(S): 17 POWDER, FOR SOLUTION ORAL at 11:38

## 2017-12-19 RX ADMIN — Medication 300 MILLIGRAM(S): at 11:37

## 2017-12-19 RX ADMIN — Medication 100 MILLIGRAM(S): at 21:53

## 2017-12-19 RX ADMIN — Medication 100 MILLIGRAM(S): at 05:50

## 2017-12-19 RX ADMIN — PIPERACILLIN AND TAZOBACTAM 25 GRAM(S): 4; .5 INJECTION, POWDER, LYOPHILIZED, FOR SOLUTION INTRAVENOUS at 21:53

## 2017-12-19 RX ADMIN — SODIUM CHLORIDE 50 MILLILITER(S): 9 INJECTION INTRAMUSCULAR; INTRAVENOUS; SUBCUTANEOUS at 05:51

## 2017-12-19 RX ADMIN — Medication 650 MILLIGRAM(S): at 14:48

## 2017-12-19 RX ADMIN — Medication 325 MILLIGRAM(S): at 16:01

## 2017-12-19 RX ADMIN — Medication 1 TABLET(S): at 11:38

## 2017-12-19 RX ADMIN — PIPERACILLIN AND TAZOBACTAM 25 GRAM(S): 4; .5 INJECTION, POWDER, LYOPHILIZED, FOR SOLUTION INTRAVENOUS at 05:50

## 2017-12-19 RX ADMIN — APIXABAN 5 MILLIGRAM(S): 2.5 TABLET, FILM COATED ORAL at 05:50

## 2017-12-19 RX ADMIN — PIPERACILLIN AND TAZOBACTAM 25 GRAM(S): 4; .5 INJECTION, POWDER, LYOPHILIZED, FOR SOLUTION INTRAVENOUS at 14:20

## 2017-12-19 RX ADMIN — MIRTAZAPINE 7.5 MILLIGRAM(S): 45 TABLET, ORALLY DISINTEGRATING ORAL at 21:53

## 2017-12-19 RX ADMIN — Medication 100 MILLIGRAM(S): at 14:20

## 2017-12-19 RX ADMIN — TIOTROPIUM BROMIDE 1 CAPSULE(S): 18 CAPSULE ORAL; RESPIRATORY (INHALATION) at 11:36

## 2017-12-19 NOTE — PROGRESS NOTE ADULT - PROBLEM SELECTOR PLAN 10
Diet: dash diet  DVT ppx: c/w eliquis 5mg BID

## 2017-12-19 NOTE — PROGRESS NOTE ADULT - PROBLEM SELECTOR PLAN 1
Na of 132 today, previous 136, concern for SIADH in setting of chemo vs hypovolemicc hyponatremia  - serum OSM at 275  - f/u ur osm, urine electrolytes  - consider cortisol level  - cont to monitor likely due to pyelo  see below

## 2017-12-19 NOTE — PROGRESS NOTE ADULT - PROBLEM SELECTOR PLAN 4
Right knuckle nodule with erythema and tenderness  - concern for gout flare vs cellulitis  -Continue to monitor Papular erythematous lesions with scaling over left arm, concerning for dermatitis, possibly atopic. Patient states it is worse after chemo. Improves with Cortisone per patient report. Not consistent with cellulitis. Can c/w topical hydrocortisone to left arm, outpatient dermatology f/u.

## 2017-12-19 NOTE — CONSULT NOTE ADULT - SUBJECTIVE AND OBJECTIVE BOX
HPI:  73 y/o M w stage IIIA squamous cell ca s/p RUL wedge resection and VATS lobectomectomy, pleural effusion s/p thoracentesis, CLL/SLL s/p abraxane/carboplatin last on , atrial fibrillation on eliquis, HTN, CKD, depression p/w generalized weakness 3 days. He reports most recently having a thoracentesis on  with Dr Foster. He states he has been feeling generalized weakness the past 3 days, requiring bed rest. Today he was unable to get up to the rest room and required assistance from his wife. His wife called the cancer center and they decided to come to the hospital. He denies n/v, h/a, diarrhea, chest pain, dyspnea, abd pain, extremity pain, new nodules. No cough or recent travel.    In ED, VS T 101.8-102.3, -->96, /76, RR 18, sp02 97 on RA. He received 2L bolus, vanc 1g, cefepime 1g, acetaminophen IV 1g. (15 Dec 2017 17:38)      PAST MEDICAL & SURGICAL HISTORY:  TIA (transient ischemic attack):   Localized enlarged lymph nodes  Solitary pulmonary nodule  HTN (hypertension)  Alcohol abuse: Sober since 2016- attending AA meeting weekly  Depression, unspecified depression type  Gout  Essential hypertension  H/O hemorrhoidectomy:   History of tonsillectomy: as a child  History of appendectomy:   H/O left knee surgery:       Allergies  No Known Allergies        ANTIMICROBIALS:  piperacillin/tazobactam IVPB. 3.375 every 8 hours      OTHER MEDS: MEDICATIONS  (STANDING):  allopurinol 300 daily  apixaban 5 every 12 hours  diltiazem    Tablet 30 every 6 hours  docusate sodium 100 three times a day  mirtazapine 7.5 at bedtime  polyethylene glycol 3350 17 daily  prochlorperazine   Tablet 10 three times a day PRN  tiotropium 18 MICROgram(s) Capsule 1 daily      SOCIAL HISTORY:  [ ] etoh [ ] tobacco [ ] former smoker [ ] IVDU    FAMILY HISTORY:  No pertinent family history in first degree relatives      REVIEW OF SYSTEMS  [  ] ROS unobtainable because:    [  ] All other systems negative except as noted below:	    Constitutional:  [ ] fever [ ] weight loss  Skin:  [ ] rash [ ] phlebitis	  Eyes: [ ] icterus [ ] inflammation	  ENMT: [ ] discharge [ ] thrush [ ] ulcers [ ] exudates  Respiratory: [ ] dyspnea [ ] hemoptysis [ ] cough [ ] sputum	  Cardiovascular:  [ ] chest pain [ ] palpitations [ ] edema	  Gastrointestinal:  [ ] nausea [ ] vomiting [ ] diarrhea [ ] constipation [ ] pain	  Genitourinary:  [ ] dysuria [ ] frequency [ ] hematuria [ ] discharge [ ] flank pain  Musculoskeletal:  [ ] myalgias [ ] arthralgias [ ] arthritis	  Neurological:  [ ] headache [ ] seizures	  Psychiatric:  [ ] anxiety [ ] depression	  Hematology/Lymphatics:  [ ] lymphadenopathy  Endocrine:  [ ] adrenal [ ] thyroid  Allergic/Immunologic:	 [ ] transplant [ ] seasonal    Vital Signs Last 24 Hrs  T(F): 98.6 (17 @ 05:16), Max: 102.9 (17 @ 22:24)    Vital Signs Last 24 Hrs  HR: 107 (17 @ 11:34) (94 - 111)  BP: 131/77 (17 @ 11:34) (125/72 - 143/90)  RR: 18 (17 @ 05:16)  SpO2: 98% (17 @ 05:16) (96% - 99%)  Wt(kg): --    PHYSICAL EXAM:  General: non-toxic  HEAD/EYES: anicteric, PERRL  ENT:  supple  Cardiovascular:   S1, S2  Respiratory:  clear bilaterally  GI:  soft, non-tender, normal bowel sounds  :  no CVA tenderness   Musculoskeletal:  no synovitis  Neurologic:  grossly non-focal  Skin:  no rash  Lymph: no lymphadenopathy  Psychiatric:  appropriate affect  Vascular:  no phlebitis                                8.6    11.99 )-----------( 265      ( 19 Dec 2017 07:35 )             25.1           133<L>  |  99  |  18  ----------------------------<  101<H>  3.9   |  21<L>  |  1.23    Ca    8.6      19 Dec 2017 07:35  Phos  3.6       Mg     1.7             Urinalysis Basic - ( 18 Dec 2017 14:24 )    Color: YELLOW / Appearance: CLEAR / S.019 / pH: 5.5  Gluc: NEGATIVE / Ketone: NEGATIVE  / Bili: NEGATIVE / Urobili: 4 mg/dL   Blood: MODERATE / Protein: 100 mg/dL / Nitrite: NEGATIVE   Leuk Esterase: NEGATIVE / RBC: 25-50 / WBC 5-10   Sq Epi: OCC / Non Sq Epi: x / Bacteria: x        MICROBIOLOGY:  BLOOD PERIPHERAL  17 --  --  --      URINE MIDSTREAM  17 --  --  --      URINE MIDSTREAM  12-15-17 --  --  --      BLOOD PERIPHERAL  12-15-17 --  --  --              v            RADIOLOGY:  < from: CT Abdomen and Pelvis w/ IV Cont (17 @ 18:10) >  FINDINGS:    CHEST:     LUNGS AND LARGE AIRWAYS: Right upper lobectomy. Atelectasis of the   lateral right lower lobe, unchanged from prior. Unchanged 2 mm nodule in   the lateral left upper lobe (2:33). Basilar dependent atelectasis.   Emphysematous changes.  PLEURA: Moderate right pleural effusion, slightly decreased since   2017.  VESSELS: Coronary artery and aortic atherosclerosis.  HEART: Heart size is normal. Trace pericardial effusion.  MEDIASTINUM AND JACKELINE:   Subcarinal lymph node (2,37) 1.3 x 0.9 cm previously 1.3 x 0.9 cm.  CHEST WALL AND LOWER NECK: Scattered lymph nodes.  Right retropectoral node (2,5) 1.4 x 1.0 cm previously  0.7 x 0.8 cm.  Left retropectoral node (2,9) 1.1 x 0.9 cm previously  1.0 x 0.6 cm.    ABDOMEN AND PELVIS:    LIVER: Within normal limits.  BILE DUCTS: Normal caliber.  GALLBLADDER: Small layering calculi.  SPLEEN: Within normal limits.  PANCREAS: Within normal limits.  ADRENALS: Within normal limits.  KIDNEYS/URETERS: Bilateral cysts.    BLADDER: Within normal limits.  REPRODUCTIVE ORGANS: The prostate is within normal limits.    BOWEL: No bowel obstruction. Colonic diverticulosis without   diverticulitis. Appendix not visualized.  PERITONEUM: No ascites.  VESSELS:  Atherosclerotic changes.  RETROPERITONEUM: Mild right perinephric stranding with thickening of the   pararenal fascia, worse since prior, nonspecific.  Scattered lymph nodes.  Portocaval lymph node (2,89) 2.9 x 1.5 cm previously 2.9 x 1.4 cm.  Aortocaval lymph node (2,92) 1.3 x 0.9 cm previously 1.2 x 0.9 cm.  ABDOMINAL WALL:Small fat containing left inguinal hernia.  BONES: Degenerative changes.    < end of copied text > HPI:  73 y/o M w stage IIIA squamous cell ca s/p RUL wedge resection and VATS lobectomectomy, pleural effusion s/p thoracentesis, CLL/SLL s/p abraxane/carboplatin last on , atrial fibrillation on eliquis, HTN, CKD, depression p/w generalized weakness 3 days. He reports most recently having a thoracentesis on  with Dr Foster. He states he has been feeling generalized weakness the past 3 days, requiring bed rest. Today he was unable to get up to the rest room and required assistance from his wife. His wife called the cancer center and they decided to come to the hospital. He denies n/v, h/a, diarrhea, chest pain, dyspnea, abd pain, extremity pain, new nodules. No cough or recent travel.    In ED, VS T 101.8-102.3, -->96, /76, RR 18, sp02 97 on RA. He received 2L bolus, vanc 1g, cefepime 1g, acetaminophen IV 1g. (15 Dec 2017 17:38)      PAST MEDICAL & SURGICAL HISTORY:  TIA (transient ischemic attack):   Localized enlarged lymph nodes  Solitary pulmonary nodule  HTN (hypertension)  Alcohol abuse: Sober since 2016- attending AA meeting weekly  Depression, unspecified depression type  Gout  Essential hypertension  H/O hemorrhoidectomy:   History of tonsillectomy: as a child  History of appendectomy:   H/O left knee surgery:       Allergies  No Known Allergies        ANTIMICROBIALS:  piperacillin/tazobactam IVPB. 3.375 every 8 hours      OTHER MEDS: MEDICATIONS  (STANDING):  allopurinol 300 daily  apixaban 5 every 12 hours  diltiazem    Tablet 30 every 6 hours  docusate sodium 100 three times a day  mirtazapine 7.5 at bedtime  polyethylene glycol 3350 17 daily  prochlorperazine   Tablet 10 three times a day PRN  tiotropium 18 MICROgram(s) Capsule 1 daily      SOCIAL HISTORY:  [ ] etoh [ ] tobacco [ ] former smoker [ ] IVDU    FAMILY HISTORY:  No pertinent family history in first degree relatives      REVIEW OF SYSTEMS  [  ] ROS unobtainable because:    [x  ] All other systems negative except as noted below:	    Constitutional:  [ x] fever [ ] weight loss  Skin:  [ ] rash [ ] phlebitis	  Eyes: [ ] icterus [ ] inflammation	  ENMT: [ ] discharge [ ] thrush [ ] ulcers [ ] exudates  Respiratory: [ ] dyspnea [ ] hemoptysis [ ] cough [ ] sputum	  Cardiovascular:  [ ] chest pain [ ] palpitations [ ] edema	  Gastrointestinal:  [ ] nausea [ ] vomiting [ ] diarrhea [ ] constipation [ ] pain	  Genitourinary:  [ ] dysuria [ ] frequency [ ] hematuria [ ] discharge [ ] flank pain  Musculoskeletal:  [ ] myalgias [x ] arthralgias [ ] arthritis	  Neurological:  [ ] headache [ ] seizures	+ tingling in digits of fingers  Psychiatric:  [ ] anxiety [ ] depression	  Hematology/Lymphatics:  [ ] lymphadenopathy  Endocrine:  [ ] adrenal [ ] thyroid  Allergic/Immunologic:	 [ ] transplant [ ] seasonal    Vital Signs Last 24 Hrs  T(F): 98.6 (17 @ 05:16), Max: 102.9 (17 @ 22:24)    Vital Signs Last 24 Hrs  HR: 107 (17 @ 11:34) (94 - 111)  BP: 131/77 (17 @ 11:34) (125/72 - 143/90)  RR: 18 (17 @ 05:16)  SpO2: 98% (17 @ 05:16) (96% - 99%)  Wt(kg): --    PHYSICAL EXAM:  General: non-toxic  HEAD/EYES: anicteric, PERRL  ENT:  supple  Cardiovascular:   S1, S2  Respiratory:  clear bilaterally  GI:  soft, non-tender, normal bowel sounds  :  no CVA tenderness   Musculoskeletal:  no synovitis  Neurologic:  grossly non-focal  Skin:  no rash  Lymph: no lymphadenopathy  Psychiatric:  appropriate affect  Vascular:  no phlebitis                                8.6    11.99 )-----------( 265      ( 19 Dec 2017 07:35 )             25.1       12-19    133<L>  |  99  |  18  ----------------------------<  101<H>  3.9   |  21<L>  |  1.23    Ca    8.6      19 Dec 2017 07:35  Phos  3.6     12-  Mg     1.7     -        Urinalysis Basic - ( 18 Dec 2017 14:24 )    Color: YELLOW / Appearance: CLEAR / S.019 / pH: 5.5  Gluc: NEGATIVE / Ketone: NEGATIVE  / Bili: NEGATIVE / Urobili: 4 mg/dL   Blood: MODERATE / Protein: 100 mg/dL / Nitrite: NEGATIVE   Leuk Esterase: NEGATIVE / RBC: 25-50 / WBC 5-10   Sq Epi: OCC / Non Sq Epi: x / Bacteria: x        MICROBIOLOGY:  BLOOD PERIPHERAL  17 --  --  --      URINE MIDSTREAM  17 --  --  --      URINE MIDSTREAM  12-15-17 --  --  --      BLOOD PERIPHERAL  12-15-17 --  --  --              v            RADIOLOGY:  < from: CT Abdomen and Pelvis w/ IV Cont (17 @ 18:10) >  FINDINGS:    CHEST:     LUNGS AND LARGE AIRWAYS: Right upper lobectomy. Atelectasis of the   lateral right lower lobe, unchanged from prior. Unchanged 2 mm nodule in   the lateral left upper lobe (2:33). Basilar dependent atelectasis.   Emphysematous changes.  PLEURA: Moderate right pleural effusion, slightly decreased since   2017.  VESSELS: Coronary artery and aortic atherosclerosis.  HEART: Heart size is normal. Trace pericardial effusion.  MEDIASTINUM AND JACKELINE:   Subcarinal lymph node (2,37) 1.3 x 0.9 cm previously 1.3 x 0.9 cm.  CHEST WALL AND LOWER NECK: Scattered lymph nodes.  Right retropectoral node (2,5) 1.4 x 1.0 cm previously  0.7 x 0.8 cm.  Left retropectoral node (2,9) 1.1 x 0.9 cm previously  1.0 x 0.6 cm.    ABDOMEN AND PELVIS:    LIVER: Within normal limits.  BILE DUCTS: Normal caliber.  GALLBLADDER: Small layering calculi.  SPLEEN: Within normal limits.  PANCREAS: Within normal limits.  ADRENALS: Within normal limits.  KIDNEYS/URETERS: Bilateral cysts.    BLADDER: Within normal limits.  REPRODUCTIVE ORGANS: The prostate is within normal limits.    BOWEL: No bowel obstruction. Colonic diverticulosis without   diverticulitis. Appendix not visualized.  PERITONEUM: No ascites.  VESSELS:  Atherosclerotic changes.  RETROPERITONEUM: Mild right perinephric stranding with thickening of the   pararenal fascia, worse since prior, nonspecific.  Scattered lymph nodes.  Portocaval lymph node (2,89) 2.9 x 1.5 cm previously 2.9 x 1.4 cm.  Aortocaval lymph node (2,92) 1.3 x 0.9 cm previously 1.2 x 0.9 cm.  ABDOMINAL WALL:Small fat containing left inguinal hernia.  BONES: Degenerative changes.    < end of copied text > HPI:  73 y/o M w stage IIIA squamous cell ca s/p RUL wedge resection and VATS lobectomectomy, pleural effusion s/p thoracentesis, CLL/SLL s/p abraxane/carboplatin last on , atrial fibrillation on eliquis, HTN, CKD, depression p/w generalized weakness 3 days. He reports most recently having a thoracentesis on  with Dr Foster. He states he has been feeling generalized weakness the past 3 days, requiring bed rest. Today he was unable to get up to the rest room and required assistance from his wife. His wife called the cancer center and they decided to come to the hospital. He denies n/v, h/a, diarrhea, chest pain, dyspnea, abd pain, extremity pain, new nodules. No cough or recent travel.    In ED, VS T 101.8-102.3, -->96, /76, RR 18, sp02 97 on RA. He received 2L bolus, vanc 1g, cefepime 1g, acetaminophen IV 1g. (15 Dec 2017 17:38)      PAST MEDICAL & SURGICAL HISTORY:  TIA (transient ischemic attack):   Localized enlarged lymph nodes  Solitary pulmonary nodule  HTN (hypertension)  Alcohol abuse: Sober since 2016- attending AA meeting weekly  Depression, unspecified depression type  Gout  Essential hypertension  H/O hemorrhoidectomy:   History of tonsillectomy: as a child  History of appendectomy:   H/O left knee surgery:       Allergies  No Known Allergies        ANTIMICROBIALS:  piperacillin/tazobactam IVPB. 3.375 every 8 hours      OTHER MEDS: MEDICATIONS  (STANDING):  allopurinol 300 daily  apixaban 5 every 12 hours  diltiazem    Tablet 30 every 6 hours  docusate sodium 100 three times a day  mirtazapine 7.5 at bedtime  polyethylene glycol 3350 17 daily  prochlorperazine   Tablet 10 three times a day PRN  tiotropium 18 MICROgram(s) Capsule 1 daily      SOCIAL HISTORY:  retired .    FAMILY HISTORY:  No pertinent family history in first degree relatives      REVIEW OF SYSTEMS  [  ] ROS unobtainable because:    [x  ] All other systems negative except as noted below:	    Constitutional:  [ x] fever [ ] weight loss  Skin:  [ ] rash [ ] phlebitis	  Eyes: [ ] icterus [ ] inflammation	  ENMT: [ ] discharge [ ] thrush [ ] ulcers [ ] exudates  Respiratory: [ ] dyspnea [ ] hemoptysis [ ] cough [ ] sputum	  Cardiovascular:  [ ] chest pain [ ] palpitations [ ] edema	  Gastrointestinal:  [ ] nausea [ ] vomiting [ ] diarrhea [ ] constipation [ ] pain	  Genitourinary:  [ ] dysuria [ ] frequency [ ] hematuria [ ] discharge [ ] flank pain  Musculoskeletal:  [ ] myalgias [x ] arthralgias [ ] arthritis	  Neurological:  [ ] headache [ ] seizures	+ tingling in digits of fingers  Psychiatric:  [ ] anxiety [ ] depression	  Hematology/Lymphatics:  [ ] lymphadenopathy  Endocrine:  [ ] adrenal [ ] thyroid  Allergic/Immunologic:	 [ ] transplant [ ] seasonal    Vital Signs Last 24 Hrs  T(F): 98.6 (17 @ 05:16), Max: 102.9 (17 @ 22:24)    Vital Signs Last 24 Hrs  HR: 107 (17 @ 11:34) (94 - 111)  BP: 131/77 (17 @ 11:34) (125/72 - 143/90)  RR: 18 (17 @ 05:16)  SpO2: 98% (17 @ 05:16) (96% - 99%)  Wt(kg): --    PHYSICAL EXAM:  General: non-toxic  HEAD/EYES: anicteric, PERRL  ENT:  supple  Cardiovascular:   S1, S2  Respiratory:  clear bilaterally  GI:  soft, non-tender, normal bowel sounds  :  no CVA tenderness   Musculoskeletal:  swelling right olecranon bursa  Neurologic:  grossly non-focal  Skin:  no rash  Lymph: no lymphadenopathy  Psychiatric:  appropriate affect  Vascular:  no phlebitis                                8.6    11.99 )-----------( 265      ( 19 Dec 2017 07:35 )             25.1       -    133<L>  |  99  |  18  ----------------------------<  101<H>  3.9   |  21<L>  |  1.23    Ca    8.6      19 Dec 2017 07:35  Phos  3.6     12-  Mg     1.7     -        Urinalysis Basic - ( 18 Dec 2017 14:24 )    Color: YELLOW / Appearance: CLEAR / S.019 / pH: 5.5  Gluc: NEGATIVE / Ketone: NEGATIVE  / Bili: NEGATIVE / Urobili: 4 mg/dL   Blood: MODERATE / Protein: 100 mg/dL / Nitrite: NEGATIVE   Leuk Esterase: NEGATIVE / RBC: 25-50 / WBC 5-10   Sq Epi: OCC / Non Sq Epi: x / Bacteria: x        MICROBIOLOGY:  BLOOD PERIPHERAL  17 --  --  --      URINE MIDSTREAM  17 --  --  --      URINE MIDSTREAM  12-15-17 --  --  --      BLOOD PERIPHERAL  12-15-17 --  --  --      RADIOLOGY:  < from: CT Abdomen and Pelvis w/ IV Cont (17 @ 18:10) >  FINDINGS:    CHEST:     LUNGS AND LARGE AIRWAYS: Right upper lobectomy. Atelectasis of the   lateral right lower lobe, unchanged from prior. Unchanged 2 mm nodule in   the lateral left upper lobe (2:33). Basilar dependent atelectasis.   Emphysematous changes.  PLEURA: Moderate right pleural effusion, slightly decreased since   2017.  VESSELS: Coronary artery and aortic atherosclerosis.  HEART: Heart size is normal. Trace pericardial effusion.  MEDIASTINUM AND JACKELINE:   Subcarinal lymph node (2,37) 1.3 x 0.9 cm previously 1.3 x 0.9 cm.  CHEST WALL AND LOWER NECK: Scattered lymph nodes.  Right retropectoral node (2,5) 1.4 x 1.0 cm previously  0.7 x 0.8 cm.  Left retropectoral node (2,9) 1.1 x 0.9 cm previously  1.0 x 0.6 cm.    ABDOMEN AND PELVIS:    LIVER: Within normal limits.  BILE DUCTS: Normal caliber.  GALLBLADDER: Small layering calculi.  SPLEEN: Within normal limits.  PANCREAS: Within normal limits.  ADRENALS: Within normal limits.  KIDNEYS/URETERS: Bilateral cysts.    BLADDER: Within normal limits.  REPRODUCTIVE ORGANS: The prostate is within normal limits.    BOWEL: No bowel obstruction. Colonic diverticulosis without   diverticulitis. Appendix not visualized.  PERITONEUM: No ascites.  VESSELS:  Atherosclerotic changes.  RETROPERITONEUM: Mild right perinephric stranding with thickening of the   pararenal fascia, worse since prior, nonspecific.  Scattered lymph nodes.  Portocaval lymph node (2,89) 2.9 x 1.5 cm previously 2.9 x 1.4 cm.  Aortocaval lymph node (2,92) 1.3 x 0.9 cm previously 1.2 x 0.9 cm.  ABDOMINAL WALL:Small fat containing left inguinal hernia.  BONES: Degenerative changes.    < end of copied text >

## 2017-12-19 NOTE — PROGRESS NOTE ADULT - SUBJECTIVE AND OBJECTIVE BOX
Jon Churchill MD  Medicine Team 4  Pager: [phone]  After 7PM on weekdays, and after 12PM on weekends, please page [phone]    Patient is a 72y old  Male who presents with a chief complaint of weakness (16 Dec 2017 14:16)        SUBJECTIVE / OVERNIGHT EVENTS: Pt febrile to 102 yesterday afternoon, pt received tylenol.Blood cx and UA drawn, zosyn started, CT chest and a/p were ordered.       MEDICATIONS  (STANDING):  allopurinol 300 milliGRAM(s) Oral daily  apixaban 5 milliGRAM(s) Oral every 12 hours  diltiazem    Tablet 30 milliGRAM(s) Oral every 6 hours  docusate sodium 100 milliGRAM(s) Oral three times a day  mirtazapine 7.5 milliGRAM(s) Oral at bedtime  multivitamin/mineral Chewable Tablet (AquADEKs) 1 Tablet(s) Chew daily  piperacillin/tazobactam IVPB. 3.375 Gram(s) IV Intermittent every 8 hours  sodium chloride 0.9%. 1000 milliLiter(s) (50 mL/Hr) IV Continuous <Continuous>  tiotropium 18 MICROgram(s) Capsule 1 Capsule(s) Inhalation daily    MEDICATIONS  (PRN):  prochlorperazine   Tablet 10 milliGRAM(s) Oral three times a day PRN nausea/vomiting      T(C): 37 (17 @ 05:16), Max: 38.9 (17 @ 14:10)  HR: 94 (17 @ 05:16) (94 - 111)  BP: 143/73 (17 @ 05:16) (125/72 - 143/90)  RR: 18 (17 @ 05:16) (17 - 20)  SpO2: 98% (17 @ 05:16) (96% - 99%)  CAPILLARY BLOOD GLUCOSE        I&O's Summary      PHYSICAL EXAM  GENERAL: NAD, well-developed  HEAD:  Atraumatic, Normocephalic  EYES: EOMI, PERRLA, conjunctiva and sclera clear  NECK: Supple, No JVD  CHEST/LUNG: Clear to auscultation bilaterally; No wheeze  HEART: Regular rate and rhythm; No murmurs, rubs, or gallops  ABDOMEN: Soft, Nontender, Nondistended; Bowel sounds present  EXTREMITIES:  2+ Peripheral Pulses, No clubbing, cyanosis, or edema  PSYCH: AAOx3  SKIN: No rashes or lesions    LABS:                        9.3    12.67 )-----------( 269      ( 18 Dec 2017 06:35 )             26.5     12-18    132<L>  |  97<L>  |  19  ----------------------------<  89  4.2   |  21<L>  |  1.15    Ca    8.7      18 Dec 2017 06:35  Phos  3.0     12-18  Mg     1.4     -18            Urinalysis Basic - ( 18 Dec 2017 14:24 )    Color: YELLOW / Appearance: CLEAR / S.019 / pH: 5.5  Gluc: NEGATIVE / Ketone: NEGATIVE  / Bili: NEGATIVE / Urobili: 4 mg/dL   Blood: MODERATE / Protein: 100 mg/dL / Nitrite: NEGATIVE   Leuk Esterase: NEGATIVE / RBC: 25-50 / WBC 5-10   Sq Epi: OCC / Non Sq Epi: x / Bacteria: x        RADIOLOGY & ADDITIONAL TESTS:    Imaging Personally Reviewed:  Consultant(s) Notes Reviewed:    Care Discussed with Consultants/Other Providers: Jon Churchill MD  Medicine Team 4  Pager: [phone]  After 7PM on weekdays, and after 12PM on weekends, please page [phone]    Patient is a 72y old  Male who presents with a chief complaint of weakness (16 Dec 2017 14:16)        SUBJECTIVE / OVERNIGHT EVENTS: Pt febrile to 102 yesterday afternoon, pt received tylenol.Blood cx and UA drawn, zosyn started, CT chest and a/p were ordered.       MEDICATIONS  (STANDING):  allopurinol 300 milliGRAM(s) Oral daily  apixaban 5 milliGRAM(s) Oral every 12 hours  diltiazem    Tablet 30 milliGRAM(s) Oral every 6 hours  docusate sodium 100 milliGRAM(s) Oral three times a day  mirtazapine 7.5 milliGRAM(s) Oral at bedtime  multivitamin/mineral Chewable Tablet (AquADEKs) 1 Tablet(s) Chew daily  piperacillin/tazobactam IVPB. 3.375 Gram(s) IV Intermittent every 8 hours  sodium chloride 0.9%. 1000 milliLiter(s) (50 mL/Hr) IV Continuous <Continuous>  tiotropium 18 MICROgram(s) Capsule 1 Capsule(s) Inhalation daily    MEDICATIONS  (PRN):  prochlorperazine   Tablet 10 milliGRAM(s) Oral three times a day PRN nausea/vomiting      T(C): 37 (17 @ 05:16), Max: 38.9 (17 @ 14:10)  HR: 94 (17 @ 05:16) (94 - 111)  BP: 143/73 (17 @ 05:16) (125/72 - 143/90)  RR: 18 (17 @ 05:16) (17 - 20)  SpO2: 98% (17 @ 05:16) (96% - 99%)  CAPILLARY BLOOD GLUCOSE        I&O's Summary      PHYSICAL EXAM  GENERAL: NAD, well-developed  HEAD:  Atraumatic, Normocephalic  EYES: EOMI, PERRLA, conjunctiva and sclera clear  NECK: Supple, No JVD  CHEST/LUNG: Clear to auscultation bilaterally; No wheeze  HEART: Regular rate and rhythm; No murmurs, rubs, or gallops  ABDOMEN: Soft, Nontender, Nondistended; Bowel sounds present  BACK: No CVA tenderness b/l  EXTREMITIES:  2+ Peripheral Pulses, No clubbing, cyanosis, or edema  PSYCH: AAOx3  SKIN: No rashes or lesions    LABS:                        9.3    12.67 )-----------( 269      ( 18 Dec 2017 06:35 )             26.5     12-18    132<L>  |  97<L>  |  19  ----------------------------<  89  4.2   |  21<L>  |  1.15    Ca    8.7      18 Dec 2017 06:35  Phos  3.0     12-18  Mg     1.4     12-18            Urinalysis Basic - ( 18 Dec 2017 14:24 )    Color: YELLOW / Appearance: CLEAR / S.019 / pH: 5.5  Gluc: NEGATIVE / Ketone: NEGATIVE  / Bili: NEGATIVE / Urobili: 4 mg/dL   Blood: MODERATE / Protein: 100 mg/dL / Nitrite: NEGATIVE   Leuk Esterase: NEGATIVE / RBC: 25-50 / WBC 5-10   Sq Epi: OCC / Non Sq Epi: x / Bacteria: x        RADIOLOGY & ADDITIONAL TESTS:    Imaging Personally Reviewed:  Consultant(s) Notes Reviewed:    Care Discussed with Consultants/Other Providers:

## 2017-12-19 NOTE — PROGRESS NOTE ADULT - PROBLEM SELECTOR PLAN 7
No leukopenia or thrombocytopenia. Anemia consistent with previous values. No concern for blast crisis at this time.   - cont to trend cbc  - f/u with Dr Feliciano  -  IgA, IgM nml, IgG low  -  s/p 1U PRBC, hg 7.8 -->9.3 HR at goal at present  - c/w cardizem, start at 30q8, uptitrate to 60 q8hr  - c/w Eliquis 5mg BID

## 2017-12-19 NOTE — PROGRESS NOTE ADULT - PROBLEM SELECTOR PLAN 8
HR at goal at present  - c/w cardizem, start at 30q8, uptitrate to 60 q8hr  - c/w Eliquis 5mg BID BP stable and at goal  - c/w diltiazem 30q8h, uptritate to 60q8

## 2017-12-19 NOTE — PROGRESS NOTE ADULT - PROBLEM SELECTOR PLAN 2
Pt with 3d of malaise, found to have fever and tachycardia. No localizing symptoms for source of infection. CXR without PNA. UA without e/o UTI. BCx negative to date. Presentation likely represents sepsis 2/2 viral infection vs PNA. He received vancomycin and cefepime in the ED. Although patient is on chemotherapy, he is not neutropenic.   - Blood cultures 12/15 NGTD, 12/16 NGTD  - f/u urine cx  - Persistent fever and sepsis despite zosyn and supportive care, received zosyn x1  - RVP negative  - plan to d/c with levaquin x5d as no source identified  - leukocytosis likely 2/2 chemo  -  s/p 1U PRBC, hg 7.8 -->9.3  - recurrent fever to 102, concern for malignancy-associated fevers vs infection of unk origin.  - f/u repeat bl cx, u/a  - pending Chest CT and A/P CT A/P with perinephric stranding  + RBC and increased WBCs on UA with negative leuk esterase at this time, no gross hematuria  Continue Zosyn  Repeat blood cultures  ID consult

## 2017-12-19 NOTE — PROGRESS NOTE ADULT - PROBLEM SELECTOR PLAN 3
- RBCs on UA, concern for mets vs cystitis  - UA with no signs of UTI, repeat UA negative  - monitor h/h  - IgA, igM wnl, IgG low Right knuckle nodule with erythema and tenderness  - concern for gout flare vs cellulitis  -Continue to monitor

## 2017-12-19 NOTE — PROGRESS NOTE ADULT - PROBLEM SELECTOR PLAN 5
Papular erythematous lesions with scaling over left arm, concerning for dermatitis, possibly atopic. Patient states it is worse after chemo. Improves with Cortisone per patient report. Not consistent with cellulitis. Can c/w topical hydrocortisone to left arm, outpatient dermatology f/u. 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 Followed by Dr Feliciano.  - f/u with onc service

## 2017-12-19 NOTE — CONSULT NOTE ADULT - ATTENDING COMMENTS
Fever 102 despite antibiotics in 71 yo man with squamous cell lung cancer s/p RUL wedge resection, VATS, recent chemo.  On exam non toxic with joint pain.  CT with moderate right effusion.    Possible source of fever: un drained focus such as empyema, gout, transfusion rxn, drug fever.  suggest : continue zosyn                rheumatology eval                check peripheral smear                will review CT

## 2017-12-19 NOTE — PROGRESS NOTE ADULT - PROBLEM SELECTOR PLAN 9
BP stable and at goal  - c/w diltiazem 30q8h, uptritate to 60q8 Diet: dash diet  DVT ppx: c/w eliquis 5mg BID

## 2017-12-19 NOTE — PROGRESS NOTE ADULT - PROBLEM SELECTOR PLAN 6
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 Followed by Dr Feliciano.  - f/u with onc service No leukopenia or thrombocytopenia. Anemia consistent with previous values. No concern for blast crisis at this time.   - cont to trend cbc  - f/u with Dr Feliciano  -  IgA, IgM nml, IgG low  -  s/p 1U PRBC, hg 7.8 -->9.3

## 2017-12-19 NOTE — CONSULT NOTE ADULT - ASSESSMENT
71 y/o M w stage Alberto squamous cell ca s/p RUL wedge resection and VATS lobectomy, s/p carboplatin/paclitaxel last on 12/5, CLL/SLL,  atrial fibrillation on eliquis, HTN, CKD, depression presenting with weakness for past 3 days and found have fever and tachycardia on presentation, meeting sepsis criteria:    #Sepsis:  - Possible viral etiology given upper respiratory symptoms but patient responded well to initial antibiotics so could still be bacterial source  - blood cultures pending. UA and CXR OK.  - pt recently had malignant pleural effusion removed last week and could be possible source, but current cxr shows no effusion  - recommend repeating RVP nasopharyngeal swab  - continued IV abx for now  - current hgb of 7.4 with baseline ~ 10, recommend transfusing 1 unit prbc especially in setting of sepsis    #Squamous cell carcinoma of the lung on weekly carbo/taxol:  - last chemotherapy on 12/5  - outpatient follow-up with Dr. Feliciano
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 Found to have fever-source unclear. CT abdomen reported right perinephric stranding and thickening of pararenal fascia, however other workup all negative. Patient is complaining of some pain in his toes from his gout  Unclear source of fever- no source of infection as of now-consider gout flare vs transfusion reaction from recent blood transfusion vs adverse effect of chemo?  Recommend   Check peripheral smear   Rheumatology consult-patient follows with Dr Neda Llamas

## 2017-12-19 NOTE — PROGRESS NOTE ADULT - ATTENDING COMMENTS
Patient was seen and examined personally by me. I have discussed the plan and reviewed the resident's note and agree with the above physical exam findings including assessment and plan except as indicated below.    73yo M w/ CLL and stage III NSCLC on chemotherapy a/w fever and malaise.   Continues to have persistent fever. UA with increased WBC and RBC  CT A/P with mild right perinephric stranding with thickening of the pararenal fascia, worse since prior. This may possibly be the source for fevers  RVP negative  Will continue with Zosyn, 12/18 Bcx with NGTD  ID consulted  PT eval: rehab but family refuses and wants to take pt home with home PT

## 2017-12-20 LAB
ALBUMIN SERPL ELPH-MCNC: 2.7 G/DL — LOW (ref 3.3–5)
ALP SERPL-CCNC: 108 U/L — SIGNIFICANT CHANGE UP (ref 40–120)
ALT FLD-CCNC: 11 U/L — SIGNIFICANT CHANGE UP (ref 4–41)
ANA TITR SER: NEGATIVE — SIGNIFICANT CHANGE UP
AST SERPL-CCNC: 21 U/L — SIGNIFICANT CHANGE UP (ref 4–40)
BACTERIA BLD CULT: SIGNIFICANT CHANGE UP
BACTERIA BLD CULT: SIGNIFICANT CHANGE UP
BASOPHILS # BLD AUTO: 0.03 K/UL — SIGNIFICANT CHANGE UP (ref 0–0.2)
BASOPHILS NFR BLD AUTO: 0.2 % — SIGNIFICANT CHANGE UP (ref 0–2)
BILIRUB SERPL-MCNC: 0.4 MG/DL — SIGNIFICANT CHANGE UP (ref 0.2–1.2)
BUN SERPL-MCNC: 16 MG/DL — SIGNIFICANT CHANGE UP (ref 7–23)
CALCIUM SERPL-MCNC: 8.8 MG/DL — SIGNIFICANT CHANGE UP (ref 8.4–10.5)
CHLORIDE SERPL-SCNC: 100 MMOL/L — SIGNIFICANT CHANGE UP (ref 98–107)
CO2 SERPL-SCNC: 21 MMOL/L — LOW (ref 22–31)
CREAT SERPL-MCNC: 1.16 MG/DL — SIGNIFICANT CHANGE UP (ref 0.5–1.3)
EOSINOPHIL # BLD AUTO: 0.1 K/UL — SIGNIFICANT CHANGE UP (ref 0–0.5)
EOSINOPHIL NFR BLD AUTO: 0.8 % — SIGNIFICANT CHANGE UP (ref 0–6)
GLUCOSE SERPL-MCNC: 103 MG/DL — HIGH (ref 70–99)
HCT VFR BLD CALC: 25.4 % — LOW (ref 39–50)
HGB BLD-MCNC: 8.4 G/DL — LOW (ref 13–17)
IMM GRANULOCYTES # BLD AUTO: 0.11 # — SIGNIFICANT CHANGE UP
IMM GRANULOCYTES NFR BLD AUTO: 0.9 % — SIGNIFICANT CHANGE UP (ref 0–1.5)
LYMPHOCYTES # BLD AUTO: 3.89 K/UL — HIGH (ref 1–3.3)
LYMPHOCYTES # BLD AUTO: 32 % — SIGNIFICANT CHANGE UP (ref 13–44)
MAGNESIUM SERPL-MCNC: 1.5 MG/DL — LOW (ref 1.6–2.6)
MCHC RBC-ENTMCNC: 31.1 PG — SIGNIFICANT CHANGE UP (ref 27–34)
MCHC RBC-ENTMCNC: 33.1 % — SIGNIFICANT CHANGE UP (ref 32–36)
MCV RBC AUTO: 94.1 FL — SIGNIFICANT CHANGE UP (ref 80–100)
MONOCYTES # BLD AUTO: 1.2 K/UL — HIGH (ref 0–0.9)
MONOCYTES NFR BLD AUTO: 9.9 % — SIGNIFICANT CHANGE UP (ref 2–14)
NEUTROPHILS # BLD AUTO: 6.84 K/UL — SIGNIFICANT CHANGE UP (ref 1.8–7.4)
NEUTROPHILS NFR BLD AUTO: 56.2 % — SIGNIFICANT CHANGE UP (ref 43–77)
NRBC # FLD: 0 — SIGNIFICANT CHANGE UP
PHOSPHATE SERPL-MCNC: 3.5 MG/DL — SIGNIFICANT CHANGE UP (ref 2.5–4.5)
PLATELET # BLD AUTO: 312 K/UL — SIGNIFICANT CHANGE UP (ref 150–400)
PMV BLD: 9.8 FL — SIGNIFICANT CHANGE UP (ref 7–13)
POTASSIUM SERPL-MCNC: 4 MMOL/L — SIGNIFICANT CHANGE UP (ref 3.5–5.3)
POTASSIUM SERPL-SCNC: 4 MMOL/L — SIGNIFICANT CHANGE UP (ref 3.5–5.3)
PROCALCITONIN SERPL-MCNC: 0.46 NG/ML — HIGH (ref 0–0.04)
PROT SERPL-MCNC: 6.2 G/DL — SIGNIFICANT CHANGE UP (ref 6–8.3)
RBC # BLD: 2.7 M/UL — LOW (ref 4.2–5.8)
RBC # FLD: 18.8 % — HIGH (ref 10.3–14.5)
SODIUM SERPL-SCNC: 136 MMOL/L — SIGNIFICANT CHANGE UP (ref 135–145)
URATE SERPL-MCNC: 4 MG/DL — SIGNIFICANT CHANGE UP (ref 3.4–8.8)
WBC # BLD: 12.17 K/UL — HIGH (ref 3.8–10.5)
WBC # FLD AUTO: 12.17 K/UL — HIGH (ref 3.8–10.5)

## 2017-12-20 PROCEDURE — 99232 SBSQ HOSP IP/OBS MODERATE 35: CPT

## 2017-12-20 PROCEDURE — 99233 SBSQ HOSP IP/OBS HIGH 50: CPT | Mod: GC

## 2017-12-20 RX ORDER — MEROPENEM 1 G/30ML
INJECTION INTRAVENOUS
Qty: 0 | Refills: 0 | Status: DISCONTINUED | OUTPATIENT
Start: 2017-12-20 | End: 2017-12-22

## 2017-12-20 RX ORDER — MEROPENEM 1 G/30ML
1000 INJECTION INTRAVENOUS EVERY 8 HOURS
Qty: 0 | Refills: 0 | Status: DISCONTINUED | OUTPATIENT
Start: 2017-12-20 | End: 2017-12-22

## 2017-12-20 RX ORDER — MAGNESIUM SULFATE 500 MG/ML
1 VIAL (ML) INJECTION ONCE
Qty: 0 | Refills: 0 | Status: COMPLETED | OUTPATIENT
Start: 2017-12-20 | End: 2017-12-20

## 2017-12-20 RX ORDER — MEROPENEM 1 G/30ML
1000 INJECTION INTRAVENOUS ONCE
Qty: 0 | Refills: 0 | Status: COMPLETED | OUTPATIENT
Start: 2017-12-20 | End: 2017-12-20

## 2017-12-20 RX ADMIN — Medication 650 MILLIGRAM(S): at 14:53

## 2017-12-20 RX ADMIN — Medication 100 MILLIGRAM(S): at 21:35

## 2017-12-20 RX ADMIN — TIOTROPIUM BROMIDE 1 CAPSULE(S): 18 CAPSULE ORAL; RESPIRATORY (INHALATION) at 09:57

## 2017-12-20 RX ADMIN — Medication 300 MILLIGRAM(S): at 12:12

## 2017-12-20 RX ADMIN — APIXABAN 5 MILLIGRAM(S): 2.5 TABLET, FILM COATED ORAL at 17:39

## 2017-12-20 RX ADMIN — MEROPENEM 100 MILLIGRAM(S): 1 INJECTION INTRAVENOUS at 21:56

## 2017-12-20 RX ADMIN — Medication 100 MILLIGRAM(S): at 05:14

## 2017-12-20 RX ADMIN — APIXABAN 5 MILLIGRAM(S): 2.5 TABLET, FILM COATED ORAL at 05:14

## 2017-12-20 RX ADMIN — MEROPENEM 100 MILLIGRAM(S): 1 INJECTION INTRAVENOUS at 12:07

## 2017-12-20 RX ADMIN — Medication 100 MILLIGRAM(S): at 13:08

## 2017-12-20 RX ADMIN — Medication 1 TABLET(S): at 12:15

## 2017-12-20 RX ADMIN — PIPERACILLIN AND TAZOBACTAM 25 GRAM(S): 4; .5 INJECTION, POWDER, LYOPHILIZED, FOR SOLUTION INTRAVENOUS at 05:14

## 2017-12-20 RX ADMIN — Medication 100 GRAM(S): at 09:56

## 2017-12-20 RX ADMIN — MIRTAZAPINE 7.5 MILLIGRAM(S): 45 TABLET, ORALLY DISINTEGRATING ORAL at 21:35

## 2017-12-20 RX ADMIN — POLYETHYLENE GLYCOL 3350 17 GRAM(S): 17 POWDER, FOR SOLUTION ORAL at 12:12

## 2017-12-20 NOTE — PROGRESS NOTE ADULT - SUBJECTIVE AND OBJECTIVE BOX
Follow Up:  fever    Inverval History/ROS:  antibiotics broadened to meropenem.  no complaint but notes pain in joints.    Allergies  No Known Allergies        ANTIMICROBIALS:  meropenem IVPB    meropenem IVPB 1000 every 8 hours      OTHER MEDS:  acetaminophen   Tablet 650 milliGRAM(s) Oral every 6 hours PRN  allopurinol 300 milliGRAM(s) Oral daily  apixaban 5 milliGRAM(s) Oral every 12 hours  diltiazem    Tablet 30 milliGRAM(s) Oral every 6 hours  docusate sodium 100 milliGRAM(s) Oral three times a day  mirtazapine 7.5 milliGRAM(s) Oral at bedtime  multivitamin/mineral Chewable Tablet (AquADEKs) 1 Tablet(s) Chew daily  polyethylene glycol 3350 17 Gram(s) Oral daily  prochlorperazine   Tablet 10 milliGRAM(s) Oral three times a day PRN  tiotropium 18 MICROgram(s) Capsule 1 Capsule(s) Inhalation daily      Vital Signs Last 24 Hrs  T(C): 38.1 (20 Dec 2017 14:49), Max: 38.4 (19 Dec 2017 15:53)  T(F): 100.6 (20 Dec 2017 14:49), Max: 101.2 (19 Dec 2017 15:53)  HR: 91 (20 Dec 2017 14:49) (84 - 99)  BP: 142/56 (20 Dec 2017 14:49) (115/74 - 151/74)  BP(mean): --  RR: 18 (20 Dec 2017 14:49) (18 - 18)  SpO2: 98% (20 Dec 2017 14:49) (98% - 100%)    PHYSICAL EXAM:  General: [x ] non-toxic  HEAD/EYES: [ ] PERRL [ ] white sclera [ ] icterus  ENT:  [ ] normal [x ] supple [ ] thrush [ ] pharyngeal exudate  Cardiovascular:   [ ] murmur [x ] normal [ ] PPM/AICD  Respiratory:  [x ] clear to ausculation bilaterally  GI:  [x ] soft, non-tender, normal bowel sounds  :  [ ] bowie [ ] no CVA tenderness   Musculoskeletal:  [ ] no synovitis  Neurologic:  [x ] non-focal exam   Skin:  [ ] no rash  Lymph: [ ] no lymphadenopathy  Psychiatric:  [ ] appropriate affect [ ] alert & oriented  Lines:  [x ] no phlebitis [ ] central line                                8.4    12.17 )-----------( 312      ( 20 Dec 2017 06:30 )             25.4       12-20    136  |  100  |  16  ----------------------------<  103<H>  4.0   |  21<L>  |  1.16    Ca    8.8      20 Dec 2017 06:30  Phos  3.5     12-20  Mg     1.5     12-20    TPro  6.2  /  Alb  2.7<L>  /  TBili  0.4  /  DBili  x   /  AST  21  /  ALT  11  /  AlkPhos  108  12-20          MICROBIOLOGY:  v  BLOOD  12-18-17 --  --  --      BLOOD PERIPHERAL  12-16-17 --  --  --      URINE MIDSTREAM  12-16-17 --  --  --      URINE MIDSTREAM  12-15-17 --  --  --      BLOOD PERIPHERAL  12-15-17 --  --  --                RADIOLOGY: Follow Up:  fever    Inverval History/ROS:  antibiotics broadened to meropenem.  no complaint but notes pain in joints.    Allergies  No Known Allergies        ANTIMICROBIALS:  meropenem IVPB    meropenem IVPB 1000 every 8 hours      OTHER MEDS:  acetaminophen   Tablet 650 milliGRAM(s) Oral every 6 hours PRN  allopurinol 300 milliGRAM(s) Oral daily  apixaban 5 milliGRAM(s) Oral every 12 hours  diltiazem    Tablet 30 milliGRAM(s) Oral every 6 hours  docusate sodium 100 milliGRAM(s) Oral three times a day  mirtazapine 7.5 milliGRAM(s) Oral at bedtime  multivitamin/mineral Chewable Tablet (AquADEKs) 1 Tablet(s) Chew daily  polyethylene glycol 3350 17 Gram(s) Oral daily  prochlorperazine   Tablet 10 milliGRAM(s) Oral three times a day PRN  tiotropium 18 MICROgram(s) Capsule 1 Capsule(s) Inhalation daily      Vital Signs Last 24 Hrs  T(C): 38.1 (20 Dec 2017 14:49), Max: 38.4 (19 Dec 2017 15:53)  T(F): 100.6 (20 Dec 2017 14:49), Max: 101.2 (19 Dec 2017 15:53)  HR: 91 (20 Dec 2017 14:49) (84 - 99)  BP: 142/56 (20 Dec 2017 14:49) (115/74 - 151/74)  BP(mean): --  RR: 18 (20 Dec 2017 14:49) (18 - 18)  SpO2: 98% (20 Dec 2017 14:49) (98% - 100%)    PHYSICAL EXAM:  General: [x ] non-toxic  HEAD/EYES: [ ] PERRL [ ] white sclera [ ] icterus  ENT:  [ ] normal [x ] supple [ ] thrush [ ] pharyngeal exudate  Cardiovascular:   [ ] murmur [x ] normal [ ] PPM/AICD  Respiratory:  [x ] clear to ausculation bilaterally ant  GI:  [x ] soft, non-tender, normal bowel sounds  :  [ ] bowie [ ] no CVA tenderness   Musculoskeletal:  well healed scar left knee  Neurologic:  [x ] non-focal exam   Skin:  [ ] no rash  Lymph: [ ] no lymphadenopathy  Psychiatric:  [ ] appropriate affect [ ] alert & oriented  Lines:  [x ] no phlebitis [ ] central line                                8.4    12.17 )-----------( 312      ( 20 Dec 2017 06:30 )             25.4       12-20    136  |  100  |  16  ----------------------------<  103<H>  4.0   |  21<L>  |  1.16    Ca    8.8      20 Dec 2017 06:30  Phos  3.5     12-20  Mg     1.5     12-20    TPro  6.2  /  Alb  2.7<L>  /  TBili  0.4  /  DBili  x   /  AST  21  /  ALT  11  /  AlkPhos  108  12-20          MICROBIOLOGY:  v  BLOOD  12-18-17 --  --  --      BLOOD PERIPHERAL  12-16-17 --  --  --      URINE MIDSTREAM  12-16-17 --  --  --      URINE MIDSTREAM  12-15-17 --  --  --      BLOOD PERIPHERAL  12-15-17 --  --  --                RADIOLOGY:    < from: CT Abdomen and Pelvis w/ IV Cont (12.18.17 @ 18:10) >  :   CT of the Chest, Abdomen and Pelvis was performed with intravenous   contrast.   Intravenous contrast: 90 ml Omnipaque 350. 10 ml discarded.  Oral contrast: None.  Sagittal and coronal reformats were performed.    FINDINGS:    CHEST:     LUNGS AND LARGE AIRWAYS: Right upper lobectomy. Atelectasis of the   lateral right lower lobe, unchanged from prior. Unchanged 2 mm nodule in   the lateral left upper lobe (2:33). Basilar dependent atelectasis.   Emphysematous changes.  PLEURA: Moderate right pleural effusion, slightly decreased since   12/4/2017.  VESSELS: Coronary artery and aortic atherosclerosis.  HEART: Heart size is normal. Trace pericardial effusion.  MEDIASTINUM AND JACKELINE:   Subcarinal lymph node (2,37) 1.3 x 0.9 cm previously 1.3 x 0.9 cm.  CHEST WALL AND LOWER NECK: Scattered lymph nodes.  Right retropectoral node (2,5) 1.4 x 1.0 cm previously  0.7 x 0.8 cm.  Left retropectoral node (2,9) 1.1 x 0.9 cm previously  1.0 x 0.6 cm.    ABDOMEN AND PELVIS:    LIVER: Within normal limits.  BILE DUCTS: Normal caliber.  GALLBLADDER: Small layering calculi.  SPLEEN: Within normal limits.  PANCREAS: Within normal limits.  ADRENALS: Within normal limits.  KIDNEYS/URETERS: Bilateral cysts.    BLADDER: Within normal limits.  REPRODUCTIVE ORGANS: The prostate is within normal limits.    BOWEL: No bowel obstruction. Colonic diverticulosis without   diverticulitis. Appendix not visualized.  PERITONEUM: No ascites.  VESSELS:  Atherosclerotic changes.  RETROPERITONEUM: Mild right perinephric stranding with thickening of the   pararenal fascia, worse since prior, nonspecific.  Scattered lymph nodes.  Portocaval lymph node (2,89) 2.9 x 1.5 cm previously 2.9 x 1.4 cm.  Aortocaval lymph node (2,92) 1.3 x 0.9 cm previously 1.2 x 0.9 cm.  ABDOMINAL WALL:Small fat containing left inguinal hernia.  BONES: Degenerative changes.    IMPRESSION:        Post operative changes of the right lung with slightly improved pleural   effusion.  Slight increase in size of retropectoral nodes. Additional lymph nodesin   the chest and abdomen appear stable.    < end of copied text >

## 2017-12-20 NOTE — PROGRESS NOTE ADULT - PROBLEM SELECTOR PLAN 6
No leukopenia or thrombocytopenia. Anemia consistent with previous values. No concern for blast crisis at this time.   - cont to trend cbc  - f/u with Dr Feliciano  -  IgA, IgM nml, IgG low  -  s/p 1U PRBC, hg 7.8 -->9.3

## 2017-12-20 NOTE — PROGRESS NOTE ADULT - PROBLEM SELECTOR PLAN 4
Papular erythematous lesions with scaling over left arm, concerning for dermatitis, possibly atopic. Patient states it is worse after chemo. Improves with Cortisone per patient report. Not consistent with cellulitis.   - c/w topical hydrocortisone to left arm, outpatient dermatology f/u.

## 2017-12-20 NOTE — PROGRESS NOTE ADULT - PROBLEM SELECTOR PLAN 2
- concern for aseptic pyelonephritis,   - CT A/P with perinephric stranding  - +RBC and increased WBCs on UA with negative leuk esterase at this time, no gross hematuria  - c/w Zosyn  - bl cx 12/18 NGTD,   - ID input appreciated, advised c/w abx, consider rheum c/s - concern for pyelonephritis although negative UA  - CT A/P with perinephric stranding  - +RBC and increased WBCs on UA with negative leuk esterase at this time, no gross hematuria  - c/w Zosyn  - bl cx 12/18 NGTD,   - ID input appreciated, advised c/w abx, consider rheum c/s

## 2017-12-20 NOTE — PROGRESS NOTE ADULT - SUBJECTIVE AND OBJECTIVE BOX
Jon Churchill MD  Medicine Team 4  Pager: [phone]  After 7PM on weekdays, and after 12PM on weekends, please page [phone]    Patient is a 72y old  Male who presents with a chief complaint of weakness (16 Dec 2017 14:16)        SUBJECTIVE / OVERNIGHT EVENTS: Pt febrile yesterday afternoon to 102, defervesced with tylenol. Family would like to home PT. Day 4 abx. Seen by ID. Im AM, pt denies h/a, n/v, chest pain ,dyspnea, abd pain.       MEDICATIONS  (STANDING):  allopurinol 300 milliGRAM(s) Oral daily  apixaban 5 milliGRAM(s) Oral every 12 hours  diltiazem    Tablet 30 milliGRAM(s) Oral every 6 hours  docusate sodium 100 milliGRAM(s) Oral three times a day  mirtazapine 7.5 milliGRAM(s) Oral at bedtime  multivitamin/mineral Chewable Tablet (AquADEKs) 1 Tablet(s) Chew daily  piperacillin/tazobactam IVPB. 3.375 Gram(s) IV Intermittent every 8 hours  polyethylene glycol 3350 17 Gram(s) Oral daily  sodium chloride 0.9%. 1000 milliLiter(s) (50 mL/Hr) IV Continuous <Continuous>  tiotropium 18 MICROgram(s) Capsule 1 Capsule(s) Inhalation daily    MEDICATIONS  (PRN):  acetaminophen   Tablet 650 milliGRAM(s) Oral every 6 hours PRN For Temp greater than 38 C (100.4 F)  prochlorperazine   Tablet 10 milliGRAM(s) Oral three times a day PRN nausea/vomiting      T(C): 37 (17 @ 04:43), Max: 39.3 (17 @ 14:11)  HR: 87 (17 @ 04:43) (87 - 107)  BP: 151/74 (17 @ 04:43) (115/74 - 151/74)  RR: 18 (17 @ 04:43) (18 - 20)  SpO2: 100% (17 @ 04:43) (96% - 100%)  CAPILLARY BLOOD GLUCOSE        I&O's Summary      PHYSICAL EXAM  GENERAL: NAD, well-developed  HEAD:  Atraumatic, Normocephalic  EYES: EOMI, PERRLA, conjunctiva and sclera clear  NECK: Supple, No JVD  CHEST/LUNG: Clear to auscultation bilaterally; No wheeze  HEART: Regular rate and rhythm; No murmurs, rubs, or gallops  ABDOMEN: Soft, Nontender, Nondistended; Bowel sounds present  EXTREMITIES:  2+ Peripheral Pulses, No clubbing, cyanosis, or edema  PSYCH: AAOx3  SKIN: No rashes or lesions    LABS:                        8.6    11.99 )-----------( 265      ( 19 Dec 2017 07:35 )             25.1     12-19    133<L>  |  99  |  18  ----------------------------<  101<H>  3.9   |  21<L>  |  1.23    Ca    8.6      19 Dec 2017 07:35  Phos  3.6     12-19  Mg     1.7     12-19            Urinalysis Basic - ( 18 Dec 2017 14:24 )    Color: YELLOW / Appearance: CLEAR / S.019 / pH: 5.5  Gluc: NEGATIVE / Ketone: NEGATIVE  / Bili: NEGATIVE / Urobili: 4 mg/dL   Blood: MODERATE / Protein: 100 mg/dL / Nitrite: NEGATIVE   Leuk Esterase: NEGATIVE / RBC: 25-50 / WBC 5-10   Sq Epi: OCC / Non Sq Epi: x / Bacteria: x        RADIOLOGY & ADDITIONAL TESTS:  CT Chest w/ IV Cont (17 @ 18:03)   RETROPERITONEUM: Mild right perinephric stranding with thickening of the   pararenal fascia, worse since prior, nonspecific.  Scattered lymph nodes.  Portocaval lymph node (2,89) 2.9 x 1.5 cm previously 2.9 x 1.4 cm.  Aortocaval lymph node (2,92) 1.3 x 0.9 cm previously 1.2 x 0.9 cm.  ABDOMINAL WALL:Small fat containing left inguinal hernia.  BONES: Degenerative changes.  IMPRESSION:      Post operative changes of the right lung with slightly improved pleural   effusion.  Slight increase in size of retropectoral nodes. Additional lymph nodesin   the chest and abdomen appear stable.      Imaging Personally Reviewed:  Consultant(s) Notes Reviewed:    Care Discussed with Consultants/Other Providers:

## 2017-12-20 NOTE — PROGRESS NOTE ADULT - PROBLEM SELECTOR PLAN 5
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 Followed by Dr Feliciano.  - f/u with onc service  - Pleural effusion

## 2017-12-20 NOTE — PROGRESS NOTE ADULT - ATTENDING COMMENTS
Patient was seen and examined personally by me. I have discussed the plan and reviewed the resident's note and agree with the above physical exam findings including assessment and plan except as indicated below.    71yo M w/ CLL and stage III NSCLC on chemotherapy a/w fever and malaise. Found mild right perinephric stranding with thickening of pararenal fascia, worse since prior. Suspect possible pyelonephritis    Continues to have persistent fever. UA with increased WBC and RBC but negative leuk esterase  Cultures NGTD, DC zosyn, broaden to meropenem  Rheum workup sent: RICHI, ANCA, RF, check CRP, ESR in AM  ID following  PT eval: rehab but family refuses and wants to take pt home with home PT

## 2017-12-20 NOTE — PROGRESS NOTE ADULT - PROBLEM SELECTOR PLAN 3
Right knuckle nodule with erythema and tenderness, improving.   - concern for gout flare vs cellulitis  -Continue to monitor

## 2017-12-21 LAB
BACTERIA BLD CULT: SIGNIFICANT CHANGE UP
BASOPHILS # BLD AUTO: 0.04 K/UL — SIGNIFICANT CHANGE UP (ref 0–0.2)
BASOPHILS NFR BLD AUTO: 0.3 % — SIGNIFICANT CHANGE UP (ref 0–2)
BUN SERPL-MCNC: 18 MG/DL — SIGNIFICANT CHANGE UP (ref 7–23)
C-ANCA SER-ACNC: NEGATIVE — SIGNIFICANT CHANGE UP
CALCIUM SERPL-MCNC: 8.8 MG/DL — SIGNIFICANT CHANGE UP (ref 8.4–10.5)
CHLORIDE SERPL-SCNC: 99 MMOL/L — SIGNIFICANT CHANGE UP (ref 98–107)
CO2 SERPL-SCNC: 21 MMOL/L — LOW (ref 22–31)
CREAT SERPL-MCNC: 1.06 MG/DL — SIGNIFICANT CHANGE UP (ref 0.5–1.3)
CRP SERPL-MCNC: 157.1 MG/L — HIGH
EOSINOPHIL # BLD AUTO: 0.12 K/UL — SIGNIFICANT CHANGE UP (ref 0–0.5)
EOSINOPHIL NFR BLD AUTO: 1 % — SIGNIFICANT CHANGE UP (ref 0–6)
ERYTHROCYTE [SEDIMENTATION RATE] IN BLOOD: 124 MM/HR — HIGH (ref 1–15)
GLUCOSE SERPL-MCNC: 98 MG/DL — SIGNIFICANT CHANGE UP (ref 70–99)
HCT VFR BLD CALC: 25.8 % — LOW (ref 39–50)
HGB BLD-MCNC: 8.6 G/DL — LOW (ref 13–17)
IMM GRANULOCYTES # BLD AUTO: 0.12 # — SIGNIFICANT CHANGE UP
IMM GRANULOCYTES NFR BLD AUTO: 1 % — SIGNIFICANT CHANGE UP (ref 0–1.5)
LYMPHOCYTES # BLD AUTO: 36.5 % — SIGNIFICANT CHANGE UP (ref 13–44)
LYMPHOCYTES # BLD AUTO: 4.21 K/UL — HIGH (ref 1–3.3)
MAGNESIUM SERPL-MCNC: 1.6 MG/DL — SIGNIFICANT CHANGE UP (ref 1.6–2.6)
MCHC RBC-ENTMCNC: 32 PG — SIGNIFICANT CHANGE UP (ref 27–34)
MCHC RBC-ENTMCNC: 33.3 % — SIGNIFICANT CHANGE UP (ref 32–36)
MCV RBC AUTO: 95.9 FL — SIGNIFICANT CHANGE UP (ref 80–100)
MONOCYTES # BLD AUTO: 1.11 K/UL — HIGH (ref 0–0.9)
MONOCYTES NFR BLD AUTO: 9.6 % — SIGNIFICANT CHANGE UP (ref 2–14)
NEUTROPHILS # BLD AUTO: 5.93 K/UL — SIGNIFICANT CHANGE UP (ref 1.8–7.4)
NEUTROPHILS NFR BLD AUTO: 51.6 % — SIGNIFICANT CHANGE UP (ref 43–77)
NRBC # FLD: 0 — SIGNIFICANT CHANGE UP
P-ANCA SER-ACNC: NEGATIVE — SIGNIFICANT CHANGE UP
PHOSPHATE SERPL-MCNC: 3.7 MG/DL — SIGNIFICANT CHANGE UP (ref 2.5–4.5)
PLATELET # BLD AUTO: 331 K/UL — SIGNIFICANT CHANGE UP (ref 150–400)
PMV BLD: 9.5 FL — SIGNIFICANT CHANGE UP (ref 7–13)
POTASSIUM SERPL-MCNC: 4.1 MMOL/L — SIGNIFICANT CHANGE UP (ref 3.5–5.3)
POTASSIUM SERPL-SCNC: 4.1 MMOL/L — SIGNIFICANT CHANGE UP (ref 3.5–5.3)
RBC # BLD: 2.69 M/UL — LOW (ref 4.2–5.8)
RBC # FLD: 18.8 % — HIGH (ref 10.3–14.5)
RHEUMATOID FACT SERPL-ACNC: 19 IU/ML — SIGNIFICANT CHANGE UP
SODIUM SERPL-SCNC: 137 MMOL/L — SIGNIFICANT CHANGE UP (ref 135–145)
WBC # BLD: 11.53 K/UL — HIGH (ref 3.8–10.5)
WBC # FLD AUTO: 11.53 K/UL — HIGH (ref 3.8–10.5)

## 2017-12-21 PROCEDURE — 99233 SBSQ HOSP IP/OBS HIGH 50: CPT | Mod: GC

## 2017-12-21 PROCEDURE — 99232 SBSQ HOSP IP/OBS MODERATE 35: CPT

## 2017-12-21 RX ADMIN — MEROPENEM 100 MILLIGRAM(S): 1 INJECTION INTRAVENOUS at 06:05

## 2017-12-21 RX ADMIN — Medication 100 MILLIGRAM(S): at 13:08

## 2017-12-21 RX ADMIN — MEROPENEM 100 MILLIGRAM(S): 1 INJECTION INTRAVENOUS at 13:08

## 2017-12-21 RX ADMIN — APIXABAN 5 MILLIGRAM(S): 2.5 TABLET, FILM COATED ORAL at 17:30

## 2017-12-21 RX ADMIN — Medication 100 MILLIGRAM(S): at 06:05

## 2017-12-21 RX ADMIN — APIXABAN 5 MILLIGRAM(S): 2.5 TABLET, FILM COATED ORAL at 06:05

## 2017-12-21 RX ADMIN — Medication 100 MILLIGRAM(S): at 21:53

## 2017-12-21 RX ADMIN — MEROPENEM 100 MILLIGRAM(S): 1 INJECTION INTRAVENOUS at 21:53

## 2017-12-21 RX ADMIN — Medication 300 MILLIGRAM(S): at 11:06

## 2017-12-21 RX ADMIN — MIRTAZAPINE 7.5 MILLIGRAM(S): 45 TABLET, ORALLY DISINTEGRATING ORAL at 21:53

## 2017-12-21 RX ADMIN — POLYETHYLENE GLYCOL 3350 17 GRAM(S): 17 POWDER, FOR SOLUTION ORAL at 11:05

## 2017-12-21 RX ADMIN — TIOTROPIUM BROMIDE 1 CAPSULE(S): 18 CAPSULE ORAL; RESPIRATORY (INHALATION) at 09:08

## 2017-12-21 RX ADMIN — Medication 1 TABLET(S): at 11:06

## 2017-12-21 NOTE — PROGRESS NOTE ADULT - ATTENDING COMMENTS
Patient was seen and examined personally by me. I have discussed the plan and reviewed the resident's note and agree with the above physical exam findings including assessment and plan except as indicated below.    71yo M w/ CLL and stage III NSCLC on chemotherapy a/w fever and malaise. Found mild right perinephric stranding with thickening of pararenal fascia, worse since prior. Suspect possible pyelonephritis    Afebrile since start of Meropenem on 12/20. WBC slightly decreased today  Cultures NGTD  RICHI, ANCA negative and RF low  ESR and CRP elevated, trend daily with IV Abx  ID following  PT eval: rehab to which family now agreeable  Dispo: possible DC once afebrile for 24 hours. ID to recommend Abx on DC and duration  Discussed with daughterNinoska at [phone] per patient and wife's request

## 2017-12-21 NOTE — PROGRESS NOTE ADULT - PROBLEM SELECTOR PLAN 2
- concern for pyelonephritis although negative UA  - CT A/P with perinephric stranding  - +RBC and increased WBCs on UA with negative leuk esterase at this time, no gross hematuria  - switched from Zosyn to Meropenem. Day 6 of antibiotics    - bl cx 12/18 NGTD,   - ID input appreciated

## 2017-12-21 NOTE — PROGRESS NOTE ADULT - SUBJECTIVE AND OBJECTIVE BOX
Follow Up:  fever    Inverval History/ROS:  antibiotics broadened to meropenem 12/20.  afebrile now > 24 hours..  no complaint.      Allergies  No Known Allergies        ANTIMICROBIALS:  meropenem IVPB    meropenem IVPB 1000 every 8 hours      OTHER MEDS:  acetaminophen   Tablet 650 milliGRAM(s) Oral every 6 hours PRN  allopurinol 300 milliGRAM(s) Oral daily  apixaban 5 milliGRAM(s) Oral every 12 hours  diltiazem    Tablet 30 milliGRAM(s) Oral every 6 hours  docusate sodium 100 milliGRAM(s) Oral three times a day  mirtazapine 7.5 milliGRAM(s) Oral at bedtime  multivitamin/mineral Chewable Tablet (AquADEKs) 1 Tablet(s) Chew daily  polyethylene glycol 3350 17 Gram(s) Oral daily  prochlorperazine   Tablet 10 milliGRAM(s) Oral three times a day PRN  tiotropium 18 MICROgram(s) Capsule 1 Capsule(s) Inhalation daily      Vital Signs Last 24 Hrs  T(F): 98.7 (12-21-17 @ 14:38), Max: 99.8 (12-20-17 @ 20:51)  HR: 93 (12-21-17 @ 14:38)  BP: 150/93 (12-21-17 @ 14:38)  RR: 18 (12-21-17 @ 14:38)  SpO2: 100% (12-21-17 @ 14:38) (98% - 100%)    PHYSICAL EXAM:  General: [x ] non-toxic  HEAD/EYES: [ ] PERRL [ ] white sclera [ ] icterus  ENT:  [ ] normal [x ] supple [ ] thrush [ ] pharyngeal exudate  Cardiovascular:   [ ] murmur [x ] normal [ ] PPM/AICD  Respiratory:  [x ] decreased BS bilaterally   GI:  [x ] soft, non-tender, normal bowel sounds  :  [ ] bowie [x ] no CVA tenderness   Musculoskeletal:  well healed scar left knee  Neurologic:  [x ] non-focal exam   Skin:  [ ] no rash  Lymph: [ ] no lymphadenopathy  Psychiatric:  [ ] appropriate affect [ ] alert & oriented  Lines:  [x ] no phlebitis [ ] central line                                           8.6    11.53 )-----------( 331      ( 21 Dec 2017 05:40 )             25.8 12-21    137  |  99  |  18  ----------------------------<  98  4.1   |  21  |  1.06  Ca    8.8      21 Dec 2017 05:40Phos  3.7     12-21Mg     1.6     12-21  TPro  6.2  /  Alb  2.7  /  TBili  0.4  /  DBili  x   /  AST  21  /  ALT  11  /  AlkPhos  108  12-20        MICROBIOLOGY:    BLOOD  12-18-17 --  --  -- no growth      BLOOD PERIPHERAL  12-16-17 --  --  --  no growth      URINE MIDSTREAM  12-16-17 --  --  --  no growth      URINE MIDSTREAM  12-15-17 --  --  --      BLOOD PERIPHERAL  12-15-17 --  --  --                RADIOLOGY:    < from: CT Abdomen and Pelvis w/ IV Cont (12.18.17 @ 18:10) >  :   CT of the Chest, Abdomen and Pelvis was performed with intravenous   contrast.   Intravenous contrast: 90 ml Omnipaque 350. 10 ml discarded.  Oral contrast: None.  Sagittal and coronal reformats were performed.    FINDINGS:    CHEST:     LUNGS AND LARGE AIRWAYS: Right upper lobectomy. Atelectasis of the   lateral right lower lobe, unchanged from prior. Unchanged 2 mm nodule in   the lateral left upper lobe (2:33). Basilar dependent atelectasis.   Emphysematous changes.  PLEURA: Moderate right pleural effusion, slightly decreased since   12/4/2017.  VESSELS: Coronary artery and aortic atherosclerosis.  HEART: Heart size is normal. Trace pericardial effusion.  MEDIASTINUM AND JACKELINE:   Subcarinal lymph node (2,37) 1.3 x 0.9 cm previously 1.3 x 0.9 cm.  CHEST WALL AND LOWER NECK: Scattered lymph nodes.  Right retropectoral node (2,5) 1.4 x 1.0 cm previously  0.7 x 0.8 cm.  Left retropectoral node (2,9) 1.1 x 0.9 cm previously  1.0 x 0.6 cm.    ABDOMEN AND PELVIS:    LIVER: Within normal limits.  BILE DUCTS: Normal caliber.  GALLBLADDER: Small layering calculi.  SPLEEN: Within normal limits.  PANCREAS: Within normal limits.  ADRENALS: Within normal limits.  KIDNEYS/URETERS: Bilateral cysts.    BLADDER: Within normal limits.  REPRODUCTIVE ORGANS: The prostate is within normal limits.    BOWEL: No bowel obstruction. Colonic diverticulosis without   diverticulitis. Appendix not visualized.  PERITONEUM: No ascites.  VESSELS:  Atherosclerotic changes.  RETROPERITONEUM: Mild right perinephric stranding with thickening of the   pararenal fascia, worse since prior, nonspecific.  Scattered lymph nodes.  Portocaval lymph node (2,89) 2.9 x 1.5 cm previously 2.9 x 1.4 cm.  Aortocaval lymph node (2,92) 1.3 x 0.9 cm previously 1.2 x 0.9 cm.  ABDOMINAL WALL:Small fat containing left inguinal hernia.  BONES: Degenerative changes.    IMPRESSION:        Post operative changes of the right lung with slightly improved pleural   effusion.  Slight increase in size of retropectoral nodes. Additional lymph nodesin   the chest and abdomen appear stable.    < end of copied text >

## 2017-12-21 NOTE — PROGRESS NOTE ADULT - SUBJECTIVE AND OBJECTIVE BOX
Patient is a 72y old  Male who presents with a chief complaint of weakness (16 Dec 2017 14:16)      INTERVAL HPI/OVERNIGHT EVENTS:  No acute events overnight. No fever, chills, nausea, vomiting, diarrhea    REVIEW OF SYSTEMS:  CONSTITUTIONAL: No fever, weight loss, or fatigue  EYES: No eye pain, visual disturbances, or discharge  ENMT:  No difficulty hearing, tinnitus, vertigo; No sinus or throat pain  NECK: No pain or stiffness  BREASTS: No pain, masses, or nipple discharge  RESPIRATORY: No cough, wheezing, chills or hemoptysis; No shortness of breath  CARDIOVASCULAR: No chest pain, palpitations, dizziness, or leg swelling  GASTROINTESTINAL: No abdominal or epigastric pain. No nausea, vomiting, or hematemesis; No diarrhea or constipation. No melena or hematochezia.  GENITOURINARY: No dysuria, frequency, hematuria, or incontinence  NEUROLOGICAL: No headaches, memory loss, loss of strength, numbness, or tremors  SKIN: No itching, burning, rashes, or lesions   LYMPH NODES: No enlarged glands  ENDOCRINE: No heat or cold intolerance; No hair loss  MUSCULOSKELETAL: No joint pain or swelling; No muscle, back, or extremity pain  PSYCHIATRIC: No depression, anxiety, mood swings, or difficulty sleeping  HEME/LYMPH: No easy bruising, or bleeding gums  ALLERY AND IMMUNOLOGIC: No hives or eczema    MEDICATIONS  (STANDING):  allopurinol 300 milliGRAM(s) Oral daily  apixaban 5 milliGRAM(s) Oral every 12 hours  diltiazem    Tablet 30 milliGRAM(s) Oral every 6 hours  docusate sodium 100 milliGRAM(s) Oral three times a day  meropenem IVPB      meropenem IVPB 1000 milliGRAM(s) IV Intermittent every 8 hours  mirtazapine 7.5 milliGRAM(s) Oral at bedtime  multivitamin/mineral Chewable Tablet (AquADEKs) 1 Tablet(s) Chew daily  polyethylene glycol 3350 17 Gram(s) Oral daily  tiotropium 18 MICROgram(s) Capsule 1 Capsule(s) Inhalation daily    MEDICATIONS  (PRN):  acetaminophen   Tablet 650 milliGRAM(s) Oral every 6 hours PRN For Temp greater than 38 C (100.4 F)  prochlorperazine   Tablet 10 milliGRAM(s) Oral three times a day PRN nausea/vomiting    PHYSICAL EXAM    T(C): 36.3 (12-21-17 @ 05:35), Max: 38.1 (12-20-17 @ 14:49)  HR: 94 (12-21-17 @ 05:35) (84 - 94)  BP: 132/84 (12-21-17 @ 05:35) (132/80 - 142/72)  RR: 18 (12-21-17 @ 05:35) (18 - 18)  SpO2: 100% (12-21-17 @ 05:35) (98% - 100%)  Wt(kg): --Vital Signs Last 24 Hrs  T(C): 36.3 (21 Dec 2017 05:35), Max: 38.1 (20 Dec 2017 14:49)  T(F): 97.4 (21 Dec 2017 05:35), Max: 100.6 (20 Dec 2017 14:49)  HR: 94 (21 Dec 2017 05:35) (84 - 94)  BP: 132/84 (21 Dec 2017 05:35) (132/80 - 142/72)  BP(mean): --  RR: 18 (21 Dec 2017 05:35) (18 - 18)  SpO2: 100% (21 Dec 2017 05:35) (98% - 100%)  I&O's Summary    PHYSICAL EXAM:  GENERAL: NAD, well-groomed, well-developed  HEAD:  Atraumatic, Normocephalic  EYES: EOMI, PERRLA, conjunctiva and sclera clear  ENMT: No tonsillar erythema, exudates, or enlargement; Moist mucous membranes, Good dentition, No lesions  NECK: Supple, No JVD, Normal thyroid  NERVOUS SYSTEM:  Alert & Oriented X3, Good concentration; Motor Strength 5/5 B/L upper and lower extremities; DTRs 2+ intact and symmetric  CHEST/LUNG: Clear to percussion bilaterally; No rales, rhonchi, wheezing, or rubs  HEART: Regular rate and rhythm; No murmurs, rubs, or gallops  ABDOMEN: Soft, Nontender, Nondistended; Bowel sounds present  EXTREMITIES:  2+ Peripheral Pulses, No clubbing, cyanosis, or edema  LYMPH: No lymphadenopathy noted  SKIN: No rashes or lesions    LABS:                        8.6    11.53 )-----------( 331      ( 21 Dec 2017 05:40 )             25.8     12-20    136  |  100  |  16  ----------------------------<  103<H>  4.0   |  21<L>  |  1.16    Ca    8.8      20 Dec 2017 06:30  Phos  3.5     12-20  Mg     1.5     12-20    TPro  6.2  /  Alb  2.7<L>  /  TBili  0.4  /  DBili  x   /  AST  21  /  ALT  11  /  AlkPhos  108  12-20        CAPILLARY BLOOD GLUCOSE        RADIOLOGY & ADDITIONAL TESTS:    < from: CT Chest w/ IV Cont (12.18.17 @ 18:03) >    EXAM:  CT CHEST IC      EXAM:  CT ABDOMEN AND PELVIS IC        PROCEDURE DATE:  Dec 18 2017         INTERPRETATION:  CLINICAL INFORMATION: 72-year-old male with CLL presents   with fever.    COMPARISON: CT chest 12/4/2017    PROCEDURE:   CT of the Chest, Abdomen and Pelvis was performed with intravenous   contrast.   Intravenous contrast: 90 ml Omnipaque 350. 10 ml discarded.  Oral contrast: None.  Sagittal and coronal reformats were performed.    FINDINGS:    CHEST:     LUNGS AND LARGE AIRWAYS: Right upper lobectomy. Atelectasis of the   lateral right lower lobe, unchanged from prior. Unchanged 2 mm nodule in   the lateral left upper lobe (2:33). Basilar dependent atelectasis.   Emphysematous changes.  PLEURA: Moderate right pleural effusion, slightly decreased since   12/4/2017.  VESSELS: Coronary artery and aortic atherosclerosis.  HEART: Heart size is normal. Trace pericardial effusion.  MEDIASTINUM AND JACKELINE:   Subcarinal lymph node (2,37) 1.3 x 0.9 cm previously 1.3 x 0.9 cm.  CHEST WALL AND LOWER NECK: Scattered lymph nodes.  Right retropectoral node (2,5) 1.4 x 1.0 cm previously  0.7 x 0.8 cm.  Left retropectoral node (2,9) 1.1 x 0.9 cm previously  1.0 x 0.6 cm.    ABDOMEN AND PELVIS:    LIVER: Within normal limits.  BILE DUCTS: Normal caliber.  GALLBLADDER: Small layering calculi.  SPLEEN: Within normal limits.  PANCREAS: Within normal limits.  ADRENALS: Within normal limits.  KIDNEYS/URETERS: Bilateral cysts.    BLADDER: Within normal limits.  REPRODUCTIVE ORGANS: The prostate is within normal limits.    BOWEL: No bowel obstruction. Colonic diverticulosis without   diverticulitis. Appendix not visualized.  PERITONEUM: No ascites.  VESSELS:  Atherosclerotic changes.  RETROPERITONEUM: Mild right perinephric stranding with thickening of the   pararenal fascia, worse since prior, nonspecific.  Scattered lymph nodes.  Portocaval lymph node (2,89) 2.9 x 1.5 cm previously 2.9 x 1.4 cm.  Aortocaval lymph node (2,92) 1.3 x 0.9 cm previously 1.2 x 0.9 cm.  ABDOMINAL WALL:Small fat containing left inguinal hernia.  BONES: Degenerative changes.    IMPRESSION:        Post operative changes of the right lung with slightly improved pleural   effusion.  Slight increase in size of retropectoral nodes. Additional lymph nodesin   the chest and abdomen appear stable.      STEWART LAINEZ M.D., RADIOLOGY FELLOW  This document has been electronically signed.  IFEOMA HERNANDEZ M.D., ATTENDING RADIOLOGIST  This document has been electronically signed. Dec 19 2017 10:10AM    Imaging Personally Reviewed:  [ ] YES  [ ] NO    Care Discussed with Consultants/Other Providers [ x] YES  [ ] NO Patient is a 72y old  Male who presents with a chief complaint of weakness (16 Dec 2017 14:16)      INTERVAL HPI/OVERNIGHT EVENTS:  No acute events overnight. No fever, chills, nausea, vomiting, diarrhea    MEDICATIONS  (STANDING):  allopurinol 300 milliGRAM(s) Oral daily  apixaban 5 milliGRAM(s) Oral every 12 hours  diltiazem    Tablet 30 milliGRAM(s) Oral every 6 hours  docusate sodium 100 milliGRAM(s) Oral three times a day  meropenem IVPB      meropenem IVPB 1000 milliGRAM(s) IV Intermittent every 8 hours  mirtazapine 7.5 milliGRAM(s) Oral at bedtime  multivitamin/mineral Chewable Tablet (AquADEKs) 1 Tablet(s) Chew daily  polyethylene glycol 3350 17 Gram(s) Oral daily  tiotropium 18 MICROgram(s) Capsule 1 Capsule(s) Inhalation daily    MEDICATIONS  (PRN):  acetaminophen   Tablet 650 milliGRAM(s) Oral every 6 hours PRN For Temp greater than 38 C (100.4 F)  prochlorperazine   Tablet 10 milliGRAM(s) Oral three times a day PRN nausea/vomiting    PHYSICAL EXAM    T(C): 36.3 (12-21-17 @ 05:35), Max: 38.1 (12-20-17 @ 14:49)  HR: 94 (12-21-17 @ 05:35) (84 - 94)  BP: 132/84 (12-21-17 @ 05:35) (132/80 - 142/72)  RR: 18 (12-21-17 @ 05:35) (18 - 18)  SpO2: 100% (12-21-17 @ 05:35) (98% - 100%)  Wt(kg): --Vital Signs Last 24 Hrs  T(C): 36.3 (21 Dec 2017 05:35), Max: 38.1 (20 Dec 2017 14:49)  T(F): 97.4 (21 Dec 2017 05:35), Max: 100.6 (20 Dec 2017 14:49)  HR: 94 (21 Dec 2017 05:35) (84 - 94)  BP: 132/84 (21 Dec 2017 05:35) (132/80 - 142/72)  BP(mean): --  RR: 18 (21 Dec 2017 05:35) (18 - 18)  SpO2: 100% (21 Dec 2017 05:35) (98% - 100%)  I&O's Summary    PHYSICAL EXAM:  GENERAL: NAD, well-groomed, well-developed  HEAD:  Atraumatic, Normocephalic  EYES: EOMI, PERRLA, conjunctiva and sclera clear  ENMT: No tonsillar erythema, exudates, or enlargement; Moist mucous membranes, Good dentition, No lesions  NECK: Supple, No JVD, Normal thyroid  NERVOUS SYSTEM:  Alert & Oriented X3, Good concentration; Motor Strength 5/5 B/L upper and lower extremities; DTRs 2+ intact and symmetric  CHEST/LUNG: Clear to percussion bilaterally; No rales, rhonchi, wheezing, or rubs  HEART: Regular rate and rhythm; No murmurs, rubs, or gallops  ABDOMEN: Soft, Nontender, Nondistended; Bowel sounds present  EXTREMITIES:  2+ Peripheral Pulses, No clubbing, cyanosis, or edema  LYMPH: No lymphadenopathy noted  SKIN: No rashes or lesions    LABS:                        8.6    11.53 )-----------( 331      ( 21 Dec 2017 05:40 )             25.8     12-20    136  |  100  |  16  ----------------------------<  103<H>  4.0   |  21<L>  |  1.16    Ca    8.8      20 Dec 2017 06:30  Phos  3.5     12-20  Mg     1.5     12-20    TPro  6.2  /  Alb  2.7<L>  /  TBili  0.4  /  DBili  x   /  AST  21  /  ALT  11  /  AlkPhos  108  12-20        CAPILLARY BLOOD GLUCOSE        RADIOLOGY & ADDITIONAL TESTS:    < from: CT Chest w/ IV Cont (12.18.17 @ 18:03) >    EXAM:  CT CHEST IC      EXAM:  CT ABDOMEN AND PELVIS IC        PROCEDURE DATE:  Dec 18 2017         INTERPRETATION:  CLINICAL INFORMATION: 72-year-old male with CLL presents   with fever.    COMPARISON: CT chest 12/4/2017    PROCEDURE:   CT of the Chest, Abdomen and Pelvis was performed with intravenous   contrast.   Intravenous contrast: 90 ml Omnipaque 350. 10 ml discarded.  Oral contrast: None.  Sagittal and coronal reformats were performed.    FINDINGS:    CHEST:     LUNGS AND LARGE AIRWAYS: Right upper lobectomy. Atelectasis of the   lateral right lower lobe, unchanged from prior. Unchanged 2 mm nodule in   the lateral left upper lobe (2:33). Basilar dependent atelectasis.   Emphysematous changes.  PLEURA: Moderate right pleural effusion, slightly decreased since   12/4/2017.  VESSELS: Coronary artery and aortic atherosclerosis.  HEART: Heart size is normal. Trace pericardial effusion.  MEDIASTINUM AND JACKELINE:   Subcarinal lymph node (2,37) 1.3 x 0.9 cm previously 1.3 x 0.9 cm.  CHEST WALL AND LOWER NECK: Scattered lymph nodes.  Right retropectoral node (2,5) 1.4 x 1.0 cm previously  0.7 x 0.8 cm.  Left retropectoral node (2,9) 1.1 x 0.9 cm previously  1.0 x 0.6 cm.    ABDOMEN AND PELVIS:    LIVER: Within normal limits.  BILE DUCTS: Normal caliber.  GALLBLADDER: Small layering calculi.  SPLEEN: Within normal limits.  PANCREAS: Within normal limits.  ADRENALS: Within normal limits.  KIDNEYS/URETERS: Bilateral cysts.    BLADDER: Within normal limits.  REPRODUCTIVE ORGANS: The prostate is within normal limits.    BOWEL: No bowel obstruction. Colonic diverticulosis without   diverticulitis. Appendix not visualized.  PERITONEUM: No ascites.  VESSELS:  Atherosclerotic changes.  RETROPERITONEUM: Mild right perinephric stranding with thickening of the   pararenal fascia, worse since prior, nonspecific.  Scattered lymph nodes.  Portocaval lymph node (2,89) 2.9 x 1.5 cm previously 2.9 x 1.4 cm.  Aortocaval lymph node (2,92) 1.3 x 0.9 cm previously 1.2 x 0.9 cm.  ABDOMINAL WALL:Small fat containing left inguinal hernia.  BONES: Degenerative changes.    IMPRESSION:        Post operative changes of the right lung with slightly improved pleural   effusion.  Slight increase in size of retropectoral nodes. Additional lymph nodesin   the chest and abdomen appear stable.      STEWART LAINEZ M.D., RADIOLOGY FELLOW  This document has been electronically signed.  IFEOMA HERNANDEZ M.D., ATTENDING RADIOLOGIST  This document has been electronically signed. Dec 19 2017 10:10AM    Imaging Personally Reviewed:  [ ] YES  [ ] NO    Care Discussed with Consultants/Other Providers [ x] YES  [ ] NO

## 2017-12-22 VITALS — DIASTOLIC BLOOD PRESSURE: 66 MMHG | HEART RATE: 84 BPM | SYSTOLIC BLOOD PRESSURE: 146 MMHG

## 2017-12-22 LAB
ALBUMIN SERPL ELPH-MCNC: 2.8 G/DL — LOW (ref 3.3–5)
ALP SERPL-CCNC: 98 U/L — SIGNIFICANT CHANGE UP (ref 40–120)
ALT FLD-CCNC: 9 U/L — SIGNIFICANT CHANGE UP (ref 4–41)
AST SERPL-CCNC: 19 U/L — SIGNIFICANT CHANGE UP (ref 4–40)
BASOPHILS # BLD AUTO: 0.05 K/UL — SIGNIFICANT CHANGE UP (ref 0–0.2)
BASOPHILS NFR BLD AUTO: 0.5 % — SIGNIFICANT CHANGE UP (ref 0–2)
BILIRUB SERPL-MCNC: 0.3 MG/DL — SIGNIFICANT CHANGE UP (ref 0.2–1.2)
BUN SERPL-MCNC: 11 MG/DL — SIGNIFICANT CHANGE UP (ref 7–23)
CALCIUM SERPL-MCNC: 8.9 MG/DL — SIGNIFICANT CHANGE UP (ref 8.4–10.5)
CHLORIDE SERPL-SCNC: 100 MMOL/L — SIGNIFICANT CHANGE UP (ref 98–107)
CO2 SERPL-SCNC: 21 MMOL/L — LOW (ref 22–31)
CREAT SERPL-MCNC: 1.09 MG/DL — SIGNIFICANT CHANGE UP (ref 0.5–1.3)
CRP SERPL-MCNC: 102.3 MG/L — HIGH
EOSINOPHIL # BLD AUTO: 0.14 K/UL — SIGNIFICANT CHANGE UP (ref 0–0.5)
EOSINOPHIL NFR BLD AUTO: 1.3 % — SIGNIFICANT CHANGE UP (ref 0–6)
ERYTHROCYTE [SEDIMENTATION RATE] IN BLOOD: > 140 MM/HR — HIGH (ref 1–15)
GLUCOSE SERPL-MCNC: 96 MG/DL — SIGNIFICANT CHANGE UP (ref 70–99)
HCT VFR BLD CALC: 26.8 % — LOW (ref 39–50)
HGB BLD-MCNC: 8.6 G/DL — LOW (ref 13–17)
IMM GRANULOCYTES # BLD AUTO: 0.1 # — SIGNIFICANT CHANGE UP
IMM GRANULOCYTES NFR BLD AUTO: 0.9 % — SIGNIFICANT CHANGE UP (ref 0–1.5)
LYMPHOCYTES # BLD AUTO: 3.98 K/UL — HIGH (ref 1–3.3)
LYMPHOCYTES # BLD AUTO: 36.1 % — SIGNIFICANT CHANGE UP (ref 13–44)
MAGNESIUM SERPL-MCNC: 1.4 MG/DL — LOW (ref 1.6–2.6)
MCHC RBC-ENTMCNC: 30.7 PG — SIGNIFICANT CHANGE UP (ref 27–34)
MCHC RBC-ENTMCNC: 32.1 % — SIGNIFICANT CHANGE UP (ref 32–36)
MCV RBC AUTO: 95.7 FL — SIGNIFICANT CHANGE UP (ref 80–100)
MONOCYTES # BLD AUTO: 1.14 K/UL — HIGH (ref 0–0.9)
MONOCYTES NFR BLD AUTO: 10.3 % — SIGNIFICANT CHANGE UP (ref 2–14)
NEUTROPHILS # BLD AUTO: 5.63 K/UL — SIGNIFICANT CHANGE UP (ref 1.8–7.4)
NEUTROPHILS NFR BLD AUTO: 50.9 % — SIGNIFICANT CHANGE UP (ref 43–77)
NRBC # FLD: 0 — SIGNIFICANT CHANGE UP
PHOSPHATE SERPL-MCNC: 3.1 MG/DL — SIGNIFICANT CHANGE UP (ref 2.5–4.5)
PLATELET # BLD AUTO: 367 K/UL — SIGNIFICANT CHANGE UP (ref 150–400)
PMV BLD: 9.8 FL — SIGNIFICANT CHANGE UP (ref 7–13)
POTASSIUM SERPL-MCNC: 3.8 MMOL/L — SIGNIFICANT CHANGE UP (ref 3.5–5.3)
POTASSIUM SERPL-SCNC: 3.8 MMOL/L — SIGNIFICANT CHANGE UP (ref 3.5–5.3)
PROT SERPL-MCNC: 6.3 G/DL — SIGNIFICANT CHANGE UP (ref 6–8.3)
RBC # BLD: 2.8 M/UL — LOW (ref 4.2–5.8)
RBC # FLD: 18.6 % — HIGH (ref 10.3–14.5)
SODIUM SERPL-SCNC: 135 MMOL/L — SIGNIFICANT CHANGE UP (ref 135–145)
WBC # BLD: 11.04 K/UL — HIGH (ref 3.8–10.5)
WBC # FLD AUTO: 11.04 K/UL — HIGH (ref 3.8–10.5)

## 2017-12-22 PROCEDURE — 99239 HOSP IP/OBS DSCHRG MGMT >30: CPT

## 2017-12-22 PROCEDURE — 99232 SBSQ HOSP IP/OBS MODERATE 35: CPT

## 2017-12-22 RX ORDER — POLYETHYLENE GLYCOL 3350 17 G/17G
17 POWDER, FOR SOLUTION ORAL
Qty: 0 | Refills: 0 | COMMUNITY
Start: 2017-12-22

## 2017-12-22 RX ORDER — MAGNESIUM SULFATE 500 MG/ML
2 VIAL (ML) INJECTION ONCE
Qty: 0 | Refills: 0 | Status: COMPLETED | OUTPATIENT
Start: 2017-12-22 | End: 2017-12-22

## 2017-12-22 RX ORDER — MEROPENEM 1 G/30ML
1000 INJECTION INTRAVENOUS
Qty: 0 | Refills: 0 | COMMUNITY
Start: 2017-12-22 | End: 2017-12-24

## 2017-12-22 RX ADMIN — Medication 50 GRAM(S): at 09:13

## 2017-12-22 RX ADMIN — MEROPENEM 100 MILLIGRAM(S): 1 INJECTION INTRAVENOUS at 05:10

## 2017-12-22 RX ADMIN — Medication 300 MILLIGRAM(S): at 12:30

## 2017-12-22 RX ADMIN — Medication 1 TABLET(S): at 12:29

## 2017-12-22 RX ADMIN — MEROPENEM 100 MILLIGRAM(S): 1 INJECTION INTRAVENOUS at 13:40

## 2017-12-22 RX ADMIN — TIOTROPIUM BROMIDE 1 CAPSULE(S): 18 CAPSULE ORAL; RESPIRATORY (INHALATION) at 09:13

## 2017-12-22 RX ADMIN — Medication 100 MILLIGRAM(S): at 05:10

## 2017-12-22 RX ADMIN — APIXABAN 5 MILLIGRAM(S): 2.5 TABLET, FILM COATED ORAL at 05:10

## 2017-12-22 NOTE — PROGRESS NOTE ADULT - PROBLEM SELECTOR PLAN 3
Resolved. 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 Followed by Dr Feliciano.  - f/u with onc service  - Pleural effusion  - On Discharge to rehab, pt will not require any chemotherapy

## 2017-12-22 NOTE — PROGRESS NOTE ADULT - PROBLEM SELECTOR PLAN 2
- concern for pyelonephritis although negative UA  - CT A/P with perinephric stranding  - +RBC and increased WBCs on UA with negative leuk esterase at this time, no gross hematuria  - switched from Zosyn to Meropenem. Day 7 of antibiotics    - bl cx 12/18 NGTD,   - ID input appreciated

## 2017-12-22 NOTE — PROGRESS NOTE ADULT - SUBJECTIVE AND OBJECTIVE BOX
Jon Churchill MD  Medicine Team 4  Pager: [phone]  After 7PM on weekdays, and after 12PM on weekends, please page [phone]      Patient is a 72y old  Male who presents with a chief complaint of weakness (16 Dec 2017 14:16)        SUBJECTIVE / OVERNIGHT EVENTS: No events o/n.      MEDICATIONS  (STANDING):  allopurinol 300 milliGRAM(s) Oral daily  apixaban 5 milliGRAM(s) Oral every 12 hours  diltiazem    Tablet 30 milliGRAM(s) Oral every 6 hours  docusate sodium 100 milliGRAM(s) Oral three times a day  meropenem IVPB      meropenem IVPB 1000 milliGRAM(s) IV Intermittent every 8 hours  mirtazapine 7.5 milliGRAM(s) Oral at bedtime  multivitamin/mineral Chewable Tablet (AquADEKs) 1 Tablet(s) Chew daily  polyethylene glycol 3350 17 Gram(s) Oral daily  tiotropium 18 MICROgram(s) Capsule 1 Capsule(s) Inhalation daily    MEDICATIONS  (PRN):  acetaminophen   Tablet 650 milliGRAM(s) Oral every 6 hours PRN For Temp greater than 38 C (100.4 F)  prochlorperazine   Tablet 10 milliGRAM(s) Oral three times a day PRN nausea/vomiting      T(C): 36.7 (12-22-17 @ 04:56), Max: 37.1 (12-21-17 @ 14:38)  HR: 82 (12-22-17 @ 04:56) (82 - 100)  BP: 150/83 (12-22-17 @ 04:56) (134/79 - 150/93)  RR: 18 (12-22-17 @ 04:56) (17 - 18)  SpO2: 100% (12-22-17 @ 04:56) (98% - 100%)  CAPILLARY BLOOD GLUCOSE        I&O's Summary      PHYSICAL EXAM  GENERAL: NAD, well-developed  HEAD:  Atraumatic, Normocephalic  EYES: EOMI, PERRLA, conjunctiva and sclera clear  NECK: Supple, No JVD  CHEST/LUNG: Clear to auscultation bilaterally; No wheeze  HEART: Regular rate and rhythm; No murmurs, rubs, or gallops  ABDOMEN: Soft, Nontender, Nondistended; Bowel sounds present  EXTREMITIES:  2+ Peripheral Pulses, No clubbing, cyanosis, or edema  PSYCH: AAOx3  SKIN: No rashes or lesions    LABS:                        8.6    11.53 )-----------( 331      ( 21 Dec 2017 05:40 )             25.8     12-21    137  |  99  |  18  ----------------------------<  98  4.1   |  21<L>  |  1.06    Ca    8.8      21 Dec 2017 05:40  Phos  3.7     12-21  Mg     1.6     12-21                RADIOLOGY & ADDITIONAL TESTS:    Imaging Personally Reviewed:  Consultant(s) Notes Reviewed:    Care Discussed with Consultants/Other Providers: Jon Churchill MD  Medicine Team 4  Pager: [phone]  After 7PM on weekdays, and after 12PM on weekends, please page [phone]      Patient is a 72y old  Male who presents with a chief complaint of weakness (16 Dec 2017 14:16)        SUBJECTIVE / OVERNIGHT EVENTS: No events o/n. Pt afebrile since 12/20. Denies h/a, fever, chest pain, dyspnea, abd pain, dysuria, constipation.       MEDICATIONS  (STANDING):  allopurinol 300 milliGRAM(s) Oral daily  apixaban 5 milliGRAM(s) Oral every 12 hours  diltiazem    Tablet 30 milliGRAM(s) Oral every 6 hours  docusate sodium 100 milliGRAM(s) Oral three times a day  meropenem IVPB      meropenem IVPB 1000 milliGRAM(s) IV Intermittent every 8 hours  mirtazapine 7.5 milliGRAM(s) Oral at bedtime  multivitamin/mineral Chewable Tablet (AquADEKs) 1 Tablet(s) Chew daily  polyethylene glycol 3350 17 Gram(s) Oral daily  tiotropium 18 MICROgram(s) Capsule 1 Capsule(s) Inhalation daily    MEDICATIONS  (PRN):  acetaminophen   Tablet 650 milliGRAM(s) Oral every 6 hours PRN For Temp greater than 38 C (100.4 F)  prochlorperazine   Tablet 10 milliGRAM(s) Oral three times a day PRN nausea/vomiting      T(C): 36.7 (12-22-17 @ 04:56), Max: 37.1 (12-21-17 @ 14:38)  HR: 82 (12-22-17 @ 04:56) (82 - 100)  BP: 150/83 (12-22-17 @ 04:56) (134/79 - 150/93)  RR: 18 (12-22-17 @ 04:56) (17 - 18)  SpO2: 100% (12-22-17 @ 04:56) (98% - 100%)  CAPILLARY BLOOD GLUCOSE        I&O's Summary      PHYSICAL EXAM  GENERAL: NAD, well-developed  HEAD:  Atraumatic, Normocephalic  EYES: EOMI, PERRLA, conjunctiva and sclera clear  NECK: Supple, No JVD  CHEST/LUNG: Clear to auscultation bilaterally; No wheeze  HEART: Regular rate and rhythm; No murmurs, rubs, or gallops  ABDOMEN: Soft, Nontender, Nondistended; Bowel sounds present  EXTREMITIES:  2+ Peripheral Pulses, No clubbing, cyanosis, or edema  PSYCH: AAOx3  SKIN: No rashes or lesions    LABS:                        8.6    11.53 )-----------( 331      ( 21 Dec 2017 05:40 )             25.8     12-21    137  |  99  |  18  ----------------------------<  98  4.1   |  21<L>  |  1.06    Ca    8.8      21 Dec 2017 05:40  Phos  3.7     12-21  Mg     1.6     12-21                RADIOLOGY & ADDITIONAL TESTS:    Imaging Personally Reviewed:  Consultant(s) Notes Reviewed:    Care Discussed with Consultants/Other Providers:

## 2017-12-22 NOTE — PROGRESS NOTE ADULT - ATTENDING COMMENTS
Patient was seen and examined personally by me. I have discussed the plan and reviewed the resident's note and agree with the above physical exam findings including assessment and plan except as indicated below.    71yo M w/ CLL and stage III NSCLC on chemotherapy a/w fever and malaise. Found mild right perinephric stranding with thickening of pararenal fascia, worse since prior. Suspect possible pyelonephritis    Afebrile since start of Meropenem on 12/20. WBC downtrending  Cultures NGTD  RICHI, ANCA negative and RF low  ESR and CRP elevated, CRP downtrending.  Will continue Meropenem for 2 more days via IV at rehab. Clinically patient looks well with no infectious symptoms  ID recs appreciated  Stable for DC to rehab today. DC time: 35 min  Dispo: possible DC once afebrile for 24 hours. ID to recommend Abx on DC and duration  Discussed with daughterNinoska at [phone] per patient and wife's request

## 2017-12-22 NOTE — PROGRESS NOTE ADULT - ASSESSMENT
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 H/c c/b recurrent fevers, likely due to aseptic pyelonephritis, with right perinephric stranding on CT.
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 H/c c/b recurrent fevers, likely due to aseptic pyelonephritis, with right perinephric stranding on CT.
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 H/c c/b recurrent fevers, likely due to aseptic pyelonephritis, with right perinephric stranding on CT.
Fever in 66 yo man with squamous cell lung cancer, CLL, s/p RUL wedge resection, VATS, s/p thoracentesis 12/7, on chemo last infusion 12/5.  CT with moderate right pleural effusion and perinephric stranding of unclear significance.  Fever curve trending down; afebrile > 24 hours.  Source : pulmonary vs .  Would continue meropenem.  Planning d/c to rehab tomorrow.
Fever resolved in 64 yo man with squamous cell lung cancer, CLL, s/p RUL wedge resection, VATS, s/p thoracentesis 12/7, on chemo last infusion 12/5.  CT with moderate right pleural effusion and perinephric stranding of unclear significance.  Fever curve trending down; afebrile > 48 hours now on meropenem.  Source : pulmonary vs .    Planning d/c to rehab today.  If feasible would continue meropenem at rehab for 1 or 2 more days.
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
Fever in 66 yo man with squamous cell lung cancer, CLL, s/p RUL wedge resection, VATS, s/p thoracentesis 12/7, on chemo last infusion 12/5.  CT with moderate right pleural effusion and perinephric stranding of unclear significance.  agree with meropenem. ?gout flare.
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 H/c c/b recurrent fevers.

## 2017-12-22 NOTE — PROGRESS NOTE ADULT - PROBLEM SELECTOR PLAN 6
Stable. No leukopenia or thrombocytopenia. Anemia consistent with previous values. No concern for blast crisis at this time.   - cont to trend cbc  - f/u with Dr Feliciano  -  IgA, IgM nml, IgG low  -  s/p 1U PRBC, hg 7.8 -->9.3 Papular erythematous lesions with scaling over left arm, concerning for dermatitis, possibly atopic. Patient states it is worse after chemo. Improves with Cortisone per patient report. Not consistent with cellulitis.   - c/w topical hydrocortisone to left arm, outpatient dermatology f/u.

## 2017-12-22 NOTE — PROGRESS NOTE ADULT - PROBLEM SELECTOR PLAN 5
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 Followed by Dr Feliciano.  - f/u with onc service  - Pleural effusion Resolved. Right knuckle nodule with erythema and tenderness  - concern for gout flare vs cellulitis  -Continue to monitor

## 2017-12-22 NOTE — PROGRESS NOTE ADULT - PROVIDER SPECIALTY LIST ADULT
Infectious Disease
Infectious Disease
Internal Medicine
Infectious Disease
Internal Medicine

## 2017-12-22 NOTE — PROGRESS NOTE ADULT - PROBLEM SELECTOR PLAN 4
Papular erythematous lesions with scaling over left arm, concerning for dermatitis, possibly atopic. Patient states it is worse after chemo. Improves with Cortisone per patient report. Not consistent with cellulitis.   - c/w topical hydrocortisone to left arm, outpatient dermatology f/u. Stable. No leukopenia or thrombocytopenia. Anemia consistent with previous values. No concern for blast crisis at this time.   - cont to trend cbc  - f/u with Dr Feliciano  -  IgA, IgM nml, IgG low  -  s/p 1U PRBC, hg 7.8 -->9.3  - - On Discharge, pt will not require any chemotherapy

## 2017-12-22 NOTE — PROGRESS NOTE ADULT - PROBLEM SELECTOR PLAN 1
Possibly due to aseptic pyelo  Low suspicion for gout flare or septic arthritis. Possibly due to aseptic pyelo  Low suspicion for gout flare or septic arthritis.  - c/w Meropenem, total abx 7, will f/u ID to discus course and abx on d/c Possibly due to aseptic pyelo  Low suspicion for gout flare or septic arthritis.  - c/w Meropenem, total abx day 7, will f/u ID to discus course and abx on d/c

## 2017-12-22 NOTE — PROGRESS NOTE ADULT - NSHPATTENDINGPLANDISCUSS_GEN_ALL_CORE
Dr. Leon
patient and wife
patient, wife and daughter
Dr. Churchill
patient, wife, Dr. Martins, daughter: Ninoska: [phone]
patient and wife
patient and wife

## 2017-12-22 NOTE — PROGRESS NOTE ADULT - SUBJECTIVE AND OBJECTIVE BOX
Follow Up:  fever    Inverval History/ROS:  antibiotics broadened to meropenem 12/20.  afebrile now > 48 hours.  no complaint.  appetite improved per patient's wife.     Allergies  No Known Allergies        ANTIMICROBIALS:  meropenem IVPB    meropenem IVPB 1000 every 8 hours      OTHER MEDS:  acetaminophen   Tablet 650 milliGRAM(s) Oral every 6 hours PRN  allopurinol 300 milliGRAM(s) Oral daily  apixaban 5 milliGRAM(s) Oral every 12 hours  diltiazem    Tablet 30 milliGRAM(s) Oral every 6 hours  docusate sodium 100 milliGRAM(s) Oral three times a day  mirtazapine 7.5 milliGRAM(s) Oral at bedtime  multivitamin/mineral Chewable Tablet (AquADEKs) 1 Tablet(s) Chew daily  polyethylene glycol 3350 17 Gram(s) Oral daily  prochlorperazine   Tablet 10 milliGRAM(s) Oral three times a day PRN  tiotropium 18 MICROgram(s) Capsule 1 Capsule(s) Inhalation daily      Vital Signs Last 24 Hrs  T(F): 98 (12-22-17 @ 04:56), Max: 98.8 (12-21-17 @ 22:34)  HR: 82 (12-22-17 @ 04:56)  BP: 150/83 (12-22-17 @ 04:56)  RR: 18 (12-22-17 @ 04:56)  SpO2: 100% (12-22-17 @ 04:56) (98% - 100%)    PHYSICAL EXAM:  General: [x ] non-toxic  HEAD/EYES: [ ] PERRL [ ] white sclera [ ] icterus  ENT:  [ ] normal [x ] supple [ ] thrush [ ] pharyngeal exudate  Cardiovascular:   [ ] murmur [x ] normal [ ] PPM/AICD  Respiratory:  [x ] decreased BS bilaterally   GI:  [x ] soft, non-tender, normal bowel sounds  :  [ ] bowie [x ] no CVA tenderness   Musculoskeletal:  well healed scar left knee  Neurologic:  [x ] non-focal exam   Skin:  [ ] no rash  Lymph: [ ] no lymphadenopathy  Psychiatric:  [ ] appropriate affect [ ] alert & oriented  Lines:  [x ] no phlebitis [ ] central line                                                      8.6    11.04 )-----------( 367      ( 22 Dec 2017 06:30 )             26.8 12-22    135  |  100  |  11  ----------------------------<  96  3.8   |  21  |  1.09  Ca    8.9      22 Dec 2017 06:30Phos  3.1     12-22Mg     1.4     12-22  TPro  6.3  /  Alb  2.8  /  TBili  0.3  /  DBili  x   /  AST  19  /  ALT  9   /  AlkPhos  98  12-22        MICROBIOLOGY:    BLOOD  12-18-17 --  --  -- no growth      BLOOD PERIPHERAL  12-16-17 --  --  --  no growth      URINE MIDSTREAM  12-16-17 --  --  --  no growth      URINE MIDSTREAM  12-15-17 --  --  --      BLOOD PERIPHERAL  12-15-17 --  --  --                RADIOLOGY:    < from: CT Abdomen and Pelvis w/ IV Cont (12.18.17 @ 18:10) >  :   CT of the Chest, Abdomen and Pelvis was performed with intravenous   contrast.   Intravenous contrast: 90 ml Omnipaque 350. 10 ml discarded.  Oral contrast: None.  Sagittal and coronal reformats were performed.    FINDINGS:    CHEST:     LUNGS AND LARGE AIRWAYS: Right upper lobectomy. Atelectasis of the   lateral right lower lobe, unchanged from prior. Unchanged 2 mm nodule in   the lateral left upper lobe (2:33). Basilar dependent atelectasis.   Emphysematous changes.  PLEURA: Moderate right pleural effusion, slightly decreased since   12/4/2017.  VESSELS: Coronary artery and aortic atherosclerosis.  HEART: Heart size is normal. Trace pericardial effusion.  MEDIASTINUM AND JACKELINE:   Subcarinal lymph node (2,37) 1.3 x 0.9 cm previously 1.3 x 0.9 cm.  CHEST WALL AND LOWER NECK: Scattered lymph nodes.  Right retropectoral node (2,5) 1.4 x 1.0 cm previously  0.7 x 0.8 cm.  Left retropectoral node (2,9) 1.1 x 0.9 cm previously  1.0 x 0.6 cm.    ABDOMEN AND PELVIS:    LIVER: Within normal limits.  BILE DUCTS: Normal caliber.  GALLBLADDER: Small layering calculi.  SPLEEN: Within normal limits.  PANCREAS: Within normal limits.  ADRENALS: Within normal limits.  KIDNEYS/URETERS: Bilateral cysts.    BLADDER: Within normal limits.  REPRODUCTIVE ORGANS: The prostate is within normal limits.    BOWEL: No bowel obstruction. Colonic diverticulosis without   diverticulitis. Appendix not visualized.  PERITONEUM: No ascites.  VESSELS:  Atherosclerotic changes.  RETROPERITONEUM: Mild right perinephric stranding with thickening of the   pararenal fascia, worse since prior, nonspecific.  Scattered lymph nodes.  Portocaval lymph node (2,89) 2.9 x 1.5 cm previously 2.9 x 1.4 cm.  Aortocaval lymph node (2,92) 1.3 x 0.9 cm previously 1.2 x 0.9 cm.  ABDOMINAL WALL:Small fat containing left inguinal hernia.  BONES: Degenerative changes.    IMPRESSION:        Post operative changes of the right lung with slightly improved pleural   effusion.  Slight increase in size of retropectoral nodes. Additional lymph nodesin   the chest and abdomen appear stable.    < end of copied text >

## 2017-12-23 LAB
BACTERIA BLD CULT: SIGNIFICANT CHANGE UP
BACTERIA BLD CULT: SIGNIFICANT CHANGE UP

## 2018-01-07 ENCOUNTER — FORM ENCOUNTER (OUTPATIENT)
Age: 73
End: 2018-01-07

## 2018-01-08 ENCOUNTER — APPOINTMENT (OUTPATIENT)
Dept: HEMATOLOGY ONCOLOGY | Facility: CLINIC | Age: 73
End: 2018-01-08

## 2018-01-08 ENCOUNTER — RESULT REVIEW (OUTPATIENT)
Age: 73
End: 2018-01-08

## 2018-01-08 ENCOUNTER — APPOINTMENT (OUTPATIENT)
Dept: NUCLEAR MEDICINE | Facility: IMAGING CENTER | Age: 73
End: 2018-01-08
Payer: MEDICARE

## 2018-01-08 ENCOUNTER — OUTPATIENT (OUTPATIENT)
Dept: OUTPATIENT SERVICES | Facility: HOSPITAL | Age: 73
LOS: 1 days | Discharge: HOME | End: 2018-01-08

## 2018-01-08 ENCOUNTER — OUTPATIENT (OUTPATIENT)
Dept: OUTPATIENT SERVICES | Facility: HOSPITAL | Age: 73
LOS: 1 days | End: 2018-01-08
Payer: MEDICARE

## 2018-01-08 ENCOUNTER — OUTPATIENT (OUTPATIENT)
Dept: OUTPATIENT SERVICES | Facility: HOSPITAL | Age: 73
LOS: 1 days | End: 2018-01-08

## 2018-01-08 ENCOUNTER — APPOINTMENT (OUTPATIENT)
Dept: RADIOLOGY | Facility: IMAGING CENTER | Age: 73
End: 2018-01-08
Payer: MEDICARE

## 2018-01-08 ENCOUNTER — RX RENEWAL (OUTPATIENT)
Age: 73
End: 2018-01-08

## 2018-01-08 DIAGNOSIS — Z98.890 OTHER SPECIFIED POSTPROCEDURAL STATES: Chronic | ICD-10-CM

## 2018-01-08 DIAGNOSIS — Z90.89 ACQUIRED ABSENCE OF OTHER ORGANS: Chronic | ICD-10-CM

## 2018-01-08 DIAGNOSIS — Z90.49 ACQUIRED ABSENCE OF OTHER SPECIFIED PARTS OF DIGESTIVE TRACT: Chronic | ICD-10-CM

## 2018-01-08 DIAGNOSIS — C78.00 SECONDARY MALIGNANT NEOPLASM OF UNSPECIFIED LUNG: ICD-10-CM

## 2018-01-08 DIAGNOSIS — C34.90 MALIGNANT NEOPLASM OF UNSPECIFIED PART OF UNSPECIFIED BRONCHUS OR LUNG: ICD-10-CM

## 2018-01-08 LAB
BASOPHILS # BLD AUTO: 0.1 K/UL — SIGNIFICANT CHANGE UP (ref 0–0.2)
BLD GP AB SCN SERPL QL: NEGATIVE — SIGNIFICANT CHANGE UP
EOSINOPHIL # BLD AUTO: 0.2 K/UL — SIGNIFICANT CHANGE UP (ref 0–0.5)
EOSINOPHIL NFR BLD AUTO: 1 % — SIGNIFICANT CHANGE UP (ref 0–6)
HCT VFR BLD CALC: 29 % — LOW (ref 39–50)
HGB BLD-MCNC: 10 G/DL — LOW (ref 13–17)
LYMPHOCYTES # BLD AUTO: 34 % — SIGNIFICANT CHANGE UP (ref 13–44)
LYMPHOCYTES # BLD AUTO: 5.8 K/UL — HIGH (ref 1–3.3)
MCHC RBC-ENTMCNC: 34.5 PG — HIGH (ref 27–34)
MCHC RBC-ENTMCNC: 34.7 G/DL — SIGNIFICANT CHANGE UP (ref 32–36)
MCV RBC AUTO: 99.6 FL — SIGNIFICANT CHANGE UP (ref 80–100)
MONOCYTES # BLD AUTO: 1.8 K/UL — HIGH (ref 0–0.9)
MONOCYTES NFR BLD AUTO: 7 % — SIGNIFICANT CHANGE UP (ref 2–14)
NEUTROPHILS # BLD AUTO: 8.5 K/UL — HIGH (ref 1.8–7.4)
NEUTROPHILS NFR BLD AUTO: 57 % — SIGNIFICANT CHANGE UP (ref 43–77)
NEUTS BAND # BLD: 1 % — SIGNIFICANT CHANGE UP (ref 0–8)
PLAT MORPH BLD: NORMAL — SIGNIFICANT CHANGE UP
PLATELET # BLD AUTO: 284 K/UL — SIGNIFICANT CHANGE UP (ref 150–400)
RBC # BLD: 2.91 M/UL — LOW (ref 4.2–5.8)
RBC # FLD: 18.1 % — HIGH (ref 10.3–14.5)
RBC BLD AUTO: SIGNIFICANT CHANGE UP
RH IG SCN BLD-IMP: POSITIVE — SIGNIFICANT CHANGE UP
WBC # BLD: 16.5 K/UL — HIGH (ref 3.8–10.5)
WBC # FLD AUTO: 16.5 K/UL — HIGH (ref 3.8–10.5)

## 2018-01-08 PROCEDURE — 78815 PET IMAGE W/CT SKULL-THIGH: CPT | Mod: 26,PS

## 2018-01-08 PROCEDURE — 71046 X-RAY EXAM CHEST 2 VIEWS: CPT | Mod: 26

## 2018-01-08 PROCEDURE — 71046 X-RAY EXAM CHEST 2 VIEWS: CPT

## 2018-01-08 PROCEDURE — A9552: CPT

## 2018-01-08 PROCEDURE — 86900 BLOOD TYPING SEROLOGIC ABO: CPT

## 2018-01-08 PROCEDURE — 78815 PET IMAGE W/CT SKULL-THIGH: CPT

## 2018-01-08 PROCEDURE — 86901 BLOOD TYPING SEROLOGIC RH(D): CPT

## 2018-01-08 PROCEDURE — 86850 RBC ANTIBODY SCREEN: CPT

## 2018-01-09 ENCOUNTER — APPOINTMENT (OUTPATIENT)
Dept: THORACIC SURGERY | Facility: CLINIC | Age: 73
End: 2018-01-09
Payer: MEDICARE

## 2018-01-09 ENCOUNTER — INPATIENT (INPATIENT)
Facility: HOSPITAL | Age: 73
LOS: 1 days | Discharge: HOME | End: 2018-01-11
Attending: HOSPITALIST | Admitting: HOSPITALIST
Payer: MEDICARE

## 2018-01-09 VITALS
RESPIRATION RATE: 22 BRPM | HEART RATE: 112 BPM | TEMPERATURE: 101 F | DIASTOLIC BLOOD PRESSURE: 72 MMHG | SYSTOLIC BLOOD PRESSURE: 138 MMHG | OXYGEN SATURATION: 98 %

## 2018-01-09 VITALS
HEART RATE: 87 BPM | SYSTOLIC BLOOD PRESSURE: 139 MMHG | DIASTOLIC BLOOD PRESSURE: 77 MMHG | BODY MASS INDEX: 23.85 KG/M2 | HEIGHT: 69 IN | WEIGHT: 161 LBS | OXYGEN SATURATION: 98 %

## 2018-01-09 DIAGNOSIS — Z90.89 ACQUIRED ABSENCE OF OTHER ORGANS: Chronic | ICD-10-CM

## 2018-01-09 DIAGNOSIS — Z98.890 OTHER SPECIFIED POSTPROCEDURAL STATES: Chronic | ICD-10-CM

## 2018-01-09 DIAGNOSIS — F17.200 NICOTINE DEPENDENCE, UNSPECIFIED, UNCOMPLICATED: ICD-10-CM

## 2018-01-09 DIAGNOSIS — Z90.49 ACQUIRED ABSENCE OF OTHER SPECIFIED PARTS OF DIGESTIVE TRACT: Chronic | ICD-10-CM

## 2018-01-09 DIAGNOSIS — R50.9 FEVER, UNSPECIFIED: ICD-10-CM

## 2018-01-09 LAB
ALBUMIN SERPL ELPH-MCNC: 3.6 G/DL — SIGNIFICANT CHANGE UP (ref 3.3–5)
ALP SERPL-CCNC: 115 U/L — SIGNIFICANT CHANGE UP (ref 40–120)
ALT FLD-CCNC: 5 U/L — SIGNIFICANT CHANGE UP (ref 4–41)
APTT BLD: 32.7 SEC — SIGNIFICANT CHANGE UP (ref 27.5–37.4)
AST SERPL-CCNC: 17 U/L — SIGNIFICANT CHANGE UP (ref 4–40)
BASE EXCESS BLDV CALC-SCNC: -2.7 MMOL/L — SIGNIFICANT CHANGE UP
BASOPHILS # BLD AUTO: 0.02 K/UL — SIGNIFICANT CHANGE UP (ref 0–0.2)
BASOPHILS NFR BLD AUTO: 0.1 % — SIGNIFICANT CHANGE UP (ref 0–2)
BILIRUB SERPL-MCNC: 0.4 MG/DL — SIGNIFICANT CHANGE UP (ref 0.2–1.2)
BLOOD GAS VENOUS - CREATININE: 1.71 MG/DL — HIGH (ref 0.5–1.3)
BUN SERPL-MCNC: 27 MG/DL — HIGH (ref 7–23)
CALCIUM SERPL-MCNC: 9.2 MG/DL — SIGNIFICANT CHANGE UP (ref 8.4–10.5)
CHLORIDE BLDV-SCNC: 108 MMOL/L — SIGNIFICANT CHANGE UP (ref 96–108)
CHLORIDE SERPL-SCNC: 99 MMOL/L — SIGNIFICANT CHANGE UP (ref 98–107)
CO2 SERPL-SCNC: 18 MMOL/L — LOW (ref 22–31)
CREAT SERPL-MCNC: 1.75 MG/DL — HIGH (ref 0.5–1.3)
EOSINOPHIL # BLD AUTO: 0.01 K/UL — SIGNIFICANT CHANGE UP (ref 0–0.5)
EOSINOPHIL NFR BLD AUTO: 0.1 % — SIGNIFICANT CHANGE UP (ref 0–6)
GAS PNL BLDV: 133 MMOL/L — LOW (ref 136–146)
GLUCOSE BLDV-MCNC: 161 — HIGH (ref 70–99)
GLUCOSE SERPL-MCNC: 159 MG/DL — HIGH (ref 70–99)
HCO3 BLDV-SCNC: 22 MMOL/L — SIGNIFICANT CHANGE UP (ref 20–27)
HCT VFR BLD CALC: 30.3 % — LOW (ref 39–50)
HCT VFR BLDV CALC: 30.3 % — LOW (ref 39–51)
HGB BLD-MCNC: 9.8 G/DL — LOW (ref 13–17)
HGB BLDV-MCNC: 9.8 G/DL — LOW (ref 13–17)
IMM GRANULOCYTES # BLD AUTO: 0.08 # — SIGNIFICANT CHANGE UP
IMM GRANULOCYTES NFR BLD AUTO: 0.5 % — SIGNIFICANT CHANGE UP (ref 0–1.5)
INR BLD: 1.18 — HIGH (ref 0.88–1.17)
LACTATE BLDV-MCNC: 1.8 MMOL/L — SIGNIFICANT CHANGE UP (ref 0.5–2)
LYMPHOCYTES # BLD AUTO: 1.77 K/UL — SIGNIFICANT CHANGE UP (ref 1–3.3)
LYMPHOCYTES # BLD AUTO: 10.4 % — LOW (ref 13–44)
MCHC RBC-ENTMCNC: 32.1 PG — SIGNIFICANT CHANGE UP (ref 27–34)
MCHC RBC-ENTMCNC: 32.3 % — SIGNIFICANT CHANGE UP (ref 32–36)
MCV RBC AUTO: 99.3 FL — SIGNIFICANT CHANGE UP (ref 80–100)
MONOCYTES # BLD AUTO: 1.31 K/UL — HIGH (ref 0–0.9)
MONOCYTES NFR BLD AUTO: 7.7 % — SIGNIFICANT CHANGE UP (ref 2–14)
NEUTROPHILS # BLD AUTO: 13.77 K/UL — HIGH (ref 1.8–7.4)
NEUTROPHILS NFR BLD AUTO: 81.2 % — HIGH (ref 43–77)
NRBC # FLD: 0 — SIGNIFICANT CHANGE UP
PCO2 BLDV: 36 MMHG — LOW (ref 41–51)
PH BLDV: 7.39 PH — SIGNIFICANT CHANGE UP (ref 7.32–7.43)
PLATELET # BLD AUTO: 293 K/UL — SIGNIFICANT CHANGE UP (ref 150–400)
PMV BLD: 10.3 FL — SIGNIFICANT CHANGE UP (ref 7–13)
PO2 BLDV: 46 MMHG — HIGH (ref 35–40)
POTASSIUM BLDV-SCNC: 4.3 MMOL/L — SIGNIFICANT CHANGE UP (ref 3.4–4.5)
POTASSIUM SERPL-MCNC: 4.5 MMOL/L — SIGNIFICANT CHANGE UP (ref 3.5–5.3)
POTASSIUM SERPL-SCNC: 4.5 MMOL/L — SIGNIFICANT CHANGE UP (ref 3.5–5.3)
PROT SERPL-MCNC: 7.1 G/DL — SIGNIFICANT CHANGE UP (ref 6–8.3)
PROTHROM AB SERPL-ACNC: 13.6 SEC — HIGH (ref 9.8–13.1)
RBC # BLD: 3.05 M/UL — LOW (ref 4.2–5.8)
RBC # FLD: 19.2 % — HIGH (ref 10.3–14.5)
SAO2 % BLDV: 78 % — SIGNIFICANT CHANGE UP (ref 60–85)
SODIUM SERPL-SCNC: 135 MMOL/L — SIGNIFICANT CHANGE UP (ref 135–145)
WBC # BLD: 16.96 K/UL — HIGH (ref 3.8–10.5)
WBC # FLD AUTO: 16.96 K/UL — HIGH (ref 3.8–10.5)

## 2018-01-09 PROCEDURE — 99215 OFFICE O/P EST HI 40 MIN: CPT

## 2018-01-09 PROCEDURE — 71045 X-RAY EXAM CHEST 1 VIEW: CPT | Mod: 26

## 2018-01-09 RX ORDER — SODIUM CHLORIDE 9 MG/ML
1000 INJECTION INTRAMUSCULAR; INTRAVENOUS; SUBCUTANEOUS ONCE
Qty: 0 | Refills: 0 | Status: COMPLETED | OUTPATIENT
Start: 2018-01-09 | End: 2018-01-09

## 2018-01-09 RX ORDER — MEROPENEM 1 G/30ML
1000 INJECTION INTRAVENOUS ONCE
Qty: 0 | Refills: 0 | Status: COMPLETED | OUTPATIENT
Start: 2018-01-09 | End: 2018-01-09

## 2018-01-09 RX ORDER — ACETAMINOPHEN 500 MG
1000 TABLET ORAL ONCE
Qty: 0 | Refills: 0 | Status: COMPLETED | OUTPATIENT
Start: 2018-01-09 | End: 2018-01-09

## 2018-01-09 RX ORDER — VANCOMYCIN HCL 1 G
1000 VIAL (EA) INTRAVENOUS ONCE
Qty: 0 | Refills: 0 | Status: COMPLETED | OUTPATIENT
Start: 2018-01-09 | End: 2018-01-09

## 2018-01-09 RX ADMIN — MEROPENEM 100 MILLIGRAM(S): 1 INJECTION INTRAVENOUS at 23:55

## 2018-01-09 RX ADMIN — Medication 400 MILLIGRAM(S): at 21:57

## 2018-01-09 RX ADMIN — SODIUM CHLORIDE 1000 MILLILITER(S): 9 INJECTION INTRAMUSCULAR; INTRAVENOUS; SUBCUTANEOUS at 21:57

## 2018-01-09 RX ADMIN — Medication 250 MILLIGRAM(S): at 22:49

## 2018-01-09 NOTE — ED ADULT NURSE NOTE - CHIEF COMPLAINT QUOTE
Pt arrives via EMS--pt had contrast yesterday for pet scan. Pt has lung ca. Pt developed 1 large episode of diarrhea--couldnt control his bowels  and lethergy,chills and SOB  Pt given 1 liter NS--#20 lt hand--pt feels better

## 2018-01-09 NOTE — ED PROVIDER NOTE - PMH
Alcohol abuse  Sober since 01/2016- attending AA meeting weekly  Depression, unspecified depression type    Essential hypertension    Gout    HTN (hypertension)    Localized enlarged lymph nodes    Solitary pulmonary nodule    Squamous cell carcinoma of lung, stage III    TIA (transient ischemic attack)  1999

## 2018-01-09 NOTE — ED PROVIDER NOTE - MEDICAL DECISION MAKING DETAILS
Resident Morenitansky: 73M w/ stage IIIA squamous cell CA s/p RUL wedge resection and VATS lobectomectomy, pleural effusion s/p thoracentesis, CLL/SLL (s/p abraxane/carboplatin last 12/5), Afib on eliquis, HTN, CKD, depression, recent admission 12/16-12/22/17 for aseptic pyelonephritis presenting with one day of profuse watery diarrhea, chills and SOB.  Concern is for C diff given recent admission and Meropenem course. Will draw basic labs, blood cultures, UA/ucx, CXR. IV Hydration and acetaminophen. Will send C diff and defer antibiotics pending result.  Likely admit.

## 2018-01-09 NOTE — ED PROVIDER NOTE - OBJECTIVE STATEMENT
73M w/ stage IIIA squamous cell CA s/p RUL wedge resection and VATS lobectomectomy, pleural effusion s/p thoracentesis, CLL/SLL (s/p abraxane/carboplatin last 12/5), Afib on eliquis, HTN, CKD, depression, recent admission 12/16-12/22/17 for aseptic pyelonephritis presenting with one day of profuse watery diarrhea, chills and SOB.  Patient states he had a PET/CT yesterday and saw his CTsx Dr. Foster today.  He felt well until he arrived home after the appointment where he had a small episode of diarrhea while in bed. He then had a very large profuse watery diarrhea bowel movement that he could not control. He also felt SOB, states he couldn't catch his breath. Also endorsing chills and fatigue, but denies fever.  Denies abdominal pain, nausea, BRBPR or melena. Denies cough, headache, sick contacts.  His last chemo was Dec 5th, no plans for further chemo at this time.     PCP: Dr. Nakia Ruiz  Onc: Dr. Feliciano  CTsx: Dr. Foster

## 2018-01-09 NOTE — ED PROVIDER NOTE - ATTENDING CONTRIBUTION TO CARE
73M p/w diarrhea several episodes, profuse, watery while at home today, also had chills and generalized weakness at home.  Pt was feeling fine today and yesterday, had Pet CT yest and saw CT surg yest, feeling okay.  Pt had chemo about 1 month ago for lung CA, still smokes.  Was admitted recently for Fever and had course of meropenem, unclear if abx at home.  Sepsis.  Currently feeling a bit unwell, no abd pain.  Febrile here, will culture.  Concern for c-diff place on precautions and sent for c-diff if produces.  Consider IV abx, given dramatic presentation would favor empiric abx.  Rx fluids, admit.   VS:  fever, tachycardia    GEN - NAD;malaise A+O x3   HEAD - NC/AT     ENT - PEERL, EOMI, mucous membranes  dry , no discharge      NECK: Neck supple, non-tender without lymphadenopathy, no masses, no JVD  PULM - CTA b/l,  symmetric breath sounds  COR -  normal heart sounds    ABD - , ND, NT, soft, no guarding, no rebound, no masses    BACK - no CVA tenderness, nontender spine     EXTREMS - no edema, no deformity, warm and well perfused    SKIN - no rash or bruising      NEUROLOGIC - alert, CN 2-12 intact, sensation nl, motor 5/5 RUE/LUE/RLE/LLE.

## 2018-01-09 NOTE — ED ADULT NURSE NOTE - OBJECTIVE STATEMENT
RN facilitator: A&Ox3, respirations even and unlabored, speaks in clear and full sentences, bilateral lower extremity redness noted, ambulatory with walker at baseline, left hand 20g IV from EMS. Patient reports SOB today. feeling as if "gasping for air", feeling weak since last chemo 12/05/2017, watery diarrhea x 2 dark in color denies blood in stool.  Denies chest pain, palpitation, abdominal pain, headache, dysuria, hematuria. As per family, patient noted to over dyspnea on exertion and chills, episode today of weakness, denies falls or syncope. Hx Afib, HTN, patient on Eliquis, anemia, last blood transfusions on November 2017. RN facilitator: A&Ox3, respirations even and unlabored, speaks in clear and full sentences, bilateral lower extremity redness noted, ambulatory with walker at baseline, left hand 20g IV from EMS, flushes well and good blood return. Patient reports SOB today, feeling as if "gasping for air", feeling weak since last chemo 12/05/2017, watery diarrhea x 2 dark in color, denies blood in stool. Patient was recently admitted 12/2017 for fever, family states unknown source of possible infection, was treated with IV antibiotics, discharged to rehab facility and later discharged home. Denies chest pain, palpitations, abdominal pain, headache, dysuria, hematuria. As per family, patient noted to over dyspnea on exertion and chills, episode today of weakness, denies falls or syncope. Hx Afib, HTN, patient on Eliquis, anemia, last blood transfusions on November 2017. Report given to primary RN Alfred. RN facilitator: A&Ox3, respirations even and unlabored, speaks in clear and full sentences, bilateral lower extremity redness noted, blanchable sacral redness noted, no signs of skin break observed, ambulatory with walker at baseline, left hand 20g IV from EMS, flushes well and good blood return. Patient reports SOB today, feeling as if "gasping for air", feeling weak since last chemo 12/05/2017, watery diarrhea x 2 dark in color, denies blood in stool. Patient was recently admitted 12/2017 for fever, family states unknown source of possible infection, was treated with IV antibiotics, discharged to rehab facility and later discharged home. Denies chest pain, palpitations, abdominal pain, headache, dysuria, hematuria. As per family, patient noted to over dyspnea on exertion and chills, episode today of weakness, denies falls or syncope. Hx Afib, HTN, patient on Eliquis, anemia, last blood transfusions on November 2017. Report given to primary RN Alfred.

## 2018-01-10 ENCOUNTER — RESULT REVIEW (OUTPATIENT)
Age: 73
End: 2018-01-10

## 2018-01-10 ENCOUNTER — CLINICAL ADVICE (OUTPATIENT)
Age: 73
End: 2018-01-10

## 2018-01-10 ENCOUNTER — APPOINTMENT (OUTPATIENT)
Dept: GERIATRICS | Facility: CLINIC | Age: 73
End: 2018-01-10

## 2018-01-10 DIAGNOSIS — Z71.89 OTHER SPECIFIED COUNSELING: ICD-10-CM

## 2018-01-10 DIAGNOSIS — C34.91 MALIGNANT NEOPLASM OF UNSPECIFIED PART OF RIGHT BRONCHUS OR LUNG: ICD-10-CM

## 2018-01-10 DIAGNOSIS — N17.9 ACUTE KIDNEY FAILURE, UNSPECIFIED: ICD-10-CM

## 2018-01-10 DIAGNOSIS — R19.7 DIARRHEA, UNSPECIFIED: ICD-10-CM

## 2018-01-10 DIAGNOSIS — A09 INFECTIOUS GASTROENTERITIS AND COLITIS, UNSPECIFIED: ICD-10-CM

## 2018-01-10 DIAGNOSIS — R50.9 FEVER, UNSPECIFIED: ICD-10-CM

## 2018-01-10 DIAGNOSIS — Z29.9 ENCOUNTER FOR PROPHYLACTIC MEASURES, UNSPECIFIED: ICD-10-CM

## 2018-01-10 DIAGNOSIS — I10 ESSENTIAL (PRIMARY) HYPERTENSION: ICD-10-CM

## 2018-01-10 DIAGNOSIS — M1A.9XX1 CHRONIC GOUT, UNSPECIFIED, WITH TOPHUS (TOPHI): ICD-10-CM

## 2018-01-10 DIAGNOSIS — R65.10 SYSTEMIC INFLAMMATORY RESPONSE SYNDROME (SIRS) OF NON-INFECTIOUS ORIGIN WITHOUT ACUTE ORGAN DYSFUNCTION: ICD-10-CM

## 2018-01-10 DIAGNOSIS — I48.91 UNSPECIFIED ATRIAL FIBRILLATION: ICD-10-CM

## 2018-01-10 DIAGNOSIS — A41.9 SEPSIS, UNSPECIFIED ORGANISM: ICD-10-CM

## 2018-01-10 DIAGNOSIS — C34.90 MALIGNANT NEOPLASM OF UNSPECIFIED PART OF UNSPECIFIED BRONCHUS OR LUNG: ICD-10-CM

## 2018-01-10 LAB
ALBUMIN SERPL ELPH-MCNC: 4 G/DL
ALP BLD-CCNC: 135 U/L
ALT SERPL-CCNC: 7 U/L
ANION GAP SERPL CALC-SCNC: 17 MMOL/L
ANISOCYTOSIS BLD QL: SLIGHT — SIGNIFICANT CHANGE UP
APPEARANCE UR: CLEAR — SIGNIFICANT CHANGE UP
AST SERPL-CCNC: 23 U/L
B PERT DNA SPEC QL NAA+PROBE: SIGNIFICANT CHANGE UP
BASOPHILS # BLD AUTO: 0.03 K/UL — SIGNIFICANT CHANGE UP (ref 0–0.2)
BASOPHILS NFR BLD AUTO: 0.2 % — SIGNIFICANT CHANGE UP (ref 0–2)
BASOPHILS NFR SPEC: 0 % — SIGNIFICANT CHANGE UP (ref 0–2)
BILIRUB SERPL-MCNC: 0.3 MG/DL
BILIRUB UR-MCNC: NEGATIVE — SIGNIFICANT CHANGE UP
BLOOD UR QL VISUAL: NEGATIVE — SIGNIFICANT CHANGE UP
BUN SERPL-MCNC: 18 MG/DL
BUN SERPL-MCNC: 28 MG/DL — HIGH (ref 7–23)
C PNEUM DNA SPEC QL NAA+PROBE: NOT DETECTED — SIGNIFICANT CHANGE UP
CALCIUM SERPL-MCNC: 8.8 MG/DL — SIGNIFICANT CHANGE UP (ref 8.4–10.5)
CALCIUM SERPL-MCNC: 9.7 MG/DL
CHLORIDE SERPL-SCNC: 102 MMOL/L — SIGNIFICANT CHANGE UP (ref 98–107)
CHLORIDE SERPL-SCNC: 99 MMOL/L
CO2 SERPL-SCNC: 21 MMOL/L — LOW (ref 22–31)
CO2 SERPL-SCNC: 23 MMOL/L
COLOR SPEC: YELLOW — SIGNIFICANT CHANGE UP
CREAT SERPL-MCNC: 1.39 MG/DL
CREAT SERPL-MCNC: 1.68 MG/DL — HIGH (ref 0.5–1.3)
EOSINOPHIL # BLD AUTO: 0.29 K/UL — SIGNIFICANT CHANGE UP (ref 0–0.5)
EOSINOPHIL NFR BLD AUTO: 1.8 % — SIGNIFICANT CHANGE UP (ref 0–6)
EOSINOPHIL NFR FLD: 2.8 % — SIGNIFICANT CHANGE UP (ref 0–6)
FLUAV H1 2009 PAND RNA SPEC QL NAA+PROBE: NOT DETECTED — SIGNIFICANT CHANGE UP
FLUAV H1 RNA SPEC QL NAA+PROBE: NOT DETECTED — SIGNIFICANT CHANGE UP
FLUAV H3 RNA SPEC QL NAA+PROBE: NOT DETECTED — SIGNIFICANT CHANGE UP
FLUAV SUBTYP SPEC NAA+PROBE: SIGNIFICANT CHANGE UP
FLUBV RNA SPEC QL NAA+PROBE: NOT DETECTED — SIGNIFICANT CHANGE UP
GIANT PLATELETS BLD QL SMEAR: PRESENT — SIGNIFICANT CHANGE UP
GLUCOSE SERPL-MCNC: 115 MG/DL
GLUCOSE SERPL-MCNC: 90 MG/DL — SIGNIFICANT CHANGE UP (ref 70–99)
GLUCOSE UR-MCNC: NEGATIVE — SIGNIFICANT CHANGE UP
HADV DNA SPEC QL NAA+PROBE: NOT DETECTED — SIGNIFICANT CHANGE UP
HCOV 229E RNA SPEC QL NAA+PROBE: NOT DETECTED — SIGNIFICANT CHANGE UP
HCOV HKU1 RNA SPEC QL NAA+PROBE: NOT DETECTED — SIGNIFICANT CHANGE UP
HCOV NL63 RNA SPEC QL NAA+PROBE: NOT DETECTED — SIGNIFICANT CHANGE UP
HCOV OC43 RNA SPEC QL NAA+PROBE: NOT DETECTED — SIGNIFICANT CHANGE UP
HCT VFR BLD CALC: 27.5 % — LOW (ref 39–50)
HGB BLD-MCNC: 8.6 G/DL — LOW (ref 13–17)
HMPV RNA SPEC QL NAA+PROBE: NOT DETECTED — SIGNIFICANT CHANGE UP
HPIV1 RNA SPEC QL NAA+PROBE: NOT DETECTED — SIGNIFICANT CHANGE UP
HPIV2 RNA SPEC QL NAA+PROBE: NOT DETECTED — SIGNIFICANT CHANGE UP
HPIV3 RNA SPEC QL NAA+PROBE: NOT DETECTED — SIGNIFICANT CHANGE UP
HPIV4 RNA SPEC QL NAA+PROBE: NOT DETECTED — SIGNIFICANT CHANGE UP
HYALINE CASTS # UR AUTO: SIGNIFICANT CHANGE UP (ref 0–?)
IMM GRANULOCYTES # BLD AUTO: 0.06 # — SIGNIFICANT CHANGE UP
IMM GRANULOCYTES NFR BLD AUTO: 0.4 % — SIGNIFICANT CHANGE UP (ref 0–1.5)
KETONES UR-MCNC: NEGATIVE — SIGNIFICANT CHANGE UP
LEUKOCYTE ESTERASE UR-ACNC: NEGATIVE — SIGNIFICANT CHANGE UP
LYMPHOCYTES # BLD AUTO: 26.9 % — SIGNIFICANT CHANGE UP (ref 13–44)
LYMPHOCYTES # BLD AUTO: 4.31 K/UL — HIGH (ref 1–3.3)
LYMPHOCYTES NFR SPEC AUTO: 7.5 % — LOW (ref 13–44)
M PNEUMO DNA SPEC QL NAA+PROBE: NOT DETECTED — SIGNIFICANT CHANGE UP
MACROCYTES BLD QL: SIGNIFICANT CHANGE UP
MAGNESIUM SERPL-MCNC: 1.4 MG/DL — LOW (ref 1.6–2.6)
MCHC RBC-ENTMCNC: 31.3 % — LOW (ref 32–36)
MCHC RBC-ENTMCNC: 31.7 PG — SIGNIFICANT CHANGE UP (ref 27–34)
MCV RBC AUTO: 101.5 FL — HIGH (ref 80–100)
MONOCYTES # BLD AUTO: 1.81 K/UL — HIGH (ref 0–0.9)
MONOCYTES NFR BLD AUTO: 11.3 % — SIGNIFICANT CHANGE UP (ref 2–14)
MONOCYTES NFR BLD: 4.7 % — SIGNIFICANT CHANGE UP (ref 2–9)
MUCOUS THREADS # UR AUTO: SIGNIFICANT CHANGE UP
NEUTROPHIL AB SER-ACNC: 82.2 % — HIGH (ref 43–77)
NEUTROPHILS # BLD AUTO: 9.52 K/UL — HIGH (ref 1.8–7.4)
NEUTROPHILS NFR BLD AUTO: 59.4 % — SIGNIFICANT CHANGE UP (ref 43–77)
NEUTS BAND # BLD: 0.9 % — SIGNIFICANT CHANGE UP (ref 0–6)
NITRITE UR-MCNC: NEGATIVE — SIGNIFICANT CHANGE UP
NRBC # FLD: 0 — SIGNIFICANT CHANGE UP
PH UR: 5.5 — SIGNIFICANT CHANGE UP (ref 4.6–8)
PHOSPHATE SERPL-MCNC: 4.3 MG/DL — SIGNIFICANT CHANGE UP (ref 2.5–4.5)
PLATELET # BLD AUTO: 259 K/UL — SIGNIFICANT CHANGE UP (ref 150–400)
PLATELET COUNT - ESTIMATE: NORMAL — SIGNIFICANT CHANGE UP
PMV BLD: 10.2 FL — SIGNIFICANT CHANGE UP (ref 7–13)
POIKILOCYTOSIS BLD QL AUTO: SLIGHT — SIGNIFICANT CHANGE UP
POTASSIUM SERPL-MCNC: 4.3 MMOL/L — SIGNIFICANT CHANGE UP (ref 3.5–5.3)
POTASSIUM SERPL-SCNC: 4.3 MMOL/L — SIGNIFICANT CHANGE UP (ref 3.5–5.3)
POTASSIUM SERPL-SCNC: 4.8 MMOL/L
PROT SERPL-MCNC: 6.8 G/DL
PROT UR-MCNC: 20 MG/DL — SIGNIFICANT CHANGE UP
RBC # BLD: 2.71 M/UL — LOW (ref 4.2–5.8)
RBC # FLD: 19.2 % — HIGH (ref 10.3–14.5)
RBC CASTS # UR COMP ASSIST: SIGNIFICANT CHANGE UP (ref 0–?)
RSV RNA SPEC QL NAA+PROBE: NOT DETECTED — SIGNIFICANT CHANGE UP
RV+EV RNA SPEC QL NAA+PROBE: NOT DETECTED — SIGNIFICANT CHANGE UP
SCHISTOCYTES BLD QL AUTO: SLIGHT — SIGNIFICANT CHANGE UP
SODIUM SERPL-SCNC: 136 MMOL/L — SIGNIFICANT CHANGE UP (ref 135–145)
SODIUM SERPL-SCNC: 139 MMOL/L
SP GR SPEC: 1.01 — SIGNIFICANT CHANGE UP (ref 1–1.04)
SPECIMEN SOURCE: SIGNIFICANT CHANGE UP
SPECIMEN SOURCE: SIGNIFICANT CHANGE UP
SQUAMOUS # UR AUTO: SIGNIFICANT CHANGE UP
UROBILINOGEN FLD QL: NORMAL MG/DL — SIGNIFICANT CHANGE UP
VARIANT LYMPHS # BLD: 1.9 % — SIGNIFICANT CHANGE UP
WBC # BLD: 16.02 K/UL — HIGH (ref 3.8–10.5)
WBC # FLD AUTO: 16.02 K/UL — HIGH (ref 3.8–10.5)
WBC UR QL: SIGNIFICANT CHANGE UP (ref 0–?)

## 2018-01-10 PROCEDURE — 99233 SBSQ HOSP IP/OBS HIGH 50: CPT | Mod: GC

## 2018-01-10 PROCEDURE — 12345: CPT | Mod: NC

## 2018-01-10 PROCEDURE — 99223 1ST HOSP IP/OBS HIGH 75: CPT | Mod: GC

## 2018-01-10 RX ORDER — PROCHLORPERAZINE MALEATE 5 MG
10 TABLET ORAL THREE TIMES A DAY
Qty: 0 | Refills: 0 | Status: DISCONTINUED | OUTPATIENT
Start: 2018-01-10 | End: 2018-01-11

## 2018-01-10 RX ORDER — APIXABAN 2.5 MG/1
5 TABLET, FILM COATED ORAL EVERY 12 HOURS
Qty: 0 | Refills: 0 | Status: DISCONTINUED | OUTPATIENT
Start: 2018-01-10 | End: 2018-01-11

## 2018-01-10 RX ORDER — TIOTROPIUM BROMIDE 18 UG/1
1 CAPSULE ORAL; RESPIRATORY (INHALATION) DAILY
Qty: 0 | Refills: 0 | Status: DISCONTINUED | OUTPATIENT
Start: 2018-01-10 | End: 2018-01-11

## 2018-01-10 RX ORDER — SODIUM CHLORIDE 9 MG/ML
1000 INJECTION INTRAMUSCULAR; INTRAVENOUS; SUBCUTANEOUS
Qty: 0 | Refills: 0 | Status: DISCONTINUED | OUTPATIENT
Start: 2018-01-10 | End: 2018-01-11

## 2018-01-10 RX ORDER — NICOTINE POLACRILEX 2 MG
1 GUM BUCCAL DAILY
Qty: 0 | Refills: 0 | Status: DISCONTINUED | OUTPATIENT
Start: 2018-01-10 | End: 2018-01-11

## 2018-01-10 RX ORDER — MAGNESIUM SULFATE 500 MG/ML
2 VIAL (ML) INJECTION ONCE
Qty: 0 | Refills: 0 | Status: COMPLETED | OUTPATIENT
Start: 2018-01-10 | End: 2018-01-10

## 2018-01-10 RX ADMIN — Medication 50 GRAM(S): at 08:53

## 2018-01-10 RX ADMIN — APIXABAN 5 MILLIGRAM(S): 2.5 TABLET, FILM COATED ORAL at 17:38

## 2018-01-10 RX ADMIN — TIOTROPIUM BROMIDE 1 CAPSULE(S): 18 CAPSULE ORAL; RESPIRATORY (INHALATION) at 13:13

## 2018-01-10 RX ADMIN — APIXABAN 5 MILLIGRAM(S): 2.5 TABLET, FILM COATED ORAL at 08:53

## 2018-01-10 RX ADMIN — SODIUM CHLORIDE 100 MILLILITER(S): 9 INJECTION INTRAMUSCULAR; INTRAVENOUS; SUBCUTANEOUS at 04:45

## 2018-01-10 RX ADMIN — Medication 1 TABLET(S): at 13:19

## 2018-01-10 NOTE — PROGRESS NOTE ADULT - PROBLEM SELECTOR PLAN 5
Rate controlled currently  Paroxysmal a-fib; [id number] risk stratification score 3  - Will convert long-acting cardizem to short-acting in the setting of relative hypotension. Can likely transition back to long acting regimen tomorrow if BP stable.  - C/w Eliquis

## 2018-01-10 NOTE — CONSULT NOTE ADULT - ATTENDING COMMENTS
Mr. Melendez was seen in consultation along with the oncology fellow, Dr. Ruiz. This man was diagnosed with non-small cell carcinoma the lung, stage IIIa. He underwent resection and had multiple nodes positive. He received adjuvant chemotherapy, and recently was diagnosed with malignant pleural effusion on the right side. He presented with a fever. There is no clear source of infection at this time. Imaging studies were reviewed. He has a free-flowing effusion involving the right lung. He had recently seen Dr. Phillips in regards to this. It is unlikely that any treatment would be planned at this time, given the lack of measurable disease. He may require attention to his infusion it becomes more symptomatic. All questions were answered to best my ability and to their apparent satisfaction. I agree with the assessment and plan as stated above in the note by Dr. Ruiz.

## 2018-01-10 NOTE — ED ADULT NURSE REASSESSMENT NOTE - NS ED NURSE REASSESS COMMENT FT1
Pt requested cream due to discomfort to his buttocks.  Moisture barrier cream placed by male RN.  Pt educated on positioning to relieve pain to buttocks.  Pt continues to have blanchable erythema to sacral area.  Pt has not had a bowel movement the entire night.  Will continue to monitor.

## 2018-01-10 NOTE — CONSULT NOTE ADULT - ASSESSMENT
73M PMH initial stage 3A squamous cell carcinoma of the lung s/p RUL wedge resection, lymph node dissection and adjuvant chemo with no residual nodule disease, however with malignant pleural effusion

## 2018-01-10 NOTE — H&P ADULT - PROBLEM SELECTOR PLAN 5
Rate controlled currently  - Will convert long-acting cardizem to short-acting in the setting of relative hypotension  - C/w Eliquis Rate controlled currently  Paroxysmal a-fib; [id number] risk stratification score 3  - Will convert long-acting cardizem to short-acting in the setting of relative hypotension  - C/w Eliquis

## 2018-01-10 NOTE — H&P ADULT - GASTROINTESTINAL DETAILS
Hyperactive bowel sounds/no rebound tenderness/no rigidity/soft/no distention/no guarding/nontender no guarding/no rebound tenderness/no rigidity/no distention/soft/nontender/slightly hyperactive bowel sounds

## 2018-01-10 NOTE — PROGRESS NOTE ADULT - ASSESSMENT
72yo Male with a history of Stage IIIa squamous cell lung cancer s/p 4 cycles of cisplatin and Abraxane (last 12/5/17) s/p RUL lobectomy, CLL/SLL, malignant pleural effusion s/p thoracentesis, Afib on Eliquis, HTN and gout presenting with 1-day history of loose stools, found to be febrile and meeting SIRS criteria, concerning for acute infection. Currently asymptomatic w/o any BMs while in hospital.

## 2018-01-10 NOTE — H&P ADULT - ASSESSMENT
74yo M with a history of Stage IIIa squamous cell lung cancer s/p 4 cycles of cisplatin and Abraxane (last 12/5/17) s/p RUL lobectomy, CLL/SLL, malignant pleural effusion s/p thoracentesis, Afib on Eliquis, HTN and gout presenting with sepsis 2/2 likely infectious diarrhea. 72yo Male with a history of Stage IIIa squamous cell lung cancer s/p 4 cycles of cisplatin and Abraxane (last 12/5/17) s/p RUL lobectomy, CLL/SLL, malignant pleural effusion s/p thoracentesis, Afib on Eliquis, HTN and gout presenting with sepsis 2/2 likely infectious diarrhea C diff vs viral etiology; 72yo Male with a history of Stage IIIa squamous cell lung cancer s/p 4 cycles of cisplatin and Abraxane (last 12/5/17) s/p RUL lobectomy, CLL/SLL, malignant pleural effusion s/p thoracentesis, Afib on Eliquis, HTN and gout presenting with SIRS r/o infectious diarrhea C diff vs viral etiology; No BM while in ED;

## 2018-01-10 NOTE — H&P ADULT - NEGATIVE ENMT SYMPTOMS
no throat pain/no dysphagia/no hearing difficulty/no nasal discharge/no sinus symptoms/no nasal congestion

## 2018-01-10 NOTE — PROGRESS NOTE ADULT - PROBLEM SELECTOR PLAN 3
EUGENIO likely 2/2 pre-renal losses through decreased PO intake and loose stools.    - C/w maintenance IVF  - Trend creatinine daily  - Avoid nephrotoxic agents  - Holding allopurinol for now

## 2018-01-10 NOTE — ED ADULT NURSE REASSESSMENT NOTE - NS ED NURSE REASSESS COMMENT FT1
Pt received from WILLIAM Valles, pt A&Ox3, c/o of feeling tired and fatigued, denies any pain, dizziness, nausea, vomiting, shortness fo breath, pt currently afebrile, on contact precautions but has not a bowel movement in 15 hrs. does not feel like he has to go. pt admitted to medicine, awaiting bed assignment. Will continue to monitor.

## 2018-01-10 NOTE — H&P ADULT - NSHPLABSRESULTS_GEN_ALL_CORE
9.8    16.96 )-----------( 293      ( 09 Jan 2018 21:15 )             30.3     01-09    135  |  99  |  27<H>  ----------------------------<  159<H>  4.5   |  18<L>  |  1.75<H>    Ca    9.2      09 Jan 2018 21:15    TPro  7.1  /  Alb  3.6  /  TBili  0.4  /  DBili  x   /  AST  17  /  ALT  5   /  AlkPhos  115  01-09    PT/INR - ( 09 Jan 2018 21:15 )   PT: 13.6 SEC;   INR: 1.18          PTT - ( 09 Jan 2018 21:15 )  PTT:32.7 SEC    Lactate 1.8    RVP negative EKG, 1/10/18, NSR, 74bpm, qtc 446, no acute Tw or ST changes - my reading     9.8    16.96 )-----------( 293      ( 09 Jan 2018 21:15 )             30.3     01-09    135  |  99  |  27<H>  ----------------------------<  159<H>  4.5   |  18<L>  |  1.75<H>    Ca    9.2      09 Jan 2018 21:15    TPro  7.1  /  Alb  3.6  /  TBili  0.4  /  DBili  x   /  AST  17  /  ALT  5   /  AlkPhos  115  01-09    PT/INR - ( 09 Jan 2018 21:15 )   PT: 13.6 SEC;   INR: 1.18          PTT - ( 09 Jan 2018 21:15 )  PTT:32.7 SEC    Lactate 1.8    RVP negative

## 2018-01-10 NOTE — PROGRESS NOTE ADULT - SUBJECTIVE AND OBJECTIVE BOX
Patient is a 73y old  Male who presents with a chief complaint of Diarrhea and fever (10 Ahsan 2018 01:10)        SUBJECTIVE / OVERNIGHT EVENTS:      MEDICATIONS  (STANDING):  apixaban 5 milliGRAM(s) Oral every 12 hours  diltiazem    Tablet 90 milliGRAM(s) Oral every 6 hours  multivitamin 1 Tablet(s) Oral daily  nicotine -  14 mG/24Hr(s) Patch 1 patch Transdermal daily  sodium chloride 0.9%. 1000 milliLiter(s) (100 mL/Hr) IV Continuous <Continuous>  tiotropium 18 MICROgram(s) Capsule 1 Capsule(s) Inhalation daily    MEDICATIONS  (PRN):  prochlorperazine   Tablet 10 milliGRAM(s) Oral three times a day PRN Nausea/vomiting      Vital Signs Last 24 Hrs  T(C): 36.4 (10 Ahsan 2018 14:19), Max: 39.2 (2018 20:59)  T(F): 97.6 (10 Ahsan 2018 14:19), Max: 102.5 (2018 20:59)  HR: 80 (10 Ahsan 2018 14:19) (70 - 112)  BP: 145/79 (10 Ahsan 2018 14:19) (109/63 - 145/79)  BP(mean): --  RR: 16 (10 Ahsan 2018 14:19) (16 - 22)  SpO2: 99% (10 Ahsan 2018 14:19) (97% - 100%)  CAPILLARY BLOOD GLUCOSE        I&O's Summary    10 Ahsan 2018 07:01  -  10 Ahsan 2018 16:14  --------------------------------------------------------  IN: 0 mL / OUT: 600 mL / NET: -600 mL          PHYSICAL EXAM  GENERAL: NAD, well-developed  HEAD:  Atraumatic, Normocephalic  EYES: EOMI, PERRLA, conjunctiva and sclera clear  NECK: Supple, No JVD  CHEST/LUNG: Clear to auscultation bilaterally; No wheeze  HEART: Regular rate and rhythm; No murmurs, rubs, or gallops  ABDOMEN: Soft, Nontender, Nondistended; Bowel sounds present  EXTREMITIES:  2+ Peripheral Pulses, No clubbing, cyanosis, or edema  PSYCH: AAOx3  SKIN: No rashes or lesions    LABS:                        8.6    16.02 )-----------( 259      ( 10 Ahsan 2018 06:10 )             27.5     01-10    136  |  102  |  28<H>  ----------------------------<  90  4.3   |  21<L>  |  1.68<H>    Ca    8.8      10 Ahsan 2018 06:10  Phos  4.3     01-10  Mg     1.4     01-10    TPro  7.1  /  Alb  3.6  /  TBili  0.4  /  DBili  x   /  AST  17  /  ALT  5   /  AlkPhos  115      PT/INR - ( 2018 21:15 )   PT: 13.6 SEC;   INR: 1.18          PTT - ( 2018 21:15 )  PTT:32.7 SEC      Urinalysis Basic - ( 10 Ahsan 2018 11:10 )    Color: YELLOW / Appearance: CLEAR / S.013 / pH: 5.5  Gluc: NEGATIVE / Ketone: NEGATIVE  / Bili: NEGATIVE / Urobili: NORMAL mg/dL   Blood: NEGATIVE / Protein: 20 mg/dL / Nitrite: NEGATIVE   Leuk Esterase: NEGATIVE / RBC: 0-2 / WBC 0-2   Sq Epi: OCC / Non Sq Epi: x / Bacteria: x        RADIOLOGY & ADDITIONAL TESTS:    Imaging Personally Reviewed:  Consultant(s) Notes Reviewed:    Care Discussed with Consultants/Other Providers: Patient is a 73y old  Male who presents with a chief complaint of Diarrhea and fever (10 Ahsan 2018 01:10)    SUBJECTIVE / OVERNIGHT EVENTS:  Patient seen and examined at bedside earlier today. Patient's wife also present at bedside. Patient reports feeling better. No diarrhea or BMs since being hospitalized. Patient with no SOB, chest pain, n/v, or abdominal pain.     MEDICATIONS  (STANDING):  apixaban 5 milliGRAM(s) Oral every 12 hours  diltiazem    Tablet 90 milliGRAM(s) Oral every 6 hours  multivitamin 1 Tablet(s) Oral daily  nicotine -  14 mG/24Hr(s) Patch 1 patch Transdermal daily  sodium chloride 0.9%. 1000 milliLiter(s) (100 mL/Hr) IV Continuous <Continuous>  tiotropium 18 MICROgram(s) Capsule 1 Capsule(s) Inhalation daily    MEDICATIONS  (PRN):  prochlorperazine   Tablet 10 milliGRAM(s) Oral three times a day PRN Nausea/vomiting      Vital Signs Last 24 Hrs  T(C): 36.4 (10 Ahsan 2018 14:19), Max: 39.2 (2018 20:59)  T(F): 97.6 (10 Ahsan 2018 14:19), Max: 102.5 (2018 20:59)  HR: 80 (10 Ahsan 2018 14:19) (70 - 112)  BP: 145/79 (10 Ahsan 2018 14:19) (109/63 - 145/79)  BP(mean): --  RR: 16 (10 Ahsan 2018 14:19) (16 - 22)  SpO2: 99% (10 Ahsan 2018 14:19) (97% - 100%)  CAPILLARY BLOOD GLUCOSE        I&O's Summary    10 Ahsan 2018 07:01  -  10 Ahsan 2018 16:14  --------------------------------------------------------  IN: 0 mL / OUT: 600 mL / NET: -600 mL          PHYSICAL EXAM  GENERAL: NAD, well-developed  HEAD:  Atraumatic, Normocephalic  EYES: EOMI, PERRLA, conjunctiva and sclera clear  NECK: Supple, No JVD  CHEST/LUNG: Clear to auscultation bilaterally; No wheeze  HEART: Regular rate and rhythm; No murmurs, rubs, or gallops  ABDOMEN: Soft, Nontender, Nondistended; Bowel sounds present  EXTREMITIES:  2+ Peripheral Pulses, No clubbing, cyanosis, or edema  PSYCH: AAOx3  SKIN: erythematous patches of skin (not warm to touch) on lower shins b/l        LABS:                        8.6    16.02 )-----------( 259      ( 10 Ahsan 2018 06:10 )             27.5     01-10    136  |  102  |  28<H>  ----------------------------<  90  4.3   |  21<L>  |  1.68<H>    Ca    8.8      10 Ahsan 2018 06:10  Phos  4.3     01-10  Mg     1.4     01-10    TPro  7.1  /  Alb  3.6  /  TBili  0.4  /  DBili  x   /  AST  17  /  ALT  5   /  AlkPhos  115  -09    PT/INR - ( 2018 21:15 )   PT: 13.6 SEC;   INR: 1.18          PTT - ( 2018 21:15 )  PTT:32.7 SEC      Urinalysis Basic - ( 10 Ahsan 2018 11:10 )    Color: YELLOW / Appearance: CLEAR / S.013 / pH: 5.5  Gluc: NEGATIVE / Ketone: NEGATIVE  / Bili: NEGATIVE / Urobili: NORMAL mg/dL   Blood: NEGATIVE / Protein: 20 mg/dL / Nitrite: NEGATIVE   Leuk Esterase: NEGATIVE / RBC: 0-2 / WBC 0-2   Sq Epi: OCC / Non Sq Epi: x / Bacteria: x        RADIOLOGY & ADDITIONAL TESTS:    Imaging Personally Reviewed: Yes  Consultant(s) Notes Reviewed:  Yes  Care Discussed with Consultants/Other Providers: Yes

## 2018-01-10 NOTE — H&P ADULT - MUSCULOSKELETAL
details… no calf tenderness/ROM intact/no joint swelling/no joint erythema/normal strength/no joint warmth detailed exam

## 2018-01-10 NOTE — CONSULT NOTE ADULT - SUBJECTIVE AND OBJECTIVE BOX
HPI: Patient is a 73 year old male with PMH of stage IIIA squamous cell lung cancer previously treated with RUL lobectomy, lymph node dissection and adjuvant cisplatin and abraxane (last dose 12/5/17), CLL, with recently diagnosed right-side malignat effusion who presents due to profuse diarrhea of 1 day duration, now resolved since arriving the ER. He was additionally noted to have a fever of 102.5. Patient denies fevers at home, nausea, vomiting, abdominal pain, distention, cough, chills, rigors, headache, neck stiffness, dysuria, rash, shortness of breath, dyspnea on exertion. Patient RVP was negative, his UA was not suggestive of UTI and his CXR did not show infiltrative process to suggest pneumonia. Patient's most recent PET CT from 1/8 shows a moderate, partiall loculated right pleural effusion, otherwise there are mildly FDG avid nodes in the neck thorax, retroperitoneum that are similar or slightly decreased in size attributed to patient's CLL. Otherwise there was no evidence of recurrence or metastatic disease.    Allergies    No Known Allergies    Intolerances    MEDICATIONS  (STANDING):  apixaban 5 milliGRAM(s) Oral every 12 hours  diltiazem    Tablet 90 milliGRAM(s) Oral every 6 hours  multivitamin 1 Tablet(s) Oral daily  nicotine -  14 mG/24Hr(s) Patch 1 patch Transdermal daily  sodium chloride 0.9%. 1000 milliLiter(s) (100 mL/Hr) IV Continuous <Continuous>  tiotropium 18 MICROgram(s) Capsule 1 Capsule(s) Inhalation daily    MEDICATIONS  (PRN):  prochlorperazine   Tablet 10 milliGRAM(s) Oral three times a day PRN Nausea/vomiting    PAST MEDICAL & SURGICAL HISTORY:  Squamous cell carcinoma of lung, stage III  TIA (transient ischemic attack): 1999  Localized enlarged lymph nodes  Solitary pulmonary nodule  HTN (hypertension)  Alcohol abuse: Sober since 01/2016- attending AA meeting weekly  Depression, unspecified depression type  Gout  Essential hypertension  H/O hemorrhoidectomy: 1967  History of tonsillectomy: as a child  History of appendectomy: 1958  H/O left knee surgery: 1995      FAMILY HISTORY:  No pertinent family history in first degree relatives      SOCIAL HISTORY: No EtOH, no tobacco    REVIEW OF SYSTEMS:    CONSTITUTIONAL: No weakness, chills  EYES/ENT: No visual changes;  No vertigo or throat pain   NECK: No pain or stiffness  RESPIRATORY: No cough, wheezing, hemoptysis; No shortness of breath  CARDIOVASCULAR: No chest pain or palpitations  GASTROINTESTINAL: No abdominal or epigastric pain. No nausea, vomiting, or hematemesis; No melena or hematochezia.  GENITOURINARY: No dysuria, frequency or hematuria  NEUROLOGICAL: No numbness or weakness  SKIN: No itching, burning, rashes, or lesions   All other review of systems is negative unless indicated above.        T(F): 97.6 (01-10-18 @ 14:19), Max: 102.5 (01-09-18 @ 20:59)  HR: 77 (01-10-18 @ 18:11)  BP: 133/69 (01-10-18 @ 18:11)  RR: 18 (01-10-18 @ 18:11)  SpO2: 99% (01-10-18 @ 18:11)  Wt(kg): --    GENERAL: NAD, well-developed  HEAD:  Atraumatic, Normocephalic  EYES: EOMI, PERRLA, conjunctiva and sclera clear  NECK: Supple, No JVD  CHEST/LUNG: Clear to auscultation bilaterally; No wheeze  HEART: Regular rate and rhythm; No murmurs, rubs, or gallops  ABDOMEN: Soft, Nontender, Nondistended; Bowel sounds present  EXTREMITIES:  2+ Peripheral Pulses, No clubbing, cyanosis, or edema  NEUROLOGY: non-focal  SKIN: No rashes or lesions                          8.6    16.02 )-----------( 259      ( 10 Ahsan 2018 06:10 )             27.5       01-10    136  |  102  |  28<H>  ----------------------------<  90  4.3   |  21<L>  |  1.68<H>    Ca    8.8      10 Ahsan 2018 06:10  Phos  4.3     01-10  Mg     1.4     01-10    TPro  7.1  /  Alb  3.6  /  TBili  0.4  /  DBili  x   /  AST  17  /  ALT  5   /  AlkPhos  115  01-09      Phosphorus Level, Serum: 4.3 mg/dL (01-10 @ 06:10)  Magnesium, Serum: 1.4 mg/dL (01-10 @ 06:10)

## 2018-01-10 NOTE — H&P ADULT - HISTORY OF PRESENT ILLNESS
Pt is a 72yo M with a history of Stage IIIa squamous cell lung cancer s/p 4 cycles of cisplatin and Abraxane (last 12/5/17) s/p RUL lobectomy, CLL/SLL, malignant pleural effusion s/p thoracentesis, Afib on Eliquis, HTN and gout presenting with diarrhea and fever. Pt states that since yesterday, he has not been feeling well. He has had decreased PO intake and had chills and fevers at home up to 102. Today he suddenly had 1 episode of loose, non-bloody diarrhea and was unable to make it to the bathroom in time. Pt denies abdominal pain, nausea or vomiting. He did not have any changes in his BM prior to today. Pt denies eating any new foods or recent travel. Pt last completed chemotherapy on 12/5 and had a recent PET scan done. Pt last had a CXR done the day PTA which found a trace to small right pleural effusion; pt denies any SOB at this time.     ED vitals: Temp 100.6  /72 RR 22 O2 98% on room air. CXR showed right lung volume loss consistent with history of lobectomy. RVP was negative. Labs remarkable for WBC 16.96, Hgb of 9.8 at baseline, creatinine of 1.75, and INR of 1.18. Pt was given vancomycin x1, meropenem x1, NS 1L bolus, and Tylenol IV. Blood cultures x2 were sent. Pt is a 72yo M with a history of Stage IIIa squamous cell lung cancer s/p 4 cycles of cisplatin and Abraxane (last 12/5/17) s/p RUL lobectomy, CLL/SLL, malignant pleural effusion s/p thoracentesis, Afib on Eliquis, HTN and gout presenting with diarrhea and fever. Pt states that since yesterday, he has not been feeling well. He has had decreased PO intake and had chills and fevers at home up to 102. Today he suddenly had 1 episode of loose, non-bloody diarrhea and was unable to make it to the bathroom in time. Pt denies abdominal pain, nausea or vomiting. He did not have any changes in his BM prior to today. Pt denies eating any new foods or recent travel. Pt last completed chemotherapy on 12/5 and had a recent PET scan done. Pt last had a CXR done the day PTA which found a trace to small right pleural effusion; pt denies any SOB at this time.     ED vitals: Temp 100.6 Tmax 102.5  /72 RR 22 O2 98% on room air. CXR showed right lung volume loss consistent with history of lobectomy. RVP was negative. Labs remarkable for WBC 16.96, Hgb of 9.8 at baseline, creatinine of 1.75, and INR of 1.18. Pt was given vancomycin x1, meropenem x1, NS 1L bolus, and Tylenol IV. Blood cultures x2 were sent. Pt is a 72yo Male with a history of Stage IIIa squamous cell lung cancer s/p 4 cycles of cisplatin and Abraxane (last 12/5/17) s/p RUL lobectomy, CLL/SLL, malignant pleural effusion s/p thoracentesis, Afib on Eliquis, HTN and gout, admitted on 12/15/2017 for sepsis due to pulmonary vs  source per ID consultation (completed course of Meropenem); On this admission the pt presents with diarrhea and fever. Pt states that since yesterday, he has not been feeling well. He has had decreased PO intake and had chills and fevers at home up to 102. Today he suddenly had 1 episode of loose, non-bloody diarrhea and was unable to make it to the bathroom in time. Pt denies abdominal pain, nausea or vomiting. He did not have any changes in his BM prior to today. Pt denies eating any new foods or recent travel. Pt last completed chemotherapy on 12/5 and had a recent PET scan done. Pt last had a CXR done the day PTA which found a trace to small right pleural effusion; pt denies any SOB at this time.     ED vitals: Temp 100.6 Tmax 102.5  /72 RR 22 O2 98% on room air. CXR showed right lung volume loss consistent with history of lobectomy. RVP was negative. Labs remarkable for WBC 16.96, Hgb of 9.8 at baseline, creatinine of 1.75, and INR of 1.18. Pt was given vancomycin x1, meropenem x1, NS 1L bolus, and Tylenol IV. Blood cultures x2 were sent. Pt is a 72yo Male with a history of Stage IIIa squamous cell lung cancer s/p 4 cycles of cisplatin and Abraxane (last 12/5/17) s/p RUL lobectomy, CLL/SLL, malignant pleural effusion s/p thoracentesis, Afib on Eliquis, HTN and gout, admitted on 12/15/2017 for sepsis due to pulmonary vs  source per ID consultation (completed course of Meropenem); On this admission the pt c/o watery diarrhea and fever. Pt states that since yesterday, he has not been feeling well. He has had decreased PO intake and had chills and fevers at home up to 102. Today he suddenly had 1 episode of loose, non-bloody diarrhea and was unable to make it to the bathroom in time. Pt denies abdominal pain, nausea or vomiting. He did not have any changes in his BM prior to today. Pt denies eating any new foods or recent travel. Pt last completed chemotherapy on 12/5 and had a recent PET scan done. Pt last had a CXR done the day PTA which found a trace to small right pleural effusion; pt denies any SOB at this time. No recent travel outside the country.    ED vitals: Temp 100.6 Tmax 102.5  /72 RR 22 O2 98% on room air. CXR showed right lung volume loss consistent with history of lobectomy. RVP was negative. Labs remarkable for WBC 16.96, Hgb of 9.8 at baseline, creatinine of 1.75, and INR of 1.18. Pt was given vancomycin x1, meropenem x1, NS 1L bolus, and Tylenol IV. Blood cultures x2 were sent. Pt is a 72yo Male with a history of Stage IIIa squamous cell lung cancer s/p 4 cycles of cisplatin and Abraxane (last 12/5/17) s/p RUL lobectomy, CLL/SLL, malignant pleural effusion s/p thoracentesis, Afib on Eliquis, HTN and gout, admitted on 12/15/2017 for sepsis due to pulmonary vs  source per ID consultation (completed course of Meropenem); On this admission the pt c/o watery diarrhea and fever. Pt states that since yesterday, he has not been feeling well. He has had decreased PO intake and had chills and fevers at home up to 102. Today he suddenly had 1 episode of loose, non-bloody diarrhea and was unable to make it to the bathroom in time. Pt denies abdominal pain, nausea or vomiting. He did not have any changes in his BM prior to today. Pt denies eating any new foods or recent travel. Pt last completed chemotherapy on 12/5 and had a recent PET scan done. Pt last had a CXR done the day PTA which found a trace to small right pleural effusion; pt denies any SOB at this time. No recent travel outside the country.    ED course: Temp 100.6 Tmax 102.5  /72 RR 22 O2 98% on room air. Pt was given vancomycin x1, meropenem x1, NS 1L bolus, and Tylenol IV. No BM while in ED;

## 2018-01-10 NOTE — PROGRESS NOTE ADULT - PROBLEM SELECTOR PLAN 9
DNR/DNI in chart    DISPO: Possible d/c home tomorrow if pt remains afebrile with negative infectious w/u and Cr closer to baseline level.

## 2018-01-10 NOTE — H&P ADULT - PROBLEM SELECTOR PLAN 1
Pt meets SIRS criteria with leukocytosis, fever, tachycardia and tachypnea. No clear source of infection as per history, pt had 1 loose stool and has not had any BMs since.   - Will monitor off abx for now  - F/u blood cultures and urine cultures  - C/w IVF- NS @100cc/hr for 10 hours  - Monitor VS q4h Pt meets SIRS criteria with leukocytosis (baseline), fever, tachycardia and tachypnea. No clear source of infection as per history, pt had 1 loose stool and has not had any BMs since.   - Will monitor off abx for now  - F/u blood cultures and urine cultures  - C/w IVF- NS @100cc/hr for 10 hours  - Monitor VS q4h

## 2018-01-10 NOTE — H&P ADULT - NEGATIVE GASTROINTESTINAL SYMPTOMS
no abdominal pain/no vomiting/no nausea/no hematochezia no hematochezia/no abdominal pain/no melena/no nausea/no vomiting

## 2018-01-10 NOTE — CONSULT NOTE ADULT - PROBLEM SELECTOR RECOMMENDATION 3
- unclear etiology await blood cultures, workup negative for source thus far and now afebrile    Moe Ruiz, PGY4  Hematology/Oncology Fellow  Pager: [phone]

## 2018-01-10 NOTE — H&P ADULT - NEUROLOGICAL DETAILS
responds to verbal commands/normal strength/sensation intact/alert and oriented x 3 alert and oriented x 3/sensation intact/responds to verbal commands/cranial nerves intact/normal strength

## 2018-01-10 NOTE — H&P ADULT - NSHPSOCIALHISTORY_GEN_ALL_CORE
Lives at home with his wife. Retired . Current smoker, smokes 1/2 PPD for the past year- previously smoked 1 PPD for 60 years prior. Previous history of alcohol abuse- denies recent alcohol intake. Denies recreational drug use. No recent travel outside the country. Lives at home with his wife.  Retired .  Current smoker, smokes 1/2 PPD for the past year- previously smoked 1 PPD for 60 years prior.  Previous history of alcohol abuse- denies recent alcohol intake.  Denies recreational drug use.

## 2018-01-10 NOTE — H&P ADULT - PROBLEM SELECTOR PLAN 3
EUGENIO likely 2/2 pre-renal losses through decreased PO intake and loose stools  - C/w maintenance IVF  - Trend creatinine daily  - Avoid nephrotoxic agents  - Holding allopurinol for now

## 2018-01-10 NOTE — H&P ADULT - PROBLEM SELECTOR PLAN 2
Pt with 1 BM before presentation, with no further BMs in the ED  - Cdiff and stool cultures pending  - C/w IVF  - Regular diet as tolerated Pt with 1 BM before presentation, with no further BMs in the ED  - Cdiff and stool cultures pending  - If Cdiff positive, will treat with oral vancomycin for severe disease given leukocytosis over 15,000 and creatinine >1.5x that of baseline  - C/w IVF  - Regular diet as tolerated Pt with 1 BM before presentation, with no further BMs in the ED  - Cdiff and stool cultures pending (no BM while in ED)  - C/w IVF  - Regular diet as tolerated

## 2018-01-10 NOTE — PROGRESS NOTE ADULT - PROBLEM SELECTOR PLAN 2
Pt with 1 BM before presentation, with no further BMs in the ED. Possibly adverse effect of oral contrast patient had to take for recent PET scan.   - low suspicion for infectious source at this time.  - Regular diet as tolerated

## 2018-01-11 ENCOUNTER — TRANSCRIPTION ENCOUNTER (OUTPATIENT)
Age: 73
End: 2018-01-11

## 2018-01-11 VITALS
OXYGEN SATURATION: 100 % | RESPIRATION RATE: 18 BRPM | DIASTOLIC BLOOD PRESSURE: 76 MMHG | HEART RATE: 74 BPM | SYSTOLIC BLOOD PRESSURE: 118 MMHG

## 2018-01-11 LAB
ALBUMIN SERPL ELPH-MCNC: 3.2 G/DL — LOW (ref 3.3–5)
ALP SERPL-CCNC: 95 U/L — SIGNIFICANT CHANGE UP (ref 40–120)
ALT FLD-CCNC: 5 U/L — SIGNIFICANT CHANGE UP (ref 4–41)
AST SERPL-CCNC: 15 U/L — SIGNIFICANT CHANGE UP (ref 4–40)
BACTERIA UR CULT: SIGNIFICANT CHANGE UP
BASOPHILS # BLD AUTO: 0.02 K/UL — SIGNIFICANT CHANGE UP (ref 0–0.2)
BASOPHILS NFR BLD AUTO: 0.1 % — SIGNIFICANT CHANGE UP (ref 0–2)
BILIRUB SERPL-MCNC: < 0.2 MG/DL — LOW (ref 0.2–1.2)
BUN SERPL-MCNC: 20 MG/DL — SIGNIFICANT CHANGE UP (ref 7–23)
CALCIUM SERPL-MCNC: 8.9 MG/DL — SIGNIFICANT CHANGE UP (ref 8.4–10.5)
CHLORIDE SERPL-SCNC: 106 MMOL/L — SIGNIFICANT CHANGE UP (ref 98–107)
CO2 SERPL-SCNC: 22 MMOL/L — SIGNIFICANT CHANGE UP (ref 22–31)
CREAT SERPL-MCNC: 1.15 MG/DL — SIGNIFICANT CHANGE UP (ref 0.5–1.3)
EOSINOPHIL # BLD AUTO: 0.52 K/UL — HIGH (ref 0–0.5)
EOSINOPHIL NFR BLD AUTO: 3.7 % — SIGNIFICANT CHANGE UP (ref 0–6)
GLUCOSE SERPL-MCNC: 97 MG/DL — SIGNIFICANT CHANGE UP (ref 70–99)
HCT VFR BLD CALC: 27.6 % — LOW (ref 39–50)
HGB BLD-MCNC: 8.9 G/DL — LOW (ref 13–17)
IMM GRANULOCYTES # BLD AUTO: 0.05 # — SIGNIFICANT CHANGE UP
IMM GRANULOCYTES NFR BLD AUTO: 0.4 % — SIGNIFICANT CHANGE UP (ref 0–1.5)
LYMPHOCYTES # BLD AUTO: 35.8 % — SIGNIFICANT CHANGE UP (ref 13–44)
LYMPHOCYTES # BLD AUTO: 5.02 K/UL — HIGH (ref 1–3.3)
MCHC RBC-ENTMCNC: 32.2 % — SIGNIFICANT CHANGE UP (ref 32–36)
MCHC RBC-ENTMCNC: 33 PG — SIGNIFICANT CHANGE UP (ref 27–34)
MCV RBC AUTO: 102.2 FL — HIGH (ref 80–100)
MONOCYTES # BLD AUTO: 2.3 K/UL — HIGH (ref 0–0.9)
MONOCYTES NFR BLD AUTO: 16.4 % — HIGH (ref 2–14)
NEUTROPHILS # BLD AUTO: 6.1 K/UL — SIGNIFICANT CHANGE UP (ref 1.8–7.4)
NEUTROPHILS NFR BLD AUTO: 43.6 % — SIGNIFICANT CHANGE UP (ref 43–77)
NRBC # FLD: 0 — SIGNIFICANT CHANGE UP
PLATELET # BLD AUTO: 233 K/UL — SIGNIFICANT CHANGE UP (ref 150–400)
PMV BLD: 9.7 FL — SIGNIFICANT CHANGE UP (ref 7–13)
POTASSIUM SERPL-MCNC: 4.2 MMOL/L — SIGNIFICANT CHANGE UP (ref 3.5–5.3)
POTASSIUM SERPL-SCNC: 4.2 MMOL/L — SIGNIFICANT CHANGE UP (ref 3.5–5.3)
PROT SERPL-MCNC: 6.2 G/DL — SIGNIFICANT CHANGE UP (ref 6–8.3)
RBC # BLD: 2.7 M/UL — LOW (ref 4.2–5.8)
RBC # FLD: 18.8 % — HIGH (ref 10.3–14.5)
SODIUM SERPL-SCNC: 141 MMOL/L — SIGNIFICANT CHANGE UP (ref 135–145)
SPECIMEN SOURCE: SIGNIFICANT CHANGE UP
WBC # BLD: 14.01 K/UL — HIGH (ref 3.8–10.5)
WBC # FLD AUTO: 14.01 K/UL — HIGH (ref 3.8–10.5)

## 2018-01-11 PROCEDURE — 99239 HOSP IP/OBS DSCHRG MGMT >30: CPT

## 2018-01-11 RX ADMIN — TIOTROPIUM BROMIDE 1 CAPSULE(S): 18 CAPSULE ORAL; RESPIRATORY (INHALATION) at 12:50

## 2018-01-11 RX ADMIN — APIXABAN 5 MILLIGRAM(S): 2.5 TABLET, FILM COATED ORAL at 05:35

## 2018-01-11 RX ADMIN — Medication 1 TABLET(S): at 12:48

## 2018-01-11 NOTE — PROGRESS NOTE ADULT - PROBLEM SELECTOR PLAN 2
Likely adverse effect of oral contrast patient had to take for recent PET scan done on 1/8. Patient's presentation not c/w with bacterial infection, particularly C. diff. Character of stool also improving w/o any intervention.  - low suspicion for infectious source at this time.  - no need for further w/u  - Regular diet as tolerated

## 2018-01-11 NOTE — PROGRESS NOTE ADULT - PROBLEM SELECTOR PLAN 5
Rate controlled currently  Paroxysmal a-fib; [id number] risk stratification score 3  - Will convert long-acting cardizem to short-acting in the setting of relative hypotension. Can likely transition back to long acting regimen tomorrow if BP stable.  - C/w Eliquis

## 2018-01-11 NOTE — DISCHARGE NOTE ADULT - MEDICATION SUMMARY - MEDICATIONS TO TAKE
I will START or STAY ON the medications listed below when I get home from the hospital:    Cardizem  mg/24 hours oral capsule, extended release  -- 1 cap(s) by mouth once a day  -- It is very important that you take or use this exactly as directed.  Do not skip doses or discontinue unless directed by your doctor.  Some non-prescription drugs may aggravate your condition.  Read all labels carefully.  If a warning appears, check with your doctor before taking.  Swallow whole.  Do not crush.    -- Indication: For Atrial fibrillation, unspecified type    Eliquis 5 mg oral tablet  -- orally 2 times a day  -- Indication: For Atrial fibrillation, unspecified type    prochlorperazine 10 mg oral tablet  -- 1 tab(s) by mouth 3 times a day, As Needed  -- Indication: For Nausea    allopurinol 300 mg oral tablet  -- 1 tab(s) by mouth once a day  -- Indication: For gout    Spiriva 18 mcg inhalation capsule  -- 1 cap(s) inhaled once a day  -- Indication: For bronchodilator    Centrum oral tablet  -- 1 tab(s) by mouth once a day   -- Indication: For ppx

## 2018-01-11 NOTE — DISCHARGE NOTE ADULT - PLAN OF CARE
Pt to be afebrile and stable hemodynamically Follow up with PCP if any signs of fever develop- Blood cx neg to date Continue taking eliquis Follow up with Oncology at [facility] as scheduled

## 2018-01-11 NOTE — PROGRESS NOTE ADULT - PROBLEM SELECTOR PLAN 3
EUGENIO likely 2/2 pre-renal losses through decreased PO intake and loose stools.  Resolved s/p IV fluids and oral hydration  - Can resume home dose allopurinol

## 2018-01-11 NOTE — PROGRESS NOTE ADULT - PROBLEM SELECTOR PLAN 1
Pt meets SIRS criteria with leukocytosis,, fever, tachycardia and tachypnea. However, pt with chronic leukocytosis which is lower than baseline level currently. No clear source of infection. CXR reveals resolution of prior right sided pleural effusion. UA and RVP negative. Fever possibly due to cytokine release from underlying cancer??  - no indication for abx; blood cultures and urine culture all negative

## 2018-01-11 NOTE — DISCHARGE NOTE ADULT - CARE PROVIDER_API CALL
Pravin Feliciano; MBBS), Hematology; [facility]; Medical Oncology  [address], [place] [id number]  Phone: [phone]  Fax: [phone]    Robson Foster), Surgery; Thoracic Surgery  [address] [place] [address]  Phone: [phone]  Fax: [phone]

## 2018-01-11 NOTE — PROGRESS NOTE ADULT - ASSESSMENT
74yo Male with a history of Stage IIIa squamous cell lung cancer s/p 4 cycles of cisplatin and Abraxane (last 12/5/17) s/p RUL lobectomy, CLL/SLL, malignant pleural effusion s/p thoracentesis, Afib on Eliquis, HTN and gout presenting with 1-day history of loose stools, found to be febrile and meeting SIRS criteria, concerning for acute infection. Currently asymptomatic with loose but more formed stool today.

## 2018-01-11 NOTE — DISCHARGE NOTE ADULT - PATIENT PORTAL LINK FT
“You can access the [facility] Patient Portal, offered by [facility], by registering with the following website: [email or web address]”

## 2018-01-11 NOTE — DISCHARGE NOTE ADULT - HOSPITAL COURSE
74yo Male with a history of Stage IIIa squamous cell lung cancer s/p 4 cycles of cisplatin and Abraxane (last 12/5/17) s/p RUL lobectomy, CLL/SLL, malignant pleural effusion s/p thoracentesis, Afib on Eliquis, HTN and gout presenting with SIRS r/o infectious diarrhea C diff vs viral etiology;    SIRS (systemic inflammatory response syndrome).     Pt meets SIRS criteria with leukocytosis,, fever, tachycardia and tachypnea  . However, pt with chronic leukocytosis which is at baseline level currently. No clear source of infection. Pt had 1 loose stool and has not had any BMs since. CXR reveals resolution of prior right sided pleural effusion. UA and RVP negative. Fever possibly due to cytokine release from underlying cancer??  - monitor off abx for now     Diarrhea of presumed infectious origin   Possibly adverse effect of oral contrast patient had to take for recent PET scan.   No more episodes of diarrhea  - Regular diet as tolerated.      EUGENIO (acute kidney injury).   - C/w maintenance IVF  resolved   -    Stage III squamous cell carcinoma of right lung    S/p chemotherapy most recently on December 5th  Oncology notified about admission     Dispo Home with Home PT .

## 2018-01-11 NOTE — PROGRESS NOTE ADULT - SUBJECTIVE AND OBJECTIVE BOX
Patient is a 73y old  Male who presents with a chief complaint of Diarrhea and fever (2018 11:11)        SUBJECTIVE / OVERNIGHT EVENTS:      MEDICATIONS  (STANDING):  apixaban 5 milliGRAM(s) Oral every 12 hours  diltiazem    Tablet 90 milliGRAM(s) Oral every 6 hours  multivitamin 1 Tablet(s) Oral daily  nicotine -  14 mG/24Hr(s) Patch 1 patch Transdermal daily  tiotropium 18 MICROgram(s) Capsule 1 Capsule(s) Inhalation daily    MEDICATIONS  (PRN):  prochlorperazine   Tablet 10 milliGRAM(s) Oral three times a day PRN Nausea/vomiting      Vital Signs Last 24 Hrs  T(C): 36.4 (2018 05:39), Max: 36.6 (10 Ahsan 2018 22:02)  T(F): 97.5 (2018 05:39), Max: 97.8 (10 Ahsan 2018 22:02)  HR: 74 (2018 12:43) (68 - 80)  BP: 118/76 (2018 12:43) (118/76 - 146/82)  BP(mean): --  RR: 18 (2018 12:43) (16 - 18)  SpO2: 100% (2018 12:43) (99% - 100%)  CAPILLARY BLOOD GLUCOSE        I&O's Summary    10 Ahsan 2018 07:01  -  2018 07:00  --------------------------------------------------------  IN: 0 mL / OUT: 600 mL / NET: -600 mL          PHYSICAL EXAM  GENERAL: NAD, well-developed  HEAD:  Atraumatic, Normocephalic  EYES: EOMI, PERRLA, conjunctiva and sclera clear  NECK: Supple, No JVD  CHEST/LUNG: Clear to auscultation bilaterally; No wheeze  HEART: Regular rate and rhythm; No murmurs, rubs, or gallops  ABDOMEN: Soft, Nontender, Nondistended; Bowel sounds present  EXTREMITIES:  2+ Peripheral Pulses, No clubbing, cyanosis, or edema  PSYCH: AAOx3  SKIN: No rashes or lesions    LABS:                        8.9    14.01 )-----------( 233      ( 2018 06:50 )             27.6     01-11    141  |  106  |  20  ----------------------------<  97  4.2   |  22  |  1.15    Ca    8.9      2018 06:50  Phos  4.3     01-10  Mg     1.4     -10    TPro  6.2  /  Alb  3.2<L>  /  TBili  < 0.2<L>  /  DBili  x   /  AST  15  /  ALT  5   /  AlkPhos  95  01-11    PT/INR - ( 2018 21:15 )   PT: 13.6 SEC;   INR: 1.18          PTT - ( 2018 21:15 )  PTT:32.7 SEC      Urinalysis Basic - ( 10 Ahsan 2018 11:10 )    Color: YELLOW / Appearance: CLEAR / S.013 / pH: 5.5  Gluc: NEGATIVE / Ketone: NEGATIVE  / Bili: NEGATIVE / Urobili: NORMAL mg/dL   Blood: NEGATIVE / Protein: 20 mg/dL / Nitrite: NEGATIVE   Leuk Esterase: NEGATIVE / RBC: 0-2 / WBC 0-2   Sq Epi: OCC / Non Sq Epi: x / Bacteria: x        RADIOLOGY & ADDITIONAL TESTS:    Imaging Personally Reviewed:  Consultant(s) Notes Reviewed:    Care Discussed with Consultants/Other Providers: Patient is a 73y old  Male who presents with a chief complaint of Diarrhea and fever (2018 11:11)    SUBJECTIVE / OVERNIGHT EVENTS:  Patient reports feeling fine. Had loose BM today, but more formed and not explosive as initial episode. No n/v, abdominal pain. Eating well. He reports no fever or chills.    MEDICATIONS  (STANDING):  apixaban 5 milliGRAM(s) Oral every 12 hours  diltiazem    Tablet 90 milliGRAM(s) Oral every 6 hours  multivitamin 1 Tablet(s) Oral daily  nicotine -  14 mG/24Hr(s) Patch 1 patch Transdermal daily  tiotropium 18 MICROgram(s) Capsule 1 Capsule(s) Inhalation daily    MEDICATIONS  (PRN):  prochlorperazine   Tablet 10 milliGRAM(s) Oral three times a day PRN Nausea/vomiting      Vital Signs Last 24 Hrs  T(C): 36.4 (2018 05:39), Max: 36.6 (10 Ahsan 2018 22:02)  T(F): 97.5 (2018 05:39), Max: 97.8 (10 Ahsan 2018 22:02)  HR: 74 (2018 12:43) (68 - 80)  BP: 118/76 (2018 12:43) (118/76 - 146/82)  BP(mean): --  RR: 18 (2018 12:43) (16 - 18)  SpO2: 100% (2018 12:43) (99% - 100%)          I&O's Summary    10 Ahsan 2018 07:01  -  2018 07:00  --------------------------------------------------------  IN: 0 mL / OUT: 600 mL / NET: -600 mL          PHYSICAL EXAM  GENERAL: NAD, well-developed  HEAD:  Atraumatic, Normocephalic  EYES: EOMI, PERRLA, conjunctiva and sclera clear  NECK: Supple, No JVD  CHEST/LUNG: Clear to auscultation bilaterally; No wheeze  HEART: Regular rate and rhythm; No murmurs, rubs, or gallops  ABDOMEN: Soft, Nontender, Nondistended; Bowel sounds present  EXTREMITIES:  2+ Peripheral Pulses, No clubbing, cyanosis, or edema  PSYCH: AAOx3      LABS:                        8.9    14.01 )-----------( 233      ( 2018 06:50 )             27.6     01-11    141  |  106  |  20  ----------------------------<  97  4.2   |  22  |  1.15    Ca    8.9      2018 06:50  Phos  4.3     -10  Mg     1.4     -10    TPro  6.2  /  Alb  3.2<L>  /  TBili  < 0.2<L>  /  DBili  x   /  AST  15  /  ALT  5   /  AlkPhos  95  01-11    PT/INR - ( 2018 21:15 )   PT: 13.6 SEC;   INR: 1.18          PTT - ( 2018 21:15 )  PTT:32.7 SEC      Urinalysis Basic - ( 10 Ahsan 2018 11:10 )    Color: YELLOW / Appearance: CLEAR / S.013 / pH: 5.5  Gluc: NEGATIVE / Ketone: NEGATIVE  / Bili: NEGATIVE / Urobili: NORMAL mg/dL   Blood: NEGATIVE / Protein: 20 mg/dL / Nitrite: NEGATIVE   Leuk Esterase: NEGATIVE / RBC: 0-2 / WBC 0-2   Sq Epi: OCC / Non Sq Epi: x / Bacteria: x        Consultant(s) Notes Reviewed:  Yes  Care Discussed with Consultants/Other Providers: Yes

## 2018-01-11 NOTE — DISCHARGE NOTE ADULT - MEDICATION SUMMARY - MEDICATIONS TO STOP TAKING
I will STOP taking the medications listed below when I get home from the hospital:    polyethylene glycol 3350 oral powder for reconstitution  -- 17 gram(s) by mouth once a day, As Needed

## 2018-01-11 NOTE — DISCHARGE NOTE ADULT - CARE PLAN
Principal Discharge DX:	Fever and chills  Goal:	Pt to be afebrile and stable hemodynamically  Instructions for follow-up, activity and diet:	Follow up with PCP if any signs of fever develop- Blood cx neg to date  Secondary Diagnosis:	Atrial fibrillation, unspecified type  Instructions for follow-up, activity and diet:	Continue taking eliquis  Secondary Diagnosis:	Stage III squamous cell carcinoma of right lung  Instructions for follow-up, activity and diet:	Follow up with Oncology at [facility] as scheduled

## 2018-01-11 NOTE — DISCHARGE NOTE ADULT - CARE PROVIDERS DIRECT ADDRESSES
[email or web address]

## 2018-01-12 DIAGNOSIS — C34.91 MALIGNANT NEOPLASM OF UNSPECIFIED PART OF RIGHT BRONCHUS OR LUNG: ICD-10-CM

## 2018-01-12 DIAGNOSIS — J90 PLEURAL EFFUSION, NOT ELSEWHERE CLASSIFIED: ICD-10-CM

## 2018-01-14 LAB
BACTERIA BLD CULT: SIGNIFICANT CHANGE UP
BACTERIA BLD CULT: SIGNIFICANT CHANGE UP

## 2018-01-16 ENCOUNTER — APPOINTMENT (OUTPATIENT)
Dept: GERIATRICS | Facility: CLINIC | Age: 73
End: 2018-01-16
Payer: MEDICARE

## 2018-01-16 VITALS
TEMPERATURE: 97.6 F | HEART RATE: 82 BPM | HEIGHT: 69 IN | DIASTOLIC BLOOD PRESSURE: 70 MMHG | RESPIRATION RATE: 17 BRPM | BODY MASS INDEX: 23.29 KG/M2 | OXYGEN SATURATION: 96 % | WEIGHT: 157.25 LBS | SYSTOLIC BLOOD PRESSURE: 130 MMHG

## 2018-01-16 DIAGNOSIS — G47.00 INSOMNIA, UNSPECIFIED: ICD-10-CM

## 2018-01-16 PROCEDURE — 99214 OFFICE O/P EST MOD 30 MIN: CPT

## 2018-01-18 ENCOUNTER — RESULT REVIEW (OUTPATIENT)
Age: 73
End: 2018-01-18

## 2018-01-18 ENCOUNTER — APPOINTMENT (OUTPATIENT)
Dept: HEMATOLOGY ONCOLOGY | Facility: CLINIC | Age: 73
End: 2018-01-18
Payer: MEDICARE

## 2018-01-18 VITALS
OXYGEN SATURATION: 100 % | WEIGHT: 157.23 LBS | BODY MASS INDEX: 23.22 KG/M2 | RESPIRATION RATE: 16 BRPM | DIASTOLIC BLOOD PRESSURE: 70 MMHG | SYSTOLIC BLOOD PRESSURE: 136 MMHG | TEMPERATURE: 97.8 F | HEART RATE: 87 BPM

## 2018-01-18 DIAGNOSIS — L03.119 CELLULITIS OF UNSPECIFIED PART OF LIMB: ICD-10-CM

## 2018-01-18 LAB
BASOPHILS # BLD AUTO: 0.2 K/UL — SIGNIFICANT CHANGE UP (ref 0–0.2)
EOSINOPHIL # BLD AUTO: 0.3 K/UL — SIGNIFICANT CHANGE UP (ref 0–0.5)
EOSINOPHIL NFR BLD AUTO: 2 % — SIGNIFICANT CHANGE UP (ref 0–6)
HCT VFR BLD CALC: 28.5 % — LOW (ref 39–50)
HGB BLD-MCNC: 9.6 G/DL — LOW (ref 13–17)
LYMPHOCYTES # BLD AUTO: 53 % — HIGH (ref 13–44)
LYMPHOCYTES # BLD AUTO: 6.2 K/UL — HIGH (ref 1–3.3)
MCHC RBC-ENTMCNC: 33.1 PG — SIGNIFICANT CHANGE UP (ref 27–34)
MCHC RBC-ENTMCNC: 33.7 G/DL — SIGNIFICANT CHANGE UP (ref 32–36)
MCV RBC AUTO: 98.3 FL — SIGNIFICANT CHANGE UP (ref 80–100)
MONOCYTES # BLD AUTO: 1.3 K/UL — HIGH (ref 0–0.9)
MONOCYTES NFR BLD AUTO: 7 % — SIGNIFICANT CHANGE UP (ref 2–14)
NEUTROPHILS # BLD AUTO: 6.2 K/UL — SIGNIFICANT CHANGE UP (ref 1.8–7.4)
NEUTROPHILS NFR BLD AUTO: 38 % — LOW (ref 43–77)
PLAT MORPH BLD: NORMAL — SIGNIFICANT CHANGE UP
PLATELET # BLD AUTO: 313 K/UL — SIGNIFICANT CHANGE UP (ref 150–400)
RBC # BLD: 2.9 M/UL — LOW (ref 4.2–5.8)
RBC # FLD: 16.8 % — HIGH (ref 10.3–14.5)
RBC BLD AUTO: SIGNIFICANT CHANGE UP
WBC # BLD: 14.2 K/UL — HIGH (ref 3.8–10.5)
WBC # FLD AUTO: 14.2 K/UL — HIGH (ref 3.8–10.5)

## 2018-01-18 PROCEDURE — 99214 OFFICE O/P EST MOD 30 MIN: CPT

## 2018-01-20 LAB
ALBUMIN SERPL ELPH-MCNC: 3.8 G/DL
ALP BLD-CCNC: 128 U/L
ALT SERPL-CCNC: 8 U/L
ANION GAP SERPL CALC-SCNC: 17 MMOL/L
AST SERPL-CCNC: 21 U/L
BILIRUB SERPL-MCNC: 0.2 MG/DL
BUN SERPL-MCNC: 21 MG/DL
CALCIUM SERPL-MCNC: 9.6 MG/DL
CEA SERPL-MCNC: 4 NG/ML
CHLORIDE SERPL-SCNC: 101 MMOL/L
CO2 SERPL-SCNC: 22 MMOL/L
CREAT SERPL-MCNC: 1.29 MG/DL
GLUCOSE SERPL-MCNC: 134 MG/DL
POTASSIUM SERPL-SCNC: 4.4 MMOL/L
PROT SERPL-MCNC: 6.7 G/DL
SODIUM SERPL-SCNC: 140 MMOL/L
URATE SERPL-MCNC: 5 MG/DL

## 2018-02-02 ENCOUNTER — OUTPATIENT (OUTPATIENT)
Dept: OUTPATIENT SERVICES | Facility: HOSPITAL | Age: 73
LOS: 1 days | Discharge: HOME | End: 2018-02-02

## 2018-02-02 DIAGNOSIS — Z90.89 ACQUIRED ABSENCE OF OTHER ORGANS: Chronic | ICD-10-CM

## 2018-02-02 DIAGNOSIS — Z98.890 OTHER SPECIFIED POSTPROCEDURAL STATES: Chronic | ICD-10-CM

## 2018-02-02 DIAGNOSIS — Z90.49 ACQUIRED ABSENCE OF OTHER SPECIFIED PARTS OF DIGESTIVE TRACT: Chronic | ICD-10-CM

## 2018-02-02 DIAGNOSIS — C34.90 MALIGNANT NEOPLASM OF UNSPECIFIED PART OF UNSPECIFIED BRONCHUS OR LUNG: ICD-10-CM

## 2018-02-08 ENCOUNTER — APPOINTMENT (OUTPATIENT)
Dept: HEMATOLOGY ONCOLOGY | Facility: CLINIC | Age: 73
End: 2018-02-08
Payer: MEDICARE

## 2018-02-08 VITALS
WEIGHT: 162 LBS | HEART RATE: 87 BPM | OXYGEN SATURATION: 99 % | DIASTOLIC BLOOD PRESSURE: 65 MMHG | BODY MASS INDEX: 23.92 KG/M2 | RESPIRATION RATE: 16 BRPM | TEMPERATURE: 97.5 F | SYSTOLIC BLOOD PRESSURE: 170 MMHG

## 2018-02-08 PROCEDURE — 99214 OFFICE O/P EST MOD 30 MIN: CPT

## 2018-02-13 ENCOUNTER — APPOINTMENT (OUTPATIENT)
Dept: RHEUMATOLOGY | Facility: CLINIC | Age: 73
End: 2018-02-13
Payer: MEDICARE

## 2018-02-13 DIAGNOSIS — M79.662 PAIN IN RIGHT LOWER LEG: ICD-10-CM

## 2018-02-13 DIAGNOSIS — M79.661 PAIN IN RIGHT LOWER LEG: ICD-10-CM

## 2018-02-13 PROCEDURE — 96372 THER/PROPH/DIAG INJ SC/IM: CPT

## 2018-02-13 RX ORDER — METHYLPRED ACET/NACL,ISO-OS/PF 40 MG/ML
40 VIAL (ML) INJECTION
Qty: 1 | Refills: 0 | Status: COMPLETED | OUTPATIENT
Start: 2018-02-13

## 2018-02-13 RX ADMIN — METHYLPREDNISOLONE ACETATE 0 MG/ML: 40 INJECTION, SUSPENSION INTRA-ARTICULAR; INTRALESIONAL; INTRAMUSCULAR; SOFT TISSUE at 00:00

## 2018-02-16 ENCOUNTER — OUTPATIENT (OUTPATIENT)
Dept: OUTPATIENT SERVICES | Facility: HOSPITAL | Age: 73
LOS: 1 days | End: 2018-02-16
Payer: MEDICARE

## 2018-02-16 ENCOUNTER — APPOINTMENT (OUTPATIENT)
Dept: RADIOLOGY | Facility: CLINIC | Age: 73
End: 2018-02-16
Payer: MEDICARE

## 2018-02-16 DIAGNOSIS — Z90.89 ACQUIRED ABSENCE OF OTHER ORGANS: Chronic | ICD-10-CM

## 2018-02-16 DIAGNOSIS — Z98.890 OTHER SPECIFIED POSTPROCEDURAL STATES: Chronic | ICD-10-CM

## 2018-02-16 DIAGNOSIS — Z90.49 ACQUIRED ABSENCE OF OTHER SPECIFIED PARTS OF DIGESTIVE TRACT: Chronic | ICD-10-CM

## 2018-02-16 DIAGNOSIS — J91.0 MALIGNANT PLEURAL EFFUSION: ICD-10-CM

## 2018-02-16 PROCEDURE — 71046 X-RAY EXAM CHEST 2 VIEWS: CPT

## 2018-02-16 PROCEDURE — 71046 X-RAY EXAM CHEST 2 VIEWS: CPT | Mod: 26

## 2018-02-18 NOTE — H&P ADULT - RS GEN PE MLT RESP DETAILS PC
I have seen the patient with the PA and agree with above examination and assessment and plan with the following addendum:        Focused PE:   General: NAD, alert and oriented  Head: Normocephalic, atraumatic  Eyes: PERRLA, EOMI  Cardiac: RRR, no murmurs, rubs or gallops  Resp: CTA, no wheezes, rales or ronchi  GI: Nondistended, nontender, no rebound or guarding  MSK: R. shoulder has shallow abrasion. FROM. No numbness or tingling, no weakness. R. foot has no swelling, able to ambulate w/o limp, FROM.   Neuro: Alert and oriented, no focal deficits. Normal gait  Ext: Non edematous, nontender.    Ddx: Ro fx, pt up to date on tetanus.  Plan: xray, d/c. breath sounds equal/clear to auscultation bilaterally/good air movement/airway patent/respirations non-labored/diminished breath sounds, L

## 2018-02-20 ENCOUNTER — APPOINTMENT (OUTPATIENT)
Dept: THORACIC SURGERY | Facility: CLINIC | Age: 73
End: 2018-02-20
Payer: MEDICARE

## 2018-02-20 PROCEDURE — 99213 OFFICE O/P EST LOW 20 MIN: CPT

## 2018-02-21 VITALS
OXYGEN SATURATION: 100 % | DIASTOLIC BLOOD PRESSURE: 78 MMHG | BODY MASS INDEX: 22.4 KG/M2 | RESPIRATION RATE: 16 BRPM | WEIGHT: 160 LBS | HEIGHT: 71 IN | HEART RATE: 68 BPM | SYSTOLIC BLOOD PRESSURE: 159 MMHG

## 2018-03-16 ENCOUNTER — MOBILE ON CALL (OUTPATIENT)
Age: 73
End: 2018-03-16

## 2018-03-29 ENCOUNTER — FORM ENCOUNTER (OUTPATIENT)
Age: 73
End: 2018-03-29

## 2018-03-30 ENCOUNTER — APPOINTMENT (OUTPATIENT)
Dept: GERIATRICS | Facility: CLINIC | Age: 73
End: 2018-03-30
Payer: MEDICARE

## 2018-03-30 ENCOUNTER — OUTPATIENT (OUTPATIENT)
Dept: OUTPATIENT SERVICES | Facility: HOSPITAL | Age: 73
LOS: 1 days | End: 2018-03-30
Payer: MEDICARE

## 2018-03-30 ENCOUNTER — LABORATORY RESULT (OUTPATIENT)
Age: 73
End: 2018-03-30

## 2018-03-30 ENCOUNTER — APPOINTMENT (OUTPATIENT)
Dept: CT IMAGING | Facility: CLINIC | Age: 73
End: 2018-03-30
Payer: MEDICARE

## 2018-03-30 VITALS
OXYGEN SATURATION: 97 % | WEIGHT: 162 LBS | DIASTOLIC BLOOD PRESSURE: 70 MMHG | SYSTOLIC BLOOD PRESSURE: 130 MMHG | HEIGHT: 71 IN | TEMPERATURE: 98 F | BODY MASS INDEX: 22.68 KG/M2 | RESPIRATION RATE: 16 BRPM | HEART RATE: 112 BPM

## 2018-03-30 DIAGNOSIS — Z90.49 ACQUIRED ABSENCE OF OTHER SPECIFIED PARTS OF DIGESTIVE TRACT: Chronic | ICD-10-CM

## 2018-03-30 DIAGNOSIS — D72.829 ELEVATED WHITE BLOOD CELL COUNT, UNSPECIFIED: ICD-10-CM

## 2018-03-30 DIAGNOSIS — Z90.89 ACQUIRED ABSENCE OF OTHER ORGANS: Chronic | ICD-10-CM

## 2018-03-30 DIAGNOSIS — Z98.890 OTHER SPECIFIED POSTPROCEDURAL STATES: Chronic | ICD-10-CM

## 2018-03-30 DIAGNOSIS — J18.9 PNEUMONIA, UNSPECIFIED ORGANISM: ICD-10-CM

## 2018-03-30 DIAGNOSIS — C34.91 MALIGNANT NEOPLASM OF UNSPECIFIED PART OF RIGHT BRONCHUS OR LUNG: ICD-10-CM

## 2018-03-30 PROCEDURE — 71250 CT THORAX DX C-: CPT

## 2018-03-30 PROCEDURE — 99215 OFFICE O/P EST HI 40 MIN: CPT | Mod: GC,PD

## 2018-03-30 PROCEDURE — 71250 CT THORAX DX C-: CPT | Mod: 26

## 2018-03-30 RX ORDER — MULTIVIT-MIN/FOLIC/VIT K/LYCOP 400-300MCG
25 MCG TABLET ORAL
Refills: 0 | Status: ACTIVE | COMMUNITY
Start: 2018-03-30

## 2018-03-30 RX ORDER — CEPHALEXIN 500 MG/1
500 CAPSULE ORAL 3 TIMES DAILY
Qty: 21 | Refills: 0 | Status: DISCONTINUED | COMMUNITY
Start: 2018-01-18 | End: 2018-03-30

## 2018-03-30 RX ORDER — PREDNISONE 5 MG/1
5 TABLET ORAL
Qty: 12 | Refills: 0 | Status: DISCONTINUED | COMMUNITY
Start: 2018-02-13 | End: 2018-03-30

## 2018-03-31 ENCOUNTER — TRANSCRIPTION ENCOUNTER (OUTPATIENT)
Age: 73
End: 2018-03-31

## 2018-04-01 ENCOUNTER — INPATIENT (INPATIENT)
Facility: HOSPITAL | Age: 73
LOS: 6 days | Discharge: HOME HEALTH | End: 2018-04-08
Attending: INTERNAL MEDICINE | Admitting: INTERNAL MEDICINE
Payer: MEDICARE

## 2018-04-01 VITALS
OXYGEN SATURATION: 98 % | TEMPERATURE: 98 F | DIASTOLIC BLOOD PRESSURE: 72 MMHG | SYSTOLIC BLOOD PRESSURE: 140 MMHG | RESPIRATION RATE: 18 BRPM | HEART RATE: 82 BPM

## 2018-04-01 DIAGNOSIS — Z90.49 ACQUIRED ABSENCE OF OTHER SPECIFIED PARTS OF DIGESTIVE TRACT: Chronic | ICD-10-CM

## 2018-04-01 DIAGNOSIS — J90 PLEURAL EFFUSION, NOT ELSEWHERE CLASSIFIED: ICD-10-CM

## 2018-04-01 DIAGNOSIS — J18.9 PNEUMONIA, UNSPECIFIED ORGANISM: ICD-10-CM

## 2018-04-01 DIAGNOSIS — Z98.890 OTHER SPECIFIED POSTPROCEDURAL STATES: Chronic | ICD-10-CM

## 2018-04-01 DIAGNOSIS — Z90.89 ACQUIRED ABSENCE OF OTHER ORGANS: Chronic | ICD-10-CM

## 2018-04-01 LAB
ALBUMIN SERPL ELPH-MCNC: 3.4 G/DL — SIGNIFICANT CHANGE UP (ref 3.3–5)
ALP SERPL-CCNC: 108 U/L — SIGNIFICANT CHANGE UP (ref 40–120)
ALT FLD-CCNC: 8 U/L — SIGNIFICANT CHANGE UP (ref 4–41)
ANISOCYTOSIS BLD QL: SLIGHT — SIGNIFICANT CHANGE UP
APTT BLD: 33.2 SEC — SIGNIFICANT CHANGE UP (ref 27.5–37.4)
AST SERPL-CCNC: 13 U/L — SIGNIFICANT CHANGE UP (ref 4–40)
BASOPHILS # BLD AUTO: 0.02 K/UL — SIGNIFICANT CHANGE UP (ref 0–0.2)
BASOPHILS NFR BLD AUTO: 0.2 % — SIGNIFICANT CHANGE UP (ref 0–2)
BASOPHILS NFR SPEC: 0 % — SIGNIFICANT CHANGE UP (ref 0–2)
BILIRUB SERPL-MCNC: 0.4 MG/DL — SIGNIFICANT CHANGE UP (ref 0.2–1.2)
BLD GP AB SCN SERPL QL: NEGATIVE — SIGNIFICANT CHANGE UP
BUN SERPL-MCNC: 26 MG/DL — HIGH (ref 7–23)
CALCIUM SERPL-MCNC: 8.9 MG/DL — SIGNIFICANT CHANGE UP (ref 8.4–10.5)
CHLORIDE SERPL-SCNC: 91 MMOL/L — LOW (ref 98–107)
CO2 SERPL-SCNC: 24 MMOL/L — SIGNIFICANT CHANGE UP (ref 22–31)
CREAT SERPL-MCNC: 1.49 MG/DL — HIGH (ref 0.5–1.3)
EOSINOPHIL # BLD AUTO: 0.11 K/UL — SIGNIFICANT CHANGE UP (ref 0–0.5)
EOSINOPHIL NFR BLD AUTO: 0.9 % — SIGNIFICANT CHANGE UP (ref 0–6)
EOSINOPHIL NFR FLD: 2.7 % — SIGNIFICANT CHANGE UP (ref 0–6)
GIANT PLATELETS BLD QL SMEAR: PRESENT — SIGNIFICANT CHANGE UP
GLUCOSE SERPL-MCNC: 128 MG/DL — HIGH (ref 70–99)
HCT VFR BLD CALC: 32.7 % — LOW (ref 39–50)
HGB BLD-MCNC: 10.8 G/DL — LOW (ref 13–17)
IMM GRANULOCYTES # BLD AUTO: 0.06 # — SIGNIFICANT CHANGE UP
IMM GRANULOCYTES NFR BLD AUTO: 0.5 % — SIGNIFICANT CHANGE UP (ref 0–1.5)
INR BLD: 1.46 — HIGH (ref 0.88–1.17)
LYMPHOCYTES # BLD AUTO: 29.3 % — SIGNIFICANT CHANGE UP (ref 13–44)
LYMPHOCYTES # BLD AUTO: 3.48 K/UL — HIGH (ref 1–3.3)
LYMPHOCYTES NFR SPEC AUTO: 19.1 % — SIGNIFICANT CHANGE UP (ref 13–44)
MACROCYTES BLD QL: SLIGHT — SIGNIFICANT CHANGE UP
MCHC RBC-ENTMCNC: 30.9 PG — SIGNIFICANT CHANGE UP (ref 27–34)
MCHC RBC-ENTMCNC: 33 % — SIGNIFICANT CHANGE UP (ref 32–36)
MCV RBC AUTO: 93.7 FL — SIGNIFICANT CHANGE UP (ref 80–100)
MONOCYTES # BLD AUTO: 1.51 K/UL — HIGH (ref 0–0.9)
MONOCYTES NFR BLD AUTO: 12.7 % — SIGNIFICANT CHANGE UP (ref 2–14)
MONOCYTES NFR BLD: 10 % — HIGH (ref 2–9)
NEUTROPHIL AB SER-ACNC: 60.9 % — SIGNIFICANT CHANGE UP (ref 43–77)
NEUTROPHILS # BLD AUTO: 6.68 K/UL — SIGNIFICANT CHANGE UP (ref 1.8–7.4)
NEUTROPHILS NFR BLD AUTO: 56.4 % — SIGNIFICANT CHANGE UP (ref 43–77)
NRBC # FLD: 0 — SIGNIFICANT CHANGE UP
OSMOLALITY SERPL: 273 MOSMO/KG — LOW (ref 275–295)
OVALOCYTES BLD QL SMEAR: SLIGHT — SIGNIFICANT CHANGE UP
PLATELET # BLD AUTO: 386 K/UL — SIGNIFICANT CHANGE UP (ref 150–400)
PLATELET COUNT - ESTIMATE: NORMAL — SIGNIFICANT CHANGE UP
PMV BLD: 9.6 FL — SIGNIFICANT CHANGE UP (ref 7–13)
POIKILOCYTOSIS BLD QL AUTO: SLIGHT — SIGNIFICANT CHANGE UP
POTASSIUM SERPL-MCNC: 4.2 MMOL/L — SIGNIFICANT CHANGE UP (ref 3.5–5.3)
POTASSIUM SERPL-SCNC: 4.2 MMOL/L — SIGNIFICANT CHANGE UP (ref 3.5–5.3)
PROT SERPL-MCNC: 7 G/DL — SIGNIFICANT CHANGE UP (ref 6–8.3)
PROTHROM AB SERPL-ACNC: 16.3 SEC — HIGH (ref 9.8–13.1)
RBC # BLD: 3.49 M/UL — LOW (ref 4.2–5.8)
RBC # FLD: 15.5 % — HIGH (ref 10.3–14.5)
RH IG SCN BLD-IMP: POSITIVE — SIGNIFICANT CHANGE UP
SMUDGE CELLS # BLD: PRESENT — SIGNIFICANT CHANGE UP
SODIUM SERPL-SCNC: 128 MMOL/L — LOW (ref 135–145)
VARIANT LYMPHS # BLD: 7.3 % — SIGNIFICANT CHANGE UP
WBC # BLD: 11.86 K/UL — HIGH (ref 3.8–10.5)
WBC # FLD AUTO: 11.86 K/UL — HIGH (ref 3.8–10.5)

## 2018-04-01 PROCEDURE — 32652 THORACOSCOPY REM TOTL CORTEX: CPT

## 2018-04-01 PROCEDURE — 31622 DX BRONCHOSCOPE/WASH: CPT

## 2018-04-01 PROCEDURE — 71046 X-RAY EXAM CHEST 2 VIEWS: CPT | Mod: 26

## 2018-04-01 RX ORDER — PROCHLORPERAZINE MALEATE 5 MG
10 TABLET ORAL ONCE
Qty: 0 | Refills: 0 | Status: DISCONTINUED | OUTPATIENT
Start: 2018-04-01 | End: 2018-04-08

## 2018-04-01 RX ORDER — HEPARIN SODIUM 5000 [USP'U]/ML
5000 INJECTION INTRAVENOUS; SUBCUTANEOUS EVERY 12 HOURS
Qty: 0 | Refills: 0 | Status: DISCONTINUED | OUTPATIENT
Start: 2018-04-02 | End: 2018-04-06

## 2018-04-01 RX ORDER — DOCUSATE SODIUM 100 MG
100 CAPSULE ORAL THREE TIMES A DAY
Qty: 0 | Refills: 0 | Status: DISCONTINUED | OUTPATIENT
Start: 2018-04-01 | End: 2018-04-08

## 2018-04-01 RX ORDER — PANTOPRAZOLE SODIUM 20 MG/1
40 TABLET, DELAYED RELEASE ORAL
Qty: 0 | Refills: 0 | Status: DISCONTINUED | OUTPATIENT
Start: 2018-04-01 | End: 2018-04-08

## 2018-04-01 RX ORDER — ACETAMINOPHEN 500 MG
650 TABLET ORAL EVERY 6 HOURS
Qty: 0 | Refills: 0 | Status: DISCONTINUED | OUTPATIENT
Start: 2018-04-01 | End: 2018-04-04

## 2018-04-01 RX ORDER — NICOTINE POLACRILEX 2 MG
1 GUM BUCCAL DAILY
Qty: 0 | Refills: 0 | Status: DISCONTINUED | OUTPATIENT
Start: 2018-04-01 | End: 2018-04-08

## 2018-04-01 RX ORDER — SODIUM CHLORIDE 9 MG/ML
3 INJECTION INTRAMUSCULAR; INTRAVENOUS; SUBCUTANEOUS EVERY 8 HOURS
Qty: 0 | Refills: 0 | Status: DISCONTINUED | OUTPATIENT
Start: 2018-04-01 | End: 2018-04-08

## 2018-04-01 RX ORDER — TIOTROPIUM BROMIDE 18 UG/1
1 CAPSULE ORAL; RESPIRATORY (INHALATION) DAILY
Qty: 0 | Refills: 0 | Status: DISCONTINUED | OUTPATIENT
Start: 2018-04-01 | End: 2018-04-08

## 2018-04-01 RX ADMIN — SODIUM CHLORIDE 3 MILLILITER(S): 9 INJECTION INTRAMUSCULAR; INTRAVENOUS; SUBCUTANEOUS at 21:27

## 2018-04-01 RX ADMIN — Medication 100 MILLIGRAM(S): at 21:58

## 2018-04-01 RX ADMIN — Medication 100 MILLIGRAM(S): at 14:10

## 2018-04-01 RX ADMIN — SODIUM CHLORIDE 3 MILLILITER(S): 9 INJECTION INTRAMUSCULAR; INTRAVENOUS; SUBCUTANEOUS at 14:10

## 2018-04-01 RX ADMIN — Medication 1 PATCH: at 19:08

## 2018-04-01 NOTE — ED ADULT TRIAGE NOTE - CHIEF COMPLAINT QUOTE
p/t with hx lung ca,  recently diagnosed with pneumonia and pleural effusion c/o of increased sob for past few days, denies any chest pain

## 2018-04-01 NOTE — PATIENT PROFILE ADULT. - VISION (WITH CORRECTIVE LENSES IF THE PATIENT USUALLY WEARS THEM):
friend/significant other/Ryan
Normal vision: sees adequately in most situations; can see medication labels, newsprint/Glasses

## 2018-04-01 NOTE — CONSULT NOTE ADULT - SUBJECTIVE AND OBJECTIVE BOX
CHIEF COMPLAINT:Patient is a 73y old  Male who presents with a chief complaint of Recurrent R pleural effusion (01 Apr 2018 12:07)      HISTORY OF PRESENT ILLNESS:  This is a pleasant gentleman with history as below presented with sob and recurrent large right pl effusion planned for surgery  denies any cp   no dizziness or syncpe     PAST MEDICAL & SURGICAL HISTORY:  Squamous cell carcinoma of lung, stage III  TIA (transient ischemic attack): 1999  Localized enlarged lymph nodes  Solitary pulmonary nodule  HTN (hypertension)  Alcohol abuse: Sober since 01/2016- attending AA meeting weekly  Depression, unspecified depression type  Gout  Essential hypertension  H/O hemorrhoidectomy: 1967  History of tonsillectomy: as a child  History of appendectomy: 1958  H/O left knee surgery: 1995          MEDICATIONS:  diltiazem    Tablet 90 milliGRAM(s) Oral every 6 hours      tiotropium 18 MICROgram(s) Capsule 1 Capsule(s) Inhalation daily    acetaminophen   Tablet. 650 milliGRAM(s) Oral every 6 hours PRN  prochlorperazine   Tablet 10 milliGRAM(s) Oral Once PRN    docusate sodium 100 milliGRAM(s) Oral three times a day  pantoprazole    Tablet 40 milliGRAM(s) Oral before breakfast      sodium chloride 0.9% lock flush 3 milliLiter(s) IV Push every 8 hours      FAMILY HISTORY:  No pertinent family history in first degree relatives      Non-contributory    SOCIAL HISTORY:    No tobacco, drugs or etoh    Allergies    No Known Allergies    Intolerances    	    REVIEW OF SYSTEMS:  as above  The rest of the 14 points ROS reviewed and except above they are unremarkable.        PHYSICAL EXAM:  T(C): 36.8 (04-01-18 @ 10:08), Max: 36.8 (04-01-18 @ 10:08)  HR: 85 (04-01-18 @ 10:08) (82 - 85)  BP: 146/71 (04-01-18 @ 10:08) (140/72 - 146/71)  RR: 16 (04-01-18 @ 10:08) (16 - 18)  SpO2: 100% (04-01-18 @ 10:08) (98% - 100%)  Wt(kg): --  I&O's Summary    JVP: Normal  Neck: supple  Lung: dec bs on right   CV: S1 S2 , Murmur:  Abd: soft  Ext: No edema  neuro: Awake / alert  Psych: flat affect  Skin: normal     LABS/DATA:    TELEMETRY: 	    ECG:  	sinus, apcs    	  CARDIAC MARKERS:                                  10.8   11.86 )-----------( 386      ( 01 Apr 2018 10:27 )             32.7     04-01    128<L>  |  91<L>  |  26<H>  ----------------------------<  128<H>  4.2   |  24  |  1.49<H>    Ca    8.9      01 Apr 2018 10:27    TPro  7.0  /  Alb  3.4  /  TBili  0.4  /  DBili  x   /  AST  13  /  ALT  8   /  AlkPhos  108  04-01    proBNP:   Lipid Profile:   HgA1c:   TSH:     < from: Transthoracic Echocardiogram (08.19.17 @ 12:18) >  CONCLUSIONS:  1. Mitral annular calcification, otherwise normal mitral  valve. Mild mitral regurgitation.  2. Normal left ventricular internal dimensions and wall  thicknesses.  3. Normal left ventricular systolic function. No segmental  wall motion abnormalities.  4. Normal right ventricular size and function.  5. Normal tricuspid valve.    Minimal tricuspid  regurgitation.  6. Estimated pulmonary artery systolic pressure equals 40  mm Hg, assuming right atrial pressure equals 10  mm Hg,  consistent with mild pulmonary hypertension.    < end of copied text >

## 2018-04-01 NOTE — ED PROVIDER NOTE - ATTENDING CONTRIBUTION TO CARE
73M h/o lung cancer in remission, afib on eliquis, tobacco use, DNR presents from home with family for increasing shortness of breath and DONNELLY. Recent outpatient CT showed pneumonia and effusion, patient started on levaquin. Denies fever, has some cough. Denies malaise, headache, neck pain, back pain, chest pain, abd pain, vomiting, diarrhea, rash, leg edema. Patient well appearing, sitting upright, smiles, neck supple, heart clear, lungs with decreased breath sounds on the right, abd soft nontender, no leg edema, no calf tenderness. CXR to eval for infiltrate, effusions. Consider antibiotics for recent pneumonia. Consider CT surgery consult (patient follows with Zeltsman) for effusion. Patient stable. Reassess.

## 2018-04-01 NOTE — ED PROVIDER NOTE - OBJECTIVE STATEMENT
72 yo M with extensive med hx including R sided lung ca s/p lobectomy now presenting with worsening SOB. Pt seen by Jose Ruiz (Geriatrics) on Friday, was sent for CT scan which showed worsening loculated effusion and a new R sided consolidation concerning for PNA. Pt does not use O2 at home, denies any f/c/n/v/ha/CP.

## 2018-04-01 NOTE — ED PROVIDER NOTE - MEDICAL DECISION MAKING DETAILS
Pt here from Dr. Ruiz, CT showed r PNA and loculated pleural effusion. Will get CXR and likely admit

## 2018-04-01 NOTE — CONSULT NOTE ADULT - ASSESSMENT
Based on current ACC/AHA guidelines, patient history and physical exam, the patient is considered to have elevated risk  no CV objection to proceed to OR for this time sensitive surgery     PAF  stable in sinus  cont cardizem  resume a/c once deemed safe by CTS    HTN  stable

## 2018-04-01 NOTE — H&P ADULT - HISTORY OF PRESENT ILLNESS
74yo male w PMHx HTN, gout, a-fib (on eliquis), EtOH (last drink 1/2016), CLL and SCLC (T2a/N2/Mx) s/p RVATS RUL on 8/2017. Outpatient included completion of chemo 12/5/2017 x1 and R pleural effusion drained in office 12/7/17 (+ malignancy CLL). He presented to ED complaining of progressive worsening of DONNELLY for the past week, saw PMD 3/30 who ordered CT showing large R effusion and compressive atelectasis of RLL. He denies productive sputum, pleuritic/substernal chest pain, fever, chills, nausea, vomiting, dysuria, and diarrhea.     In ED: CXR confirmed large R pleural effusion, CBC/BMP, EKG (pending)     PMHx/PSHx:PAST MEDICAL & SURGICAL HISTORY:  Squamous cell carcinoma of lung, stage III  TIA (transient ischemic attack): 1999  Localized enlarged lymph nodes  Solitary pulmonary nodule  HTN (hypertension)  Alcohol abuse: Sober since 01/2016- attending AA meeting weekly  Depression, unspecified depression type  Gout  Essential hypertension  H/O hemorrhoidectomy: 1967  History of tonsillectomy: as a child  History of appendectomy: 1958  H/O left knee surgery: 1995      Vital Signs:  Vital Signs Last 24 Hrs  T(C): 36.8 (04-01-18 @ 10:08), Max: 36.8 (04-01-18 @ 10:08)  T(F): 98.3 (04-01-18 @ 10:08), Max: 98.3 (04-01-18 @ 10:08)  HR: 85 (04-01-18 @ 10:08) (82 - 85)  BP: 146/71 (04-01-18 @ 10:08) (140/72 - 146/71)  RR: 16 (04-01-18 @ 10:08) (16 - 18)  SpO2: 100% (04-01-18 @ 10:08) (98% - 100%) on (O2)    Telemetry/Alarms: Pending EKG  Gen: Aox3, no acute distress lying in bed   Neuro: Grossly intact, moving all extremities  Head/Neck: Normocephalic, trachea is midline   Pulm: Decreased B/s on R Vs. L   Card: Irr irr, s1/s2, no murmur  Abd: +BS, non-tender, soft   LE: No edema, non-tender  Skin: RVATS incision well healed     CXR **Pending official Review**: Large R pleural effusion  CT: Large R pleural effusion w/ associated RLL compressive atelectasis    Relevant labs, radiology and Medications reviewed                        10.8   11.86 )-----------( 386      ( 01 Apr 2018 10:27 )             32.7     04-01    128<L>  |  91<L>  |  26<H>  ----------------------------<  128<H>  4.2   |  24  |  1.49<H>    Ca    8.9      01 Apr 2018 10:27    TPro  7.0  /  Alb  3.4  /  TBili  0.4  /  DBili  x   /  AST  13  /  ALT  8   /  AlkPhos  108  04-01    PT/INR - ( 01 Apr 2018 10:57 )   PT: 16.3 SEC;   INR: 1.46          PTT - ( 01 Apr 2018 10:57 )  PTT:33.2 SEC  Pertinent Physical Exam  I&O's Summary      Assessment  73y Male  w/ PAST MEDICAL & SURGICAL HISTORY:  Squamous cell carcinoma of lung, stage III  TIA (transient ischemic attack): 1999  Localized enlarged lymph nodes  Solitary pulmonary nodule  HTN (hypertension)  Alcohol abuse: Sober since 01/2016- attending AA meeting weekly  Depression, unspecified depression type  Gout  Essential hypertension  H/O hemorrhoidectomy: 1967  History of tonsillectomy: as a child  History of appendectomy: 1958  H/O left knee surgery: 1995   Patient is a 73y old  Male who presents with a chief complaint of .  On (Date), patient underwent .                                                                                                         PLAN    **Neuro**   - Gout: Holding allopurinol while Cr. elevated   - Tylenol for pain  - Compazine PRN  **Pulm**  - Spiriva daily  - OR planned for tomorrow in AM  - NPO after midnight, x2 PRBC on hold  - valid type/screen  **Cardio**   - Tele monitor  - Pending CXR  - Rate control w/ cardizem  - Holding eliquis for OR tomorrow  - Discussed w/ Dr. Ward need for pre-op clearance pending  **GI**  - DASH diet  - protonix surgical prophylaxis  - NPO after midnight  **Renal**   - Hyponatremia/EUGENIO, appears euvolemic. Asymptomatic, work-up pending.  - Daily weight, urine osm/electrolye and serum osm   - Repeat electrolyte in AM  ****  - Voids self  **Vasc**  - Holding elequis last dose 3/31 in PM  - SQH for DVT prophylaxis tomorrow/SCDs  **Heme**  - h/h stable   ** Endo**  - Grossly intact   **ID**  - PMD started Levaquin for Pneumonia s/p 3 out of 7  - Adjusted 2/2 GFR 46. Will start 4/3 q48hr until course completed (4/5)    Discussed with Cardiothoracic Team at AM rounds.                                                                                                      Contact spectra: [id number]

## 2018-04-02 ENCOUNTER — RESULT REVIEW (OUTPATIENT)
Age: 73
End: 2018-04-02

## 2018-04-02 ENCOUNTER — APPOINTMENT (OUTPATIENT)
Dept: THORACIC SURGERY | Facility: HOSPITAL | Age: 73
End: 2018-04-02
Payer: MEDICARE

## 2018-04-02 LAB
ALBUMIN SERPL ELPH-MCNC: 3.9 G/DL
ALP BLD-CCNC: 115 U/L
ALT SERPL-CCNC: 7 U/L
ANION GAP SERPL CALC-SCNC: 17 MMOL/L
APTT BLD: 32 SEC — SIGNIFICANT CHANGE UP (ref 27.5–37.4)
AST SERPL-CCNC: 14 U/L
BASOPHILS # BLD AUTO: 0.03 K/UL
BASOPHILS NFR BLD AUTO: 0.2 %
BILIRUB SERPL-MCNC: 0.3 MG/DL
BLD GP AB SCN SERPL QL: NEGATIVE — SIGNIFICANT CHANGE UP
BUN SERPL-MCNC: 25 MG/DL — HIGH (ref 7–23)
BUN SERPL-MCNC: 31 MG/DL
CALCIUM SERPL-MCNC: 9 MG/DL — SIGNIFICANT CHANGE UP (ref 8.4–10.5)
CALCIUM SERPL-MCNC: 9.7 MG/DL
CHLORIDE SERPL-SCNC: 93 MMOL/L — LOW (ref 98–107)
CHLORIDE SERPL-SCNC: 99 MMOL/L
CO2 SERPL-SCNC: 23 MMOL/L
CO2 SERPL-SCNC: 23 MMOL/L — SIGNIFICANT CHANGE UP (ref 22–31)
CREAT SERPL-MCNC: 1.52 MG/DL — HIGH (ref 0.5–1.3)
CREAT SERPL-MCNC: 1.54 MG/DL
EOSINOPHIL # BLD AUTO: 0.21 K/UL
EOSINOPHIL NFR BLD AUTO: 1.5 %
FOLATE SERPL-MCNC: >20 NG/ML
GLUCOSE SERPL-MCNC: 117 MG/DL — HIGH (ref 70–99)
GLUCOSE SERPL-MCNC: 159 MG/DL
HCT VFR BLD CALC: 32.1 % — LOW (ref 39–50)
HCT VFR BLD CALC: 34.7 %
HGB BLD-MCNC: 10.6 G/DL — LOW (ref 13–17)
HGB BLD-MCNC: 11.4 G/DL
IMM GRANULOCYTES NFR BLD AUTO: 0.2 %
INR BLD: 1.31 — HIGH (ref 0.88–1.17)
LYMPHOCYTES # BLD AUTO: 4.55 K/UL
LYMPHOCYTES NFR BLD AUTO: 33.1 %
MAN DIFF?: NORMAL
MCHC RBC-ENTMCNC: 30.9 PG — SIGNIFICANT CHANGE UP (ref 27–34)
MCHC RBC-ENTMCNC: 31.1 PG
MCHC RBC-ENTMCNC: 32.9 GM/DL
MCHC RBC-ENTMCNC: 33 % — SIGNIFICANT CHANGE UP (ref 32–36)
MCV RBC AUTO: 93.6 FL — SIGNIFICANT CHANGE UP (ref 80–100)
MCV RBC AUTO: 94.8 FL
MONOCYTES # BLD AUTO: 1.58 K/UL
MONOCYTES NFR BLD AUTO: 11.5 %
NEUTROPHILS # BLD AUTO: 7.34 K/UL
NEUTROPHILS NFR BLD AUTO: 53.5 %
NRBC # FLD: 0 — SIGNIFICANT CHANGE UP
PLATELET # BLD AUTO: 401 K/UL — HIGH (ref 150–400)
PLATELET # BLD AUTO: 421 K/UL
PMV BLD: 9.5 FL — SIGNIFICANT CHANGE UP (ref 7–13)
POTASSIUM SERPL-MCNC: 4.6 MMOL/L — SIGNIFICANT CHANGE UP (ref 3.5–5.3)
POTASSIUM SERPL-SCNC: 4.6 MMOL/L — SIGNIFICANT CHANGE UP (ref 3.5–5.3)
POTASSIUM SERPL-SCNC: 5.2 MMOL/L
PROT SERPL-MCNC: 7 G/DL
PROTHROM AB SERPL-ACNC: 14.6 SEC — HIGH (ref 9.8–13.1)
RBC # BLD: 3.43 M/UL — LOW (ref 4.2–5.8)
RBC # BLD: 3.66 M/UL
RBC # FLD: 15.5 % — HIGH (ref 10.3–14.5)
RBC # FLD: 16.1 %
RH IG SCN BLD-IMP: POSITIVE — SIGNIFICANT CHANGE UP
SODIUM SERPL-SCNC: 132 MMOL/L — LOW (ref 135–145)
SODIUM SERPL-SCNC: 139 MMOL/L
TSH SERPL-ACNC: 1.35 UIU/ML
VIT B12 SERPL-MCNC: 457 PG/ML
WBC # BLD: 13.01 K/UL — HIGH (ref 3.8–10.5)
WBC # FLD AUTO: 13.01 K/UL — HIGH (ref 3.8–10.5)
WBC # FLD AUTO: 13.74 K/UL

## 2018-04-02 PROCEDURE — 88342 IMHCHEM/IMCYTCHM 1ST ANTB: CPT | Mod: 26

## 2018-04-02 PROCEDURE — ZZZZZ: CPT

## 2018-04-02 PROCEDURE — 88305 TISSUE EXAM BY PATHOLOGIST: CPT | Mod: 26

## 2018-04-02 PROCEDURE — 71045 X-RAY EXAM CHEST 1 VIEW: CPT | Mod: 26

## 2018-04-02 PROCEDURE — 88341 IMHCHEM/IMCYTCHM EA ADD ANTB: CPT | Mod: 26

## 2018-04-02 RX ORDER — CALCIUM CHLORIDE, MAGNESIUM CHLORIDE, POTASSIUM CHLORIDE, SODIUM ACETATE, SODIUM CHLORIDE, SODIUM CITRATE .48; .3; .75; 3.9; 6.4; 1.7 MG/ML; MG/ML; MG/ML; MG/ML; MG/ML; MG/ML
1 SOLUTION OPHTHALMIC ONCE
Qty: 0 | Refills: 0 | Status: COMPLETED | OUTPATIENT
Start: 2018-04-02 | End: 2018-04-02

## 2018-04-02 RX ORDER — HYDROMORPHONE HYDROCHLORIDE 2 MG/ML
0.5 INJECTION INTRAMUSCULAR; INTRAVENOUS; SUBCUTANEOUS
Qty: 0 | Refills: 0 | Status: DISCONTINUED | OUTPATIENT
Start: 2018-04-02 | End: 2018-04-04

## 2018-04-02 RX ORDER — SODIUM CHLORIDE 9 MG/ML
1000 INJECTION, SOLUTION INTRAVENOUS
Qty: 0 | Refills: 0 | Status: DISCONTINUED | OUTPATIENT
Start: 2018-04-02 | End: 2018-04-03

## 2018-04-02 RX ORDER — IPRATROPIUM/ALBUTEROL SULFATE 18-103MCG
3 AEROSOL WITH ADAPTER (GRAM) INHALATION EVERY 6 HOURS
Qty: 0 | Refills: 0 | Status: DISCONTINUED | OUTPATIENT
Start: 2018-04-02 | End: 2018-04-08

## 2018-04-02 RX ORDER — HYDROMORPHONE HYDROCHLORIDE 2 MG/ML
0.5 INJECTION INTRAMUSCULAR; INTRAVENOUS; SUBCUTANEOUS
Qty: 0 | Refills: 0 | Status: DISCONTINUED | OUTPATIENT
Start: 2018-04-02 | End: 2018-04-02

## 2018-04-02 RX ORDER — TOBRAMYCIN 0.3 %
1 DROPS OPHTHALMIC (EYE) ONCE
Qty: 0 | Refills: 0 | Status: COMPLETED | OUTPATIENT
Start: 2018-04-02 | End: 2018-04-03

## 2018-04-02 RX ORDER — ONDANSETRON 8 MG/1
4 TABLET, FILM COATED ORAL ONCE
Qty: 0 | Refills: 0 | Status: DISCONTINUED | OUTPATIENT
Start: 2018-04-02 | End: 2018-04-02

## 2018-04-02 RX ORDER — ONDANSETRON 8 MG/1
4 TABLET, FILM COATED ORAL EVERY 6 HOURS
Qty: 0 | Refills: 0 | Status: DISCONTINUED | OUTPATIENT
Start: 2018-04-02 | End: 2018-04-04

## 2018-04-02 RX ORDER — NALOXONE HYDROCHLORIDE 4 MG/.1ML
0.1 SPRAY NASAL
Qty: 0 | Refills: 0 | Status: DISCONTINUED | OUTPATIENT
Start: 2018-04-02 | End: 2018-04-04

## 2018-04-02 RX ORDER — HYDROMORPHONE HYDROCHLORIDE 2 MG/ML
30 INJECTION INTRAMUSCULAR; INTRAVENOUS; SUBCUTANEOUS
Qty: 0 | Refills: 0 | Status: DISCONTINUED | OUTPATIENT
Start: 2018-04-02 | End: 2018-04-04

## 2018-04-02 RX ADMIN — HEPARIN SODIUM 5000 UNIT(S): 5000 INJECTION INTRAVENOUS; SUBCUTANEOUS at 05:19

## 2018-04-02 RX ADMIN — HYDROMORPHONE HYDROCHLORIDE 30 MILLILITER(S): 2 INJECTION INTRAMUSCULAR; INTRAVENOUS; SUBCUTANEOUS at 12:50

## 2018-04-02 RX ADMIN — Medication 1 PATCH: at 19:12

## 2018-04-02 RX ADMIN — PANTOPRAZOLE SODIUM 40 MILLIGRAM(S): 20 TABLET, DELAYED RELEASE ORAL at 05:19

## 2018-04-02 RX ADMIN — HYDROMORPHONE HYDROCHLORIDE 30 MILLILITER(S): 2 INJECTION INTRAMUSCULAR; INTRAVENOUS; SUBCUTANEOUS at 19:13

## 2018-04-02 RX ADMIN — Medication 100 MILLIGRAM(S): at 05:19

## 2018-04-02 RX ADMIN — SODIUM CHLORIDE 100 MILLILITER(S): 9 INJECTION, SOLUTION INTRAVENOUS at 11:06

## 2018-04-02 RX ADMIN — SODIUM CHLORIDE 3 MILLILITER(S): 9 INJECTION INTRAMUSCULAR; INTRAVENOUS; SUBCUTANEOUS at 21:26

## 2018-04-02 RX ADMIN — Medication 100 MILLIGRAM(S): at 13:46

## 2018-04-02 RX ADMIN — Medication 100 MILLIGRAM(S): at 22:19

## 2018-04-02 RX ADMIN — HEPARIN SODIUM 5000 UNIT(S): 5000 INJECTION INTRAVENOUS; SUBCUTANEOUS at 17:32

## 2018-04-02 RX ADMIN — CALCIUM CHLORIDE, MAGNESIUM CHLORIDE, POTASSIUM CHLORIDE, SODIUM ACETATE, SODIUM CHLORIDE, SODIUM CITRATE 1 APPLICATION(S): .48; .3; .75; 3.9; 6.4; 1.7 SOLUTION OPHTHALMIC at 12:35

## 2018-04-02 RX ADMIN — SODIUM CHLORIDE 3 MILLILITER(S): 9 INJECTION INTRAMUSCULAR; INTRAVENOUS; SUBCUTANEOUS at 13:47

## 2018-04-02 RX ADMIN — Medication 1 DROP(S): at 12:55

## 2018-04-02 RX ADMIN — SODIUM CHLORIDE 3 MILLILITER(S): 9 INJECTION INTRAMUSCULAR; INTRAVENOUS; SUBCUTANEOUS at 05:19

## 2018-04-02 NOTE — BRIEF OPERATIVE NOTE - PROCEDURE
<<-----Click on this checkbox to enter Procedure Decortication, lung, with VATS  04/02/2018  Right Uniportal VATS, drainage of hemothorax, decortication, lysis of adhesions  Active  [id number]

## 2018-04-02 NOTE — BRIEF OPERATIVE NOTE - COMMENTS
Extubated in OR, brought to PACU, not taking significant breaths of air, requiring further sugammadex and narcan with improvement in respirations as well as mental status

## 2018-04-03 ENCOUNTER — APPOINTMENT (OUTPATIENT)
Dept: THORACIC SURGERY | Facility: CLINIC | Age: 73
End: 2018-04-03

## 2018-04-03 DIAGNOSIS — K59.01 SLOW TRANSIT CONSTIPATION: ICD-10-CM

## 2018-04-03 DIAGNOSIS — Z51.5 ENCOUNTER FOR PALLIATIVE CARE: ICD-10-CM

## 2018-04-03 DIAGNOSIS — R52 PAIN, UNSPECIFIED: ICD-10-CM

## 2018-04-03 DIAGNOSIS — C34.90 MALIGNANT NEOPLASM OF UNSPECIFIED PART OF UNSPECIFIED BRONCHUS OR LUNG: ICD-10-CM

## 2018-04-03 LAB
BUN SERPL-MCNC: 33 MG/DL — HIGH (ref 7–23)
BUN SERPL-MCNC: 34 MG/DL — HIGH (ref 7–23)
CA-I BLD-SCNC: 1.1 MMOL/L — SIGNIFICANT CHANGE UP (ref 1.03–1.23)
CALCIUM SERPL-MCNC: 8.1 MG/DL — LOW (ref 8.4–10.5)
CALCIUM SERPL-MCNC: 8.4 MG/DL — SIGNIFICANT CHANGE UP (ref 8.4–10.5)
CHLORIDE SERPL-SCNC: 90 MMOL/L — LOW (ref 98–107)
CHLORIDE SERPL-SCNC: 94 MMOL/L — LOW (ref 98–107)
CHLORIDE UR-SCNC: 23 MMOL/L — SIGNIFICANT CHANGE UP
CO2 SERPL-SCNC: 21 MMOL/L — LOW (ref 22–31)
CO2 SERPL-SCNC: 22 MMOL/L — SIGNIFICANT CHANGE UP (ref 22–31)
CREAT SERPL-MCNC: 1.84 MG/DL — HIGH (ref 0.5–1.3)
CREAT SERPL-MCNC: 1.99 MG/DL — HIGH (ref 0.5–1.3)
GLUCOSE SERPL-MCNC: 104 MG/DL — HIGH (ref 70–99)
GLUCOSE SERPL-MCNC: 143 MG/DL — HIGH (ref 70–99)
HCT VFR BLD CALC: 28.3 % — LOW (ref 39–50)
HGB BLD-MCNC: 9.4 G/DL — LOW (ref 13–17)
MAGNESIUM SERPL-MCNC: 1.5 MG/DL — LOW (ref 1.6–2.6)
MCHC RBC-ENTMCNC: 30.6 PG — SIGNIFICANT CHANGE UP (ref 27–34)
MCHC RBC-ENTMCNC: 33.2 % — SIGNIFICANT CHANGE UP (ref 32–36)
MCV RBC AUTO: 92.2 FL — SIGNIFICANT CHANGE UP (ref 80–100)
NRBC # FLD: 0 — SIGNIFICANT CHANGE UP
OSMOLALITY UR: 469 MOSMO/KG — SIGNIFICANT CHANGE UP (ref 50–1200)
PHOSPHATE SERPL-MCNC: 3.9 MG/DL — SIGNIFICANT CHANGE UP (ref 2.5–4.5)
PLATELET # BLD AUTO: 421 K/UL — HIGH (ref 150–400)
PMV BLD: 9.5 FL — SIGNIFICANT CHANGE UP (ref 7–13)
POTASSIUM SERPL-MCNC: 4.6 MMOL/L — SIGNIFICANT CHANGE UP (ref 3.5–5.3)
POTASSIUM SERPL-MCNC: 5.3 MMOL/L — SIGNIFICANT CHANGE UP (ref 3.5–5.3)
POTASSIUM SERPL-SCNC: 4.6 MMOL/L — SIGNIFICANT CHANGE UP (ref 3.5–5.3)
POTASSIUM SERPL-SCNC: 5.3 MMOL/L — SIGNIFICANT CHANGE UP (ref 3.5–5.3)
POTASSIUM UR-SCNC: 67.5 MMOL/L — SIGNIFICANT CHANGE UP
RBC # BLD: 3.07 M/UL — LOW (ref 4.2–5.8)
RBC # FLD: 15.4 % — HIGH (ref 10.3–14.5)
SODIUM SERPL-SCNC: 124 MMOL/L — LOW (ref 135–145)
SODIUM SERPL-SCNC: 129 MMOL/L — LOW (ref 135–145)
SODIUM UR-SCNC: 25 MMOL/L — SIGNIFICANT CHANGE UP
WBC # BLD: 15 K/UL — HIGH (ref 3.8–10.5)
WBC # FLD AUTO: 15 K/UL — HIGH (ref 3.8–10.5)

## 2018-04-03 PROCEDURE — 99223 1ST HOSP IP/OBS HIGH 75: CPT | Mod: GC

## 2018-04-03 PROCEDURE — 71045 X-RAY EXAM CHEST 1 VIEW: CPT | Mod: 26

## 2018-04-03 RX ORDER — TAMSULOSIN HYDROCHLORIDE 0.4 MG/1
0.4 CAPSULE ORAL DAILY
Qty: 0 | Refills: 0 | Status: DISCONTINUED | OUTPATIENT
Start: 2018-04-03 | End: 2018-04-08

## 2018-04-03 RX ORDER — SENNA PLUS 8.6 MG/1
2 TABLET ORAL AT BEDTIME
Qty: 0 | Refills: 0 | Status: DISCONTINUED | OUTPATIENT
Start: 2018-04-03 | End: 2018-04-08

## 2018-04-03 RX ORDER — LIDOCAINE 4 G/100G
1 CREAM TOPICAL EVERY 4 HOURS
Qty: 0 | Refills: 0 | Status: DISCONTINUED | OUTPATIENT
Start: 2018-04-03 | End: 2018-04-08

## 2018-04-03 RX ORDER — SODIUM CHLORIDE 5 G/100ML
500 INJECTION, SOLUTION INTRAVENOUS
Qty: 0 | Refills: 0 | Status: DISCONTINUED | OUTPATIENT
Start: 2018-04-03 | End: 2018-04-04

## 2018-04-03 RX ORDER — SODIUM CHLORIDE 9 MG/ML
1000 INJECTION, SOLUTION INTRAVENOUS
Qty: 0 | Refills: 0 | Status: DISCONTINUED | OUTPATIENT
Start: 2018-04-03 | End: 2018-04-03

## 2018-04-03 RX ADMIN — HYDROMORPHONE HYDROCHLORIDE 30 MILLILITER(S): 2 INJECTION INTRAMUSCULAR; INTRAVENOUS; SUBCUTANEOUS at 19:08

## 2018-04-03 RX ADMIN — Medication 100 MILLIGRAM(S): at 05:57

## 2018-04-03 RX ADMIN — Medication 1 PATCH: at 18:40

## 2018-04-03 RX ADMIN — HYDROMORPHONE HYDROCHLORIDE 30 MILLILITER(S): 2 INJECTION INTRAMUSCULAR; INTRAVENOUS; SUBCUTANEOUS at 07:10

## 2018-04-03 RX ADMIN — Medication 100 MILLIGRAM(S): at 22:01

## 2018-04-03 RX ADMIN — SODIUM CHLORIDE 40 MILLILITER(S): 5 INJECTION, SOLUTION INTRAVENOUS at 12:28

## 2018-04-03 RX ADMIN — SODIUM CHLORIDE 3 MILLILITER(S): 9 INJECTION INTRAMUSCULAR; INTRAVENOUS; SUBCUTANEOUS at 21:54

## 2018-04-03 RX ADMIN — TAMSULOSIN HYDROCHLORIDE 0.4 MILLIGRAM(S): 0.4 CAPSULE ORAL at 12:32

## 2018-04-03 RX ADMIN — SODIUM CHLORIDE 3 MILLILITER(S): 9 INJECTION INTRAMUSCULAR; INTRAVENOUS; SUBCUTANEOUS at 05:56

## 2018-04-03 RX ADMIN — PANTOPRAZOLE SODIUM 40 MILLIGRAM(S): 20 TABLET, DELAYED RELEASE ORAL at 06:31

## 2018-04-03 RX ADMIN — Medication 100 MILLIGRAM(S): at 14:33

## 2018-04-03 RX ADMIN — SENNA PLUS 2 TABLET(S): 8.6 TABLET ORAL at 22:01

## 2018-04-03 RX ADMIN — SODIUM CHLORIDE 3 MILLILITER(S): 9 INJECTION INTRAMUSCULAR; INTRAVENOUS; SUBCUTANEOUS at 14:31

## 2018-04-03 RX ADMIN — HEPARIN SODIUM 5000 UNIT(S): 5000 INJECTION INTRAVENOUS; SUBCUTANEOUS at 17:50

## 2018-04-03 RX ADMIN — Medication 1 DROP(S): at 03:49

## 2018-04-03 RX ADMIN — HEPARIN SODIUM 5000 UNIT(S): 5000 INJECTION INTRAVENOUS; SUBCUTANEOUS at 05:57

## 2018-04-03 RX ADMIN — SODIUM CHLORIDE 40 MILLILITER(S): 5 INJECTION, SOLUTION INTRAVENOUS at 20:17

## 2018-04-03 RX ADMIN — TIOTROPIUM BROMIDE 1 CAPSULE(S): 18 CAPSULE ORAL; RESPIRATORY (INHALATION) at 10:28

## 2018-04-03 NOTE — PHYSICAL THERAPY INITIAL EVALUATION ADULT - PATIENT PROFILE REVIEW, REHAB EVAL
yes/PT orders received-->ambulate as tolerated. Consult with WILLIAM RIVERA-->pt OK to participate in PT evaluation.

## 2018-04-03 NOTE — CONSULT NOTE ADULT - PROBLEM SELECTOR RECOMMENDATION 9
? stage 3 vs stage 4 given pleural effusion drained on 12/7/17 was positive for malignant cells. s/p RUL VATS lobectomy, MLND and hilar LND s/p 4 cycles of adjuvant carboplatin/abraxane (last 11/28/17). With recurrent pleural effusion s/p R VATS and drainage  - F/U path/fluid cytology  - Follow up with Dr. Feliciano as outpatient for further management. He has an appointment on 4/10/18 at 10 am.    Oncology will sign off, please reconsult as needed with any new questions or concerns stage 3 vs stage 4 given pleural effusion drained on 12/7/17 was positive for malignant cells. s/p RUL VATS lobectomy, MLND and hilar LND s/p 4 cycles of adjuvant carboplatin/abraxane (last 11/28/17). With recurrent pleural effusion s/p R VATS and drainage  - F/U path/fluid cytology  - Follow up with Dr. Feliciano as outpatient for further management. He has an appointment on 4/10/18 at 10 am.    Oncology will sign off, please reconsult as needed with any new questions or concerns

## 2018-04-03 NOTE — PHYSICAL THERAPY INITIAL EVALUATION ADULT - ADDITIONAL COMMENTS
Patient reports he lives with his wife in an apartment, 20 steps to enter +handrail. Patient reports he was previously independent in all ADLs and did not use an assistive device for ambulation.    Patient was left semi-supine in bed as found, all lines/tubes intact and call duran within reach, WILLIAM schwartz

## 2018-04-03 NOTE — CONSULT NOTE ADULT - ASSESSMENT
72 yo M Afib on Eliquis, Squamous Cell Lung Ca (dx 8/2017) s/p RUL VATS lobectomy, MLND and hilar LND s/p 4 cycles of adjuvant carboplatin/abraxane (last 11/28/17), additionally noted to have CLL/SLL in LNs without evidence of transformation, R pleural effusion drained in office 12/7/17 (+ for malignant cells), repeat PET in Jan 2018 which was stable p/w worsening SOB, CT chest shows large R effusion s/p R VATS, drainage of hemothorax, decortication on 4/2.

## 2018-04-03 NOTE — CONSULT NOTE ADULT - SUBJECTIVE AND OBJECTIVE BOX
HPI:  72 yo M Afib on Eliquis, Squamous Cell Lung Ca (dx 8/2017) s/p RUL VATS lobectomy, MLND and hilar LND s/p 4 cycles of adjuvant carboplatin/abraxane (last 11/28/17), additionally noted to have CLL/SLL in LNs without evidence of transformation, R pleural effusion drained in office 12/7/17 (+ for malignant cells), repeat PET in Jan 2018 which was stable p/w worsening SOB, CT chest shows large R effusion s/p R VATS, drainage of hemothorax, decortication on 4/2.     He presented to ED complaining of progressive worsening of DONNELLY for the past week, saw PMD 3/30 who ordered CT showing large R effusion and compressive atelectasis of RLL. He denies productive sputum, pleuritic/substernal chest pain, fever, chills, nausea, vomiting, dysuria, and diarrhea.     PAST MEDICAL & SURGICAL HISTORY:  Squamous cell carcinoma of lung, stage III  TIA (transient ischemic attack): 1999  Localized enlarged lymph nodes  Solitary pulmonary nodule  HTN (hypertension)  Alcohol abuse: Sober since 01/2016- attending AA meeting weekly  Depression, unspecified depression type  Gout  Essential hypertension  H/O hemorrhoidectomy: 1967  History of tonsillectomy: as a child  History of appendectomy: 1958  H/O left knee surgery: 1995      Allergies    No Known Allergies    Intolerances    MEDICATIONS  (STANDING):  diltiazem    Tablet 90 milliGRAM(s) Oral every 6 hours  docusate sodium 100 milliGRAM(s) Oral three times a day  heparin  Injectable 5000 Unit(s) SubCutaneous every 12 hours  HYDROmorphone PCA (1 mG/mL) 30 milliLiter(s) PCA Continuous PCA Continuous  levoFLOXacin  Tablet 750 milliGRAM(s) Oral every 48 hours  nicotine -   7 mG/24Hr(s) Patch 1 patch Transdermal daily  pantoprazole    Tablet 40 milliGRAM(s) Oral before breakfast  senna 2 Tablet(s) Oral at bedtime  sodium chloride 0.9% lock flush 3 milliLiter(s) IV Push every 8 hours  sodium chloride 1.5%. 500 milliLiter(s) (40 mL/Hr) IV Continuous <Continuous>  tamsulosin 0.4 milliGRAM(s) Oral daily  tiotropium 18 MICROgram(s) Capsule 1 Capsule(s) Inhalation daily    MEDICATIONS  (PRN):  acetaminophen   Tablet. 650 milliGRAM(s) Oral every 6 hours PRN Mild Pain (1 - 3)  ALBUTerol/ipratropium for Nebulization 3 milliLiter(s) Nebulizer every 6 hours PRN Shortness of Breath and/or Wheezing  HYDROmorphone PCA (1 mG/mL) Rescue Clinician Bolus 0.5 milliGRAM(s) IV Push every 15 minutes PRN for Pain Scale GREATER THAN 6  naloxone Injectable 0.1 milliGRAM(s) IV Push every 3 minutes PRN For ANY of the following changes in patient status:  A. RR LESS THAN 10 breaths per minute, B. Oxygen saturation LESS THAN 90%, C. Sedation score of 6  ondansetron Injectable 4 milliGRAM(s) IV Push every 6 hours PRN Nausea  prochlorperazine   Tablet 10 milliGRAM(s) Oral Once PRN Depression/anxiety      FAMILY HISTORY:  No pertinent family history in first degree relatives      SOCIAL HISTORY: No EtOH, former heavy smoker     REVIEW OF SYSTEMS: see HPI  All other review of systems is negative unless indicated above    VITALS:   T(F): 98.9 (04-03-18 @ 16:35), Max: 98.9 (04-03-18 @ 10:30)  HR: 99 (04-03-18 @ 16:35)  BP: 122/65 (04-03-18 @ 16:35)  RR: 18 (04-03-18 @ 16:35)  SpO2: 97% (04-03-18 @ 16:35)  Wt(kg): --    PHYSICAL EXAM    GENERAL: NAD, well-developed  EYES: EOMI, PERRLA, conjunctiva and sclera clear  NECK: Supple, No JVD  CHEST/LUNG: s/p R chest tube, decreased BS at R base, otherwise clear   HEART: Regular rate and rhythm; No murmurs, rubs, or gallops  ABDOMEN: Soft, Nontender, Nondistended; Bowel sounds present  EXTREMITIES:  2+ Peripheral Pulses, No clubbing, cyanosis, or edema  NEUROLOGY: non-focal, AAO x 3  SKIN: No rashes or lesions    LABS:                         9.4    15.00 )-----------( 421      ( 03 Apr 2018 06:23 )             28.3       04-03    129<L>  |  94<L>  |  34<H>  ----------------------------<  143<H>  5.3   |  22  |  1.99<H>    Ca    8.4      03 Apr 2018 16:50  Phos  3.9     04-03  Mg     1.5     04-03    Magnesium, Serum: 1.5 mg/dL (04-03 @ 06:23)  Phosphorus Level, Serum: 3.9 mg/dL (04-03 @ 06:23)    PT/INR - ( 02 Apr 2018 07:15 )   PT: 14.6 SEC;   INR: 1.31     PTT - ( 02 Apr 2018 07:15 )  PTT:32.0 SEC    IMAGING: Reviewed

## 2018-04-03 NOTE — CONSULT NOTE ADULT - SUBJECTIVE AND OBJECTIVE BOX
HPI:  Mr. Clay is a 73 year-old man with history of multiple medical issues including hypertension, gout, atrial fibrillation, CLL, and NSCLC s/p right VATS 8/2017.  He completed chemotherapy in 12/2017; he had his right pleural effusion drained in 12/2017 as well. He saw his PMD 3/30/18 and admitted to worsening dyspnea on exertion for 1 week; his PMD ordered a CT showing a large right effusion/RLL atelectasis. In light of the dyspnea and the CT findings, he was sent on 4/1/18 to [facility] for further management. Since admission, he has been noted to have azotemia and hyponatremia. His serum sodium decreased to 124meq/L as of today; his creatinine tere to 1.84mg/dL. In light of these findings, a renal consultation was requested.                                                      PAST MEDICAL & SURGICAL HISTORY:  Squamous cell carcinoma of lung, stage III  TIA (transient ischemic attack): 1999  Localized enlarged lymph nodes  Solitary pulmonary nodule  HTN (hypertension)  Alcohol abuse: Sober since 01/2016- attending AA meeting weekly  Depression, unspecified depression type  Gout  Essential hypertension  H/O hemorrhoidectomy: 1967  History of tonsillectomy: as a child  History of appendectomy: 1958  H/O left knee surgery: 1995    Allergies  No Known Allergies    SOCIAL HISTORY:  Denies ETOh,Smoking,     FAMILY HISTORY:  No pertinent family history in first degree relatives    REVIEW OF SYSTEMS:  CONSTITUTIONAL: No weakness, fevers or chills  EYES/ENT: No visual changes;  No vertigo or throat pain   NECK: No pain or stiffness  RESPIRATORY: No cough, wheezing, hemoptysis; (+)DONNELLY  CARDIOVASCULAR: No chest pain or palpitations  GASTROINTESTINAL: No abdominal or epigastric pain. No nausea, vomiting, or hematemesis; No diarrhea or constipation. No melena or hematochezia.  GENITOURINARY: No dysuria, frequency or hematuria  NEUROLOGICAL: No numbness or weakness  SKIN: No itching, burning, rashes, or lesions   All other review of systems is negative unless indicated above.    VITAL:  T(C): , Max: 37.2 (04-03-18 @ 10:30)  T(F): , Max: 98.9 (04-03-18 @ 10:30)  HR: 89 (04-03-18 @ 10:30)  BP: 135/58 (04-03-18 @ 10:30)  BP(mean): --  RR: 18 (04-03-18 @ 10:30)  SpO2: 93% (04-03-18 @ 10:30)    PHYSICAL EXAM:  Constitutional: NAD, Alert  HEENT: NCAT, MMM  Neck: Supple, No JVD  Respiratory: CTA-b/l  Cardiovascular: RRR s1s2, no m/r/g  Gastrointestinal: BS+, soft, NT/ND  Extremities: No peripheral edema b/l  Neurological: no focal deficits; strength grossly intact  Psychiatric: Normal mood, normal affect  Back: no CVAT b/l  Skin: No rashes, no nevi    LABS:                        9.4    15.00 )-----------( 421      ( 03 Apr 2018 06:23 )             28.3     Na(124)/K(4.6)/Cl(90)/HCO3(21)/BUN(33)/Cr(1.84)Glu(104)/Ca(8.1)/Mg(1.5)/PO4(3.9)    04-03 @ 06:23  Na(132)/K(4.6)/Cl(93)/HCO3(23)/BUN(25)/Cr(1.52)Glu(117)/Ca(9.0)/Mg(--)/PO4(--)    04-02 @ 07:15  Na(128)/K(4.2)/Cl(91)/HCO3(24)/BUN(26)/Cr(1.49)Glu(128)/Ca(8.9)/Mg(--)/PO4(--)    04-01 @ 10:27    (1/9/2018) - Cr 1.75; Na 135      IMAGING:    < from: CT Chest No Cont (03.30.18 @ 13:51) >  Interval increase in size of right pleural effusion, now with a loculated component within the right minor fissure.  New area of consolidation in the right lower lobe with areas of air bronchograms. In the right clinical setting infection can have this appearance.      ASSESSMENT:  (1)Renal - CKD3 with GFR 30-40ml/min; unclear exact etiology  (2)Hyponatremia -     RECOMMEND:      Thank you for involving [facility] in this patient's care.    With warm regards,    Jamari Jones MD   [facility],   [phone] HPI:  Mr. Clay is a 73 year-old man with history of multiple medical issues including hypertension, gout, atrial fibrillation, CLL, and NSCLC s/p right VATS 8/2017.  He completed chemotherapy in 12/2017; he had his right pleural effusion drained in 12/2017 as well. He saw his PMD 3/30/18 and admitted to worsening dyspnea on exertion for 1 week; his PMD ordered a CT showing a large right effusion/RLL atelectasis. In light of the dyspnea and the CT findings, he was sent on 4/1/18 to [facility] for further management. Since admission, he has been noted to have azotemia and hyponatremia. His serum sodium decreased to 124meq/L as of today; his creatinine tere to 1.84mg/dL. In light of these findings, a renal consultation was requested.                                                      PAST MEDICAL & SURGICAL HISTORY:  Squamous cell carcinoma of lung, stage III  TIA (transient ischemic attack): 1999  Localized enlarged lymph nodes  Solitary pulmonary nodule  HTN (hypertension)  Alcohol abuse: Sober since 01/2016- attending AA meeting weekly  Depression, unspecified depression type  Gout  Essential hypertension  H/O hemorrhoidectomy: 1967  History of tonsillectomy: as a child  History of appendectomy: 1958  H/O left knee surgery: 1995    Allergies  No Known Allergies    SOCIAL HISTORY:  Denies ETOh,Smoking,     FAMILY HISTORY:  No pertinent family history in first degree relatives    REVIEW OF SYSTEMS:  CONSTITUTIONAL: No weakness, fevers or chills  EYES/ENT: No visual changes;  No vertigo or throat pain   NECK: No pain or stiffness  RESPIRATORY: No cough, wheezing, hemoptysis; (+)DONNELLY  CARDIOVASCULAR: No chest pain or palpitations  GASTROINTESTINAL: No abdominal or epigastric pain. No nausea, vomiting, or hematemesis; No diarrhea or constipation. No melena or hematochezia.  GENITOURINARY: No dysuria, frequency or hematuria  NEUROLOGICAL: No numbness or weakness  SKIN: No itching, burning, rashes, or lesions   All other review of systems is negative unless indicated above.    VITAL:  T(C): , Max: 37.2 (04-03-18 @ 10:30)  T(F): , Max: 98.9 (04-03-18 @ 10:30)  HR: 89 (04-03-18 @ 10:30)  BP: 135/58 (04-03-18 @ 10:30)  BP(mean): --  RR: 18 (04-03-18 @ 10:30)  SpO2: 93% (04-03-18 @ 10:30)    PHYSICAL EXAM:  Constitutional: NAD, Alert  HEENT: NCAT, MMM  Neck: Supple, No JVD  Respiratory: CTA-b/l  Cardiovascular: RRR s1s2, no m/r/g  Gastrointestinal: BS+, soft, NT/ND  Extremities: No peripheral edema b/l  Neurological: no focal deficits; strength grossly intact  Psychiatric: Normal mood, normal affect  Back: no CVAT b/l  Skin: No rashes, no nevi    LABS:                        9.4    15.00 )-----------( 421      ( 03 Apr 2018 06:23 )             28.3     Na(124)/K(4.6)/Cl(90)/HCO3(21)/BUN(33)/Cr(1.84)Glu(104)/Ca(8.1)/Mg(1.5)/PO4(3.9)    04-03 @ 06:23  Na(132)/K(4.6)/Cl(93)/HCO3(23)/BUN(25)/Cr(1.52)Glu(117)/Ca(9.0)/Mg(--)/PO4(--)    04-02 @ 07:15  Na(128)/K(4.2)/Cl(91)/HCO3(24)/BUN(26)/Cr(1.49)Glu(128)/Ca(8.9)/Mg(--)/PO4(--)    04-01 @ 10:27    (1/9/2018) - Cr 1.75; Na 135      IMAGING:    < from: CT Chest No Cont (03.30.18 @ 13:51) >  Interval increase in size of right pleural effusion, now with a loculated component within the right minor fissure.  New area of consolidation in the right lower lobe with areas of air bronchograms. In the right clinical setting infection can have this appearance.      ASSESSMENT:  (1)Renal - CKD3 with GFR 30-40ml/min; unclear exact etiology  (2)Hyponatremia - SIADH/paraneoplastic?    RECOMMEND:  (1)1L free water restriction   (2)Change IVF from NS@30cc/h to 1.5%NS at 40cc/h  (3)Check urine: lytes, osm  (4)Check serum: uric acid, TSH, cortisol  (5)BMP at least q12h for now  (6)Dose new meds for GFR 30-40ml/min  (7)No diuretics for now; can add a loop diuretic IV if develops dyspnea at rest (lasix 40mg iv bid would be reasonable in this case)    Thank you for involving [facility] in this patient's care.    With warm regards,    Jamari Jones MD   [facility]  [phone] HPI: Mr. Melendez is a 73 year-old man with history of multiple medical issues including hypertension, gout, atrial fibrillation, CLL, and small cell lung CA s/p right VATS 8/2017.  He completed chemotherapy in 12/2017; he had his right pleural effusion drained in 12/2017 as well. He saw his PMD 3/30/18 and admitted to worsening dyspnea on exertion for 1 week; his PMD ordered a CT showing a large right effusion/RLL atelectasis. In light of the dyspnea and the CT findings, he was sent on 4/1/18 to [facility] for further management. Since admission, he has been noted to have azotemia and hyponatremia. His serum sodium decreased to 124meq/L as of today; his creatinine tere to 1.84mg/dL. In light of these findings, a renal consultation was requested.                                                    Mr. Melendez tells me that he was noted by Dr. Foster to have impaired renal function last year. He has not seen a nephrologist in the past; he does not know what the cause of his CKD is. He denies urinary changes such as gross hematuria or foaminess of his urine. He denies past history of hyponatremia. He informs me that for the past week or so he has not had much appetite, and that he has been eating very little. He has in fact been drinking more fluid than usual, as he was advised by his daughter to increase his fluid intake in effort to avoid dehydration.    PAST MEDICAL & SURGICAL HISTORY:  Small cell carcinoma of lung, stage III  TIA (transient ischemic attack): 1999  Localized enlarged lymph nodes  Solitary pulmonary nodule  HTN (hypertension)  Alcohol abuse: Sober since 01/2016- attending AA meeting weekly  Depression, unspecified depression type  Gout  Essential hypertension  H/O hemorrhoidectomy: 1967  History of tonsillectomy: as a child  History of appendectomy: 1958  H/O left knee surgery: 1995    Allergies  No Known Allergies    SOCIAL HISTORY:  Denies ETOh,Smoking,     FAMILY HISTORY:  No pertinent family history in first degree relatives    REVIEW OF SYSTEMS:  CONSTITUTIONAL: No weakness, fevers or chills; (+)loss of appetite  EYES/ENT: No visual changes;  No vertigo or throat pain   NECK: No pain or stiffness  RESPIRATORY: No cough, wheezing, hemoptysis; (+)DONNELLY  CARDIOVASCULAR: No chest pain or palpitations  GASTROINTESTINAL: No abdominal or epigastric pain. No nausea, vomiting, or hematemesis; No diarrhea or constipation. No melena or hematochezia.  GENITOURINARY: No dysuria, frequency or hematuria  NEUROLOGICAL: No numbness or weakness  SKIN: No itching, burning, rashes, or lesions   All other review of systems is negative unless indicated above.    VITAL:  T(C): , Max: 37.2 (04-03-18 @ 10:30)  T(F): , Max: 98.9 (04-03-18 @ 10:30)  HR: 89 (04-03-18 @ 10:30)  BP: 135/58 (04-03-18 @ 10:30)  BP(mean): --  RR: 18 (04-03-18 @ 10:30)  SpO2: 93% (04-03-18 @ 10:30)    PHYSICAL EXAM:  Constitutional: NAD, Alert  HEENT: NCAT, MMM  Neck: Supple, No JVD  Respiratory: CTA-b/l  Cardiovascular: RRR s1s2, no m/r/g  Gastrointestinal: BS+, soft, NT/ND  Extremities: No peripheral edema b/l  Neurological: no focal deficits; strength grossly intact  Psychiatric: Normal mood, normal affect  Back: no CVAT b/l  Skin: No rashes, no nevi    LABS:                        9.4    15.00 )-----------( 421      ( 03 Apr 2018 06:23 )             28.3     Na(124)/K(4.6)/Cl(90)/HCO3(21)/BUN(33)/Cr(1.84)Glu(104)/Ca(8.1)/Mg(1.5)/PO4(3.9)    04-03 @ 06:23  Na(132)/K(4.6)/Cl(93)/HCO3(23)/BUN(25)/Cr(1.52)Glu(117)/Ca(9.0)/Mg(--)/PO4(--)    04-02 @ 07:15  Na(128)/K(4.2)/Cl(91)/HCO3(24)/BUN(26)/Cr(1.49)Glu(128)/Ca(8.9)/Mg(--)/PO4(--)    04-01 @ 10:27    (1/9/2018) - Cr 1.75; Na 135      IMAGING:    < from: CT Chest No Cont (03.30.18 @ 13:51) >  Interval increase in size of right pleural effusion, now with a loculated component within the right minor fissure.  New area of consolidation in the right lower lobe with areas of air bronchograms. In the right clinical setting infection can have this appearance.      ASSESSMENT:  (1)Renal - CKD3 with GFR 30-40ml/min; unclear exact etiology  (2)Hyponatremia - SIADH/paraneoplastic?    RECOMMEND:  (1)1L free water restriction   (2)Change IVF from NS@30cc/h to 1.5%NS at 40cc/h  (3)Check urine: lytes, osm  (4)Check serum: uric acid, TSH, cortisol  (5)BMP at least q12h for now  (6)Dose new meds for GFR 30-40ml/min  (7)No diuretics for now; can add a loop diuretic IV if develops dyspnea at rest (lasix 40mg iv bid would be reasonable in this case)    Thank you for involving [facility] in this patient's care.    With warm regards,    Jamari Jones MD   [facility]  [phone]

## 2018-04-03 NOTE — CONSULT NOTE ADULT - PROBLEM SELECTOR RECOMMENDATION 4
Refill Request for:    traMADOL (ULTRAM) 50 MG tablet; Take 1 tablet by mouth every 8 hours. - Oral  Last Refill was: 3/2/17 for #90 with 2 refills bu Dr Platt.  Last Visit with Erica Mead NP was: 3/6/17  Last Labs were done on: 3/6/17  Next Visit scheduled for: None scheduled  Cannot refill per protocol; Please advise.       add senna   if no bm, dulcolax suppository

## 2018-04-03 NOTE — PHYSICAL THERAPY INITIAL EVALUATION ADULT - PERTINENT HX OF CURRENT PROBLEM, REHAB EVAL
Patient is a 73 year old male admitted to [facility] on 4/1 with  HTN, gout, a-fib (on eliquis), ETOH, CLL and SCLC (T2a/N2/Mx) s/p RVATS RUL on 8/2017.

## 2018-04-03 NOTE — PHYSICAL THERAPY INITIAL EVALUATION ADULT - GENERAL OBSERVATIONS, REHAB EVAL
Patient was received semi-supine in bed in NAD, +tele, +IV, +O2 nasal cannula, +Chest tube. pt's wife at bedside.

## 2018-04-03 NOTE — PHYSICAL THERAPY INITIAL EVALUATION ADULT - DID THE PATIENT HAVE SURGERY?
Chief Complaint  PT is here today for a PPD read, Negative  Also her second PPD  Active Problems    1  Abdominal pain, acute, right upper quadrant (789 01,338 19) (R10 11)   2  Anxiety and depression (300 4) (F41 8)   3  Coccydynia (724 79) (M53 3)   4  Generalized anxiety disorder (300 02) (F41 1)   5  Insomnia (780 52) (G47 00)   6  Moderate mood disorder (296 90) (F39)   7  Need for hepatitis B screening test (V73 89) (Z11 59)   8  Overweight (278 02) (E66 3)   9  Panic disorder without agoraphobia with moderate panic attacks (300 01) (F41 0)   10  PPD screening test (V74 1) (Z11 1)   11  Primary insomnia (307 42) (F51 01)   12  Screening for [place] measles (V73 3) (Z11 59)   13  Screening for rubella (V73 3) (Z11 59)    Current Meds   1  Abilify 5 MG Oral Tablet; TAKE 1 TABLET DAILY; Therapy: [id number] to (Evaluate:[id number]); Last Rx:[id number] Ordered    Allergies    1   No Known Drug Allergies    Future Appointments    Date/Time Provider Specialty Site   09/06/2016 05:00 PM Sandie Taylor     Signatures   Electronically signed by : Isael Stoll DO; Aug 17 3540  4:38PM EST                       (Author)
yes/Decortication, lung, with VATS, Right Uniportal VATS, drainage of hemothorax, decortication, lysis of adhesions

## 2018-04-03 NOTE — CONSULT NOTE ADULT - SUBJECTIVE AND OBJECTIVE BOX
HPI:  72yo male w PMHx HTN, gout, a-fib (on eliquis), EtOH (last drink 1/2016), CLL and SCLC (T2a/N2/Mx) s/p RVATS RUL on 8/2017. Outpatient included completion of chemo 12/5/2017 x1 and R pleural effusion drained in office 12/7/17 (+ malignancy CLL). He presented to ED complaining of progressive worsening of DONNELLY for the past week, saw PMD 3/30 who ordered CT showing large R effusion and compressive atelectasis of RLL. He denies productive sputum, pleuritic/substernal chest pain, fever, chills, nausea, vomiting, dysuria, and diarrhea.     Pt s/p VATS Right lung.  Pt complains of chest pain at incision.  Pt does complain of constipation.  Pt and family known to our family.    PMHx/PSHx:PAST MEDICAL & SURGICAL HISTORY:  Squamous cell carcinoma of lung, stage III  TIA (transient ischemic attack): 1999  Localized enlarged lymph nodes  Solitary pulmonary nodule  HTN (hypertension)  Alcohol abuse: Sober since 01/2016- attending AA meeting weekly  Depression, unspecified depression type  Gout  Essential hypertension  H/O hemorrhoidectomy: 1967  History of tonsillectomy: as a child  History of appendectomy: 1958  H/O left knee surgery: 1995      Vital Signs:  Vital Signs Last 24 Hrs  T(C): 36.8 (04-01-18 @ 10:08), Max: 36.8 (04-01-18 @ 10:08)  T(F): 98.3 (04-01-18 @ 10:08), Max: 98.3 (04-01-18 @ 10:08)  HR: 85 (04-01-18 @ 10:08) (82 - 85)  BP: 146/71 (04-01-18 @ 10:08) (140/72 - 146/71)  RR: 16 (04-01-18 @ 10:08) (16 - 18)  SpO2: 100% (04-01-18 @ 10:08) (98% - 100%) on (O2)    Telemetry/Alarms: Pending EKG  Gen: Aox3, no acute distress lying in bed   Neuro: Grossly intact, moving all extremities  Head/Neck: Normocephalic, trachea is midline   Pulm: Decreased B/s on R Vs. L   Card: Irr irr, s1/s2, no murmur  Abd: +BS, non-tender, soft   LE: No edema, non-tender  Skin: RVATS incision well healed     CXR **Pending official Review**: Large R pleural effusion  CT: Large R pleural effusion w/ associated RLL compressive atelectasis    Relevant labs, radiology and Medications reviewed                        10.8   11.86 )-----------( 386      ( 01 Apr 2018 10:27 )             32.7     04-01    128<L>  |  91<L>  |  26<H>  ----------------------------<  128<H>  4.2   |  24  |  1.49<H>    Ca    8.9      01 Apr 2018 10:27    TPro  7.0  /  Alb  3.4  /  TBili  0.4  /  DBili  x   /  AST  13  /  ALT  8   /  AlkPhos  108  04-01    PT/INR - ( 01 Apr 2018 10:57 )   PT: 16.3 SEC;   INR: 1.46          PTT - ( 01 Apr 2018 10:57 )  PTT:33.2 SEC  Pertinent Physical Exam  I&O's Summary      Assessment  73y Male  w/ PAST MEDICAL & SURGICAL HISTORY:  Squamous cell carcinoma of lung, stage III  TIA (transient ischemic attack): 1999  Localized enlarged lymph nodes  Solitary pulmonary nodule  HTN (hypertension)  Alcohol abuse: Sober since 01/2016- attending AA meeting weekly  Depression, unspecified depression type  Gout  Essential hypertension  H/O hemorrhoidectomy: 1967  History of tonsillectomy: as a child  History of appendectomy: 1958  H/O left knee surgery: 1995   Patient is a 73y old  Male who presents with a chief complaint of .  On (Date), patient underwent .                                                                                                         PLAN    **Neuro**   - Gout: Holding allopurinol while Cr. elevated   - Tylenol for pain  - Compazine PRN  **Pulm**  - Spiriva daily  - OR planned for tomorrow in AM  - NPO after midnight, x2 PRBC on hold  - valid type/screen  **Cardio**   - Tele monitor  - Pending CXR  - Rate control w/ cardizem  - Holding eliquis for OR tomorrow  - Discussed w/ Dr. Ward need for pre-op clearance pending  **GI**  - DASH diet  - protonix surgical prophylaxis  - NPO after midnight  **Renal**   - Hyponatremia/EUGENIO, appears euvolemic. Asymptomatic, work-up pending.  - Daily weight, urine osm/electrolye and serum osm   - Repeat electrolyte in AM  ****  - Voids self  **Vasc**  - Holding elequis last dose 3/31 in PM  - SQH for DVT prophylaxis tomorrow/SCDs  **Heme**  - h/h stable   ** Endo**  - Grossly intact   **ID**  - PMD started Levaquin for Pneumonia s/p 3 out of 7  - Adjusted 2/2 GFR 46. Will start 4/3 q48hr until course completed (4/5)    Discussed with Cardiothoracic Team at AM rounds.                                                                                                      Contact spectra: [id number] (01 Apr 2018 12:07)      PERTINENT PMH REVIEWED:  [ x] YES [ ] NO           SOCIAL HISTORY:  Significant other/partner:  [x ] YES  [ ] NO            Children:  [x ] YES  [ ] NO                   Yazdanism/Spirituality:Church  Substance hx:  [ ] YES   [x ] NO           Tobacco hx:  [ x] YES  [ ] NO             Alcohol hx: [ ] YES  [x ] NO        Home Opioid hx:  [ ] YES  [ x] NO   Living Situation: [ ] Home  [ ] Long term care  [ ] Rehab    FAMILY HISTORY:  No pertinent family history in first degree relatives    [x ] Family history non contributory     BASELINE ADLs (prior to admission):  Independent [ ] moderately [x ] fully   Dependent   [ ] moderately [ ] fully    Code Status:                      MOLST  [ ] YES [ ] NO    Living Will  [ ] YES [ ] NO    Health Care Proxy [ ] YES  [ ] NO      [ ] Surrogate  [ ] HCP  [ ] Guardian:                                                                  Phone#:    Allergies    No Known Allergies    Intolerances        MEDICATIONS  (STANDING):  diltiazem    Tablet 90 milliGRAM(s) Oral every 6 hours  docusate sodium 100 milliGRAM(s) Oral three times a day  heparin  Injectable 5000 Unit(s) SubCutaneous every 12 hours  HYDROmorphone PCA (1 mG/mL) 30 milliLiter(s) PCA Continuous PCA Continuous  levoFLOXacin  Tablet 750 milliGRAM(s) Oral every 48 hours  nicotine -   7 mG/24Hr(s) Patch 1 patch Transdermal daily  pantoprazole    Tablet 40 milliGRAM(s) Oral before breakfast  senna 2 Tablet(s) Oral at bedtime  sodium chloride 0.9% lock flush 3 milliLiter(s) IV Push every 8 hours  sodium chloride 1.5%. 500 milliLiter(s) (40 mL/Hr) IV Continuous <Continuous>  tamsulosin 0.4 milliGRAM(s) Oral daily  tiotropium 18 MICROgram(s) Capsule 1 Capsule(s) Inhalation daily    MEDICATIONS  (PRN):  acetaminophen   Tablet. 650 milliGRAM(s) Oral every 6 hours PRN Mild Pain (1 - 3)  ALBUTerol/ipratropium for Nebulization 3 milliLiter(s) Nebulizer every 6 hours PRN Shortness of Breath and/or Wheezing  HYDROmorphone PCA (1 mG/mL) Rescue Clinician Bolus 0.5 milliGRAM(s) IV Push every 15 minutes PRN for Pain Scale GREATER THAN 6  naloxone Injectable 0.1 milliGRAM(s) IV Push every 3 minutes PRN For ANY of the following changes in patient status:  A. RR LESS THAN 10 breaths per minute, B. Oxygen saturation LESS THAN 90%, C. Sedation score of 6  ondansetron Injectable 4 milliGRAM(s) IV Push every 6 hours PRN Nausea  prochlorperazine   Tablet 10 milliGRAM(s) Oral Once PRN Depression/anxiety      PRESENT SYMPTOMS:  Source: [x ] Patient   [ ] Family   [ ] Team     Pain: [x ] YES [ ] NO  Onset:  after surgery                  Location: right chest wall        Duration: days                     Aggravating factors: cough                       Relieving factors: iv dilaudid     Radiation:   none           Timing: none                            Severity:    5/10                  Character: sharp    Dyspnea: [ ] YES [ x] NO - Mild [ ]  Moderate [ ]  Severe [ ]    Anxiety: [ ] YES [x ] NO  Fatigue: [x ] YES [ ] NO   Nausea: [ ] YES [ x] NO  Loss of appetite: [ ] YES [x ] NO   Constipation: [x ] YES [ ] NO     Other Symptoms:  [x ] All other review of systems negative   [ ] Unable to obtain due to poor mentation     Does patient meet criteria for Severe Protein Calorie Malnutrition?  Yes [ ]  No [ ]  PPSV 30% or below [ ]  Anasarca [ ]  Albumin < 2 [ ] Catabolic State [ ] Poor nutritional intake [ ] Significant weight loss [ ]      Palliative Performance Status Version 2:   80      %  ECOG -        Vital Signs Last 24 Hrs  T(C): 37.2 (03 Apr 2018 16:35), Max: 37.2 (03 Apr 2018 10:30)  T(F): 98.9 (03 Apr 2018 16:35), Max: 98.9 (03 Apr 2018 10:30)  HR: 99 (03 Apr 2018 16:35) (79 - 99)  BP: 122/65 (03 Apr 2018 16:35) (113/57 - 135/58)  BP(mean): --  RR: 18 (03 Apr 2018 16:35) (18 - 18)  SpO2: 97% (03 Apr 2018 16:35) (90% - 97%)    Physical Exam:    General: [x ] Alert,  A&O x  3   [ ] lethargic   [ ] Agitated   [ ] Cachexia   HEENT: [x ] Normal   [ ] Dry mouth   [ ] ET Tube    [ ] Trach   Lungs: [ ] Clear [ ] Rhonchi  [ ] Crackles [ ] Wheezing [ ] Tachypnea  [ ] Audible excessive secretions  +decreased bs on right  Cardiovascular:  [ ] Regular rate and rhythm  [ ] Irregular [ x] Tachycardia   [ ] Bradycardia   Abdomen: [ x] Soft  [ ] Distended  [ x] +BS  [ x] Non tender [ ] Tender  [ ]PEG   [ ] NGT   Last BM:     Genitourinary: [x ] Normal [ ] Incontinent   [ ] Oliguria/Anuria   [ ] Espinoza  Musculoskeletal:  [x ] Normal   [ ] Generalized weakness  [ ] Bedbound  [ ] Edema   Neurological: [x ] No focal deficits  [ ] Cognitive impairment     Skin: [ x] Normal   [ ] Pressure ulcers     LABS:                        9.4    15.00 )-----------( 421      ( 03 Apr 2018 06:23 )             28.3     04-03    124<L>  |  90<L>  |  33<H>  ----------------------------<  104<H>  4.6   |  21<L>  |  1.84<H>    Ca    8.1<L>      03 Apr 2018 06:23  Phos  3.9     04-03  Mg     1.5     04-03      PT/INR - ( 02 Apr 2018 07:15 )   PT: 14.6 SEC;   INR: 1.31          PTT - ( 02 Apr 2018 07:15 )  PTT:32.0 SEC    I&O's Summary    02 Apr 2018 07:01  -  03 Apr 2018 07:00  --------------------------------------------------------  IN: 1920 mL / OUT: 420 mL / NET: 1500 mL    03 Apr 2018 07:01  -  03 Apr 2018 17:17  --------------------------------------------------------  IN: 330 mL / OUT: 650 mL / NET: -320 mL        RADIOLOGY & ADDITIONAL STUDIES:    Referrals:  Hospice [ ]   Chaplaincy [ ]    Child Life [ ]   Social Work [ ]   Case Management [ ]   Holistic Therapy [ ]

## 2018-04-03 NOTE — PHYSICAL THERAPY INITIAL EVALUATION ADULT - LEVEL OF INDEPENDENCE, REHAB EVAL
pt deferred as he just returned to bed from sitting in chair all day, despite + encouragement. As per pt, pt's wife and RN Jero RIVERA, patient required assistance x2 to ambulate from bed to chair. Will assess functional mobility as tolerated.

## 2018-04-04 LAB
BUN SERPL-MCNC: 31 MG/DL — HIGH (ref 7–23)
BUN SERPL-MCNC: 32 MG/DL — HIGH (ref 7–23)
CALCIUM SERPL-MCNC: 8.2 MG/DL — LOW (ref 8.4–10.5)
CALCIUM SERPL-MCNC: 8.5 MG/DL — SIGNIFICANT CHANGE UP (ref 8.4–10.5)
CHLORIDE SERPL-SCNC: 97 MMOL/L — LOW (ref 98–107)
CHLORIDE SERPL-SCNC: 97 MMOL/L — LOW (ref 98–107)
CO2 SERPL-SCNC: 20 MMOL/L — LOW (ref 22–31)
CO2 SERPL-SCNC: 22 MMOL/L — SIGNIFICANT CHANGE UP (ref 22–31)
CORTIS SERPL-MCNC: 15.3 UG/DL — SIGNIFICANT CHANGE UP (ref 2.7–18.4)
CREAT SERPL-MCNC: 1.56 MG/DL — HIGH (ref 0.5–1.3)
CREAT SERPL-MCNC: 1.69 MG/DL — HIGH (ref 0.5–1.3)
GLUCOSE SERPL-MCNC: 126 MG/DL — HIGH (ref 70–99)
GLUCOSE SERPL-MCNC: 163 MG/DL — HIGH (ref 70–99)
HCT VFR BLD CALC: 25.5 % — LOW (ref 39–50)
HGB BLD-MCNC: 8.3 G/DL — LOW (ref 13–17)
MCHC RBC-ENTMCNC: 30.7 PG — SIGNIFICANT CHANGE UP (ref 27–34)
MCHC RBC-ENTMCNC: 32.5 % — SIGNIFICANT CHANGE UP (ref 32–36)
MCV RBC AUTO: 94.4 FL — SIGNIFICANT CHANGE UP (ref 80–100)
NRBC # FLD: 0 — SIGNIFICANT CHANGE UP
PLATELET # BLD AUTO: 419 K/UL — HIGH (ref 150–400)
PMV BLD: 9.7 FL — SIGNIFICANT CHANGE UP (ref 7–13)
POTASSIUM SERPL-MCNC: 4.6 MMOL/L — SIGNIFICANT CHANGE UP (ref 3.5–5.3)
POTASSIUM SERPL-MCNC: 4.7 MMOL/L — SIGNIFICANT CHANGE UP (ref 3.5–5.3)
POTASSIUM SERPL-SCNC: 4.6 MMOL/L — SIGNIFICANT CHANGE UP (ref 3.5–5.3)
POTASSIUM SERPL-SCNC: 4.7 MMOL/L — SIGNIFICANT CHANGE UP (ref 3.5–5.3)
RBC # BLD: 2.7 M/UL — LOW (ref 4.2–5.8)
RBC # FLD: 15.4 % — HIGH (ref 10.3–14.5)
SODIUM SERPL-SCNC: 131 MMOL/L — LOW (ref 135–145)
SODIUM SERPL-SCNC: 131 MMOL/L — LOW (ref 135–145)
TSH SERPL-MCNC: 2.32 UIU/ML — SIGNIFICANT CHANGE UP (ref 0.27–4.2)
URATE SERPL-MCNC: 6.6 MG/DL — SIGNIFICANT CHANGE UP (ref 3.4–8.8)
WBC # BLD: 13.42 K/UL — HIGH (ref 3.8–10.5)
WBC # FLD AUTO: 13.42 K/UL — HIGH (ref 3.8–10.5)

## 2018-04-04 PROCEDURE — 71045 X-RAY EXAM CHEST 1 VIEW: CPT | Mod: 26

## 2018-04-04 PROCEDURE — 99223 1ST HOSP IP/OBS HIGH 75: CPT

## 2018-04-04 RX ORDER — OXYCODONE HYDROCHLORIDE 5 MG/1
2.5 TABLET ORAL
Qty: 0 | Refills: 0 | Status: DISCONTINUED | OUTPATIENT
Start: 2018-04-04 | End: 2018-04-08

## 2018-04-04 RX ORDER — ACETAMINOPHEN 500 MG
650 TABLET ORAL EVERY 6 HOURS
Qty: 0 | Refills: 0 | Status: COMPLETED | OUTPATIENT
Start: 2018-04-04 | End: 2018-04-06

## 2018-04-04 RX ORDER — MAGNESIUM OXIDE 400 MG ORAL TABLET 241.3 MG
400 TABLET ORAL
Qty: 0 | Refills: 0 | Status: COMPLETED | OUTPATIENT
Start: 2018-04-04 | End: 2018-04-06

## 2018-04-04 RX ORDER — OXYCODONE HYDROCHLORIDE 5 MG/1
5 TABLET ORAL EVERY 6 HOURS
Qty: 0 | Refills: 0 | Status: DISCONTINUED | OUTPATIENT
Start: 2018-04-04 | End: 2018-04-08

## 2018-04-04 RX ADMIN — TIOTROPIUM BROMIDE 1 CAPSULE(S): 18 CAPSULE ORAL; RESPIRATORY (INHALATION) at 13:04

## 2018-04-04 RX ADMIN — Medication 650 MILLIGRAM(S): at 18:18

## 2018-04-04 RX ADMIN — Medication 1 PATCH: at 18:19

## 2018-04-04 RX ADMIN — HYDROMORPHONE HYDROCHLORIDE 30 MILLILITER(S): 2 INJECTION INTRAMUSCULAR; INTRAVENOUS; SUBCUTANEOUS at 07:17

## 2018-04-04 RX ADMIN — Medication 5 MILLIGRAM(S): at 12:58

## 2018-04-04 RX ADMIN — Medication 650 MILLIGRAM(S): at 12:57

## 2018-04-04 RX ADMIN — Medication 100 MILLIGRAM(S): at 21:36

## 2018-04-04 RX ADMIN — SODIUM CHLORIDE 3 MILLILITER(S): 9 INJECTION INTRAMUSCULAR; INTRAVENOUS; SUBCUTANEOUS at 21:33

## 2018-04-04 RX ADMIN — Medication 100 MILLIGRAM(S): at 13:03

## 2018-04-04 RX ADMIN — SENNA PLUS 2 TABLET(S): 8.6 TABLET ORAL at 21:36

## 2018-04-04 RX ADMIN — TAMSULOSIN HYDROCHLORIDE 0.4 MILLIGRAM(S): 0.4 CAPSULE ORAL at 12:57

## 2018-04-04 RX ADMIN — Medication 100 MILLIGRAM(S): at 05:45

## 2018-04-04 RX ADMIN — LIDOCAINE 1 APPLICATION(S): 4 CREAM TOPICAL at 00:46

## 2018-04-04 RX ADMIN — MAGNESIUM OXIDE 400 MG ORAL TABLET 400 MILLIGRAM(S): 241.3 TABLET ORAL at 18:18

## 2018-04-04 RX ADMIN — HEPARIN SODIUM 5000 UNIT(S): 5000 INJECTION INTRAVENOUS; SUBCUTANEOUS at 18:19

## 2018-04-04 RX ADMIN — SODIUM CHLORIDE 3 MILLILITER(S): 9 INJECTION INTRAMUSCULAR; INTRAVENOUS; SUBCUTANEOUS at 12:58

## 2018-04-04 RX ADMIN — Medication 650 MILLIGRAM(S): at 13:03

## 2018-04-04 RX ADMIN — SODIUM CHLORIDE 3 MILLILITER(S): 9 INJECTION INTRAMUSCULAR; INTRAVENOUS; SUBCUTANEOUS at 05:43

## 2018-04-04 RX ADMIN — MAGNESIUM OXIDE 400 MG ORAL TABLET 400 MILLIGRAM(S): 241.3 TABLET ORAL at 12:57

## 2018-04-04 RX ADMIN — LIDOCAINE 1 APPLICATION(S): 4 CREAM TOPICAL at 05:55

## 2018-04-04 RX ADMIN — Medication 650 MILLIGRAM(S): at 18:19

## 2018-04-04 RX ADMIN — PANTOPRAZOLE SODIUM 40 MILLIGRAM(S): 20 TABLET, DELAYED RELEASE ORAL at 06:02

## 2018-04-04 RX ADMIN — HEPARIN SODIUM 5000 UNIT(S): 5000 INJECTION INTRAVENOUS; SUBCUTANEOUS at 05:45

## 2018-04-04 NOTE — PROGRESS NOTE ADULT - ASSESSMENT
(1)Renal - CKD3 with GFR 30-40ml/min; unclear exact etiology, renal function improving  (2)Hyponatremia - SIADH/paraneoplastic?  (3)Urology - retention? s/p bowie   (4)hypomagnesemia - due to diuresis + nutritional mild    RECOMMEND:  - maintain 1L free water restriction   - d/c 1.5%NS at 40cc/h  - check BMP this evening, if Na <130 consider resuming 1.5%NS   - start mag ox 400 mg po tid with meals x 2 days  - Dose new meds for GFR 30-40ml/min    Jennifer Alcala NP  [facility]  [phone] (1)Renal - CKD3 with GFR 30-40ml/min; unclear exact etiology, renal function improving  (2)Hyponatremia - SIADH/paraneoplastic?  (3)Urology - retention? s/p bowie   (4)hypomagnesemia - due to diuresis + nutritional mild    RECOMMEND:  - maintain 1L free water restriction   - d/c 1.5%NS at 40cc/h  - check BMP this evening, if Na <130 consider resuming 1.5%NS   - start mag ox 400 mg po tid with meals x 2 days  - Dose new meds for GFR 30-40ml/min    Jennifer Alcala NP  [facility]  [phone]    ***************************RENAL ATTENDING****************************************************************  Seen/examined with NP. I agree with NP assessment as above.    General: NAD, thin to cachectic  Pulm: coarse BS; (+)chest tube  Card: RRR s1s2  Ext: no c/c/e      ASSESSMENT:  (1)Renal - EUGENIO on CKD - prerenally mediated - improved with IVF  (2)Hyponatremia - hypovolemic/poor osm intake; improved with IVF   (3)- urinary retention - now with bowie in place  (4)Thoracic/Onc - s/p VATS and with chest tube in place.    RECOMMEND:  (1)Chest tube management per Thoracic  (2)Flomax/TOV as planned  (3)No IVF/No diuretics for now  (4)Encouarge increased osmotic intake      Jamari Jones MD  [facility]  [phone] (1)Renal - CKD3 with GFR 30-40ml/min; unclear exact etiology, renal function improving  (2)Hyponatremia - SIADH/paraneoplastic?  (3)Urology - retention? s/p bowie   (4)hypomagnesemia - due to diuresis + nutritional mild    RECOMMEND:  - maintain 1L free water restriction   - d/c 1.5%NS at 40cc/h  - check BMP this evening, if Na <130 consider resuming 1.5%NS   - start mag ox 400 mg po tid with meals x 2 days  - Dose new meds for GFR 30-40ml/min    Jennifer Alcala NP  [facility]  [phone]    ***************************RENAL ATTENDING****************************************************************  Seen/examined with NP. I agree with NP assessment as above. No SOB. Improved appetite    General: NAD, thin   Pulm: coarse BS; (+)chest tube  Card: RRR s1s2  Ext: no c/c/e      ASSESSMENT:  (1)Renal - EUGENIO on CKD - prerenally mediated - improved with IVF  (2)Hyponatremia - hypovolemic/poor osm intake; improved with IVF   (3)- urinary retention - now with bowie in place  (4)Thoracic/Onc - s/p VATS and with chest tube in place.    RECOMMEND:  (1)Chest tube management per Thoracic  (2)Flomax/TOV as planned  (3)No IVF/No diuretics for now  (4)Encourage increased osmotic intake      Jamari Jones MD  [facility]  [phone]

## 2018-04-04 NOTE — CONSULT NOTE ADULT - ASSESSMENT
74yo male w PMHx HTN, gout, a-fib (on eliquis), CKD3, EtOH (last drink 1/2016), CLL and SCLC (T2a/N2/Mx) s/p RVATS RUL, reaccumulating R pleural effusion, hyponatremia, urinary retention:  1. SCLC w/reaccumulating R pleural effusion - s/p thoracentesis by CTS, will need to address the need for pleurodesis vs indwelling catheter when acute medical issues resolved, Onc appreciated, outpt f/u for chemo options, Palliative appreciated, c/w inhalers, pain control  2. A fib - Cardio appreciated, restart Eliquis when cleared to do so by CTS, rate controlled  3. Hyponatremia - likely SIADH, Renal appreciated, Na improving, c/w fluid restriction  4. CKD3 - Cr stable, monitor  5. HTN - BP controlled  6. Anemia - likely multifactorial, Hb drop today noted but no signs of bleeding, monitor  7. Urinary retention - c/w Flomax, TOV  8. Dvt prophylaxis

## 2018-04-04 NOTE — PROGRESS NOTE ADULT - PROBLEM SELECTOR PLAN 5
Symptoms are well controlled    A care plan has been delineated for outpatient follow up with oncology    Thank you for the reconsult and feel free to reconsult when clinical needs arise.

## 2018-04-04 NOTE — CONSULT NOTE ADULT - SUBJECTIVE AND OBJECTIVE BOX
HPI: 74yo male w PMHx HTN, gout, a-fib (on eliquis), CKD3, EtOH (last drink 1/2016), CLL and SCLC (T2a/N2/Mx) s/p RVATS RUL on 8/2017. Outpatient included completion of chemo 12/5/2017 x1 and R pleural effusion drained in office 12/7/17 (+ malignancy CLL). He presented to ED complaining of progressive worsening of DONNELLY for the past week, saw PMD 3/30 who ordered CT showing large R effusion and compressive atelectasis of RLL. He denies productive sputum, pleuritic/substernal chest pain, fever, chills, nausea, vomiting, dysuria, and diarrhea. Found to have large R pleural effusion, currently s/p thoracentesis by CTS.      Allergies:  No Known Allergies      PAST MEDICAL & SURGICAL HISTORY:  Squamous cell carcinoma of lung, stage III  TIA (transient ischemic attack): 1999  Localized enlarged lymph nodes  Solitary pulmonary nodule  HTN (hypertension)  Alcohol abuse: Sober since 01/2016- attending AA meeting weekly  Depression, unspecified depression type  Gout  Essential hypertension  H/O hemorrhoidectomy: 1967  History of tonsillectomy: as a child  History of appendectomy: 1958  H/O left knee surgery: 1995      FAMILY HISTORY:  No pertinent family history in first degree relatives      REVIEW OF SYSTEMS:  CONSTITUTIONAL: No fever, weight loss, + fatigue  EYES: No eye pain, visual disturbances, or discharge  NECK: No pain or stiffness  RESPIRATORY: No cough or wheezing, + shortness of breath  CARDIOVASCULAR: No chest pain, palpitations, dizziness, or leg swelling  GASTROINTESTINAL: No abdominal or epigastric pain. No nausea, vomiting, diarrhea or constipation  GENITOURINARY: + bowie  NEUROLOGICAL: No headaches, memory loss, loss of strength, numbness, or tremors  SKIN: No itching, burning, rashes, or lesions   MUSCULOSKELETAL: No joint pain or swelling; No muscle, back, or extremity pain    Medications:  MEDICATIONS  (STANDING):  acetaminophen   Tablet. 650 milliGRAM(s) Oral every 6 hours  diltiazem    Tablet 90 milliGRAM(s) Oral every 6 hours  docusate sodium 100 milliGRAM(s) Oral three times a day  heparin  Injectable 5000 Unit(s) SubCutaneous every 12 hours  magnesium oxide 400 milliGRAM(s) Oral three times a day with meals  nicotine -   7 mG/24Hr(s) Patch 1 patch Transdermal daily  pantoprazole    Tablet 40 milliGRAM(s) Oral before breakfast  senna 2 Tablet(s) Oral at bedtime  sodium chloride 0.9% lock flush 3 milliLiter(s) IV Push every 8 hours  tamsulosin 0.4 milliGRAM(s) Oral daily  tiotropium 18 MICROgram(s) Capsule 1 Capsule(s) Inhalation daily    MEDICATIONS  (PRN):  ALBUTerol/ipratropium for Nebulization 3 milliLiter(s) Nebulizer every 6 hours PRN Shortness of Breath and/or Wheezing  bisacodyl 5 milliGRAM(s) Oral every 12 hours PRN Constipation  bisacodyl Suppository 10 milliGRAM(s) Rectal daily PRN Constipation  lidocaine 2% Gel 1 Application(s) Topical every 4 hours PRN discomfort  oxyCODONE    IR 2.5 milliGRAM(s) Oral every 3 hours PRN Moderate Pain (4 - 6)  oxyCODONE    IR 5 milliGRAM(s) Oral every 6 hours PRN Severe Pain (7 - 10)  prochlorperazine   Tablet 10 milliGRAM(s) Oral Once PRN Depression/anxiety    	    PHYSICAL EXAM:  T(C): 36.4 (04-04-18 @ 12:21), Max: 37.6 (04-03-18 @ 20:13)  HR: 100 (04-04-18 @ 12:21) (87 - 113)  BP: 110/57 (04-04-18 @ 12:21) (107/50 - 138/59)  RR: 18 (04-04-18 @ 12:21) (18 - 18)  SpO2: 93% (04-04-18 @ 12:21) (90% - 97%)  Wt(kg): --  I&O's Summary    03 Apr 2018 07:01  -  04 Apr 2018 07:00  --------------------------------------------------------  IN: 810 mL / OUT: 1970 mL / NET: -1160 mL    04 Apr 2018 07:01  -  04 Apr 2018 15:25  --------------------------------------------------------  IN: 360 mL / OUT: 280 mL / NET: 80 mL        Appearance: Normal	  HEENT:   NCAT, PERRL, EOMI	  Lymphatic: No lymphadenopathy  Cardiovascular: Normal S1 S2, irregular  Respiratory: decreased BS BL  Psychiatry: A & O x 3, Mood & affect appropriate  Gastrointestinal:  Soft, Non-tender, + BS  : + bowie  Skin: No rashes, No ecchymoses, No cyanosis	  Neurologic: Non-focal  Extremities: Normal range of motion, No clubbing, cyanosis or edema    	  LABS:	 	    CARDIAC MARKERS:                                8.3    13.42 )-----------( 419      ( 04 Apr 2018 06:30 )             25.5     04-04    131<L>  |  97<L>  |  31<H>  ----------------------------<  126<H>  4.7   |  22  |  1.69<H>    Ca    8.2<L>      04 Apr 2018 06:30  Phos  3.9     04-03  Mg     1.5     04-03      proBNP:   Lipid Profile:   HgA1c:   TSH: Thyroid Stimulating Hormone, Serum: 2.32 uIU/mL (04-04 @ 06:30)

## 2018-04-05 ENCOUNTER — OUTPATIENT (OUTPATIENT)
Dept: OUTPATIENT SERVICES | Facility: HOSPITAL | Age: 73
LOS: 1 days | Discharge: HOME | End: 2018-04-05

## 2018-04-05 DIAGNOSIS — C34.90 MALIGNANT NEOPLASM OF UNSPECIFIED PART OF UNSPECIFIED BRONCHUS OR LUNG: ICD-10-CM

## 2018-04-05 DIAGNOSIS — Z98.890 OTHER SPECIFIED POSTPROCEDURAL STATES: Chronic | ICD-10-CM

## 2018-04-05 DIAGNOSIS — Z90.89 ACQUIRED ABSENCE OF OTHER ORGANS: Chronic | ICD-10-CM

## 2018-04-05 DIAGNOSIS — Z90.49 ACQUIRED ABSENCE OF OTHER SPECIFIED PARTS OF DIGESTIVE TRACT: Chronic | ICD-10-CM

## 2018-04-05 LAB
APPEARANCE UR: SIGNIFICANT CHANGE UP
BILIRUB UR-MCNC: NEGATIVE — SIGNIFICANT CHANGE UP
BLOOD UR QL VISUAL: HIGH
BUN SERPL-MCNC: 28 MG/DL — HIGH (ref 7–23)
CALCIUM SERPL-MCNC: 8.5 MG/DL — SIGNIFICANT CHANGE UP (ref 8.4–10.5)
CHLORIDE SERPL-SCNC: 97 MMOL/L — LOW (ref 98–107)
CO2 SERPL-SCNC: 21 MMOL/L — LOW (ref 22–31)
COLOR SPEC: YELLOW — SIGNIFICANT CHANGE UP
CREAT SERPL-MCNC: 1.48 MG/DL — HIGH (ref 0.5–1.3)
GLUCOSE SERPL-MCNC: 131 MG/DL — HIGH (ref 70–99)
GLUCOSE UR-MCNC: NEGATIVE — SIGNIFICANT CHANGE UP
HCT VFR BLD CALC: 26.9 % — LOW (ref 39–50)
HGB BLD-MCNC: 8.8 G/DL — LOW (ref 13–17)
KETONES UR-MCNC: SIGNIFICANT CHANGE UP
LEUKOCYTE ESTERASE UR-ACNC: SIGNIFICANT CHANGE UP
MCHC RBC-ENTMCNC: 30.3 PG — SIGNIFICANT CHANGE UP (ref 27–34)
MCHC RBC-ENTMCNC: 32.7 % — SIGNIFICANT CHANGE UP (ref 32–36)
MCV RBC AUTO: 92.8 FL — SIGNIFICANT CHANGE UP (ref 80–100)
MUCOUS THREADS # UR AUTO: SIGNIFICANT CHANGE UP
NITRITE UR-MCNC: NEGATIVE — SIGNIFICANT CHANGE UP
NRBC # FLD: 0 — SIGNIFICANT CHANGE UP
PH UR: 5.5 — SIGNIFICANT CHANGE UP (ref 4.6–8)
PLATELET # BLD AUTO: 401 K/UL — HIGH (ref 150–400)
PMV BLD: 9.3 FL — SIGNIFICANT CHANGE UP (ref 7–13)
POTASSIUM SERPL-MCNC: 4.1 MMOL/L — SIGNIFICANT CHANGE UP (ref 3.5–5.3)
POTASSIUM SERPL-SCNC: 4.1 MMOL/L — SIGNIFICANT CHANGE UP (ref 3.5–5.3)
PROT UR-MCNC: 30 MG/DL — HIGH
RBC # BLD: 2.9 M/UL — LOW (ref 4.2–5.8)
RBC # FLD: 15.4 % — HIGH (ref 10.3–14.5)
RBC CASTS # UR COMP ASSIST: >50 — HIGH (ref 0–?)
SODIUM SERPL-SCNC: 133 MMOL/L — LOW (ref 135–145)
SP GR SPEC: 1.02 — SIGNIFICANT CHANGE UP (ref 1–1.04)
SURGICAL PATHOLOGY STUDY: SIGNIFICANT CHANGE UP
UROBILINOGEN FLD QL: NORMAL MG/DL — SIGNIFICANT CHANGE UP
WBC # BLD: 13.17 K/UL — HIGH (ref 3.8–10.5)
WBC # FLD AUTO: 13.17 K/UL — HIGH (ref 3.8–10.5)
WBC UR QL: SIGNIFICANT CHANGE UP (ref 0–?)

## 2018-04-05 PROCEDURE — 71045 X-RAY EXAM CHEST 1 VIEW: CPT | Mod: 26

## 2018-04-05 PROCEDURE — 74018 RADEX ABDOMEN 1 VIEW: CPT | Mod: 26

## 2018-04-05 RX ORDER — ONDANSETRON 8 MG/1
4 TABLET, FILM COATED ORAL ONCE
Qty: 0 | Refills: 0 | Status: COMPLETED | OUTPATIENT
Start: 2018-04-05 | End: 2018-04-05

## 2018-04-05 RX ADMIN — Medication 100 MILLIGRAM(S): at 23:00

## 2018-04-05 RX ADMIN — Medication 650 MILLIGRAM(S): at 01:33

## 2018-04-05 RX ADMIN — TIOTROPIUM BROMIDE 1 CAPSULE(S): 18 CAPSULE ORAL; RESPIRATORY (INHALATION) at 10:18

## 2018-04-05 RX ADMIN — Medication 1 PATCH: at 18:43

## 2018-04-05 RX ADMIN — Medication 650 MILLIGRAM(S): at 00:32

## 2018-04-05 RX ADMIN — SODIUM CHLORIDE 3 MILLILITER(S): 9 INJECTION INTRAMUSCULAR; INTRAVENOUS; SUBCUTANEOUS at 23:00

## 2018-04-05 RX ADMIN — ONDANSETRON 4 MILLIGRAM(S): 8 TABLET, FILM COATED ORAL at 22:52

## 2018-04-05 RX ADMIN — Medication 650 MILLIGRAM(S): at 12:01

## 2018-04-05 RX ADMIN — Medication 650 MILLIGRAM(S): at 23:00

## 2018-04-05 RX ADMIN — Medication 650 MILLIGRAM(S): at 17:33

## 2018-04-05 RX ADMIN — Medication 650 MILLIGRAM(S): at 06:30

## 2018-04-05 RX ADMIN — TAMSULOSIN HYDROCHLORIDE 0.4 MILLIGRAM(S): 0.4 CAPSULE ORAL at 12:01

## 2018-04-05 RX ADMIN — Medication 650 MILLIGRAM(S): at 05:36

## 2018-04-05 RX ADMIN — MAGNESIUM OXIDE 400 MG ORAL TABLET 400 MILLIGRAM(S): 241.3 TABLET ORAL at 08:17

## 2018-04-05 RX ADMIN — PANTOPRAZOLE SODIUM 40 MILLIGRAM(S): 20 TABLET, DELAYED RELEASE ORAL at 06:15

## 2018-04-05 RX ADMIN — SODIUM CHLORIDE 3 MILLILITER(S): 9 INJECTION INTRAMUSCULAR; INTRAVENOUS; SUBCUTANEOUS at 05:04

## 2018-04-05 RX ADMIN — Medication 30 MILLILITER(S): at 16:19

## 2018-04-05 RX ADMIN — MAGNESIUM OXIDE 400 MG ORAL TABLET 400 MILLIGRAM(S): 241.3 TABLET ORAL at 17:32

## 2018-04-05 RX ADMIN — MAGNESIUM OXIDE 400 MG ORAL TABLET 400 MILLIGRAM(S): 241.3 TABLET ORAL at 12:01

## 2018-04-05 RX ADMIN — Medication 650 MILLIGRAM(S): at 12:06

## 2018-04-05 RX ADMIN — SODIUM CHLORIDE 3 MILLILITER(S): 9 INJECTION INTRAMUSCULAR; INTRAVENOUS; SUBCUTANEOUS at 15:22

## 2018-04-05 RX ADMIN — SENNA PLUS 2 TABLET(S): 8.6 TABLET ORAL at 23:00

## 2018-04-05 RX ADMIN — HEPARIN SODIUM 5000 UNIT(S): 5000 INJECTION INTRAVENOUS; SUBCUTANEOUS at 05:42

## 2018-04-05 RX ADMIN — Medication 100 MILLIGRAM(S): at 15:22

## 2018-04-05 RX ADMIN — Medication 650 MILLIGRAM(S): at 17:32

## 2018-04-05 RX ADMIN — Medication 100 MILLIGRAM(S): at 05:42

## 2018-04-05 RX ADMIN — HEPARIN SODIUM 5000 UNIT(S): 5000 INJECTION INTRAVENOUS; SUBCUTANEOUS at 17:33

## 2018-04-05 NOTE — PROGRESS NOTE ADULT - ASSESSMENT
74yo male w PMHx HTN, gout, a-fib (on eliquis), CKD3, EtOH (last drink 1/2016), CLL and SCLC (T2a/N2/Mx) s/p RVATS RUL, reaccumulating R pleural effusion, hyponatremia, urinary retention:  1. SCLC w/reaccumulating R pleural effusion - s/p thoracentesis by CTS, will need to address the need for pleurodesis vs indwelling catheter when acute medical issues resolved, Onc appreciated, outpt f/u for chemo options, Palliative appreciated, c/w inhalers, pain control  2. A fib - Cardio appreciated, restart Eliquis when cleared to do so by CTS, rate controlled  3. Hyponatremia - likely SIADH, Renal appreciated, Na improving, c/w fluid restriction  4. CKD3 - Cr stable, monitor  5. HTN - BP controlled  6. Anemia - likely multifactorial, Hb drop today noted but no signs of bleeding, monitor  7. Urinary retention - c/w Flomax, TOV  8. Dvt prophylaxis  D/c planning for tomorrow if no objection from consulting services 74yo male w PMHx HTN, gout, a-fib (on eliquis), CKD3, EtOH (last drink 1/2016), CLL and SCLC (T2a/N2/Mx) s/p RVATS RUL, reaccumulating R pleural effusion, hyponatremia, urinary retention:  1. SCLC w/reaccumulating R pleural effusion - s/p thoracentesis by CTS, Onc appreciated, outpt f/u for chemo options, Palliative appreciated, c/w inhalers, pain control  2. A fib - Cardio appreciated, restart Eliquis when cleared to do so by CTS, rate controlled  3. Hyponatremia - likely SIADH, Renal appreciated, Na improving, c/w fluid restriction  4. CKD3 - Cr stable, monitor  5. HTN - BP controlled  6. Anemia - likely multifactorial, Hb drop today noted but no signs of bleeding, monitor  7. Urinary retention - c/w Flomax, TOV  8. Dvt prophylaxis  D/c planning for tomorrow if no objection from consulting services

## 2018-04-05 NOTE — PROVIDER CONTACT NOTE (OTHER) - SITUATION
patient had episode of emesis around 2030 about 300ml of bilious emesis and than again around 2200 of 300ml also bilious

## 2018-04-05 NOTE — PROVIDER CONTACT NOTE (OTHER) - ACTION/TREATMENT ORDERED:
CT PA made aware; allow patient more time void; will continue to monitor.
MD made aware; Will put in order for CBC and urinalysis for AM. Will continue to monitor.
1x zofran IVP, stat abdominal xray ordered

## 2018-04-06 PROCEDURE — 71045 X-RAY EXAM CHEST 1 VIEW: CPT | Mod: 26

## 2018-04-06 RX ORDER — SODIUM CHLORIDE 9 MG/ML
1000 INJECTION INTRAMUSCULAR; INTRAVENOUS; SUBCUTANEOUS
Qty: 0 | Refills: 0 | Status: DISCONTINUED | OUTPATIENT
Start: 2018-04-06 | End: 2018-04-08

## 2018-04-06 RX ORDER — APIXABAN 2.5 MG/1
5 TABLET, FILM COATED ORAL EVERY 12 HOURS
Qty: 0 | Refills: 0 | Status: DISCONTINUED | OUTPATIENT
Start: 2018-04-06 | End: 2018-04-08

## 2018-04-06 RX ORDER — SIMETHICONE 80 MG/1
80 TABLET, CHEWABLE ORAL EVERY 6 HOURS
Qty: 0 | Refills: 0 | Status: DISCONTINUED | OUTPATIENT
Start: 2018-04-06 | End: 2018-04-08

## 2018-04-06 RX ADMIN — TIOTROPIUM BROMIDE 1 CAPSULE(S): 18 CAPSULE ORAL; RESPIRATORY (INHALATION) at 10:20

## 2018-04-06 RX ADMIN — Medication 100 MILLIGRAM(S): at 12:57

## 2018-04-06 RX ADMIN — HEPARIN SODIUM 5000 UNIT(S): 5000 INJECTION INTRAVENOUS; SUBCUTANEOUS at 05:24

## 2018-04-06 RX ADMIN — Medication 100 MILLIGRAM(S): at 05:24

## 2018-04-06 RX ADMIN — Medication 650 MILLIGRAM(S): at 05:24

## 2018-04-06 RX ADMIN — HEPARIN SODIUM 5000 UNIT(S): 5000 INJECTION INTRAVENOUS; SUBCUTANEOUS at 17:24

## 2018-04-06 RX ADMIN — SODIUM CHLORIDE 50 MILLILITER(S): 9 INJECTION INTRAMUSCULAR; INTRAVENOUS; SUBCUTANEOUS at 15:43

## 2018-04-06 RX ADMIN — TAMSULOSIN HYDROCHLORIDE 0.4 MILLIGRAM(S): 0.4 CAPSULE ORAL at 12:57

## 2018-04-06 RX ADMIN — MAGNESIUM OXIDE 400 MG ORAL TABLET 400 MILLIGRAM(S): 241.3 TABLET ORAL at 07:55

## 2018-04-06 RX ADMIN — SODIUM CHLORIDE 50 MILLILITER(S): 9 INJECTION INTRAMUSCULAR; INTRAVENOUS; SUBCUTANEOUS at 23:22

## 2018-04-06 RX ADMIN — SENNA PLUS 2 TABLET(S): 8.6 TABLET ORAL at 23:21

## 2018-04-06 RX ADMIN — SODIUM CHLORIDE 3 MILLILITER(S): 9 INJECTION INTRAMUSCULAR; INTRAVENOUS; SUBCUTANEOUS at 05:23

## 2018-04-06 RX ADMIN — PANTOPRAZOLE SODIUM 40 MILLIGRAM(S): 20 TABLET, DELAYED RELEASE ORAL at 05:24

## 2018-04-06 RX ADMIN — APIXABAN 5 MILLIGRAM(S): 2.5 TABLET, FILM COATED ORAL at 20:16

## 2018-04-06 RX ADMIN — SODIUM CHLORIDE 3 MILLILITER(S): 9 INJECTION INTRAMUSCULAR; INTRAVENOUS; SUBCUTANEOUS at 23:22

## 2018-04-06 RX ADMIN — Medication 1 PATCH: at 19:17

## 2018-04-06 RX ADMIN — Medication 100 MILLIGRAM(S): at 23:21

## 2018-04-06 NOTE — CONSULT NOTE ADULT - CONSULT REQUESTED DATE/TIME
01-Apr-2018 13:08
03-Apr-2018 10:33
03-Apr-2018 12:03
03-Apr-2018 17:16
04-Apr-2018 11:25
06-Apr-2018 17:47

## 2018-04-06 NOTE — CONSULT NOTE ADULT - CONSULT REASON
Azotemia/Hyponatremia
Goals of care
medical management
pre op
squamous cell lung ca/ Pt of Dr. Feliciano at [facility]
N/V

## 2018-04-06 NOTE — PROGRESS NOTE ADULT - ASSESSMENT
74yo male w PMHx HTN, gout, a-fib (on eliquis), CKD3, EtOH (last drink 1/2016), CLL and SCLC (T2a/N2/Mx) s/p RVATS RUL, reaccumulating R pleural effusion, hyponatremia, urinary retention:  1. SCLC w/reaccumulating R pleural effusion - s/p thoracentesis by CTS, Onc appreciated, outpt f/u for chemo options, Palliative appreciated, c/w inhalers, pain control  2. A fib - Cardio appreciated, restart Eliquis when cleared to do so by CTS, rate controlled  3. Hyponatremia - likely SIADH, Renal appreciated, Na improving, c/w fluid restriction  4. CKD3 - Cr stable, monitor  5. HTN - BP controlled  6. Anemia - likely multifactorial, Hb drop today noted but no signs of bleeding, monitor  7. Urinary retention - c/w Flomax, voiding  8. Dvt prophylaxis  9. Vomiting - GI eval, NPO for now

## 2018-04-06 NOTE — CONSULT NOTE ADULT - SUBJECTIVE AND OBJECTIVE BOX
Chief Complaint:  Patient is a 73y old  Male who presents with a chief complaint of Recurrent R pleural effusion (2018 12:07)      HPI:  74yo male w PMHx HTN, gout, a-fib (on eliquis), EtOH (last drink 2016),  SCLC (dx'ed 2017,T2a/N2/Mx) s/p RVATS RUL on 2017 with MLND and hilar LND s/p 4 cycles of adjuvant carboplatin/abraxane (last 2017) with R pleural effusion drained in office 17 (+ malignancy CLL) now presented to ED on  complaining of progressive worsening of DONNELLY for the past week, saw PMD 3/30 who ordered CT showing large R effusion and compressive atelectasis of RLL.     During admission, he was seen by CT surgery and had RUL VATS on  with drainage of hemothorax and lysis of adhesions and decortication with R sided chest tube in place following procedure. During admission, patient was seen by hemo/onc, palliative care, cardiology, pain medicine, and renal. Was found to be hypoNa on presentation thought to be 2/2 SIADH, now improving. At time of presentation as well as following procedure, patient denied any fevers, chills, N/V, diarrhea, abd pain.    On , patient was noted to have several episodes of NBNB vomiting. Vitals stable and WBC stable from admission.         Allergies:  No Known Allergies      Home Medications:    Hospital Medications:  ALBUTerol/ipratropium for Nebulization 3 milliLiter(s) Nebulizer every 6 hours PRN  aluminum hydroxide/magnesium hydroxide/simethicone Suspension 30 milliLiter(s) Oral every 6 hours PRN  apixaban 5 milliGRAM(s) Oral every 12 hours  bisacodyl 5 milliGRAM(s) Oral every 12 hours PRN  bisacodyl Suppository 10 milliGRAM(s) Rectal daily PRN  diltiazem    Tablet 90 milliGRAM(s) Oral every 6 hours  docusate sodium 100 milliGRAM(s) Oral three times a day  lidocaine 2% Gel 1 Application(s) Topical every 4 hours PRN  nicotine -   7 mG/24Hr(s) Patch 1 patch Transdermal daily  oxyCODONE    IR 2.5 milliGRAM(s) Oral every 3 hours PRN  oxyCODONE    IR 5 milliGRAM(s) Oral every 6 hours PRN  pantoprazole    Tablet 40 milliGRAM(s) Oral before breakfast  prochlorperazine   Tablet 10 milliGRAM(s) Oral Once PRN  senna 2 Tablet(s) Oral at bedtime  simethicone 80 milliGRAM(s) Chew every 6 hours PRN  sodium chloride 0.9% lock flush 3 milliLiter(s) IV Push every 8 hours  sodium chloride 0.9%. 1000 milliLiter(s) IV Continuous <Continuous>  tamsulosin 0.4 milliGRAM(s) Oral daily  tiotropium 18 MICROgram(s) Capsule 1 Capsule(s) Inhalation daily      PMHX/PSHX:  Squamous cell carcinoma of lung, stage III  TIA (transient ischemic attack)  Localized enlarged lymph nodes  Solitary pulmonary nodule  HTN (hypertension)  Alcohol abuse  Depression, unspecified depression type  Gout  Essential hypertension  H/O hemorrhoidectomy  History of tonsillectomy  History of appendectomy  H/O left knee surgery      Family history:  No pertinent family history in first degree relatives      Social History:     ROS:     General:  No wt loss, fevers, chills, night sweats, fatigue,   Eyes:  Good vision, no reported pain  ENT:  No sore throat, pain, runny nose, dysphagia  CV:  No pain, palpitations, hypo/hypertension  Resp:  No dyspnea, cough, tachypnea, wheezing  GI:  See HPI  :  No pain, bleeding, incontinence, nocturia  Muscle:  No pain, weakness  Neuro:  No weakness, tingling, memory problems  Psych:  No fatigue, insomnia, mood problems, depression  Endocrine:  No polyuria, polydipsia, cold/heat intolerance  Heme:  No petechiae, ecchymosis, easy bruisability  Skin:  No rash, edema      PHYSICAL EXAM:     GENERAL:  Appears stated age, well-groomed, well-nourished, no distress  HEENT:  NC/AT,  conjunctivae clear and pink,  no JVD  CHEST:  Full & symmetric excursion, no increased effort, breath sounds clear  HEART:  Regular rhythm, S1, S2, no murmur/rub/S3/S4, no abdominal bruit, no edema  ABDOMEN:  Soft, non-tender, non-distended, normoactive bowel sounds,  no masses ,  EXTREMITIES:  no cyanosis,clubbing or edema  SKIN:  No rash/erythema/ecchymoses/petechiae/wounds/abscess/warm/dry  NEURO:  Alert, oriented    Vital Signs:  Vital Signs Last 24 Hrs  T(C): 36.6 (2018 17:01), Max: 36.8 (2018 07:46)  T(F): 97.9 (2018 17:01), Max: 98.2 (2018 07:46)  HR: 87 (2018 17:01) (87 - 108)  BP: 114/53 (2018 17:01) (101/61 - 120/72)  BP(mean): --  RR: 18 (2018 17:01) (18 - 18)  SpO2: 95% (2018 17:01) (94% - 99%)  Daily     Daily     LABS:                        8.8    13.17 )-----------( 401      ( 2018 05:58 )             26.9     04-05    133<L>  |  97<L>  |  28<H>  ----------------------------<  131<H>  4.1   |  21<L>  |  1.48<H>    Ca    8.5      2018 05:58          Urinalysis Basic - ( 2018 05:40 )    Color: YELLOW / Appearance: HAZY / S.020 / pH: 5.5  Gluc: NEGATIVE / Ketone: TRACE  / Bili: NEGATIVE / Urobili: NORMAL mg/dL   Blood: MODERATE / Protein: 30 mg/dL / Nitrite: NEGATIVE   Leuk Esterase: TRACE / RBC: >50 / WBC 2-5   Sq Epi: x / Non Sq Epi: x / Bacteria: x          Imaging:

## 2018-04-06 NOTE — PROGRESS NOTE ADULT - ASSESSMENT
PAF  HR stable  cont cardizem   resume a/c once deemed safe by CTS    HTN  stable     n/v  resolved  cont to monitor

## 2018-04-06 NOTE — CONSULT NOTE ADULT - ASSESSMENT
74yo male w PMHx HTN, gout, a-fib (on eliquis), EtOH (last drink 1/2016),  SCLC (dx'ed 8/2017,T2a/N2/Mx) s/p RVATS RUL on 8/2017 with MLND and hilar LND s/p 4 cycles of adjuvant carboplatin/abraxane (last 11/28/2017) with R pleural effusion drained in office 12/7/17 (+ malignancy CLL) now presented to ED on 4/1 complaining of progressive worsening of DONNELLY for the past week, saw PMD 3/30 who ordered CT showing large R effusion and compressive atelectasis of RLL.     1)Nausea/vomiting  DDx: viral gastroenteritis vs.    -zofran IV PRN for N/V  - IVF  - supportive care as per primary team 72yo male w PMHx HTN, gout, a-fib (on eliquis), EtOH (last drink 1/2016),  SCLC (dx'ed 8/2017,T2a/N2/Mx) s/p RVATS RUL on 8/2017 with MLND and hilar LND s/p 4 cycles of adjuvant carboplatin/abraxane (last 11/28/2017) with R pleural effusion drained in office 12/7/17 (+ malignancy CLL) now presented to ED on 4/1 complaining of progressive worsening of DONNELLY for the past week, saw PMD 3/30 who ordered CT showing large R effusion and compressive atelectasis of RLL.     1)Nausea/vomiting improving  DDx: viral gastroenteritis vs. effects of meds, analgesics, anesthesia.     -zofran IV PRN for N/V  - IVF  - supportive care as per primary team

## 2018-04-07 ENCOUNTER — TRANSCRIPTION ENCOUNTER (OUTPATIENT)
Age: 73
End: 2018-04-07

## 2018-04-07 LAB
BUN SERPL-MCNC: 30 MG/DL — HIGH (ref 7–23)
CALCIUM SERPL-MCNC: 8.3 MG/DL — LOW (ref 8.4–10.5)
CHLORIDE SERPL-SCNC: 98 MMOL/L — SIGNIFICANT CHANGE UP (ref 98–107)
CO2 SERPL-SCNC: 21 MMOL/L — LOW (ref 22–31)
CREAT SERPL-MCNC: 1.27 MG/DL — SIGNIFICANT CHANGE UP (ref 0.5–1.3)
GLUCOSE SERPL-MCNC: 110 MG/DL — HIGH (ref 70–99)
HCT VFR BLD CALC: 28.1 % — LOW (ref 39–50)
HGB BLD-MCNC: 9 G/DL — LOW (ref 13–17)
MAGNESIUM SERPL-MCNC: 1.9 MG/DL — SIGNIFICANT CHANGE UP (ref 1.6–2.6)
MCHC RBC-ENTMCNC: 30.3 PG — SIGNIFICANT CHANGE UP (ref 27–34)
MCHC RBC-ENTMCNC: 32 % — SIGNIFICANT CHANGE UP (ref 32–36)
MCV RBC AUTO: 94.6 FL — SIGNIFICANT CHANGE UP (ref 80–100)
NRBC # FLD: 0 — SIGNIFICANT CHANGE UP
PLATELET # BLD AUTO: 474 K/UL — HIGH (ref 150–400)
PMV BLD: 9.7 FL — SIGNIFICANT CHANGE UP (ref 7–13)
POTASSIUM SERPL-MCNC: 4.3 MMOL/L — SIGNIFICANT CHANGE UP (ref 3.5–5.3)
POTASSIUM SERPL-SCNC: 4.3 MMOL/L — SIGNIFICANT CHANGE UP (ref 3.5–5.3)
RBC # BLD: 2.97 M/UL — LOW (ref 4.2–5.8)
RBC # FLD: 15.3 % — HIGH (ref 10.3–14.5)
SODIUM SERPL-SCNC: 133 MMOL/L — LOW (ref 135–145)
WBC # BLD: 11.96 K/UL — HIGH (ref 3.8–10.5)
WBC # FLD AUTO: 11.96 K/UL — HIGH (ref 3.8–10.5)

## 2018-04-07 PROCEDURE — 71045 X-RAY EXAM CHEST 1 VIEW: CPT | Mod: 26

## 2018-04-07 RX ORDER — PANTOPRAZOLE SODIUM 20 MG/1
1 TABLET, DELAYED RELEASE ORAL
Qty: 0 | Refills: 0 | COMMUNITY
Start: 2018-04-07

## 2018-04-07 RX ORDER — TAMSULOSIN HYDROCHLORIDE 0.4 MG/1
1 CAPSULE ORAL
Qty: 0 | Refills: 0 | COMMUNITY
Start: 2018-04-07

## 2018-04-07 RX ADMIN — Medication 1 PATCH: at 18:37

## 2018-04-07 RX ADMIN — SODIUM CHLORIDE 3 MILLILITER(S): 9 INJECTION INTRAMUSCULAR; INTRAVENOUS; SUBCUTANEOUS at 13:13

## 2018-04-07 RX ADMIN — APIXABAN 5 MILLIGRAM(S): 2.5 TABLET, FILM COATED ORAL at 05:09

## 2018-04-07 RX ADMIN — SODIUM CHLORIDE 3 MILLILITER(S): 9 INJECTION INTRAMUSCULAR; INTRAVENOUS; SUBCUTANEOUS at 05:09

## 2018-04-07 RX ADMIN — Medication 100 MILLIGRAM(S): at 05:10

## 2018-04-07 RX ADMIN — TIOTROPIUM BROMIDE 1 CAPSULE(S): 18 CAPSULE ORAL; RESPIRATORY (INHALATION) at 11:16

## 2018-04-07 RX ADMIN — PANTOPRAZOLE SODIUM 40 MILLIGRAM(S): 20 TABLET, DELAYED RELEASE ORAL at 05:10

## 2018-04-07 RX ADMIN — SODIUM CHLORIDE 50 MILLILITER(S): 9 INJECTION INTRAMUSCULAR; INTRAVENOUS; SUBCUTANEOUS at 11:17

## 2018-04-07 RX ADMIN — SODIUM CHLORIDE 3 MILLILITER(S): 9 INJECTION INTRAMUSCULAR; INTRAVENOUS; SUBCUTANEOUS at 23:00

## 2018-04-07 RX ADMIN — SODIUM CHLORIDE 50 MILLILITER(S): 9 INJECTION INTRAMUSCULAR; INTRAVENOUS; SUBCUTANEOUS at 23:00

## 2018-04-07 RX ADMIN — TAMSULOSIN HYDROCHLORIDE 0.4 MILLIGRAM(S): 0.4 CAPSULE ORAL at 13:13

## 2018-04-07 RX ADMIN — APIXABAN 5 MILLIGRAM(S): 2.5 TABLET, FILM COATED ORAL at 17:57

## 2018-04-07 NOTE — DISCHARGE NOTE ADULT - PLAN OF CARE
s/p VATS s/p VATS, follow up with your doctor in a week Dr Foster low salt &  low cholesterol , 1000cc fluid restriction, To restore or maintain a normal heart rate and rhythm, to prevent blood clots, and decrease the risks of stroke CVA/TIA. Please take your medications as prescribed.  Continue to take your blood thinner as prescribed and follow with your physician to monitor your levels.  Low fat diet, reduce caffeine intake, and exercise at least 30 minutes daily. compliance with medications, follow up with physicians s/p VATS, low salt &  low cholesterol , 1000cc fluid restriction,  See Dr. Foster in office in 2 weeks. Call for an apt. [phone]  Have a chest xray done prior to that apt. Speak with office about arrangements.

## 2018-04-07 NOTE — DISCHARGE NOTE ADULT - MEDICATION SUMMARY - MEDICATIONS TO TAKE
I will START or STAY ON the medications listed below when I get home from the hospital:    tamsulosin 0.4 mg oral capsule  -- 1 cap(s) by mouth once a day  -- Indication: For BPH    Cardizem  mg/24 hours oral capsule, extended release  -- 1 cap(s) by mouth once a day  -- It is very important that you take or use this exactly as directed.  Do not skip doses or discontinue unless directed by your doctor.  Some non-prescription drugs may aggravate your condition.  Read all labels carefully.  If a warning appears, check with your doctor before taking.  Swallow whole.  Do not crush.    -- Indication: For Paroxysmal atrial fibrillation    apixaban 5 mg oral tablet  -- 1 tab(s) by mouth every 12 hours  -- Indication: For Paroxysmal atrial fibrillation    prochlorperazine 10 mg oral tablet  -- 1 tab(s) by mouth once, As needed, Depression/anxiety  -- Indication: For mood    Spiriva 18 mcg inhalation capsule  -- 1 cap(s) inhaled once a day  -- Indication: For Stage III squamous cell carcinoma of lung, unspecified laterality    senna oral tablet  -- 2 tab(s) by mouth once a day (at bedtime)  -- Indication: For Slow transit constipation    docusate sodium 100 mg oral capsule  -- 1 cap(s) by mouth 3 times a day  -- Indication: For Slow transit constipation    simethicone 80 mg oral tablet, chewable  -- 1 tab(s) by mouth every 6 hours, As needed, Indigestion  -- Indication: For Slow transit constipation    pantoprazole 40 mg oral delayed release tablet  -- 1 tab(s) by mouth once a day (before a meal)  -- Indication: For Stomach    nicotine 7 mg/24 hr transdermal film, extended release  --  by transdermal patch   -- Indication: For Smoking cessation    Centrum oral tablet  -- 1 tab(s) by mouth once a day   -- Indication: For Supplement

## 2018-04-07 NOTE — DISCHARGE NOTE ADULT - PATIENT PORTAL LINK FT
You can access the [facility] Patient Portal, offered by [facility], by registering with the following website: [email or web address]

## 2018-04-07 NOTE — DISCHARGE NOTE ADULT - HOSPITAL COURSE
74yo male w PMHx HTN, gout, a-fib (on eliquis), CKD3, EtOH (last drink 1/2016), CLL and SCLC (T2a/N2/Mx) s/p RVATS RUL, reaccumulating R pleural effusion, hyponatremia, urinary retention:  1. SCLC w/reaccumulating R pleural effusion - s/p thoracentesis by CTS, Onc appreciated, outpt f/u for chemo tx  , Palliative appreciated, c/w inhalers, pain control  2. A fib - Cardio appreciated, restarted on Eliquis as cleared  by CTS, rate controlled  3. Hyponatremia - likely SIADH, Renal appreciated, Na stable   c/w fluid restriction  4. CKD3 - Cr stable, monitor  5. HTN - BP controlled  6. Anemia - likely multifactorial,f/u cbc  7. Urinary retention - c/w Flomax  8. Dvt prophylaxis  9. Vomiting - GI eval,noted  Patient is hemodynamically stable and without complaints and patient is ready for discharge.

## 2018-04-07 NOTE — PROGRESS NOTE ADULT - ASSESSMENT
74yo male w PMHx HTN, gout, a-fib (on eliquis), CKD3, EtOH (last drink 1/2016), CLL and SCLC (T2a/N2/Mx) s/p RVATS RUL, reaccumulating R pleural effusion, hyponatremia, urinary retention:  1. SCLC w/reaccumulating R pleural effusion - s/p thoracentesis by CTS, Onc appreciated, outpt f/u for chemo tx  , Palliative appreciated, c/w inhalers, pain control  2. A fib - Cardio appreciated, restarted on Eliquis as cleared  by CTS, rate controlled  3. Hyponatremia - likely SIADH, Renal appreciated, Na stable   c/w fluid restriction  4. CKD3 - Cr stable, monitor  5. HTN - BP controlled  6. Anemia - likely multifactorial,f/u cbc  7. Urinary retention - c/w Flomax  8. Dvt prophylaxis  9. Vomiting - GI eval,noted

## 2018-04-07 NOTE — DISCHARGE NOTE ADULT - CARE PROVIDERS DIRECT ADDRESSES
,DirectAddress_Unknown,DirectAddress_Unknown,[email or web address]

## 2018-04-07 NOTE — DISCHARGE NOTE ADULT - CARE PROVIDER_API CALL
Robson Gallardo), Gastroenterology; Internal Medicine  [address]  [place] [address]  Phone: [phone]  Fax: [phone]    Saad Ward), Cardiovascular Disease; Internal Medicine  [address]  [place] [address]  Phone: [phone]  Fax: [phone]    Robson Foster), Surgery; Thoracic Surgery  [address] [place] [address]  Phone: [phone]  Fax: [phone]    Pravin Feliciano; MBBS), Hematology; [facility]; Medical Oncology  [address], [place] [id number]  Phone: [phone]  Fax: [phone]

## 2018-04-07 NOTE — DISCHARGE NOTE ADULT - INSTRUCTIONS
low salt &  low cholesterol low salt &  low cholesterol, 1000 carbohydrate consistent fluid restriction

## 2018-04-07 NOTE — DISCHARGE NOTE ADULT - ADDITIONAL INSTRUCTIONS
follow up with your doctor in a week Dr Ruiz  follow up with your doctor in a week Dr Sharp  follow up with your doctor in a week Dr Foster  follow up with your doctor in a week  [phone]  follow up with your doctor in a week Dr Gallardo

## 2018-04-07 NOTE — DISCHARGE NOTE ADULT - CARE PLAN
Principal Discharge DX:	Pleural effusion  Goal:	s/p VATS  Secondary Diagnosis:	Paroxysmal atrial fibrillation Principal Discharge DX:	Pleural effusion  Goal:	s/p VATS, follow up with your doctor in a week Dr Foster  Assessment and plan of treatment:	low salt &  low cholesterol , 1000cc fluid restriction,  Secondary Diagnosis:	Paroxysmal atrial fibrillation  Goal:	To restore or maintain a normal heart rate and rhythm, to prevent blood clots, and decrease the risks of stroke CVA/TIA.  Assessment and plan of treatment:	Please take your medications as prescribed.  Continue to take your blood thinner as prescribed and follow with your physician to monitor your levels.  Low fat diet, reduce caffeine intake, and exercise at least 30 minutes daily.  Secondary Diagnosis:	Stage III squamous cell carcinoma of lung, unspecified laterality  Goal:	compliance with medications, follow up with physicians Principal Discharge DX:	Pleural effusion  Goal:	s/p VATS,  Assessment and plan of treatment:	low salt &  low cholesterol , 1000cc fluid restriction,  See Dr. Foster in office in 2 weeks. Call for an apt. [phone]  Have a chest xray done prior to that apt. Speak with office about arrangements.  Secondary Diagnosis:	Paroxysmal atrial fibrillation  Goal:	To restore or maintain a normal heart rate and rhythm, to prevent blood clots, and decrease the risks of stroke CVA/TIA.  Assessment and plan of treatment:	Please take your medications as prescribed.  Continue to take your blood thinner as prescribed and follow with your physician to monitor your levels.  Low fat diet, reduce caffeine intake, and exercise at least 30 minutes daily.  Secondary Diagnosis:	Stage III squamous cell carcinoma of lung, unspecified laterality  Goal:	compliance with medications, follow up with physicians

## 2018-04-07 NOTE — DISCHARGE NOTE ADULT - MEDICATION SUMMARY - MEDICATIONS TO STOP TAKING
I will STOP taking the medications listed below when I get home from the hospital:    allopurinol 300 mg oral tablet  -- 1 tab(s) by mouth once a day    Levaquin 750 mg oral tablet  -- 1 tab(s) by mouth every 24 hours

## 2018-04-08 VITALS
HEART RATE: 94 BPM | OXYGEN SATURATION: 97 % | RESPIRATION RATE: 18 BRPM | SYSTOLIC BLOOD PRESSURE: 118 MMHG | DIASTOLIC BLOOD PRESSURE: 66 MMHG | TEMPERATURE: 98 F

## 2018-04-08 LAB
BLD GP AB SCN SERPL QL: NEGATIVE — SIGNIFICANT CHANGE UP
HCT VFR BLD CALC: 24.1 % — LOW (ref 39–50)
HCT VFR BLD CALC: 29.6 % — LOW (ref 39–50)
HGB BLD-MCNC: 7.7 G/DL — LOW (ref 13–17)
HGB BLD-MCNC: 9.4 G/DL — LOW (ref 13–17)
MCHC RBC-ENTMCNC: 30.3 PG — SIGNIFICANT CHANGE UP (ref 27–34)
MCHC RBC-ENTMCNC: 30.7 PG — SIGNIFICANT CHANGE UP (ref 27–34)
MCHC RBC-ENTMCNC: 31.8 % — LOW (ref 32–36)
MCHC RBC-ENTMCNC: 32 % — SIGNIFICANT CHANGE UP (ref 32–36)
MCV RBC AUTO: 94.9 FL — SIGNIFICANT CHANGE UP (ref 80–100)
MCV RBC AUTO: 96.7 FL — SIGNIFICANT CHANGE UP (ref 80–100)
NRBC # FLD: 0 — SIGNIFICANT CHANGE UP
NRBC # FLD: 0.02 — SIGNIFICANT CHANGE UP
PLATELET # BLD AUTO: 421 K/UL — HIGH (ref 150–400)
PLATELET # BLD AUTO: 530 K/UL — HIGH (ref 150–400)
PMV BLD: 9.4 FL — SIGNIFICANT CHANGE UP (ref 7–13)
PMV BLD: 9.5 FL — SIGNIFICANT CHANGE UP (ref 7–13)
RBC # BLD: 2.54 M/UL — LOW (ref 4.2–5.8)
RBC # BLD: 3.06 M/UL — LOW (ref 4.2–5.8)
RBC # FLD: 15.4 % — HIGH (ref 10.3–14.5)
RBC # FLD: 15.5 % — HIGH (ref 10.3–14.5)
RH IG SCN BLD-IMP: POSITIVE — SIGNIFICANT CHANGE UP
WBC # BLD: 11.81 K/UL — HIGH (ref 3.8–10.5)
WBC # BLD: 12.91 K/UL — HIGH (ref 3.8–10.5)
WBC # FLD AUTO: 11.81 K/UL — HIGH (ref 3.8–10.5)
WBC # FLD AUTO: 12.91 K/UL — HIGH (ref 3.8–10.5)

## 2018-04-08 RX ORDER — ALLOPURINOL 300 MG
1 TABLET ORAL
Qty: 0 | Refills: 0 | COMMUNITY

## 2018-04-08 RX ORDER — SIMETHICONE 80 MG/1
1 TABLET, CHEWABLE ORAL
Qty: 0 | Refills: 0 | COMMUNITY
Start: 2018-04-08

## 2018-04-08 RX ORDER — APIXABAN 2.5 MG/1
1 TABLET, FILM COATED ORAL
Qty: 0 | Refills: 0 | COMMUNITY
Start: 2018-04-08

## 2018-04-08 RX ORDER — DOCUSATE SODIUM 100 MG
1 CAPSULE ORAL
Qty: 0 | Refills: 0 | COMMUNITY
Start: 2018-04-08

## 2018-04-08 RX ORDER — NICOTINE POLACRILEX 2 MG
0 GUM BUCCAL
Qty: 0 | Refills: 0 | COMMUNITY
Start: 2018-04-08

## 2018-04-08 RX ORDER — APIXABAN 2.5 MG/1
0 TABLET, FILM COATED ORAL
Qty: 0 | Refills: 0 | COMMUNITY

## 2018-04-08 RX ORDER — SENNA PLUS 8.6 MG/1
2 TABLET ORAL
Qty: 0 | Refills: 0 | COMMUNITY
Start: 2018-04-08

## 2018-04-08 RX ORDER — PROCHLORPERAZINE MALEATE 5 MG
1 TABLET ORAL
Qty: 0 | Refills: 0 | COMMUNITY
Start: 2018-04-08

## 2018-04-08 RX ADMIN — SODIUM CHLORIDE 3 MILLILITER(S): 9 INJECTION INTRAMUSCULAR; INTRAVENOUS; SUBCUTANEOUS at 12:42

## 2018-04-08 RX ADMIN — SODIUM CHLORIDE 50 MILLILITER(S): 9 INJECTION INTRAMUSCULAR; INTRAVENOUS; SUBCUTANEOUS at 12:44

## 2018-04-08 RX ADMIN — Medication 100 MILLIGRAM(S): at 12:48

## 2018-04-08 RX ADMIN — PANTOPRAZOLE SODIUM 40 MILLIGRAM(S): 20 TABLET, DELAYED RELEASE ORAL at 05:08

## 2018-04-08 RX ADMIN — SODIUM CHLORIDE 3 MILLILITER(S): 9 INJECTION INTRAMUSCULAR; INTRAVENOUS; SUBCUTANEOUS at 05:07

## 2018-04-08 RX ADMIN — TIOTROPIUM BROMIDE 1 CAPSULE(S): 18 CAPSULE ORAL; RESPIRATORY (INHALATION) at 10:33

## 2018-04-08 RX ADMIN — APIXABAN 5 MILLIGRAM(S): 2.5 TABLET, FILM COATED ORAL at 05:08

## 2018-04-08 RX ADMIN — TAMSULOSIN HYDROCHLORIDE 0.4 MILLIGRAM(S): 0.4 CAPSULE ORAL at 12:48

## 2018-04-08 NOTE — PROGRESS NOTE ADULT - ASSESSMENT
PAF  HR stable  cont cardizem   cont a/c    HTN  stable     n/v  resolved  cont to monitor     anemia  repeat labs

## 2018-04-08 NOTE — CHART NOTE - NSCHARTNOTEFT_GEN_A_CORE
72yo male w PMHx HTN, gout, a-fib (on eliquis), EtOH (last drink 1/2016),  SCLC (dx'ed 8/2017,T2a/N2/Mx) s/p RVATS RUL on 8/2017 with MLND and hilar LND s/p 4 cycles of adjuvant carboplatin/abraxane (last 11/28/2017) with R pleural effusion drained in office 12/7/17 (+ malignancy CLL) now presented to ED on 4/1 complaining of progressive worsening of DONNELLY for the past week, saw PMD 3/30 who ordered CT showing large R effusion and compressive atelectasis of RLL. Now consulted for episode of N/V and diarrhea on night of 4/4/18.     At the time that I saw the patient, patient said his symptoms had already resolved. He believes his symptoms were secondary to some of the food and spices he had been eating that day. Denies any additional diarrhea, N/V, abdominal pain today or yesterday. Denies any fevers or chills. As per primary team they are planning on discharging him this weekend.    Recommendations  Patient with 1 day of N/V and diarrhea now resolved. Has had no symptoms for the last ~48 hours. Labs stable and vitals stable overnight. Most likely this was an viral gastroenteritis.    - no further workup or intervention needed at this time  - please call back with additional questions or concerns or if patient's symptoms return  -rest of plan as per primary team  - discharge plan as per primary team
No thoracic surg contraindication to AC
Report received from MAR around 8:45pm however patient on telemetry and continuous pulse ox. Pt signed out to Tele PA. MAR made aware
attending note appreciated - patient for d/c home today . discussed with patient and wife earlier  home pt arranged by CM team
Patient is a 73y old  Male who presents with a chief complaint of Recurrent R pleural effusion (01 Apr 2018 12:07)    Called by RN to evaluate pt. with vomiting  Vital Signs Last 24 Hrs  T(C): 36.5 (05 Apr 2018 20:32), Max: 36.8 (05 Apr 2018 11:55)  T(F): 97.7 (05 Apr 2018 20:32), Max: 98.3 (05 Apr 2018 11:55)  HR: 101 (05 Apr 2018 20:32) (95 - 124)  BP: 114/66 (05 Apr 2018 20:32) (112/59 - 117/64)  BP(mean): --  RR: 18 (05 Apr 2018 20:32) (18 - 18)  SpO2: 97% (05 Apr 2018 20:32) (95% - 97%)                              8.8    13.17 )-----------( 401      ( 05 Apr 2018 05:58 )             26.9     04-05    133<L>  |  97<L>  |  28<H>  ----------------------------<  131<H>  4.1   |  21<L>  |  1.48<H>    Ca    8.5      05 Apr 2018 05:58                                CAPILLARY BLOOD GLUCOSE        acetaminophen   Tablet. 650 milliGRAM(s) Oral every 6 hours  ALBUTerol/ipratropium for Nebulization 3 milliLiter(s) Nebulizer every 6 hours PRN  aluminum hydroxide/magnesium hydroxide/simethicone Suspension 30 milliLiter(s) Oral every 6 hours PRN  bisacodyl 5 milliGRAM(s) Oral every 12 hours PRN  bisacodyl Suppository 10 milliGRAM(s) Rectal daily PRN  diltiazem    Tablet 90 milliGRAM(s) Oral every 6 hours  docusate sodium 100 milliGRAM(s) Oral three times a day  heparin  Injectable 5000 Unit(s) SubCutaneous every 12 hours  lidocaine 2% Gel 1 Application(s) Topical every 4 hours PRN  magnesium oxide 400 milliGRAM(s) Oral three times a day with meals  nicotine -   7 mG/24Hr(s) Patch 1 patch Transdermal daily  oxyCODONE    IR 2.5 milliGRAM(s) Oral every 3 hours PRN  oxyCODONE    IR 5 milliGRAM(s) Oral every 6 hours PRN  pantoprazole    Tablet 40 milliGRAM(s) Oral before breakfast  prochlorperazine   Tablet 10 milliGRAM(s) Oral Once PRN  senna 2 Tablet(s) Oral at bedtime  sodium chloride 0.9% lock flush 3 milliLiter(s) IV Push every 8 hours  tamsulosin 0.4 milliGRAM(s) Oral daily  tiotropium 18 MICROgram(s) Capsule 1 Capsule(s) Inhalation daily      REVIEW OF SYSTEMS:    RESPIRATORY: No cough, wheezing, chills or hemoptysis; No shortness of breath  CARDIOVASCULAR: No chest pain, palpitations, dizziness, or leg swelling  GASTROINTESTINAL: No abdominal or epigastric pain. No nausea, vomiting, or hematemesis; No diarrhea or constipation. No melena or hematochezia. Last BM 4/4   GENITOURINARY: No dysuria, frequency, hematuria, or incontinence  NEUROLOGICAL: No headaches, memory loss, loss of strength, numbness, or tremors    PHYSICAL EXAM:    GENERAL: NAD, well-groomed, well-developed  NERVOUS SYSTEM:  Alert & Oriented X3,   CHEST/LUNG: Clear to percussion bilaterally;  HEART: Irregular HR  ABDOMEN: Soft, Nontender, Nondistended; Bowel sounds present    RADIOLOGY :      Assessment:     74yo male w PMHx HTN, gout, a-fib (on eliquis), EtOH (last drink 1/2016), CLL and SCLC (T2a/N2/Mx) s/p RVATS RUL on 8/2017. Outpatient included completion of chemo 12/5/2017 x1 and R pleural effusion drained in office 12/7/17 (+ malignancy CLL). He presented to ED complaining of progressive worsening of DONNELLY for the past week, saw PMD 3/30 who ordered CT showing large R effusion and compressive atelectasis of RLL. s/p R VATS and thoracentesis by CTS. Now w/ nausea, and bilious vomiting x 2.        Plan:  - Clears for now   - Abd x ray  - Zofran x 1    Will continue to monitor

## 2018-04-08 NOTE — PROGRESS NOTE ADULT - SUBJECTIVE AND OBJECTIVE BOX
ANESTHESIA POSTOP CHECK    73y Male POSTOP DAY 1 S/P     Vital Signs Last 24 Hrs  T(C): 37.2 (03 Apr 2018 10:30), Max: 37.2 (03 Apr 2018 10:30)  T(F): 98.9 (03 Apr 2018 10:30), Max: 98.9 (03 Apr 2018 10:30)  HR: 89 (03 Apr 2018 10:30) (77 - 89)  BP: 135/58 (03 Apr 2018 10:30) (113/57 - 135/58)  BP(mean): --  RR: 18 (03 Apr 2018 10:30) (16 - 18)  SpO2: 93% (03 Apr 2018 10:30) (90% - 97%)  I&O's Summary    02 Apr 2018 07:01  -  03 Apr 2018 07:00  --------------------------------------------------------  IN: 1920 mL / OUT: 420 mL / NET: 1500 mL    03 Apr 2018 07:01  -  03 Apr 2018 14:22  --------------------------------------------------------  IN: 0 mL / OUT: 400 mL / NET: -400 mL        [X ] NO APPARENT ANESTHESIA COMPLICATIONS      Comments:
Chief complaint: Pleural effusion s/p thoracentesis    Interval hx: no acute events    Allergies:  No Known Allergies      PAST MEDICAL & SURGICAL HISTORY:  Squamous cell carcinoma of lung, stage III  TIA (transient ischemic attack): 1999  Localized enlarged lymph nodes  Solitary pulmonary nodule  HTN (hypertension)  Alcohol abuse: Sober since 01/2016- attending AA meeting weekly  Depression, unspecified depression type  Gout  Essential hypertension  H/O hemorrhoidectomy: 1967  History of tonsillectomy: as a child  History of appendectomy: 1958  H/O left knee surgery: 1995      FAMILY HISTORY:  No pertinent family history in first degree relatives      REVIEW OF SYSTEMS:  CONSTITUTIONAL: No fever, weight loss, + fatigue  EYES: No eye pain, visual disturbances, or discharge  NECK: No pain or stiffness  RESPIRATORY: No cough or wheezing, + shortness of breath  CARDIOVASCULAR: No chest pain, palpitations, dizziness, or leg swelling  GASTROINTESTINAL: No abdominal or epigastric pain. No nausea, vomiting, diarrhea or constipation  GENITOURINARY: + bowie  NEUROLOGICAL: No headaches, memory loss, loss of strength, numbness, or tremors  SKIN: No itching, burning, rashes, or lesions   MUSCULOSKELETAL: No joint pain or swelling; No muscle, back, or extremity pain      Medications:  MEDICATIONS  (STANDING):  acetaminophen   Tablet. 650 milliGRAM(s) Oral every 6 hours  diltiazem    Tablet 90 milliGRAM(s) Oral every 6 hours  docusate sodium 100 milliGRAM(s) Oral three times a day  heparin  Injectable 5000 Unit(s) SubCutaneous every 12 hours  magnesium oxide 400 milliGRAM(s) Oral three times a day with meals  nicotine -   7 mG/24Hr(s) Patch 1 patch Transdermal daily  pantoprazole    Tablet 40 milliGRAM(s) Oral before breakfast  senna 2 Tablet(s) Oral at bedtime  sodium chloride 0.9% lock flush 3 milliLiter(s) IV Push every 8 hours  tamsulosin 0.4 milliGRAM(s) Oral daily  tiotropium 18 MICROgram(s) Capsule 1 Capsule(s) Inhalation daily    MEDICATIONS  (PRN):  ALBUTerol/ipratropium for Nebulization 3 milliLiter(s) Nebulizer every 6 hours PRN Shortness of Breath and/or Wheezing  bisacodyl 5 milliGRAM(s) Oral every 12 hours PRN Constipation  bisacodyl Suppository 10 milliGRAM(s) Rectal daily PRN Constipation  lidocaine 2% Gel 1 Application(s) Topical every 4 hours PRN discomfort  oxyCODONE    IR 2.5 milliGRAM(s) Oral every 3 hours PRN Moderate Pain (4 - 6)  oxyCODONE    IR 5 milliGRAM(s) Oral every 6 hours PRN Severe Pain (7 - 10)  prochlorperazine   Tablet 10 milliGRAM(s) Oral Once PRN Depression/anxiety    	    PHYSICAL EXAM:  T(C): 36.8 (04-05-18 @ 11:55), Max: 36.9 (04-04-18 @ 17:18)  HR: 120 (04-05-18 @ 11:55) (89 - 120)  BP: 115/58 (04-05-18 @ 11:55) (102/53 - 115/58)  RR: 18 (04-05-18 @ 11:55) (18 - 18)  SpO2: 97% (04-05-18 @ 11:55) (94% - 97%)  Wt(kg): --  I&O's Summary    04 Apr 2018 07:01  -  05 Apr 2018 07:00  --------------------------------------------------------  IN: 840 mL / OUT: 1303 mL / NET: -463 mL    05 Apr 2018 07:01  -  05 Apr 2018 12:27  --------------------------------------------------------  IN: 0 mL / OUT: 360 mL / NET: -360 mL      Appearance: Normal	  HEENT:   NCAT, PERRL, EOMI	  Lymphatic: No lymphadenopathy  Cardiovascular: Normal S1 S2, irregular  Respiratory: decreased BS BL  Psychiatry: A & O x 3, Mood & affect appropriate  Gastrointestinal:  Soft, Non-tender, + BS  : + bowie  Skin: No rashes, No ecchymoses, No cyanosis	  Neurologic: Non-focal  Extremities: Normal range of motion, No clubbing, cyanosis or edema    LABS:	 	    CARDIAC MARKERS:                                8.8    13.17 )-----------( 401      ( 05 Apr 2018 05:58 )             26.9     04-05    133<L>  |  97<L>  |  28<H>  ----------------------------<  131<H>  4.1   |  21<L>  |  1.48<H>    Ca    8.5      05 Apr 2018 05:58      proBNP:   Lipid Profile:   HgA1c:   TSH:
Day _3_ of Anesthesia Pain Management Service    Allergies  No Known Allergies    SUBJECTIVE: "The pump is a pain in the ***. I forget to press it."    Pain Scale Score	At rest: _2/10_     With Activity: ___ 	[ ] Refer to charted pain scores    THERAPY:    [ ] IV PCA Morphine		[ ] 5 mg/mL	[ ] 1 mg/mL  [X] IV PCA Hydromorphone	[ ] 5 mg/mL	[X] 1 mg/mL  [ ] IV PCA Fentanyl		[ ] 50 micrograms/mL    Demand dose _0.2mg_ lockout _6_ (minutes) Continuous Rate _0_ Total: _2.4mg_  Daily      MEDICATIONS  (STANDING):  acetaminophen   Tablet. 650 milliGRAM(s) Oral every 6 hours  diltiazem    Tablet 90 milliGRAM(s) Oral every 6 hours  docusate sodium 100 milliGRAM(s) Oral three times a day  heparin  Injectable 5000 Unit(s) SubCutaneous every 12 hours  nicotine -   7 mG/24Hr(s) Patch 1 patch Transdermal daily  pantoprazole    Tablet 40 milliGRAM(s) Oral before breakfast  senna 2 Tablet(s) Oral at bedtime  sodium chloride 0.9% lock flush 3 milliLiter(s) IV Push every 8 hours  sodium chloride 1.5%. 500 milliLiter(s) (40 mL/Hr) IV Continuous <Continuous>  tamsulosin 0.4 milliGRAM(s) Oral daily  tiotropium 18 MICROgram(s) Capsule 1 Capsule(s) Inhalation daily    MEDICATIONS  (PRN):  ALBUTerol/ipratropium for Nebulization 3 milliLiter(s) Nebulizer every 6 hours PRN Shortness of Breath and/or Wheezing  bisacodyl 5 milliGRAM(s) Oral every 12 hours PRN Constipation  bisacodyl Suppository 10 milliGRAM(s) Rectal daily PRN Constipation  lidocaine 2% Gel 1 Application(s) Topical every 4 hours PRN discomfort  oxyCODONE    IR 2.5 milliGRAM(s) Oral every 3 hours PRN Moderate Pain (4 - 6)  oxyCODONE    IR 5 milliGRAM(s) Oral every 6 hours PRN Severe Pain (7 - 10)  prochlorperazine   Tablet 10 milliGRAM(s) Oral Once PRN Depression/anxiety      OBJECTIVE: A&Ox3, NAD, sitting up in chair. +chest tube.    Sedation Score:	[X] Alert	[ ] Drowsy	[ ] Arousable	[ ] Asleep	[ ] Unresponsive    Side Effects:	[X] None	[ ] Nausea	[ ] Vomiting	[ ] Pruritus  		  [ ] Weakness		[ ] Numbness	[ ] Other:                              8.3    13.42 )-----------( 419      ( 04 Apr 2018 06:30 )             25.5       04-04    131<L>  |  97<L>  |  31<H>  ----------------------------<  126<H>  4.7   |  22  |  1.69<H>    Ca    8.2<L>      04 Apr 2018 06:30  Phos  3.9     04-03  Mg     1.5     04-03        ASSESSMENT/ PLAN    Therapy to  be:	[] Continue   [x] Discontinued   [ ] Change to prn Analgesics    Documentation and Verification of current medications:  [X] Done	[ ] Not done, not eligible  [ ] Not done, reason not given    Comments:  Tylenol q6hrs x 2 days.  Oxycodone PRN analgesia.
Subjective: Patient seen and examined. No new events except as noted.     SUBJECTIVE/ROS:  had n/v last night  now feels better  no cp or sob       MEDICATIONS:  MEDICATIONS  (STANDING):  diltiazem    Tablet 90 milliGRAM(s) Oral every 6 hours  docusate sodium 100 milliGRAM(s) Oral three times a day  heparin  Injectable 5000 Unit(s) SubCutaneous every 12 hours  nicotine -   7 mG/24Hr(s) Patch 1 patch Transdermal daily  pantoprazole    Tablet 40 milliGRAM(s) Oral before breakfast  senna 2 Tablet(s) Oral at bedtime  sodium chloride 0.9% lock flush 3 milliLiter(s) IV Push every 8 hours  tamsulosin 0.4 milliGRAM(s) Oral daily  tiotropium 18 MICROgram(s) Capsule 1 Capsule(s) Inhalation daily      PHYSICAL EXAM:  T(C): 36.8 (04-06-18 @ 07:46), Max: 36.8 (04-05-18 @ 11:55)  HR: 108 (04-06-18 @ 07:46) (96 - 124)  BP: 120/42 (04-06-18 @ 07:46) (114/66 - 120/72)  RR: 18 (04-06-18 @ 07:46) (18 - 18)  SpO2: 99% (04-06-18 @ 07:46) (95% - 99%)  Wt(kg): --  I&O's Summary    05 Apr 2018 07:01  -  06 Apr 2018 07:00  --------------------------------------------------------  IN: 800 mL / OUT: 2310 mL / NET: -1510 mL    06 Apr 2018 07:01  -  06 Apr 2018 08:52  --------------------------------------------------------  IN: 0 mL / OUT: 5 mL / NET: -5 mL          Appearance: Normal	  HEENT:   Normal oral mucosa, PERRL, EOMI	  Cardiovascular: Normal S1 S2,    Murmur:   Neck: JVP normal  Respiratory: Lungs clear to auscultation  Gastrointestinal:  Soft, Non-tender, + BS	  Skin: normal   Neuro: No gross deficits.   Psychiatry:  Mood & affect appropriate  Ext: No edema      LABS/DATA:    CARDIAC MARKERS:                                8.8    13.17 )-----------( 401      ( 05 Apr 2018 05:58 )             26.9     04-05    133<L>  |  97<L>  |  28<H>  ----------------------------<  131<H>  4.1   |  21<L>  |  1.48<H>    Ca    8.5      05 Apr 2018 05:58      proBNP:   Lipid Profile:   HgA1c:   TSH:     TELE:  EKG:
Anesthesia Pain Management Service    SUBJECTIVE: Pt doing well with IV PCA without problems reported.    Therapy:	  [ X] IV PCA	   [ ] Epidural           [ ] s/p Spinal Opoid              [ ] Postpartum infusion	  [ ] Patient controlled regional anesthesia (PCRA)    [ ] prn Analgesics    Allergies    No Known Allergies    Intolerances      MEDICATIONS  (STANDING):  diltiazem    Tablet 90 milliGRAM(s) Oral every 6 hours  docusate sodium 100 milliGRAM(s) Oral three times a day  heparin  Injectable 5000 Unit(s) SubCutaneous every 12 hours  HYDROmorphone PCA (1 mG/mL) 30 milliLiter(s) PCA Continuous PCA Continuous  levoFLOXacin  Tablet 750 milliGRAM(s) Oral every 48 hours  nicotine -   7 mG/24Hr(s) Patch 1 patch Transdermal daily  pantoprazole    Tablet 40 milliGRAM(s) Oral before breakfast  sodium chloride 0.9% lock flush 3 milliLiter(s) IV Push every 8 hours  sodium chloride 1.5%. 500 milliLiter(s) (40 mL/Hr) IV Continuous <Continuous>  tamsulosin 0.4 milliGRAM(s) Oral daily  tiotropium 18 MICROgram(s) Capsule 1 Capsule(s) Inhalation daily    MEDICATIONS  (PRN):  acetaminophen   Tablet. 650 milliGRAM(s) Oral every 6 hours PRN Mild Pain (1 - 3)  ALBUTerol/ipratropium for Nebulization 3 milliLiter(s) Nebulizer every 6 hours PRN Shortness of Breath and/or Wheezing  HYDROmorphone PCA (1 mG/mL) Rescue Clinician Bolus 0.5 milliGRAM(s) IV Push every 15 minutes PRN for Pain Scale GREATER THAN 6  naloxone Injectable 0.1 milliGRAM(s) IV Push every 3 minutes PRN For ANY of the following changes in patient status:  A. RR LESS THAN 10 breaths per minute, B. Oxygen saturation LESS THAN 90%, C. Sedation score of 6  ondansetron Injectable 4 milliGRAM(s) IV Push every 6 hours PRN Nausea  prochlorperazine   Tablet 10 milliGRAM(s) Oral Once PRN Depression/anxiety      OBJECTIVE:   [X] No new signs     [ ] Other:    Side Effects:  [X ] None			[ ] Other:    Assessment of Catheter Site:		[ ] Intact		[ ] Other:    ASSESSMENT/PLAN  [ X] Continue current therapy    [ ] Therapy changed to:    [ ] IV PCA       [ ] Epidural     [ ] prn Analgesics     Comments:    Progress Note written now but Patient was seen earlier.
Anesthesia Pain Management Service- Attending Addendum    SUBJECTIVE: Patient's pain control adequate    Therapy:	  [ X] IV PCA	   [ ] Epidural           [ ] s/p Spinal Opoid              [ ] Postpartum infusion	  [ ] Patient controlled regional anesthesia (PCRA)    [ ] prn Analgesics    Allergies    No Known Allergies    Intolerances      MEDICATIONS  (STANDING):  acetaminophen   Tablet. 650 milliGRAM(s) Oral every 6 hours  diltiazem    Tablet 90 milliGRAM(s) Oral every 6 hours  docusate sodium 100 milliGRAM(s) Oral three times a day  heparin  Injectable 5000 Unit(s) SubCutaneous every 12 hours  magnesium oxide 400 milliGRAM(s) Oral three times a day with meals  nicotine -   7 mG/24Hr(s) Patch 1 patch Transdermal daily  pantoprazole    Tablet 40 milliGRAM(s) Oral before breakfast  senna 2 Tablet(s) Oral at bedtime  sodium chloride 0.9% lock flush 3 milliLiter(s) IV Push every 8 hours  tamsulosin 0.4 milliGRAM(s) Oral daily  tiotropium 18 MICROgram(s) Capsule 1 Capsule(s) Inhalation daily    MEDICATIONS  (PRN):  ALBUTerol/ipratropium for Nebulization 3 milliLiter(s) Nebulizer every 6 hours PRN Shortness of Breath and/or Wheezing  bisacodyl 5 milliGRAM(s) Oral every 12 hours PRN Constipation  bisacodyl Suppository 10 milliGRAM(s) Rectal daily PRN Constipation  lidocaine 2% Gel 1 Application(s) Topical every 4 hours PRN discomfort  oxyCODONE    IR 2.5 milliGRAM(s) Oral every 3 hours PRN Moderate Pain (4 - 6)  oxyCODONE    IR 5 milliGRAM(s) Oral every 6 hours PRN Severe Pain (7 - 10)  prochlorperazine   Tablet 10 milliGRAM(s) Oral Once PRN Depression/anxiety      OBJECTIVE:   [X] No new signs     [ ] Other:    Side Effects:  [X ] None			[ ] Other:      ASSESSMENT/PLAN  -Discontinue current therapy    [ ] Therapy changed to:    [ ] IV PCA       [ ] Epidural     [ X] prn Analgesics     Comments: Pain management per primary team, APS to sign off
CHIEF COMPLAINT:Patient is a 73y old  Male who presents with a chief complaint of Recurrent R pleural effusion (01 Apr 2018 12:07)    	        PAST MEDICAL & SURGICAL HISTORY:  Squamous cell carcinoma of lung, stage III  TIA (transient ischemic attack): 1999  Localized enlarged lymph nodes  Solitary pulmonary nodule  HTN (hypertension)  Alcohol abuse: Sober since 01/2016- attending AA meeting weekly  Depression, unspecified depression type  Gout  Essential hypertension  H/O hemorrhoidectomy: 1967  History of tonsillectomy: as a child  History of appendectomy: 1958  H/O left knee surgery: 1995          REVIEW OF SYSTEMS:  CONSTITUTIONAL: No fever, weight loss, or fatigue  EYES: No eye pain, visual disturbances, or discharge  NECK: No pain or stiffness  RESPIRATORY: No cough, wheezing, chills or hemoptysis; No Shortness of Breath  CARDIOVASCULAR: No chest pain, palpitations, passing out, dizziness, or leg swelling  GASTROINTESTINAL:less abd complaints   GENITOURINARY: No dysuria, frequency, hematuria, or incontinence  NEUROLOGICAL: No headaches, memory loss, loss of strength, numbness, or tremors  SKIN: No itching, burning, rashes, or lesions   MUSCULOSKELETAL: No joint pain or swelling; No muscle, back, or extremity pain    Medications:  MEDICATIONS  (STANDING):  apixaban 5 milliGRAM(s) Oral every 12 hours  diltiazem    Tablet 90 milliGRAM(s) Oral every 6 hours  docusate sodium 100 milliGRAM(s) Oral three times a day  nicotine -   7 mG/24Hr(s) Patch 1 patch Transdermal daily  pantoprazole    Tablet 40 milliGRAM(s) Oral before breakfast  senna 2 Tablet(s) Oral at bedtime  sodium chloride 0.9% lock flush 3 milliLiter(s) IV Push every 8 hours  sodium chloride 0.9%. 1000 milliLiter(s) (50 mL/Hr) IV Continuous <Continuous>  tamsulosin 0.4 milliGRAM(s) Oral daily  tiotropium 18 MICROgram(s) Capsule 1 Capsule(s) Inhalation daily    MEDICATIONS  (PRN):  ALBUTerol/ipratropium for Nebulization 3 milliLiter(s) Nebulizer every 6 hours PRN Shortness of Breath and/or Wheezing  aluminum hydroxide/magnesium hydroxide/simethicone Suspension 30 milliLiter(s) Oral every 6 hours PRN Dyspepsia  bisacodyl 5 milliGRAM(s) Oral every 12 hours PRN Constipation  bisacodyl Suppository 10 milliGRAM(s) Rectal daily PRN Constipation  lidocaine 2% Gel 1 Application(s) Topical every 4 hours PRN discomfort  oxyCODONE    IR 2.5 milliGRAM(s) Oral every 3 hours PRN Moderate Pain (4 - 6)  oxyCODONE    IR 5 milliGRAM(s) Oral every 6 hours PRN Severe Pain (7 - 10)  prochlorperazine   Tablet 10 milliGRAM(s) Oral Once PRN Depression/anxiety  simethicone 80 milliGRAM(s) Chew every 6 hours PRN Indigestion    	    PHYSICAL EXAM:  T(C): 36.6 (04-07-18 @ 05:08), Max: 36.8 (04-06-18 @ 07:46)  HR: 87 (04-07-18 @ 05:08) (75 - 108)  BP: 110/62 (04-07-18 @ 05:08) (101/61 - 120/42)  RR: 18 (04-07-18 @ 05:08) (18 - 18)  SpO2: 100% (04-07-18 @ 05:08) (94% - 100%)  Wt(kg): --  I&O's Summary    06 Apr 2018 07:01  -  07 Apr 2018 07:00  --------------------------------------------------------  IN: 800 mL / OUT: 705 mL / NET: 95 mL        Appearance: Normal	  HEENT:   Normal oral mucosa, PERRL, EOMI	  Lymphatic: No lymphadenopathy  Cardiovascular: Normal S1 S2, No JVD, No murmurs, No edema  Respiratory:dec bs b/l  Psychiatry: A & O x 3,   Gastrointestinal:  Soft, Non-tender, + BS	  Skin: No rashes, No ecchymoses, No cyanosis	  Neurologic: Non-focal  Extremities: Normal range of motion, No clubbing, cyanosis or edema  Vascular: Peripheral pulses palpable 2+ bilaterally    TELEMETRY: 	    ECG:  	  RADIOLOGY:  OTHER: 	  	  LABS:	 	    CARDIAC MARKERS:            04-07    133<L>  |  98  |  30<H>  ----------------------------<  110<H>  4.3   |  21<L>  |  1.27    Ca    8.3<L>      07 Apr 2018 05:30  Mg     1.9     04-07      proBNP:   Lipid Profile:   HgA1c:   TSH:
CHIEF COMPLAINT:Patient is a 73y old  Male who presents with a chief complaint of Recurrent R pleural effusion (01 Apr 2018 12:07)    Interval hx: feeling well    PAST MEDICAL & SURGICAL HISTORY:  Squamous cell carcinoma of lung, stage III  TIA (transient ischemic attack): 1999  Localized enlarged lymph nodes  Solitary pulmonary nodule  HTN (hypertension)  Alcohol abuse: Sober since 01/2016- attending AA meeting weekly  Depression, unspecified depression type  Gout  Essential hypertension  H/O hemorrhoidectomy: 1967  History of tonsillectomy: as a child  History of appendectomy: 1958  H/O left knee surgery: 1995          REVIEW OF SYSTEMS:  CONSTITUTIONAL: No fever, weight loss, or fatigue  EYES: No eye pain, visual disturbances, or discharge  NECK: No pain or stiffness  RESPIRATORY: No cough, wheezing, chills or hemoptysis; No Shortness of Breath  CARDIOVASCULAR: No chest pain, palpitations, passing out, dizziness, or leg swelling  GASTROINTESTINAL: no abd pain, nausea, vomiting or diarrhea   GENITOURINARY: No dysuria, frequency, hematuria, or incontinence  NEUROLOGICAL: No headaches, memory loss, loss of strength, numbness, or tremors  SKIN: No itching, burning, rashes, or lesions   MUSCULOSKELETAL: No joint pain or swelling; No muscle, back, or extremity pain      Medications:  MEDICATIONS  (STANDING):  apixaban 5 milliGRAM(s) Oral every 12 hours  diltiazem    Tablet 90 milliGRAM(s) Oral every 6 hours  docusate sodium 100 milliGRAM(s) Oral three times a day  nicotine -   7 mG/24Hr(s) Patch 1 patch Transdermal daily  pantoprazole    Tablet 40 milliGRAM(s) Oral before breakfast  senna 2 Tablet(s) Oral at bedtime  sodium chloride 0.9% lock flush 3 milliLiter(s) IV Push every 8 hours  sodium chloride 0.9%. 1000 milliLiter(s) (50 mL/Hr) IV Continuous <Continuous>  tamsulosin 0.4 milliGRAM(s) Oral daily  tiotropium 18 MICROgram(s) Capsule 1 Capsule(s) Inhalation daily    MEDICATIONS  (PRN):  ALBUTerol/ipratropium for Nebulization 3 milliLiter(s) Nebulizer every 6 hours PRN Shortness of Breath and/or Wheezing  aluminum hydroxide/magnesium hydroxide/simethicone Suspension 30 milliLiter(s) Oral every 6 hours PRN Dyspepsia  bisacodyl 5 milliGRAM(s) Oral every 12 hours PRN Constipation  bisacodyl Suppository 10 milliGRAM(s) Rectal daily PRN Constipation  lidocaine 2% Gel 1 Application(s) Topical every 4 hours PRN discomfort  oxyCODONE    IR 2.5 milliGRAM(s) Oral every 3 hours PRN Moderate Pain (4 - 6)  oxyCODONE    IR 5 milliGRAM(s) Oral every 6 hours PRN Severe Pain (7 - 10)  prochlorperazine   Tablet 10 milliGRAM(s) Oral Once PRN Depression/anxiety  simethicone 80 milliGRAM(s) Chew every 6 hours PRN Indigestion    	    PHYSICAL EXAM:  T(C): 36.3 (04-08-18 @ 10:42), Max: 36.9 (04-07-18 @ 21:31)  HR: 98 (04-08-18 @ 12:43) (70 - 105)  BP: 125/68 (04-08-18 @ 12:43) (103/52 - 125/68)  RR: 18 (04-08-18 @ 10:42) (18 - 19)  SpO2: 95% (04-08-18 @ 10:42) (95% - 97%)  Wt(kg): --  I&O's Summary    07 Apr 2018 07:01  -  08 Apr 2018 07:00  --------------------------------------------------------  IN: 725 mL / OUT: 1650 mL / NET: -925 mL      Appearance: Normal	  HEENT:   Normal oral mucosa, PERRL, EOMI	  Lymphatic: No lymphadenopathy  Cardiovascular: Normal S1 S2, No JVD, No murmurs, No edema  Respiratory:dec bs b/l  Psychiatry: A & O x 3,   Gastrointestinal:  Soft, Non-tender, + BS	  Skin: No rashes, No ecchymoses, No cyanosis	  Neurologic: Non-focal  Extremities: Normal range of motion, No clubbing, cyanosis or edema  Vascular: Peripheral pulses palpable 2+ bilaterally    LABS:	 	    CARDIAC MARKERS:                                9.4    12.91 )-----------( 530      ( 08 Apr 2018 09:30 )             29.6     04-07    133<L>  |  98  |  30<H>  ----------------------------<  110<H>  4.3   |  21<L>  |  1.27    Ca    8.3<L>      07 Apr 2018 05:30  Mg     1.9     04-07      proBNP:   Lipid Profile:   HgA1c:   TSH:
Chest tubes discontinued in AM. f/u CXR stable. Patient tolerated procedure well. No acute complaints      Vital Signs:  Vital Signs Last 24 Hrs  T(C): 36.6 (04-06-18 @ 12:59), Max: 36.8 (04-06-18 @ 07:46)  T(F): 97.8 (04-06-18 @ 12:59), Max: 98.2 (04-06-18 @ 07:46)  HR: 89 (04-06-18 @ 12:59) (89 - 124)  BP: 101/61 (04-06-18 @ 12:59) (101/61 - 120/72)  RR: 18 (04-06-18 @ 12:59) (18 - 18)  SpO2: 94% (04-06-18 @ 12:59) (94% - 99%) on (O2)    Telemetry/Alarms:    Relevant labs, radiology and Medications reviewed                        8.8    13.17 )-----------( 401      ( 05 Apr 2018 05:58 )             26.9     04-05    133<L>  |  97<L>  |  28<H>  ----------------------------<  131<H>  4.1   |  21<L>  |  1.48<H>    Ca    8.5      05 Apr 2018 05:58        MEDICATIONS  (STANDING):  diltiazem    Tablet 90 milliGRAM(s) Oral every 6 hours  docusate sodium 100 milliGRAM(s) Oral three times a day  heparin  Injectable 5000 Unit(s) SubCutaneous every 12 hours  nicotine -   7 mG/24Hr(s) Patch 1 patch Transdermal daily  pantoprazole    Tablet 40 milliGRAM(s) Oral before breakfast  senna 2 Tablet(s) Oral at bedtime  sodium chloride 0.9% lock flush 3 milliLiter(s) IV Push every 8 hours  sodium chloride 0.9%. 1000 milliLiter(s) (50 mL/Hr) IV Continuous <Continuous>  tamsulosin 0.4 milliGRAM(s) Oral daily  tiotropium 18 MICROgram(s) Capsule 1 Capsule(s) Inhalation daily    MEDICATIONS  (PRN):  ALBUTerol/ipratropium for Nebulization 3 milliLiter(s) Nebulizer every 6 hours PRN Shortness of Breath and/or Wheezing  aluminum hydroxide/magnesium hydroxide/simethicone Suspension 30 milliLiter(s) Oral every 6 hours PRN Dyspepsia  bisacodyl 5 milliGRAM(s) Oral every 12 hours PRN Constipation  bisacodyl Suppository 10 milliGRAM(s) Rectal daily PRN Constipation  lidocaine 2% Gel 1 Application(s) Topical every 4 hours PRN discomfort  oxyCODONE    IR 2.5 milliGRAM(s) Oral every 3 hours PRN Moderate Pain (4 - 6)  oxyCODONE    IR 5 milliGRAM(s) Oral every 6 hours PRN Severe Pain (7 - 10)  prochlorperazine   Tablet 10 milliGRAM(s) Oral Once PRN Depression/anxiety  simethicone 80 milliGRAM(s) Chew every 6 hours PRN Indigestion      Social:  Denies Smoking, Drinking illict drug use    Physical exam  General: WN/WD NAD  Neurology: Awake, nonfocal, WEBBER x 4  Eyes: Scleras clear, PERRLA/ EOMI, Gross vision intact  ENT:Gross hearing intact, grossly patent pharynx, no stridor  Neck: Neck supple, trachea midline, No JVD,   Respiratory: CTA B/L, No wheezing, rales, rhonchi  CV: RRR, S1S2, no murmurs, rubs or gallops  Abdominal: Soft, NT, ND +BS,   Extremities: No edema, + peripheral pulses  Skin: No Rashes, Hematoma, Ecchymosis  Lymphatic: No Neck, axilla, groin LAD  Psych: Oriented x 3, normal affect  Incisions: c,d,i    I&O's Summary    05 Apr 2018 07:01  -  06 Apr 2018 07:00  --------------------------------------------------------  IN: 800 mL / OUT: 2310 mL / NET: -1510 mL    06 Apr 2018 07:01  -  06 Apr 2018 15:31  --------------------------------------------------------  IN: 0 mL / OUT: 5 mL / NET: -5 mL        Assessment  73y Male  w/ PAST MEDICAL & SURGICAL HISTORY:  Squamous cell carcinoma of lung, stage III  TIA (transient ischemic attack): 1999  Localized enlarged lymph nodes  Solitary pulmonary nodule  HTN (hypertension)  Alcohol abuse: Sober since 01/2016- attending AA meeting weekly  Depression, unspecified depression type  Gout  Essential hypertension  H/O hemorrhoidectomy: 1967  History of tonsillectomy: as a child  History of appendectomy: 1958  H/O left knee surgery: 1995  admitted with complaints of Patient is a 73y old  Male who presents with a chief complaint of Recurrent R pleural effusion (01 Apr 2018 12:07)  .  On (Date), patient underwent Decortication, lung, with VATS  . Postoperative course/issues:    PLAN  Neuro: Pain management  Pulm: Encourage coughing, deep breathing and use of incentive spirometry. Wean off supplemental oxygen as able. Daily CXR.   Cardio: Monitor telemetry/alarms  GI: Tolerating diet. Continue stool softeners.  Renal: monitor urine output, supplement electrolytes as needed  Vasc: Heparin SC/SCDs for DVT prophylaxis  Heme: Stable H/H. .   ID: Off antibiotics. Stable.  Therapy: OOB/ambulate  Tubes: Monitor Chest tube output  Disposition: Aim to D/C to home on  Discussed with Cardiothoracic Team at AM rounds.
Chief complaint: Pleural effusion s/p thoracentesis    Interval hx: episode of vomiting yesterday, feeling well today    Allergies:  No Known Allergies      PAST MEDICAL & SURGICAL HISTORY:  Squamous cell carcinoma of lung, stage III  TIA (transient ischemic attack): 1999  Localized enlarged lymph nodes  Solitary pulmonary nodule  HTN (hypertension)  Alcohol abuse: Sober since 01/2016- attending AA meeting weekly  Depression, unspecified depression type  Gout  Essential hypertension  H/O hemorrhoidectomy: 1967  History of tonsillectomy: as a child  History of appendectomy: 1958  H/O left knee surgery: 1995      FAMILY HISTORY:  No pertinent family history in first degree relatives      REVIEW OF SYSTEMS:  CONSTITUTIONAL: No fever, weight loss, + fatigue  EYES: No eye pain, visual disturbances, or discharge  NECK: No pain or stiffness  RESPIRATORY: No cough or wheezing, no shortness of breath  CARDIOVASCULAR: No chest pain, palpitations, dizziness, or leg swelling  GASTROINTESTINAL: No abdominal or epigastric pain. No nausea, vomiting, diarrhea or constipation  GENITOURINARY: voiding  NEUROLOGICAL: No headaches, memory loss, loss of strength, numbness, or tremors  SKIN: No itching, burning, rashes, or lesions   MUSCULOSKELETAL: No joint pain or swelling; No muscle, back, or extremity pain      Medications:  MEDICATIONS  (STANDING):  acetaminophen   Tablet. 650 milliGRAM(s) Oral every 6 hours  diltiazem    Tablet 90 milliGRAM(s) Oral every 6 hours  docusate sodium 100 milliGRAM(s) Oral three times a day  heparin  Injectable 5000 Unit(s) SubCutaneous every 12 hours  magnesium oxide 400 milliGRAM(s) Oral three times a day with meals  nicotine -   7 mG/24Hr(s) Patch 1 patch Transdermal daily  pantoprazole    Tablet 40 milliGRAM(s) Oral before breakfast  senna 2 Tablet(s) Oral at bedtime  sodium chloride 0.9% lock flush 3 milliLiter(s) IV Push every 8 hours  tamsulosin 0.4 milliGRAM(s) Oral daily  tiotropium 18 MICROgram(s) Capsule 1 Capsule(s) Inhalation daily    MEDICATIONS  (PRN):  ALBUTerol/ipratropium for Nebulization 3 milliLiter(s) Nebulizer every 6 hours PRN Shortness of Breath and/or Wheezing  bisacodyl 5 milliGRAM(s) Oral every 12 hours PRN Constipation  bisacodyl Suppository 10 milliGRAM(s) Rectal daily PRN Constipation  lidocaine 2% Gel 1 Application(s) Topical every 4 hours PRN discomfort  oxyCODONE    IR 2.5 milliGRAM(s) Oral every 3 hours PRN Moderate Pain (4 - 6)  oxyCODONE    IR 5 milliGRAM(s) Oral every 6 hours PRN Severe Pain (7 - 10)  prochlorperazine   Tablet 10 milliGRAM(s) Oral Once PRN Depression/anxiety    	    PHYSICAL EXAM:  T(C): 36.8 (04-05-18 @ 11:55), Max: 36.9 (04-04-18 @ 17:18)  HR: 120 (04-05-18 @ 11:55) (89 - 120)  BP: 115/58 (04-05-18 @ 11:55) (102/53 - 115/58)  RR: 18 (04-05-18 @ 11:55) (18 - 18)  SpO2: 97% (04-05-18 @ 11:55) (94% - 97%)  Wt(kg): --  I&O's Summary    04 Apr 2018 07:01 - 05 Apr 2018 07:00  --------------------------------------------------------  IN: 840 mL / OUT: 1303 mL / NET: -463 mL    05 Apr 2018 07:01  -  05 Apr 2018 12:27  --------------------------------------------------------  IN: 0 mL / OUT: 360 mL / NET: -360 mL      Appearance: Normal	  HEENT:   NCAT, PERRL, EOMI	  Lymphatic: No lymphadenopathy  Cardiovascular: Normal S1 S2, irregular  Respiratory: decreased BS BL  Psychiatry: A & O x 3, Mood & affect appropriate  Gastrointestinal:  Soft, Non-tender, + BS  : + bowie  Skin: No rashes, No ecchymoses, No cyanosis	  Neurologic: Non-focal  Extremities: Normal range of motion, No clubbing, cyanosis or edema    LABS:	 	    CARDIAC MARKERS:                                8.8    13.17 )-----------( 401      ( 05 Apr 2018 05:58 )             26.9     04-05    133<L>  |  97<L>  |  28<H>  ----------------------------<  131<H>  4.1   |  21<L>  |  1.48<H>    Ca    8.5      05 Apr 2018 05:58      proBNP:   Lipid Profile:   HgA1c:   TSH:
Day _2__ of Anesthesia Pain Management Service    Allergies    No Known Allergies    Intolerances        SUBJECTIVE: "I'm doing ok the pump is helping"    Pain Scale Score	At rest: __3_ 	With Activity: ___ 	[ ] Refer to charted pain scores    THERAPY:    [ ] IV PCA Morphine		[ ] 5 mg/mL	[ ] 1 mg/mL  [X] IV PCA Hydromorphone	[ ] 5 mg/mL	[X] 1 mg/mL  [ ] IV PCA Fentanyl		[ ] 50 micrograms/mL    Demand dose _0.2mg_ lockout _6_ (minutes) Continuous Rate _0_ Total: _2.2mg__  Daily      MEDICATIONS  (STANDING):  diltiazem    Tablet 90 milliGRAM(s) Oral every 6 hours  docusate sodium 100 milliGRAM(s) Oral three times a day  heparin  Injectable 5000 Unit(s) SubCutaneous every 12 hours  HYDROmorphone PCA (1 mG/mL) 30 milliLiter(s) PCA Continuous PCA Continuous  levoFLOXacin  Tablet 750 milliGRAM(s) Oral every 48 hours  nicotine -   7 mG/24Hr(s) Patch 1 patch Transdermal daily  pantoprazole    Tablet 40 milliGRAM(s) Oral before breakfast  sodium chloride 0.9% lock flush 3 milliLiter(s) IV Push every 8 hours  tiotropium 18 MICROgram(s) Capsule 1 Capsule(s) Inhalation daily    MEDICATIONS  (PRN):  acetaminophen   Tablet. 650 milliGRAM(s) Oral every 6 hours PRN Mild Pain (1 - 3)  ALBUTerol/ipratropium for Nebulization 3 milliLiter(s) Nebulizer every 6 hours PRN Shortness of Breath and/or Wheezing  HYDROmorphone PCA (1 mG/mL) Rescue Clinician Bolus 0.5 milliGRAM(s) IV Push every 15 minutes PRN for Pain Scale GREATER THAN 6  naloxone Injectable 0.1 milliGRAM(s) IV Push every 3 minutes PRN For ANY of the following changes in patient status:  A. RR LESS THAN 10 breaths per minute, B. Oxygen saturation LESS THAN 90%, C. Sedation score of 6  ondansetron Injectable 4 milliGRAM(s) IV Push every 6 hours PRN Nausea  prochlorperazine   Tablet 10 milliGRAM(s) Oral Once PRN Depression/anxiety      OBJECTIVE: A&Ox3, NAD, supine in bed    Sedation Score:	[X] Alert	[ ] Drowsy	[ ] Arousable	[ ] Asleep	[ ] Unresponsive    Side Effects:	[X] None	[ ] Nausea	[ ] Vomiting	[ ] Pruritus  		  [ ] Weakness		[ ] Numbness	[ ] Other:    PT/INR - ( 02 Apr 2018 07:15 )   PT: 14.6 SEC;   INR: 1.31          PTT - ( 02 Apr 2018 07:15 )  PTT:32.0 SEC                          9.4    15.00 )-----------( 421      ( 03 Apr 2018 06:23 )             28.3       04-03    124<L>  |  90<L>  |  33<H>  ----------------------------<  104<H>  4.6   |  21<L>  |  1.84<H>    Ca    8.1<L>      03 Apr 2018 06:23  Phos  3.9     04-03  Mg     1.5     04-03    TPro  7.0  /  Alb  3.4  /  TBili  0.4  /  DBili  x   /  AST  13  /  ALT  8   /  AlkPhos  108  04-01      ASSESSMENT/ PLAN    Therapy to  be:	[X] Continue   [ ] Discontinued   [ ] Change to prn Analgesics    Documentation and Verification of current medications:  [X] Done	[ ] Not done, not eligible  [ ] Not done, reason not given    [ ]  ELLIOTT  Reviewed and Copied to Chart    Comments: continue current therapy
HPI:  74yo male w PMHx HTN, gout, a-fib (on eliquis), EtOH (last drink 1/2016), CLL and SCLC (T2a/N2/Mx) s/p RVATS RUL on 8/2017. Outpatient included completion of chemo 12/5/2017 x1 and R pleural effusion drained in office 12/7/17 (+ malignancy CLL). He presented to ED complaining of progressive worsening of DONNELLY for the past week, saw PMD 3/30 who ordered CT showing large R effusion and compressive atelectasis of RLL. He denies productive sputum, pleuritic/substernal chest pain, fever, chills, nausea, vomiting, dysuria, and diarrhea.     Pt s/p VATS Right lung.  Pt complains of chest pain at incision.  Pt does complain of constipation.  Pt and family known to our family.    PMHx/PSHx:PAST MEDICAL & SURGICAL HISTORY:  Squamous cell carcinoma of lung, stage III  TIA (transient ischemic attack): 1999  Localized enlarged lymph nodes  Solitary pulmonary nodule  HTN (hypertension)  Alcohol abuse: Sober since 01/2016- attending AA meeting weekly  Depression, unspecified depression type  Gout  Essential hypertension  H/O hemorrhoidectomy: 1967  History of tonsillectomy: as a child  History of appendectomy: 1958  H/O left knee surgery: 1995      Vital Signs:  Vital Signs Last 24 Hrs  T(C): 36.8 (04-01-18 @ 10:08), Max: 36.8 (04-01-18 @ 10:08)  T(F): 98.3 (04-01-18 @ 10:08), Max: 98.3 (04-01-18 @ 10:08)  HR: 85 (04-01-18 @ 10:08) (82 - 85)  BP: 146/71 (04-01-18 @ 10:08) (140/72 - 146/71)  RR: 16 (04-01-18 @ 10:08) (16 - 18)  SpO2: 100% (04-01-18 @ 10:08) (98% - 100%) on (O2)    Telemetry/Alarms: Pending EKG  Gen: Aox3, no acute distress lying in bed   Neuro: Grossly intact, moving all extremities  Head/Neck: Normocephalic, trachea is midline   Pulm: Decreased B/s on R Vs. L   Card: Irr irr, s1/s2, no murmur  Abd: +BS, non-tender, soft   LE: No edema, non-tender  Skin: RVATS incision well healed     CXR **Pending official Review**: Large R pleural effusion  CT: Large R pleural effusion w/ associated RLL compressive atelectasis    Relevant labs, radiology and Medications reviewed                        10.8   11.86 )-----------( 386      ( 01 Apr 2018 10:27 )             32.7     04-01    128<L>  |  91<L>  |  26<H>  ----------------------------<  128<H>  4.2   |  24  |  1.49<H>    Ca    8.9      01 Apr 2018 10:27    TPro  7.0  /  Alb  3.4  /  TBili  0.4  /  DBili  x   /  AST  13  /  ALT  8   /  AlkPhos  108  04-01    PT/INR - ( 01 Apr 2018 10:57 )   PT: 16.3 SEC;   INR: 1.46          PTT - ( 01 Apr 2018 10:57 )  PTT:33.2 SEC  Pertinent Physical Exam  I&O's Summary      Assessment  73y Male  w/ PAST MEDICAL & SURGICAL HISTORY:  Squamous cell carcinoma of lung, stage III  TIA (transient ischemic attack): 1999  Localized enlarged lymph nodes  Solitary pulmonary nodule  HTN (hypertension)  Alcohol abuse: Sober since 01/2016- attending AA meeting weekly  Depression, unspecified depression type  Gout  Essential hypertension  H/O hemorrhoidectomy: 1967  History of tonsillectomy: as a child  History of appendectomy: 1958  H/O left knee surgery: 1995   Patient is a 73y old  Male who presents with a chief complaint of .  On (Date), patient underwent .                                                                                                         PLAN    **Neuro**   - Gout: Holding allopurinol while Cr. elevated   - Tylenol for pain  - Compazine PRN  **Pulm**  - Spiriva daily  - OR planned for tomorrow in AM  - NPO after midnight, x2 PRBC on hold  - valid type/screen  **Cardio**   - Tele monitor  - Pending CXR  - Rate control w/ cardizem  - Holding eliquis for OR tomorrow  - Discussed w/ Dr. Ward need for pre-op clearance pending  **GI**  - DASH diet  - protonix surgical prophylaxis  - NPO after midnight  **Renal**   - Hyponatremia/EUGENIO, appears euvolemic. Asymptomatic, work-up pending.  - Daily weight, urine osm/electrolye and serum osm   - Repeat electrolyte in AM  ****  - Voids self  **Vasc**  - Holding elequis last dose 3/31 in PM  - SQH for DVT prophylaxis tomorrow/SCDs  **Heme**  - h/h stable   ** Endo**  - Grossly intact   **ID**  - PMD started Levaquin for Pneumonia s/p 3 out of 7  - Adjusted 2/2 GFR 46. Will start 4/3 q48hr until course completed (4/5)    Discussed with Cardiothoracic Team at AM rounds.                                                                                                      Contact spectra: [id number] (01 Apr 2018 12:07)      PERTINENT PMH REVIEWED:  [ x] YES [ ] NO           SOCIAL HISTORY:  Significant other/partner:  [x ] YES  [ ] NO            Children:  [x ] YES  [ ] NO                   Hindu/Spirituality:Mormon  Substance hx:  [ ] YES   [x ] NO           Tobacco hx:  [ x] YES  [ ] NO             Alcohol hx: [ ] YES  [x ] NO        Home Opioid hx:  [ ] YES  [ x] NO   Living Situation: [ ] Home  [ ] Long term care  [ ] Rehab    FAMILY HISTORY:  No pertinent family history in first degree relatives    [x ] Family history non contributory     BASELINE ADLs (prior to admission):  Independent [ ] moderately [x ] fully   Dependent   [ ] moderately [ ] fully    Code Status:                      MOLST  [ ] YES [ ] NO    Living Will  [ ] YES [ ] NO    Health Care Proxy [ ] YES  [ ] NO      [ ] Surrogate  [ ] HCP  [ ] Guardian:                                                                  Phone#:    Allergies    No Known Allergies    Intolerances           PRESENT SYMPTOMS:  Source: [x ] Patient   [ ] Family   [ ] Team     Pain: [x ] YES [ ] NO  Onset:  after surgery                  Location: right chest wall        Duration: days                     Aggravating factors: cough                       Relieving factors: iv dilaudid     Radiation:   none           Timing: none                            Severity:    5/10                  Character: sharp    Dyspnea: [ ] YES [ x] NO - Mild [ ]  Moderate [ ]  Severe [ ]    Anxiety: [ ] YES [x ] NO  Fatigue: [x ] YES [ ] NO   Nausea: [ ] YES [ x] NO  Loss of appetite: [ ] YES [x ] NO   Constipation: [x ] YES [ ] NO     Other Symptoms:  [x ] All other review of systems negative   [ ] Unable to obtain due to poor mentation     Does patient meet criteria for Severe Protein Calorie Malnutrition?  Yes [ ]  No [ ]  PPSV 30% or below [ ]  Anasarca [ ]  Albumin < 2 [ ] Catabolic State [ ] Poor nutritional intake [ ] Significant weight loss [ ]      Palliative Performance Status Version 2:   80      %  ECOG -        Vital Signs Last 24 Hrs  T(C): 37.2 (03 Apr 2018 16:35), Max: 37.2 (03 Apr 2018 10:30)  T(F): 98.9 (03 Apr 2018 16:35), Max: 98.9 (03 Apr 2018 10:30)  HR: 99 (03 Apr 2018 16:35) (79 - 99)  BP: 122/65 (03 Apr 2018 16:35) (113/57 - 135/58)  BP(mean): --  RR: 18 (03 Apr 2018 16:35) (18 - 18)  SpO2: 97% (03 Apr 2018 16:35) (90% - 97%)    Physical Exam:    General: [x ] Alert,  A&O x  3   [ ] lethargic   [ ] Agitated   [ ] Cachexia   HEENT: [x ] Normal   [ ] Dry mouth   [ ] ET Tube    [ ] Trach   Lungs: [ ] Clear [ ] Rhonchi  [ ] Crackles [ ] Wheezing [ ] Tachypnea  [ ] Audible excessive secretions  +decreased bs on right  Cardiovascular:  [ ] Regular rate and rhythm  [ ] Irregular [ x] Tachycardia   [ ] Bradycardia   Abdomen: [ x] Soft  [ ] Distended  [ x] +BS  [ x] Non tender [ ] Tender  [ ]PEG   [ ] NGT   Last BM:     Genitourinary: [x ] Normal [ ] Incontinent   [ ] Oliguria/Anuria   [ ] Espinoza  Musculoskeletal:  [x ] Normal   [ ] Generalized weakness  [ ] Bedbound  [ ] Edema   Neurological: [x ] No focal deficits  [ ] Cognitive impairment     Skin: [ x] Normal   [ ] Pressure ulcers     LABS:                02 Apr 2018 07:01  -  03 Apr 2018 07:00  --------------------------------------------------------  IN: 1920 mL / OUT: 420 mL / NET: 1500 mL    03 Apr 2018 07:01  -  03 Apr 2018 17:17  --------------------------------------------------------  IN: 330 mL / OUT: 650 mL / NET: -320 mL        RADIOLOGY & ADDITIONAL STUDIES:    Referrals:  Hospice [ ]   Chaplaincy [ ]    Child Life [ ]   Social Work [ ]   Case Management [ ]   Holistic Therapy [ ]
NEPHROLOGY - NSN    Patient seen and examined. Had bowie catheter inserted last night. No complaints at this time.     MEDICATIONS  (STANDING):  acetaminophen   Tablet. 650 milliGRAM(s) Oral every 6 hours  diltiazem    Tablet 90 milliGRAM(s) Oral every 6 hours  docusate sodium 100 milliGRAM(s) Oral three times a day  heparin  Injectable 5000 Unit(s) SubCutaneous every 12 hours  nicotine -   7 mG/24Hr(s) Patch 1 patch Transdermal daily  pantoprazole    Tablet 40 milliGRAM(s) Oral before breakfast  senna 2 Tablet(s) Oral at bedtime  sodium chloride 0.9% lock flush 3 milliLiter(s) IV Push every 8 hours  sodium chloride 1.5%. 500 milliLiter(s) (40 mL/Hr) IV Continuous <Continuous>  tamsulosin 0.4 milliGRAM(s) Oral daily  tiotropium 18 MICROgram(s) Capsule 1 Capsule(s) Inhalation daily    VITALS:  T(C): , Max: 37.6 (04-03-18 @ 20:13)  T(F): , Max: 99.7 (04-03-18 @ 20:13)  HR: 113 (04-04-18 @ 10:32)  BP: 107/50 (04-04-18 @ 10:32)  BP(mean): --  RR: 18 (04-04-18 @ 10:32)  SpO2: 94% (04-04-18 @ 10:32)  Wt(kg): --  I and O's:    04-03 @ 07:01  -  04-04 @ 07:00  --------------------------------------------------------  IN: 810 mL / OUT: 1970 mL / NET: -1160 mL    04-04 @ 07:01  -  04-04 @ 11:41  --------------------------------------------------------  IN: 360 mL / OUT: 280 mL / NET: 80 mL    REVIEW OF SYSTEMS:  As per HPI.    PHYSICAL EXAM:  Constitutional: NAD  Respiratory: coarse B/L, R chest tube  Cardiovascular: S1 and S2  Gastrointestinal: BS+, soft, NT/ND  Extremities: No peripheral edema  Neurological: AAO x 3  : + Bowie  Access: Not applicable    LABS:                        8.3    13.42 )-----------( 419      ( 04 Apr 2018 06:30 )             25.5     04-04    131<L>  |  97<L>  |  31<H>  ----------------------------<  126<H>  4.7   |  22  |  1.69<H>    Ca    8.2<L>      04 Apr 2018 06:30  Phos  3.9     04-03  Mg     1.5     04-03  Uric acid: 6.6    Urine Studies:  Electrolytes, Urine (04.03.18 @ 11:42)    Sodium, Random Urine: 25: Reference range not established for this test mmol/L    Potassium, Random Urine: 67.5: Reference range not established for this test mmol/L    Chloride, Random Urine: 23: Reference range not established for this test mmol/L      Osmolality, Random Urine: 469 mosmo/kg (04-03 @ 11:42)  Potassium, Random Urine: 67.5 mmol/L (04-03 @ 11:42)  Sodium, Random Urine: 25 mmol/L (04-03 @ 11:42)  Chloride, Random Urine: 23 mmol/L (04-03 @ 11:42)      RADIOLOGY & ADDITIONAL STUDIES:
NEPHROLOGY-[facility]  [phone]  Ailyn Kemp NP      Patient seen and examined, awake, alert, responsive. c/o nausea/vomiting lala  denies sob, chest pain, abdominal. not nauseated at present  family members present      MEDICATIONS  (STANDING):  apixaban 5 milliGRAM(s) Oral every 12 hours  diltiazem    Tablet 90 milliGRAM(s) Oral every 6 hours  docusate sodium 100 milliGRAM(s) Oral three times a day  nicotine -   7 mG/24Hr(s) Patch 1 patch Transdermal daily  pantoprazole    Tablet 40 milliGRAM(s) Oral before breakfast  senna 2 Tablet(s) Oral at bedtime  sodium chloride 0.9% lock flush 3 milliLiter(s) IV Push every 8 hours  sodium chloride 0.9%. 1000 milliLiter(s) (50 mL/Hr) IV Continuous <Continuous>  tamsulosin 0.4 milliGRAM(s) Oral daily  tiotropium 18 MICROgram(s) Capsule 1 Capsule(s) Inhalation daily      VITAL:  T(C): , Max: 36.6 (04-06-18 @ 17:01)  T(F): , Max: 97.9 (04-06-18 @ 17:01)  HR: 82 (04-07-18 @ 11:34)  BP: 101/53 (04-07-18 @ 11:34)  BP(mean): --  RR: 18 (04-07-18 @ 11:34)  SpO2: 96% (04-07-18 @ 11:34)  Wt(kg): --    I and O's:    04-06 @ 07:01  -  04-07 @ 07:00  --------------------------------------------------------  IN: 800 mL / OUT: 705 mL / NET: 95 mL    04-07 @ 07:01  -  04-07 @ 16:11  --------------------------------------------------------  IN: 0 mL / OUT: 500 mL / NET: -500 mL          PHYSICAL EXAM:    Constitutional: NAD    Neck:  No JVD  Respiratory: Coarse b/l breathsounds  Cardiovascular: S1 and S2  Gastrointestinal: BS+, soft, NT/ND  Extremities: No peripheral edema  : No Espinoza  Skin: No rashes  Access: Not applicable    LABS:                        9.0    11.96 )-----------( 474      ( 07 Apr 2018 06:00 )             28.1     07 Apr 2018 05:30    133<L>  |  98     |  30<H>  ----------------------------<  110<H>  4.3     |  21<L>  |  1.27     Ca    8.3<L>      07 Apr 2018 05:30  Mg     1.9       07 Apr 2018 05:30        Urine Studies:    RADIOLOGY & ADDITIONAL STUDIES:      ASSESSMENT:   73 year old male w/ HTN, gout, AFib, CLL, lung CA s/p right VATS 8/2017, 4/1/18 a/w SOB/right pleural effusion and now POD#4 s/p VATS  (1)Renal - CKD3; resolved superimposed EUGENIO from prerenal azotemia  (2)Hyponatremia - hypovolemic hyponatremia - stable/resolved  (3) - passed trial of void    RECOMMEND:  (1)Check daily BMP while admitted  (2)D/C planning per primary team  (3)GI f/u noted    Discussed with patient's wife and daughter
No pain, no shortness of breath      VITAL:  T(C): , Max: 36.8 (18 @ 07:46)  T(F): , Max: 98.2 (18 @ 07:46)  HR: 89 (18 @ 12:59)  BP: 101/61 (18 @ 12:59)  BP(mean): --  RR: 18 (18 @ 12:59)  SpO2: 94% (18 @ 12:59)      PHYSICAL EXAM:  Constitutional: NAD  General: NAD  HEENT: DMM  Respiratory: coarse B/L, R chest tube  Cardiovascular: S1 and S2  Gastrointestinal: BS+, soft, NT/ND  Extremities: No peripheral edema  Neurological: AAO x 3  : No Espinoza  Access: Not applicable      LABS:                        8.8    13.17 )-----------( 401      ( 2018 05:58 )             26.9     Na(133)/K(4.1)/Cl(97)/HCO3(21)/BUN(28)/Cr(1.48)Glu(131)/Ca(8.5)/Mg(--)/PO4(--)     @ 05:58  Na(131)/K(4.6)/Cl(97)/HCO3(20)/BUN(32)/Cr(1.56)Glu(163)/Ca(8.5)/Mg(--)/PO4(--)     @ 17:30  Na(131)/K(4.7)/Cl(97)/HCO3(22)/BUN(31)/Cr(1.69)Glu(126)/Ca(8.2)/Mg(--)/PO4(--)     @ 06:30  Na(129)/K(5.3)/Cl(94)/HCO3(22)/BUN(34)/Cr(1.99)Glu(143)/Ca(8.4)/Mg(--)/PO4(--)     @ 16:50    Urinalysis Basic - ( 2018 05:40 )  Color: YELLOW / Appearance: HAZY / S.020 / pH: 5.5  Gluc: NEGATIVE / Ketone: TRACE  / Bili: NEGATIVE / Urobili: NORMAL mg/dL   Blood: MODERATE / Protein: 30 mg/dL / Nitrite: NEGATIVE   Leuk Esterase: TRACE / RBC: >50 / WBC 2-5   Sq Epi: x / Non Sq Epi: x / Bacteria: x      ASSESSMENT: 73M w/ HTN, gout, AFib, CLL, lung CA s/p right VATS 2017, 18 a/w SOB/right pleural effusion and now POD#4 s/p VATS  (1)Renal - CKD3; resolved superimposed EUGENIO from prerenal azotemia  (2)Hyponatremia - hypovolemic hyponatremia - resolved  (3) - passed trial of void    RECOMMEND:  (1)Chest tube management per Thoracic  (2)D/C planning per primary team                Jamari Jones MD  [facility]  [phone]
No pain, no shortness of breath      VITAL:  T(C): , Max: 36.9 (18 @ 17:18)  T(F): , Max: 98.5 (18 @ 17:18)  HR: 120 (18 @ 11:55)  BP: 115/58 (18 @ 11:55)  BP(mean): --  RR: 18 (18 @ 11:55)  SpO2: 97% (18 @ 11:55)      PHYSICAL EXAM:  Constitutional: NAD  General: NAD  HEENT: DMM  Respiratory: coarse B/L, R chest tube  Cardiovascular: S1 and S2  Gastrointestinal: BS+, soft, NT/ND  Extremities: No peripheral edema  Neurological: AAO x 3  : No Espinoza  Access: Not applicable      LABS:                        8.8    13.17 )-----------( 401      ( 2018 05:58 )             26.9     Na(133)/K(4.1)/Cl(97)/HCO3(21)/BUN(28)/Cr(1.48)Glu(131)/Ca(8.5)/Mg(--)/PO4(--)     @ 05:58  Na(131)/K(4.6)/Cl(97)/HCO3(20)/BUN(32)/Cr(1.56)Glu(163)/Ca(8.5)/Mg(--)/PO4(--)     @ 17:30  Na(131)/K(4.7)/Cl(97)/HCO3(22)/BUN(31)/Cr(1.69)Glu(126)/Ca(8.2)/Mg(--)/PO4(--)     @ 06:30  Na(129)/K(5.3)/Cl(94)/HCO3(22)/BUN(34)/Cr(1.99)Glu(143)/Ca(8.4)/Mg(--)/PO4(--)     @ 16:50  Na(124)/K(4.6)/Cl(90)/HCO3(21)/BUN(33)/Cr(1.84)Glu(104)/Ca(8.1)/Mg(1.5)/PO4(3.9)    04-03 @ 06:23    Urinalysis Basic - ( 2018 05:40 )  Color: YELLOW / Appearance: HAZY / S.020 / pH: 5.5  Gluc: NEGATIVE / Ketone: TRACE  / Bili: NEGATIVE / Urobili: NORMAL mg/dL   Blood: MODERATE / Protein: 30 mg/dL / Nitrite: NEGATIVE   Leuk Esterase: TRACE / RBC: >50 / WBC 2-5   Sq Epi: x / Non Sq Epi: x / Bacteria: x      ASSESSMENT: 73M w/ HTN, gout, AFib, CLL, lung CA s/p right VATS 2017, 16 a/w SOB/right pleural effusion and now POD#4 s/p VATS  (1)Renal - CKD3; resolved superimposed EUGENIO from prerenal azotemia  (2)Hyponatremia - hypovolemic hyponatremia - resolved  (3) - passed trial of void    RECOMMEND:  (1)Chest tube management per Thoracic  (2)D/C planning per primary team            Jamari Jones MD  [facility]  [phone]
Pt is without complaints.  He tolerated his diet and voided.      VSS    Chest: Right sided double barrel chest tube; clean dressings; Tubes to Y-connection and Pleurevac on suction; No AL  Heart: S1, S2  Abd: soft  Ext; SCDs on    Rad: CXR shows a small basilar R-sided PTX    A; Stable s/p R VATS, drainage of hemothorax, decortication    P: F/U Chest tube outputs and AM CXR
Subjective: Patient seen and examined. No new events except as noted.     SUBJECTIVE/ROS:  No chest pain, dyspnea, palpitation, or dizziness.       MEDICATIONS:  MEDICATIONS  (STANDING):  apixaban 5 milliGRAM(s) Oral every 12 hours  diltiazem    Tablet 90 milliGRAM(s) Oral every 6 hours  docusate sodium 100 milliGRAM(s) Oral three times a day  nicotine -   7 mG/24Hr(s) Patch 1 patch Transdermal daily  pantoprazole    Tablet 40 milliGRAM(s) Oral before breakfast  senna 2 Tablet(s) Oral at bedtime  sodium chloride 0.9% lock flush 3 milliLiter(s) IV Push every 8 hours  sodium chloride 0.9%. 1000 milliLiter(s) (50 mL/Hr) IV Continuous <Continuous>  tamsulosin 0.4 milliGRAM(s) Oral daily  tiotropium 18 MICROgram(s) Capsule 1 Capsule(s) Inhalation daily      PHYSICAL EXAM:  T(C): 36.6 (04-07-18 @ 05:08), Max: 36.8 (04-06-18 @ 07:46)  HR: 87 (04-07-18 @ 05:08) (75 - 108)  BP: 110/62 (04-07-18 @ 05:08) (101/61 - 120/42)  RR: 18 (04-07-18 @ 05:08) (18 - 18)  SpO2: 100% (04-07-18 @ 05:08) (94% - 100%)  Wt(kg): --  I&O's Summary    06 Apr 2018 07:01  -  07 Apr 2018 07:00  --------------------------------------------------------  IN: 800 mL / OUT: 705 mL / NET: 95 mL          Appearance: Normal	  HEENT:   Normal oral mucosa, PERRL, EOMI	  Cardiovascular: Normal S1 S2,    Murmur:   Neck: JVP normal  Respiratory: Lungs clear to auscultation  Gastrointestinal:  Soft, Non-tender, + BS	  Skin: normal   Neuro: No gross deficits.   Psychiatry:  Mood & affect appropriate  Ext: No edema      LABS/DATA:    CARDIAC MARKERS:            04-07    133<L>  |  98  |  30<H>  ----------------------------<  110<H>  4.3   |  21<L>  |  1.27    Ca    8.3<L>      07 Apr 2018 05:30  Mg     1.9     04-07      proBNP:   Lipid Profile:   HgA1c:   TSH:     TELE:  EKG:
Subjective: Patient seen and examined. No new events except as noted.     SUBJECTIVE/ROS:  No chest pain, dyspnea, palpitation, or dizziness.       MEDICATIONS:  MEDICATIONS  (STANDING):  apixaban 5 milliGRAM(s) Oral every 12 hours  diltiazem    Tablet 90 milliGRAM(s) Oral every 6 hours  docusate sodium 100 milliGRAM(s) Oral three times a day  nicotine -   7 mG/24Hr(s) Patch 1 patch Transdermal daily  pantoprazole    Tablet 40 milliGRAM(s) Oral before breakfast  senna 2 Tablet(s) Oral at bedtime  sodium chloride 0.9% lock flush 3 milliLiter(s) IV Push every 8 hours  sodium chloride 0.9%. 1000 milliLiter(s) (50 mL/Hr) IV Continuous <Continuous>  tamsulosin 0.4 milliGRAM(s) Oral daily  tiotropium 18 MICROgram(s) Capsule 1 Capsule(s) Inhalation daily      PHYSICAL EXAM:  T(C): 36.7 (04-08-18 @ 05:06), Max: 36.9 (04-07-18 @ 21:31)  HR: 90 (04-08-18 @ 05:06) (70 - 97)  BP: 113/53 (04-08-18 @ 05:06) (101/53 - 113/53)  RR: 18 (04-08-18 @ 05:06) (18 - 19)  SpO2: 96% (04-08-18 @ 05:06) (95% - 97%)  Wt(kg): --  I&O's Summary    07 Apr 2018 07:01  -  08 Apr 2018 07:00  --------------------------------------------------------  IN: 725 mL / OUT: 1650 mL / NET: -925 mL          Appearance: Normal	  HEENT:   Normal oral mucosa, PERRL, EOMI	  Cardiovascular: Normal S1 S2,    Murmur:   Neck: JVP normal  Respiratory: Lungs clear to auscultation  Gastrointestinal:  Soft, Non-tender, + BS	  Skin: normal   Neuro: No gross deficits.   Psychiatry:  Mood & affect appropriate  Ext: No edema      LABS/DATA:    CARDIAC MARKERS:                                7.7    11.81 )-----------( 421      ( 08 Apr 2018 06:42 )             24.1     04-07    133<L>  |  98  |  30<H>  ----------------------------<  110<H>  4.3   |  21<L>  |  1.27    Ca    8.3<L>      07 Apr 2018 05:30  Mg     1.9     04-07      proBNP:   Lipid Profile:   HgA1c:   TSH:     TELE:  EKG:
Subjective: Patient seen and examined. No new events except as noted.     SUBJECTIVE/ROS:  feels a bit better  less sob       MEDICATIONS:  MEDICATIONS  (STANDING):  diltiazem    Tablet 90 milliGRAM(s) Oral every 6 hours  docusate sodium 100 milliGRAM(s) Oral three times a day  heparin  Injectable 5000 Unit(s) SubCutaneous every 12 hours  HYDROmorphone PCA (1 mG/mL) 30 milliLiter(s) PCA Continuous PCA Continuous  levoFLOXacin  Tablet 750 milliGRAM(s) Oral every 48 hours  nicotine -   7 mG/24Hr(s) Patch 1 patch Transdermal daily  pantoprazole    Tablet 40 milliGRAM(s) Oral before breakfast  sodium chloride 0.9% lock flush 3 milliLiter(s) IV Push every 8 hours  sodium chloride 1.5%. 500 milliLiter(s) (40 mL/Hr) IV Continuous <Continuous>  tamsulosin 0.4 milliGRAM(s) Oral daily  tiotropium 18 MICROgram(s) Capsule 1 Capsule(s) Inhalation daily      PHYSICAL EXAM:  T(C): 37.2 (04-03-18 @ 10:30), Max: 37.2 (04-03-18 @ 10:30)  HR: 89 (04-03-18 @ 10:30) (77 - 89)  BP: 135/58 (04-03-18 @ 10:30) (113/57 - 135/58)  RR: 18 (04-03-18 @ 10:30) (16 - 18)  SpO2: 93% (04-03-18 @ 10:30) (90% - 97%)  Wt(kg): --  I&O's Summary    02 Apr 2018 07:01  -  03 Apr 2018 07:00  --------------------------------------------------------  IN: 1920 mL / OUT: 420 mL / NET: 1500 mL    03 Apr 2018 07:01  -  03 Apr 2018 16:13  --------------------------------------------------------  IN: 0 mL / OUT: 550 mL / NET: -550 mL          JVP: Normal  Neck: supple  Lung: clear   CV: S1 S2 , Murmur:  Abd: soft  Ext: No edema  neuro: Awake / alert  Psych: flat affect  Skin: normal       LABS/DATA:    CARDIAC MARKERS:                                9.4    15.00 )-----------( 421      ( 03 Apr 2018 06:23 )             28.3     04-03    124<L>  |  90<L>  |  33<H>  ----------------------------<  104<H>  4.6   |  21<L>  |  1.84<H>    Ca    8.1<L>      03 Apr 2018 06:23  Phos  3.9     04-03  Mg     1.5     04-03      proBNP:   Lipid Profile:   HgA1c:   TSH:     TELE:  EKG:
Subjective: Patient seen and examined. No new events except as noted.     SUBJECTIVE/ROS:  feels better       MEDICATIONS:  MEDICATIONS  (STANDING):  acetaminophen   Tablet. 650 milliGRAM(s) Oral every 6 hours  diltiazem    Tablet 90 milliGRAM(s) Oral every 6 hours  docusate sodium 100 milliGRAM(s) Oral three times a day  heparin  Injectable 5000 Unit(s) SubCutaneous every 12 hours  magnesium oxide 400 milliGRAM(s) Oral three times a day with meals  nicotine -   7 mG/24Hr(s) Patch 1 patch Transdermal daily  pantoprazole    Tablet 40 milliGRAM(s) Oral before breakfast  senna 2 Tablet(s) Oral at bedtime  sodium chloride 0.9% lock flush 3 milliLiter(s) IV Push every 8 hours  tamsulosin 0.4 milliGRAM(s) Oral daily  tiotropium 18 MICROgram(s) Capsule 1 Capsule(s) Inhalation daily      PHYSICAL EXAM:  T(C): 36.4 (04-04-18 @ 12:21), Max: 37.6 (04-03-18 @ 20:13)  HR: 100 (04-04-18 @ 12:21) (87 - 113)  BP: 110/57 (04-04-18 @ 12:21) (107/50 - 138/59)  RR: 18 (04-04-18 @ 12:21) (18 - 18)  SpO2: 93% (04-04-18 @ 12:21) (90% - 97%)  Wt(kg): --  I&O's Summary    03 Apr 2018 07:01  -  04 Apr 2018 07:00  --------------------------------------------------------  IN: 810 mL / OUT: 1970 mL / NET: -1160 mL    04 Apr 2018 07:01  -  04 Apr 2018 16:29  --------------------------------------------------------  IN: 360 mL / OUT: 280 mL / NET: 80 mL        JVP: Normal  Neck: supple  Lung: clear   CV: S1 S2 , Murmur:  Abd: soft  Ext: No edema  neuro: Awake / alert  Psych: flat affect  Skin: normal       LABS/DATA:    CARDIAC MARKERS:                                8.3    13.42 )-----------( 419      ( 04 Apr 2018 06:30 )             25.5     04-04    131<L>  |  97<L>  |  31<H>  ----------------------------<  126<H>  4.7   |  22  |  1.69<H>    Ca    8.2<L>      04 Apr 2018 06:30  Phos  3.9     04-03  Mg     1.5     04-03      proBNP:   Lipid Profile:   HgA1c:   TSH: Thyroid Stimulating Hormone, Serum: 2.32 uIU/mL (04-04 @ 06:30)      TELE:  EKG:
Subjective: Patient seen and examined. No new events except as noted.     SUBJECTIVE/ROS:  feels ok       MEDICATIONS:  MEDICATIONS  (STANDING):  acetaminophen   Tablet. 650 milliGRAM(s) Oral every 6 hours  diltiazem    Tablet 90 milliGRAM(s) Oral every 6 hours  docusate sodium 100 milliGRAM(s) Oral three times a day  heparin  Injectable 5000 Unit(s) SubCutaneous every 12 hours  magnesium oxide 400 milliGRAM(s) Oral three times a day with meals  nicotine -   7 mG/24Hr(s) Patch 1 patch Transdermal daily  pantoprazole    Tablet 40 milliGRAM(s) Oral before breakfast  senna 2 Tablet(s) Oral at bedtime  sodium chloride 0.9% lock flush 3 milliLiter(s) IV Push every 8 hours  tamsulosin 0.4 milliGRAM(s) Oral daily  tiotropium 18 MICROgram(s) Capsule 1 Capsule(s) Inhalation daily      PHYSICAL EXAM:  T(C): 36.8 (04-05-18 @ 11:55), Max: 36.9 (04-04-18 @ 17:18)  HR: 120 (04-05-18 @ 11:55) (89 - 120)  BP: 115/58 (04-05-18 @ 11:55) (102/53 - 115/58)  RR: 18 (04-05-18 @ 11:55) (18 - 18)  SpO2: 97% (04-05-18 @ 11:55) (94% - 97%)  Wt(kg): --  I&O's Summary    04 Apr 2018 07:01 - 05 Apr 2018 07:00  --------------------------------------------------------  IN: 840 mL / OUT: 1303 mL / NET: -463 mL    05 Apr 2018 07:01  -  05 Apr 2018 15:22  --------------------------------------------------------  IN: 0 mL / OUT: 900 mL / NET: -900 mL        JVP: Normal  Neck: supple  Lung: clear   CV: S1 S2 , Murmur:  Abd: soft  Ext: No edema  neuro: Awake / alert  Psych: flat affect  Skin: normal       LABS/DATA:    CARDIAC MARKERS:                                8.8    13.17 )-----------( 401      ( 05 Apr 2018 05:58 )             26.9     04-05    133<L>  |  97<L>  |  28<H>  ----------------------------<  131<H>  4.1   |  21<L>  |  1.48<H>    Ca    8.5      05 Apr 2018 05:58      proBNP:   Lipid Profile:   HgA1c:   TSH:     TELE:  EKG:
Subjective: Patient seen and examined. No new events except as noted.     SUBJECTIVE/ROS:  sob      MEDICATIONS:  MEDICATIONS  (STANDING):  diltiazem    Tablet 90 milliGRAM(s) Oral every 6 hours  docusate sodium 100 milliGRAM(s) Oral three times a day  heparin  Injectable 5000 Unit(s) SubCutaneous every 12 hours  nicotine -   7 mG/24Hr(s) Patch 1 patch Transdermal daily  pantoprazole    Tablet 40 milliGRAM(s) Oral before breakfast  sodium chloride 0.9% lock flush 3 milliLiter(s) IV Push every 8 hours  tiotropium 18 MICROgram(s) Capsule 1 Capsule(s) Inhalation daily      PHYSICAL EXAM:  T(C): 36.8 (04-02-18 @ 07:54), Max: 37.3 (04-01-18 @ 16:24)  HR: 93 (04-02-18 @ 07:54) (80 - 97)  BP: 132/64 (04-02-18 @ 07:54) (124/63 - 146/71)  RR: 18 (04-02-18 @ 07:54) (16 - 18)  SpO2: 94% (04-02-18 @ 07:54) (93% - 100%)  Wt(kg): --  I&O's Summary    01 Apr 2018 07:01  -  02 Apr 2018 07:00  --------------------------------------------------------  IN: 0 mL / OUT: 1250 mL / NET: -1250 mL      Height (cm): 180.34 (04-02 @ 07:54)  Weight (kg): 73.9 (04-02 @ 07:54)  BMI (kg/m2): 22.7 (04-02 @ 07:54)  BSA (m2): 1.93 (04-02 @ 07:54)    JVP: Normal  Neck: supple  Lung: dec bs on right   CV: S1 S2 , Murmur:  Abd: soft  Ext: No edema  neuro: Awake / alert  Psych: flat affect  Skin: normal       LABS/DATA:    CARDIAC MARKERS:                                10.6   13.01 )-----------( 401      ( 02 Apr 2018 07:15 )             32.1     04-01    128<L>  |  91<L>  |  26<H>  ----------------------------<  128<H>  4.2   |  24  |  1.49<H>    Ca    8.9      01 Apr 2018 10:27    TPro  7.0  /  Alb  3.4  /  TBili  0.4  /  DBili  x   /  AST  13  /  ALT  8   /  AlkPhos  108  04-01    proBNP:   Lipid Profile:   HgA1c:   TSH:     TELE:  EKG:
Subjective: Pt in no acute distress. Anxious to leave hospital.       Vital Signs:  Vital Signs Last 24 Hrs  T(C): 36.7 (04-05-18 @ 05:33), Max: 36.9 (04-04-18 @ 17:18)  T(F): 98.1 (04-05-18 @ 05:33), Max: 98.5 (04-04-18 @ 17:18)  HR: 95 (04-05-18 @ 05:33) (89 - 113)  BP: 112/59 (04-05-18 @ 05:33) (102/53 - 113/46)  RR: 18 (04-05-18 @ 05:33) (18 - 18)  SpO2: 97% (04-05-18 @ 05:33) (93% - 97%) on (O2)    Telemetry/Alarms:    Relevant labs, radiology and Medications reviewed                        8.8    13.17 )-----------( 401      ( 05 Apr 2018 05:58 )             26.9     04-05    133<L>  |  97<L>  |  28<H>  ----------------------------<  131<H>  4.1   |  21<L>  |  1.48<H>    Ca    8.5      05 Apr 2018 05:58        MEDICATIONS  (STANDING):  acetaminophen   Tablet. 650 milliGRAM(s) Oral every 6 hours  diltiazem    Tablet 90 milliGRAM(s) Oral every 6 hours  docusate sodium 100 milliGRAM(s) Oral three times a day  heparin  Injectable 5000 Unit(s) SubCutaneous every 12 hours  magnesium oxide 400 milliGRAM(s) Oral three times a day with meals  nicotine -   7 mG/24Hr(s) Patch 1 patch Transdermal daily  pantoprazole    Tablet 40 milliGRAM(s) Oral before breakfast  senna 2 Tablet(s) Oral at bedtime  sodium chloride 0.9% lock flush 3 milliLiter(s) IV Push every 8 hours  tamsulosin 0.4 milliGRAM(s) Oral daily  tiotropium 18 MICROgram(s) Capsule 1 Capsule(s) Inhalation daily    MEDICATIONS  (PRN):  ALBUTerol/ipratropium for Nebulization 3 milliLiter(s) Nebulizer every 6 hours PRN Shortness of Breath and/or Wheezing  bisacodyl 5 milliGRAM(s) Oral every 12 hours PRN Constipation  bisacodyl Suppository 10 milliGRAM(s) Rectal daily PRN Constipation  lidocaine 2% Gel 1 Application(s) Topical every 4 hours PRN discomfort  oxyCODONE    IR 2.5 milliGRAM(s) Oral every 3 hours PRN Moderate Pain (4 - 6)  oxyCODONE    IR 5 milliGRAM(s) Oral every 6 hours PRN Severe Pain (7 - 10)  prochlorperazine   Tablet 10 milliGRAM(s) Oral Once PRN Depression/anxiety      Social:  Denies Smoking, Drinking illict drug use    Physical exam  General: WN/WD NAD  Neurology: Awake, nonfocal, WEBBER x 4  Eyes: Scleras clear, PERRLA/ EOMI, Gross vision intact  ENT:Gross hearing intact, grossly patent pharynx, no stridor  Neck: Neck supple, trachea midline, No JVD,   Respiratory: CTA B/L, No wheezing, rales, rhonchi  CV: RRR, S1S2, no murmurs, rubs or gallops  Abdominal: Soft, NT, ND +BS,   Extremities: No edema, + peripheral pulses  Skin: No Rashes, Hematoma, Ecchymosis  Lymphatic: No Neck, axilla, groin LAD  Psych: Oriented x 3, normal affect  Incisions: c,d,i  Tubes: CT to WS, output 72 (50/22)    I&O's Summary    04 Apr 2018 07:01  -  05 Apr 2018 07:00  --------------------------------------------------------  IN: 840 mL / OUT: 1303 mL / NET: -463 mL        Assessment  73y Male  w/ PAST MEDICAL & SURGICAL HISTORY:  Squamous cell carcinoma of lung, stage III  TIA (transient ischemic attack): 1999  Localized enlarged lymph nodes  Solitary pulmonary nodule  HTN (hypertension)  Alcohol abuse: Sober since 01/2016- attending AA meeting weekly  Depression, unspecified depression type  Gout  Essential hypertension  H/O hemorrhoidectomy: 1967  History of tonsillectomy: as a child  History of appendectomy: 1958  H/O left knee surgery: 1995  admitted with complaints of Patient is a 73y old  Male who presents with a chief complaint of Recurrent R pleural effusion (01 Apr 2018 12:07)  .  On (Date), patient underwent Decortication, lung, with VATS  . Postoperative course/issues:    PLAN  Placed CTs to WS. CXR in AM. Plan to D/C CTs tomorrow
Subjective: no acute complaints    Vital Signs:  Vital Signs Last 24 Hrs  T(C): 36.5 (04-04-18 @ 10:32), Max: 37.6 (04-03-18 @ 20:13)  T(F): 97.7 (04-04-18 @ 10:32), Max: 99.7 (04-03-18 @ 20:13)  HR: 113 (04-04-18 @ 10:32) (87 - 113)  BP: 107/50 (04-04-18 @ 10:32) (107/50 - 138/59)  RR: 18 (04-04-18 @ 10:32) (18 - 18)  SpO2: 94% (04-04-18 @ 10:32) (90% - 97%) on (O2)    Telemetry/Alarms:  General: WN/WD NAD  Neurology: Awake, nonfocal, WEBBER x 4  Eyes: Scleras clear, PERRLA/ EOMI, Gross vision intact  ENT:Gross hearing intact, grossly patent pharynx, no stridor  Neck: Neck supple, trachea midline, No JVD,   Respiratory: CTA B/L, No wheezing, rales, rhonchi  CV: RRR, S1S2, no murmurs, rubs or gallops  Abdominal: Soft, NT, ND +BS,   Extremities: No edema, + peripheral pulses  Skin: No Rashes, Hematoma, Ecchymosis  Lymphatic: No Neck, axilla, groin LAD  Psych: Oriented x 3, normal affect  Incisions: c,d,i  Tubes: double barrel right chest tube to suction drained 195cc/24h no air leak  Relevant labs, radiology and Medications reviewed                        8.3    13.42 )-----------( 419      ( 04 Apr 2018 06:30 )             25.5     04-04    131<L>  |  97<L>  |  31<H>  ----------------------------<  126<H>  4.7   |  22  |  1.69<H>    Ca    8.2<L>      04 Apr 2018 06:30  Phos  3.9     04-03  Mg     1.5     04-03        MEDICATIONS  (STANDING):  acetaminophen   Tablet. 650 milliGRAM(s) Oral every 6 hours  diltiazem    Tablet 90 milliGRAM(s) Oral every 6 hours  docusate sodium 100 milliGRAM(s) Oral three times a day  heparin  Injectable 5000 Unit(s) SubCutaneous every 12 hours  magnesium oxide 400 milliGRAM(s) Oral three times a day with meals  nicotine -   7 mG/24Hr(s) Patch 1 patch Transdermal daily  pantoprazole    Tablet 40 milliGRAM(s) Oral before breakfast  senna 2 Tablet(s) Oral at bedtime  sodium chloride 0.9% lock flush 3 milliLiter(s) IV Push every 8 hours  tamsulosin 0.4 milliGRAM(s) Oral daily  tiotropium 18 MICROgram(s) Capsule 1 Capsule(s) Inhalation daily    MEDICATIONS  (PRN):  ALBUTerol/ipratropium for Nebulization 3 milliLiter(s) Nebulizer every 6 hours PRN Shortness of Breath and/or Wheezing  bisacodyl 5 milliGRAM(s) Oral every 12 hours PRN Constipation  bisacodyl Suppository 10 milliGRAM(s) Rectal daily PRN Constipation  lidocaine 2% Gel 1 Application(s) Topical every 4 hours PRN discomfort  oxyCODONE    IR 2.5 milliGRAM(s) Oral every 3 hours PRN Moderate Pain (4 - 6)  oxyCODONE    IR 5 milliGRAM(s) Oral every 6 hours PRN Severe Pain (7 - 10)  prochlorperazine   Tablet 10 milliGRAM(s) Oral Once PRN Depression/anxiety    Pertinent Physical Exam  I&O's Summary    03 Apr 2018 07:01  -  04 Apr 2018 07:00  --------------------------------------------------------  IN: 810 mL / OUT: 1970 mL / NET: -1160 mL    04 Apr 2018 07:01  -  04 Apr 2018 11:50  --------------------------------------------------------  IN: 360 mL / OUT: 280 mL / NET: 80 mL        Assessment  73y Male  w/ PAST MEDICAL & SURGICAL HISTORY:  Squamous cell carcinoma of lung, stage III  TIA (transient ischemic attack): 1999  Localized enlarged lymph nodes  Solitary pulmonary nodule  HTN (hypertension)  Alcohol abuse: Sober since 01/2016- attending AA meeting weekly  Depression, unspecified depression type  Gout  Essential hypertension  H/O hemorrhoidectomy: 1967  History of tonsillectomy: as a child  History of appendectomy: 1958  H/O left knee surgery: 1995  admitted with complaints of Patient is a 73y old  Male who presents with a chief complaint of Recurrent R pleural effusion (01 Apr 2018 12:07)      On (4/2/18), patient s/p LVATS drainage of hemothorax (4L), decortication  . Postoperative course/issues: Espinoza inserted for retention, Renal consulted, Cardiology follows - Eliquis on hold,     PLAN  Neuro: Pain management  Pulm: Encourage coughing, deep breathing and use of incentive spirometry. Wean off supplemental oxygen as able. Daily CXR.   Cardio: Monitor telemetry/alarms; holding eliquis (for Afib); f/u cardiology  GI: Tolerating diet. Continue stool softeners.  Renal: monitor urine output, supplement electrolytes as needed  Vasc: Heparin SC/SCDs for DVT prophylaxis  Heme: monitor H/H.   ID: Off antibiotics. Stable.  Therapy: OOB/ambulate  Tubes: Monitor Chest tube output, continue drain to suction  Disposition: pt will be transferred to medical service under Dr Hogan  Discussed with Cardiothoracic Team at AM rounds.
Subjective: pt sore, using IVPCA with relief, monitoring decreased urine output, pt denies sensation to void    Vital Signs:  Vital Signs Last 24 Hrs  T(C): 37.2 (04-03-18 @ 10:30), Max: 37.2 (04-03-18 @ 10:30)  T(F): 98.9 (04-03-18 @ 10:30), Max: 98.9 (04-03-18 @ 10:30)  HR: 89 (04-03-18 @ 10:30) (77 - 89)  BP: 135/58 (04-03-18 @ 10:30) (113/57 - 135/58)  RR: 18 (04-03-18 @ 10:30) (15 - 18)  SpO2: 93% (04-03-18 @ 10:30) (90% - 97%) on (O2)    Telemetry/Alarms:  General: WN/WD NAD  Neurology: Awake, nonfocal, WEBBER x 4  Eyes: Scleras clear, PERRLA/ EOMI, Gross vision intact  ENT:Gross hearing intact, grossly patent pharynx, no stridor  Neck: Neck supple, trachea midline, No JVD,   Respiratory: CTA B/L, No wheezing, rales, rhonchi  CV: RRR, S1S2, no murmurs, rubs or gallops  Abdominal: Soft, NT, ND +BS,   Extremities: No edema, + peripheral pulses  Skin: No Rashes, Hematoma, Ecchymosis  Lymphatic: No Neck, axilla, groin LAD  Psych: Oriented x 3, normal affect  Incisions: c,d,i  Tubes: double barrel chest tube drained 370cc/24h to suction no air leak  Relevant labs, radiology and Medications reviewed                        9.4    15.00 )-----------( 421      ( 03 Apr 2018 06:23 )             28.3     04-03    124<L>  |  90<L>  |  33<H>  ----------------------------<  104<H>  4.6   |  21<L>  |  1.84<H>    Ca    8.1<L>      03 Apr 2018 06:23  Phos  3.9     04-03  Mg     1.5     04-03      PT/INR - ( 02 Apr 2018 07:15 )   PT: 14.6 SEC;   INR: 1.31          PTT - ( 02 Apr 2018 07:15 )  PTT:32.0 SEC  MEDICATIONS  (STANDING):  diltiazem    Tablet 90 milliGRAM(s) Oral every 6 hours  docusate sodium 100 milliGRAM(s) Oral three times a day  heparin  Injectable 5000 Unit(s) SubCutaneous every 12 hours  HYDROmorphone PCA (1 mG/mL) 30 milliLiter(s) PCA Continuous PCA Continuous  levoFLOXacin  Tablet 750 milliGRAM(s) Oral every 48 hours  nicotine -   7 mG/24Hr(s) Patch 1 patch Transdermal daily  pantoprazole    Tablet 40 milliGRAM(s) Oral before breakfast  sodium chloride 0.9% lock flush 3 milliLiter(s) IV Push every 8 hours  sodium chloride 1.5%. 500 milliLiter(s) (40 mL/Hr) IV Continuous <Continuous>  tamsulosin 0.4 milliGRAM(s) Oral daily  tiotropium 18 MICROgram(s) Capsule 1 Capsule(s) Inhalation daily    MEDICATIONS  (PRN):  acetaminophen   Tablet. 650 milliGRAM(s) Oral every 6 hours PRN Mild Pain (1 - 3)  ALBUTerol/ipratropium for Nebulization 3 milliLiter(s) Nebulizer every 6 hours PRN Shortness of Breath and/or Wheezing  HYDROmorphone PCA (1 mG/mL) Rescue Clinician Bolus 0.5 milliGRAM(s) IV Push every 15 minutes PRN for Pain Scale GREATER THAN 6  naloxone Injectable 0.1 milliGRAM(s) IV Push every 3 minutes PRN For ANY of the following changes in patient status:  A. RR LESS THAN 10 breaths per minute, B. Oxygen saturation LESS THAN 90%, C. Sedation score of 6  ondansetron Injectable 4 milliGRAM(s) IV Push every 6 hours PRN Nausea  prochlorperazine   Tablet 10 milliGRAM(s) Oral Once PRN Depression/anxiety    Pertinent Physical Exam  I&O's Summary    02 Apr 2018 07:01  -  03 Apr 2018 07:00  --------------------------------------------------------  IN: 1920 mL / OUT: 420 mL / NET: 1500 mL    03 Apr 2018 07:01  -  03 Apr 2018 12:50  --------------------------------------------------------  IN: 0 mL / OUT: 400 mL / NET: -400 mL        Assessment  73y Male  w/ PAST MEDICAL & SURGICAL HISTORY:  Squamous cell carcinoma of lung, stage III  TIA (transient ischemic attack): 1999  Localized enlarged lymph nodes  Solitary pulmonary nodule  HTN (hypertension)  Alcohol abuse: Sober since 01/2016- attending AA meeting weekly  Depression, unspecified depression type  Gout  Essential hypertension  H/O hemorrhoidectomy: 1967  History of tonsillectomy: as a child  History of appendectomy: 1958  H/O left knee surgery: 1995  admitted with complaints of Patient is a 73y old  Male who presents with a chief complaint of Recurrent R pleural effusion (01 Apr 2018 12:07)  .  On (Date), patient underwent Decortication, lung, with VATS, drainage of 4 L hemothorax  . Postoperative course/issues: mobilization, increased chest tube output    PLAN  Neuro: Pain management  Pulm: Encourage coughing, deep breathing and use of incentive spirometry. Wean off supplemental oxygen as able. Daily CXR.   Cardio: Monitor telemetry/alarms  GI: Tolerating diet. Continue stool softeners.  Renal: monitor urine output, supplement electrolytes as needed; flomax started, monitoring strict i/o's Renal consulted  IV fluids adjusted  Vasc: Heparin SC/SCDs for DVT prophylaxis  Heme: monitor H/H. .   ID: On Levaquin  Oncology followup, palliative follow up  Therapy: OOB/ambulate  Tubes: Monitor Chest tube output  Disposition: Aim to D/C to home once chest tubes removed  Discussed with Cardiothoracic Team at AM rounds.

## 2018-04-08 NOTE — PROGRESS NOTE ADULT - PROVIDER SPECIALTY LIST ADULT
CT Surgery
CT Surgery
Cardiology
Internal Medicine
Nephrology
Pain Medicine
Palliative Care
Thoracic Surgery
Anesthesia
Pain Medicine
Cardiology
Internal Medicine

## 2018-04-08 NOTE — PROGRESS NOTE ADULT - ASSESSMENT
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 SCLC w/reaccumulating R pleural effusion - s/p thoracentesis by CTS, Onc appreciated, outpt f/u for chemo tx  , Palliative appreciated, c/w inhalers, pain control  2. A fib - Cardio appreciated, restarted on Eliquis as cleared  by CTS, rate controlled  3. Hyponatremia - likely SIADH, Renal appreciated, Na stable   c/w fluid restriction  4. CKD3 - Cr stable, monitor  5. HTN - BP controlled  6. Anemia - likely multifactorial,f/u cbc  7. Urinary retention - c/w Flomax  8. Dvt prophylaxis  9. Vomiting - GI eval,noted now resolved  D/c planning 74yo male w PMHx HTN, gout, a-fib (on eliquis), CKD3, EtOH (last drink 1/2016), CLL and SCLC (T2a/N2/Mx) s/p RVATS RUL, reaccumulating R pleural effusion, hyponatremia, urinary retention:  1. SCLC w/reaccumulating R pleural effusion - s/p thoracentesis by CTS, Onc appreciated, outpt f/u for chemo tx  , Palliative appreciated, c/w inhalers, pain control  2. A fib - Cardio appreciated, restarted on Eliquis as cleared  by CTS, rate controlled  3. Hyponatremia - likely SIADH, Renal appreciated, Na stable   c/w fluid restriction  4. CKD3 - Cr stable, monitor  5. HTN - BP controlled  6. Anemia - likely multifactorial,f/u cbc  7. Urinary retention - c/w Flomax  8. Dvt prophylaxis  9. Vomiting - GI eval,noted now resolved  D/c planning

## 2018-04-10 ENCOUNTER — APPOINTMENT (OUTPATIENT)
Dept: HEMATOLOGY ONCOLOGY | Facility: CLINIC | Age: 73
End: 2018-04-10

## 2018-04-16 ENCOUNTER — APPOINTMENT (OUTPATIENT)
Dept: RADIOLOGY | Facility: CLINIC | Age: 73
End: 2018-04-16
Payer: MEDICARE

## 2018-04-16 ENCOUNTER — LABORATORY RESULT (OUTPATIENT)
Age: 73
End: 2018-04-16

## 2018-04-16 ENCOUNTER — APPOINTMENT (OUTPATIENT)
Dept: GERIATRICS | Facility: CLINIC | Age: 73
End: 2018-04-16
Payer: MEDICARE

## 2018-04-16 ENCOUNTER — OUTPATIENT (OUTPATIENT)
Dept: OUTPATIENT SERVICES | Facility: HOSPITAL | Age: 73
LOS: 1 days | End: 2018-04-16
Payer: MEDICARE

## 2018-04-16 VITALS
SYSTOLIC BLOOD PRESSURE: 100 MMHG | HEIGHT: 71 IN | BODY MASS INDEX: 21.72 KG/M2 | WEIGHT: 155.13 LBS | DIASTOLIC BLOOD PRESSURE: 60 MMHG | TEMPERATURE: 97.5 F | HEART RATE: 63 BPM | RESPIRATION RATE: 16 BRPM | OXYGEN SATURATION: 97 %

## 2018-04-16 DIAGNOSIS — Z98.890 OTHER SPECIFIED POSTPROCEDURAL STATES: Chronic | ICD-10-CM

## 2018-04-16 DIAGNOSIS — Z23 ENCOUNTER FOR IMMUNIZATION: ICD-10-CM

## 2018-04-16 DIAGNOSIS — J91.0 MALIGNANT PLEURAL EFFUSION: ICD-10-CM

## 2018-04-16 DIAGNOSIS — Z90.49 ACQUIRED ABSENCE OF OTHER SPECIFIED PARTS OF DIGESTIVE TRACT: Chronic | ICD-10-CM

## 2018-04-16 DIAGNOSIS — J90 PLEURAL EFFUSION, NOT ELSEWHERE CLASSIFIED: ICD-10-CM

## 2018-04-16 DIAGNOSIS — Z90.89 ACQUIRED ABSENCE OF OTHER ORGANS: Chronic | ICD-10-CM

## 2018-04-16 DIAGNOSIS — E87.1 HYPO-OSMOLALITY AND HYPONATREMIA: ICD-10-CM

## 2018-04-16 PROCEDURE — 99496 TRANSJ CARE MGMT HIGH F2F 7D: CPT | Mod: GC

## 2018-04-16 PROCEDURE — 71046 X-RAY EXAM CHEST 2 VIEWS: CPT | Mod: 26

## 2018-04-16 PROCEDURE — 71046 X-RAY EXAM CHEST 2 VIEWS: CPT

## 2018-04-16 RX ORDER — CHOLECALCIFEROL (VITAMIN D3) 25 MCG
TABLET,CHEWABLE ORAL
Qty: 14 | Refills: 0 | Status: DISCONTINUED | COMMUNITY
Start: 2018-03-30 | End: 2018-04-16

## 2018-04-16 RX ORDER — LEVOFLOXACIN 500 MG/1
500 TABLET, FILM COATED ORAL
Qty: 7 | Refills: 0 | Status: DISCONTINUED | COMMUNITY
Start: 2018-03-30 | End: 2018-04-16

## 2018-04-16 RX ORDER — NICOTINE 21 MG/24HR
14 PATCH, TRANSDERMAL 24 HOURS TRANSDERMAL
Qty: 30 | Refills: 0 | Status: ACTIVE | COMMUNITY
Start: 2018-04-16 | End: 1900-01-01

## 2018-04-17 ENCOUNTER — APPOINTMENT (OUTPATIENT)
Dept: THORACIC SURGERY | Facility: CLINIC | Age: 73
End: 2018-04-17
Payer: MEDICARE

## 2018-04-17 ENCOUNTER — APPOINTMENT (OUTPATIENT)
Dept: HEMATOLOGY ONCOLOGY | Facility: CLINIC | Age: 73
End: 2018-04-17
Payer: MEDICARE

## 2018-04-17 VITALS
BODY MASS INDEX: 21.62 KG/M2 | DIASTOLIC BLOOD PRESSURE: 69 MMHG | OXYGEN SATURATION: 99 % | WEIGHT: 155 LBS | HEART RATE: 65 BPM | RESPIRATION RATE: 16 BRPM | SYSTOLIC BLOOD PRESSURE: 121 MMHG | TEMPERATURE: 97.9 F

## 2018-04-17 VITALS
HEART RATE: 63 BPM | RESPIRATION RATE: 16 BRPM | DIASTOLIC BLOOD PRESSURE: 73 MMHG | WEIGHT: 155 LBS | SYSTOLIC BLOOD PRESSURE: 123 MMHG | OXYGEN SATURATION: 99 % | BODY MASS INDEX: 21.62 KG/M2

## 2018-04-17 LAB
ANION GAP SERPL CALC-SCNC: 11 MMOL/L
BASOPHILS # BLD AUTO: 0.03 K/UL
BASOPHILS NFR BLD AUTO: 0.3 %
BUN SERPL-MCNC: 20 MG/DL
CALCIUM SERPL-MCNC: 9.1 MG/DL
CHLORIDE SERPL-SCNC: 101 MMOL/L
CO2 SERPL-SCNC: 25 MMOL/L
CREAT SERPL-MCNC: 1.21 MG/DL
EOSINOPHIL # BLD AUTO: 0.36 K/UL
EOSINOPHIL NFR BLD AUTO: 3 %
GLUCOSE SERPL-MCNC: 95 MG/DL
HCT VFR BLD CALC: 28.8 %
HGB BLD-MCNC: 8.9 G/DL
IMM GRANULOCYTES NFR BLD AUTO: 0.3 %
LYMPHOCYTES # BLD AUTO: 4.08 K/UL
LYMPHOCYTES NFR BLD AUTO: 34 %
MAN DIFF?: NORMAL
MCHC RBC-ENTMCNC: 30.5 PG
MCHC RBC-ENTMCNC: 30.9 GM/DL
MCV RBC AUTO: 98.6 FL
MONOCYTES # BLD AUTO: 1.25 K/UL
MONOCYTES NFR BLD AUTO: 10.4 %
NEUTROPHILS # BLD AUTO: 6.23 K/UL
NEUTROPHILS NFR BLD AUTO: 52 %
PLATELET # BLD AUTO: 511 K/UL
POTASSIUM SERPL-SCNC: 4.7 MMOL/L
RBC # BLD: 2.92 M/UL
RBC # FLD: 16.3 %
SODIUM SERPL-SCNC: 137 MMOL/L
WBC # FLD AUTO: 11.99 K/UL

## 2018-04-17 PROCEDURE — 99215 OFFICE O/P EST HI 40 MIN: CPT

## 2018-04-17 PROCEDURE — 99024 POSTOP FOLLOW-UP VISIT: CPT

## 2018-04-18 ENCOUNTER — FORM ENCOUNTER (OUTPATIENT)
Age: 73
End: 2018-04-18

## 2018-04-19 ENCOUNTER — APPOINTMENT (OUTPATIENT)
Dept: NUCLEAR MEDICINE | Facility: IMAGING CENTER | Age: 73
End: 2018-04-19
Payer: MEDICARE

## 2018-04-19 ENCOUNTER — APPOINTMENT (OUTPATIENT)
Dept: MRI IMAGING | Facility: IMAGING CENTER | Age: 73
End: 2018-04-19
Payer: MEDICARE

## 2018-04-19 ENCOUNTER — OUTPATIENT (OUTPATIENT)
Dept: OUTPATIENT SERVICES | Facility: HOSPITAL | Age: 73
LOS: 1 days | End: 2018-04-19
Payer: MEDICARE

## 2018-04-19 DIAGNOSIS — Z98.890 OTHER SPECIFIED POSTPROCEDURAL STATES: Chronic | ICD-10-CM

## 2018-04-19 DIAGNOSIS — Z90.89 ACQUIRED ABSENCE OF OTHER ORGANS: Chronic | ICD-10-CM

## 2018-04-19 DIAGNOSIS — C34.91 MALIGNANT NEOPLASM OF UNSPECIFIED PART OF RIGHT BRONCHUS OR LUNG: ICD-10-CM

## 2018-04-19 DIAGNOSIS — Z90.49 ACQUIRED ABSENCE OF OTHER SPECIFIED PARTS OF DIGESTIVE TRACT: Chronic | ICD-10-CM

## 2018-04-19 PROCEDURE — 70553 MRI BRAIN STEM W/O & W/DYE: CPT | Mod: 26

## 2018-04-19 PROCEDURE — A9585: CPT

## 2018-04-19 PROCEDURE — 78815 PET IMAGE W/CT SKULL-THIGH: CPT | Mod: 26,PI

## 2018-04-19 PROCEDURE — A9552: CPT

## 2018-04-19 PROCEDURE — 78815 PET IMAGE W/CT SKULL-THIGH: CPT

## 2018-04-19 PROCEDURE — 70553 MRI BRAIN STEM W/O & W/DYE: CPT

## 2018-04-25 ENCOUNTER — APPOINTMENT (OUTPATIENT)
Dept: HEMATOLOGY ONCOLOGY | Facility: CLINIC | Age: 73
End: 2018-04-25
Payer: MEDICARE

## 2018-04-25 VITALS
SYSTOLIC BLOOD PRESSURE: 130 MMHG | WEIGHT: 155 LBS | TEMPERATURE: 98 F | DIASTOLIC BLOOD PRESSURE: 58 MMHG | RESPIRATION RATE: 16 BRPM | BODY MASS INDEX: 21.62 KG/M2 | OXYGEN SATURATION: 99 % | HEART RATE: 67 BPM

## 2018-04-25 PROCEDURE — 99214 OFFICE O/P EST MOD 30 MIN: CPT

## 2018-05-02 ENCOUNTER — APPOINTMENT (OUTPATIENT)
Dept: GERIATRICS | Facility: CLINIC | Age: 73
End: 2018-05-02
Payer: MEDICARE

## 2018-05-02 ENCOUNTER — CLINICAL ADVICE (OUTPATIENT)
Age: 73
End: 2018-05-02

## 2018-05-02 VITALS
BODY MASS INDEX: 21.7 KG/M2 | HEART RATE: 72 BPM | DIASTOLIC BLOOD PRESSURE: 50 MMHG | RESPIRATION RATE: 16 BRPM | HEIGHT: 71 IN | TEMPERATURE: 97.6 F | SYSTOLIC BLOOD PRESSURE: 122 MMHG | WEIGHT: 155 LBS | OXYGEN SATURATION: 97 %

## 2018-05-02 DIAGNOSIS — N18.2 CHRONIC KIDNEY DISEASE, STAGE 2 (MILD): ICD-10-CM

## 2018-05-02 DIAGNOSIS — R63.4 ABNORMAL WEIGHT LOSS: ICD-10-CM

## 2018-05-02 DIAGNOSIS — R53.1 WEAKNESS: ICD-10-CM

## 2018-05-02 DIAGNOSIS — R04.0 EPISTAXIS: ICD-10-CM

## 2018-05-02 PROCEDURE — 99215 OFFICE O/P EST HI 40 MIN: CPT

## 2018-05-02 RX ORDER — MIRTAZAPINE 7.5 MG/1
7.5 TABLET, FILM COATED ORAL
Qty: 30 | Refills: 3 | Status: DISCONTINUED | COMMUNITY
Start: 2017-12-11 | End: 2018-05-02

## 2018-05-02 RX ORDER — APIXABAN 5 MG/1
5 TABLET, FILM COATED ORAL
Refills: 0 | Status: DISCONTINUED | COMMUNITY
Start: 2017-09-18 | End: 2018-05-02

## 2018-05-03 ENCOUNTER — APPOINTMENT (OUTPATIENT)
Dept: RADIOLOGY | Facility: HOSPITAL | Age: 73
End: 2018-05-03

## 2018-05-03 ENCOUNTER — OUTPATIENT (OUTPATIENT)
Dept: OUTPATIENT SERVICES | Facility: HOSPITAL | Age: 73
LOS: 1 days | End: 2018-05-03
Payer: MEDICARE

## 2018-05-03 ENCOUNTER — APPOINTMENT (OUTPATIENT)
Dept: THORACIC SURGERY | Facility: CLINIC | Age: 73
End: 2018-05-03
Payer: MEDICARE

## 2018-05-03 VITALS
OXYGEN SATURATION: 99 % | SYSTOLIC BLOOD PRESSURE: 111 MMHG | RESPIRATION RATE: 16 BRPM | WEIGHT: 155 LBS | BODY MASS INDEX: 21.7 KG/M2 | DIASTOLIC BLOOD PRESSURE: 55 MMHG | HEIGHT: 71 IN | HEART RATE: 60 BPM

## 2018-05-03 DIAGNOSIS — C78.00 SECONDARY MALIGNANT NEOPLASM OF UNSPECIFIED LUNG: ICD-10-CM

## 2018-05-03 DIAGNOSIS — Z98.890 OTHER SPECIFIED POSTPROCEDURAL STATES: Chronic | ICD-10-CM

## 2018-05-03 DIAGNOSIS — Z90.89 ACQUIRED ABSENCE OF OTHER ORGANS: Chronic | ICD-10-CM

## 2018-05-03 DIAGNOSIS — Z90.49 ACQUIRED ABSENCE OF OTHER SPECIFIED PARTS OF DIGESTIVE TRACT: Chronic | ICD-10-CM

## 2018-05-03 DIAGNOSIS — C91.90 LYMPHOID LEUKEMIA, UNSPECIFIED NOT HAVING ACHIEVED REMISSION: ICD-10-CM

## 2018-05-03 PROCEDURE — 71046 X-RAY EXAM CHEST 2 VIEWS: CPT | Mod: 26

## 2018-05-03 PROCEDURE — 99024 POSTOP FOLLOW-UP VISIT: CPT

## 2018-05-09 ENCOUNTER — INPATIENT (INPATIENT)
Facility: HOSPITAL | Age: 73
LOS: 1 days | Discharge: HOME HEALTH | End: 2018-05-11
Attending: HOSPITALIST | Admitting: HOSPITALIST
Payer: MEDICARE

## 2018-05-09 VITALS
DIASTOLIC BLOOD PRESSURE: 63 MMHG | HEART RATE: 58 BPM | OXYGEN SATURATION: 99 % | TEMPERATURE: 99 F | SYSTOLIC BLOOD PRESSURE: 125 MMHG | RESPIRATION RATE: 20 BRPM

## 2018-05-09 DIAGNOSIS — J18.9 PNEUMONIA, UNSPECIFIED ORGANISM: ICD-10-CM

## 2018-05-09 DIAGNOSIS — Z98.890 OTHER SPECIFIED POSTPROCEDURAL STATES: Chronic | ICD-10-CM

## 2018-05-09 DIAGNOSIS — Z90.49 ACQUIRED ABSENCE OF OTHER SPECIFIED PARTS OF DIGESTIVE TRACT: Chronic | ICD-10-CM

## 2018-05-09 DIAGNOSIS — Z90.89 ACQUIRED ABSENCE OF OTHER ORGANS: Chronic | ICD-10-CM

## 2018-05-09 LAB
ALBUMIN SERPL ELPH-MCNC: 3.3 G/DL — SIGNIFICANT CHANGE UP (ref 3.3–5)
ALP SERPL-CCNC: 111 U/L — SIGNIFICANT CHANGE UP (ref 40–120)
ALT FLD-CCNC: 5 U/L — SIGNIFICANT CHANGE UP (ref 4–41)
APTT BLD: 31.3 SEC — SIGNIFICANT CHANGE UP (ref 27.5–37.4)
AST SERPL-CCNC: 15 U/L — SIGNIFICANT CHANGE UP (ref 4–40)
BASE EXCESS BLDV CALC-SCNC: 0.8 MMOL/L — SIGNIFICANT CHANGE UP
BASOPHILS # BLD AUTO: 0.02 K/UL — SIGNIFICANT CHANGE UP (ref 0–0.2)
BASOPHILS NFR BLD AUTO: 0.2 % — SIGNIFICANT CHANGE UP (ref 0–2)
BILIRUB SERPL-MCNC: 0.3 MG/DL — SIGNIFICANT CHANGE UP (ref 0.2–1.2)
BLOOD GAS VENOUS - CREATININE: 1.01 MG/DL — SIGNIFICANT CHANGE UP (ref 0.5–1.3)
BUN SERPL-MCNC: 20 MG/DL — SIGNIFICANT CHANGE UP (ref 7–23)
CALCIUM SERPL-MCNC: 9.2 MG/DL — SIGNIFICANT CHANGE UP (ref 8.4–10.5)
CHLORIDE BLDV-SCNC: 103 MMOL/L — SIGNIFICANT CHANGE UP (ref 96–108)
CHLORIDE SERPL-SCNC: 96 MMOL/L — LOW (ref 98–107)
CO2 SERPL-SCNC: 24 MMOL/L — SIGNIFICANT CHANGE UP (ref 22–31)
CREAT SERPL-MCNC: 1.22 MG/DL — SIGNIFICANT CHANGE UP (ref 0.5–1.3)
EOSINOPHIL # BLD AUTO: 0.21 K/UL — SIGNIFICANT CHANGE UP (ref 0–0.5)
EOSINOPHIL NFR BLD AUTO: 1.6 % — SIGNIFICANT CHANGE UP (ref 0–6)
GAS PNL BLDV: 133 MMOL/L — LOW (ref 136–146)
GLUCOSE BLDV-MCNC: 96 — SIGNIFICANT CHANGE UP (ref 70–99)
GLUCOSE SERPL-MCNC: 99 MG/DL — SIGNIFICANT CHANGE UP (ref 70–99)
HCO3 BLDV-SCNC: 24 MMOL/L — SIGNIFICANT CHANGE UP (ref 20–27)
HCT VFR BLD CALC: 26.7 % — LOW (ref 39–50)
HCT VFR BLDV CALC: 26.8 % — LOW (ref 39–51)
HGB BLD-MCNC: 8.4 G/DL — LOW (ref 13–17)
HGB BLDV-MCNC: 8.6 G/DL — LOW (ref 13–17)
IMM GRANULOCYTES # BLD AUTO: 0.07 # — SIGNIFICANT CHANGE UP
IMM GRANULOCYTES NFR BLD AUTO: 0.5 % — SIGNIFICANT CHANGE UP (ref 0–1.5)
INR BLD: 1.02 — SIGNIFICANT CHANGE UP (ref 0.88–1.17)
LACTATE BLDV-MCNC: 1 MMOL/L — SIGNIFICANT CHANGE UP (ref 0.5–2)
LYMPHOCYTES # BLD AUTO: 37.3 % — SIGNIFICANT CHANGE UP (ref 13–44)
LYMPHOCYTES # BLD AUTO: 4.8 K/UL — HIGH (ref 1–3.3)
MCHC RBC-ENTMCNC: 29.3 PG — SIGNIFICANT CHANGE UP (ref 27–34)
MCHC RBC-ENTMCNC: 31.5 % — LOW (ref 32–36)
MCV RBC AUTO: 93 FL — SIGNIFICANT CHANGE UP (ref 80–100)
MONOCYTES # BLD AUTO: 1.31 K/UL — HIGH (ref 0–0.9)
MONOCYTES NFR BLD AUTO: 10.2 % — SIGNIFICANT CHANGE UP (ref 2–14)
NEUTROPHILS # BLD AUTO: 6.46 K/UL — SIGNIFICANT CHANGE UP (ref 1.8–7.4)
NEUTROPHILS NFR BLD AUTO: 50.2 % — SIGNIFICANT CHANGE UP (ref 43–77)
NRBC # FLD: 0 — SIGNIFICANT CHANGE UP
NT-PROBNP SERPL-SCNC: 714.3 PG/ML — SIGNIFICANT CHANGE UP
PCO2 BLDV: 42 MMHG — SIGNIFICANT CHANGE UP (ref 41–51)
PH BLDV: 7.39 PH — SIGNIFICANT CHANGE UP (ref 7.32–7.43)
PLATELET # BLD AUTO: 265 K/UL — SIGNIFICANT CHANGE UP (ref 150–400)
PMV BLD: 10 FL — SIGNIFICANT CHANGE UP (ref 7–13)
PO2 BLDV: 31 MMHG — LOW (ref 35–40)
POTASSIUM BLDV-SCNC: 4.1 MMOL/L — SIGNIFICANT CHANGE UP (ref 3.4–4.5)
POTASSIUM SERPL-MCNC: 4.3 MMOL/L — SIGNIFICANT CHANGE UP (ref 3.5–5.3)
POTASSIUM SERPL-SCNC: 4.3 MMOL/L — SIGNIFICANT CHANGE UP (ref 3.5–5.3)
PROT SERPL-MCNC: 7.3 G/DL — SIGNIFICANT CHANGE UP (ref 6–8.3)
PROTHROM AB SERPL-ACNC: 11.7 SEC — SIGNIFICANT CHANGE UP (ref 9.8–13.1)
RBC # BLD: 2.87 M/UL — LOW (ref 4.2–5.8)
RBC # FLD: 15.5 % — HIGH (ref 10.3–14.5)
SAO2 % BLDV: 50.5 % — LOW (ref 60–85)
SODIUM SERPL-SCNC: 134 MMOL/L — LOW (ref 135–145)
WBC # BLD: 12.87 K/UL — HIGH (ref 3.8–10.5)
WBC # FLD AUTO: 12.87 K/UL — HIGH (ref 3.8–10.5)

## 2018-05-09 PROCEDURE — 71275 CT ANGIOGRAPHY CHEST: CPT | Mod: 26

## 2018-05-09 RX ORDER — PIPERACILLIN AND TAZOBACTAM 4; .5 G/20ML; G/20ML
3.38 INJECTION, POWDER, LYOPHILIZED, FOR SOLUTION INTRAVENOUS ONCE
Qty: 0 | Refills: 0 | Status: COMPLETED | OUTPATIENT
Start: 2018-05-09 | End: 2018-05-09

## 2018-05-09 RX ORDER — VANCOMYCIN HCL 1 G
1000 VIAL (EA) INTRAVENOUS ONCE
Qty: 0 | Refills: 0 | Status: COMPLETED | OUTPATIENT
Start: 2018-05-09 | End: 2018-05-09

## 2018-05-09 NOTE — ED ADULT TRIAGE NOTE - CHIEF COMPLAINT QUOTE
pt bibems from home, here for shortness of breath, fatigue, dyspnea on exertion x 3 weeks, has gotten progressively worse over the past few days. no cough, fevers, chest pain. pt on O2 via nc 2lp-m with improvement in symptoms. pmhx: lung ca, last chemo 12/5/18 pt bibems from home, here for shortness of breath, fatigue, dyspnea on exertion x 3 weeks, has gotten progressively worse over the past few days. no cough, fevers, chest pain. pt on O2 via nc 2lp-m with improvement in symptoms. pmhx: lung ca, last chemo 12/5/17

## 2018-05-09 NOTE — ED ADULT NURSE NOTE - OBJECTIVE STATEMENT
Fac RN: Pt rcvd in room 13 accompanied by wife, aox3, baseline ambulatory with wife, presents to the ed with c/o SOB worse with exertion and laying flat since April after his R upper lung resection, hx of lung CA, last chemo Dec 2017. Pt denies any cough, chest pain, fever, chills, sick contacts, recent travels. Pt sats between  RA. No other complaints made, MD eval done, 20G placed on R AC, labs sent, VS as noted, NAD, report given to primary RN Elmer.

## 2018-05-09 NOTE — ED PROVIDER NOTE - ATTENDING CONTRIBUTION TO CARE
MD Deng:  I performed a face to face bedside interview with patient regarding history of present illness, review of symptoms and past medical history. I completed an independent physical exam(documented below).  I have discussed patient's plan of care with resident.   I agree with note as stated above, having amended the EMR as needed to reflect my findings. I have discussed the assessment and plan of care.  This includes during the time I functioned as the attending physician for this patient.  PE:  Gen: Alert, NAD  Head: NC, AT,  EOMI, normal lids/conjunctiva  ENT:  normal hearing, patent oropharynx without erythema/exudate, uvula midline  Neck: +supple, no tenderness/meningismus/JVD, +Trachea midline  Chest: no chest wall tenderness, equal chest rise  Pulm: Bilateral BS, tachpneic, crackles in posterior bases (R>L)  CV: RRR, no M/R/G, +dist pulses  Abd: soft, NT/ND, +BS, no hepatosplenomegaly  Rectal: deferred  Mskel: no edema/erythema/cyanosis  Skin: no rash  Neuro: AAOx3   MDM:  74yo M w/ pmh inclusive of htn, afib(not on AC), ckd, sclc s/p VATs of RUL, w/ recurrent pleural effusion presents c/o sob X 1.5wks, progressively worsening, worsened w/ exertion and when recumbent. Pt was recently seen by his CT surgeon and his pulmonologist for consideration of supplemental home O2 but didn't qualify.  Given strong cancer hx and fact that pt no longer on AC, w/ need to r/o PE. Ddx also includes pna vs copd exacerbation vs recurrent malignancy vs progression of chronic dx vs ACS. Labs, imaging, admission.

## 2018-05-09 NOTE — ED PROVIDER NOTE - OBJECTIVE STATEMENT
The patient is a 74 yo M PMH of HTN Gout, afib (not on AC, was previously on Eliquis, d/c'd 2/2 epistaxis), CKD3, CCL, SCLC s/p VATs RUL, recurrent pleural effusion who presents today for progressive SOB. The patient states that for the last 1.5 weeks he has been having increasing SOB with exertion. Followed up with his CTSX (Dr. Foster) who performed a CXR and say no change from previous and was referred to his Pulmonologist (Dr. Sid Chow) for consideration of supplemental O2 but did not qualify. The patient currently states that he feels his SOB is worse with exertion and layin down. Denies any cough, CP, ROXI, recent travel, F NVD, chills, sick contacts.

## 2018-05-09 NOTE — ED ADULT NURSE NOTE - CHIEF COMPLAINT QUOTE
pt bibems from home, here for shortness of breath, fatigue, dyspnea on exertion x 3 weeks, has gotten progressively worse over the past few days. no cough, fevers, chest pain. pt on O2 via nc 2lp-m with improvement in symptoms. pmhx: lung ca, last chemo 12/5/17

## 2018-05-09 NOTE — ED PROVIDER NOTE - MEDICAL DECISION MAKING DETAILS
CTA ChesT: No PE but evidence of groundglass opacities. On ambulation, desaturated to 87%. Will initiate treatment for HCAP.

## 2018-05-10 DIAGNOSIS — C91.90 LYMPHOID LEUKEMIA, UNSPECIFIED NOT HAVING ACHIEVED REMISSION: ICD-10-CM

## 2018-05-10 DIAGNOSIS — Z29.9 ENCOUNTER FOR PROPHYLACTIC MEASURES, UNSPECIFIED: ICD-10-CM

## 2018-05-10 DIAGNOSIS — N40.0 BENIGN PROSTATIC HYPERPLASIA WITHOUT LOWER URINARY TRACT SYMPTOMS: ICD-10-CM

## 2018-05-10 DIAGNOSIS — E22.2 SYNDROME OF INAPPROPRIATE SECRETION OF ANTIDIURETIC HORMONE: ICD-10-CM

## 2018-05-10 DIAGNOSIS — C34.90 MALIGNANT NEOPLASM OF UNSPECIFIED PART OF UNSPECIFIED BRONCHUS OR LUNG: ICD-10-CM

## 2018-05-10 DIAGNOSIS — J18.9 PNEUMONIA, UNSPECIFIED ORGANISM: ICD-10-CM

## 2018-05-10 DIAGNOSIS — J90 PLEURAL EFFUSION, NOT ELSEWHERE CLASSIFIED: ICD-10-CM

## 2018-05-10 DIAGNOSIS — I48.0 PAROXYSMAL ATRIAL FIBRILLATION: ICD-10-CM

## 2018-05-10 DIAGNOSIS — R06.02 SHORTNESS OF BREATH: ICD-10-CM

## 2018-05-10 DIAGNOSIS — M10.9 GOUT, UNSPECIFIED: ICD-10-CM

## 2018-05-10 LAB
ALBUMIN SERPL ELPH-MCNC: 3 G/DL — LOW (ref 3.3–5)
ALP SERPL-CCNC: 105 U/L — SIGNIFICANT CHANGE UP (ref 40–120)
ALT FLD-CCNC: 6 U/L — SIGNIFICANT CHANGE UP (ref 4–41)
ANISOCYTOSIS BLD QL: SLIGHT — SIGNIFICANT CHANGE UP
APTT BLD: 30.7 SEC — SIGNIFICANT CHANGE UP (ref 27.5–37.4)
AST SERPL-CCNC: 12 U/L — SIGNIFICANT CHANGE UP (ref 4–40)
BASOPHILS # BLD AUTO: 0.03 K/UL — SIGNIFICANT CHANGE UP (ref 0–0.2)
BASOPHILS NFR BLD AUTO: 0.3 % — SIGNIFICANT CHANGE UP (ref 0–2)
BASOPHILS NFR SPEC: 0 % — SIGNIFICANT CHANGE UP (ref 0–2)
BILIRUB SERPL-MCNC: 0.4 MG/DL — SIGNIFICANT CHANGE UP (ref 0.2–1.2)
BUN SERPL-MCNC: 16 MG/DL — SIGNIFICANT CHANGE UP (ref 7–23)
CALCIUM SERPL-MCNC: 9.1 MG/DL — SIGNIFICANT CHANGE UP (ref 8.4–10.5)
CHLORIDE SERPL-SCNC: 95 MMOL/L — LOW (ref 98–107)
CO2 SERPL-SCNC: 22 MMOL/L — SIGNIFICANT CHANGE UP (ref 22–31)
CREAT SERPL-MCNC: 1.19 MG/DL — SIGNIFICANT CHANGE UP (ref 0.5–1.3)
EOSINOPHIL # BLD AUTO: 0.21 K/UL — SIGNIFICANT CHANGE UP (ref 0–0.5)
EOSINOPHIL NFR BLD AUTO: 1.9 % — SIGNIFICANT CHANGE UP (ref 0–6)
EOSINOPHIL NFR FLD: 0.9 % — SIGNIFICANT CHANGE UP (ref 0–6)
GIANT PLATELETS BLD QL SMEAR: PRESENT — SIGNIFICANT CHANGE UP
GLUCOSE SERPL-MCNC: 91 MG/DL — SIGNIFICANT CHANGE UP (ref 70–99)
HCT VFR BLD CALC: 25.1 % — LOW (ref 39–50)
HGB BLD-MCNC: 7.9 G/DL — LOW (ref 13–17)
HYPOCHROMIA BLD QL: SLIGHT — SIGNIFICANT CHANGE UP
IMM GRANULOCYTES # BLD AUTO: 0.03 # — SIGNIFICANT CHANGE UP
IMM GRANULOCYTES NFR BLD AUTO: 0.3 % — SIGNIFICANT CHANGE UP (ref 0–1.5)
INR BLD: 1.08 — SIGNIFICANT CHANGE UP (ref 0.88–1.17)
LYMPHOCYTES # BLD AUTO: 3.48 K/UL — HIGH (ref 1–3.3)
LYMPHOCYTES # BLD AUTO: 32 % — SIGNIFICANT CHANGE UP (ref 13–44)
LYMPHOCYTES NFR SPEC AUTO: 20.4 % — SIGNIFICANT CHANGE UP (ref 13–44)
MACROCYTES BLD QL: SLIGHT — SIGNIFICANT CHANGE UP
MAGNESIUM SERPL-MCNC: 1.6 MG/DL — SIGNIFICANT CHANGE UP (ref 1.6–2.6)
MCHC RBC-ENTMCNC: 28.8 PG — SIGNIFICANT CHANGE UP (ref 27–34)
MCHC RBC-ENTMCNC: 31.5 % — LOW (ref 32–36)
MCV RBC AUTO: 91.6 FL — SIGNIFICANT CHANGE UP (ref 80–100)
MONOCYTES # BLD AUTO: 1.42 K/UL — HIGH (ref 0–0.9)
MONOCYTES NFR BLD AUTO: 13.1 % — SIGNIFICANT CHANGE UP (ref 2–14)
MONOCYTES NFR BLD: 12.4 % — HIGH (ref 2–9)
NEUTROPHIL AB SER-ACNC: 61.9 % — SIGNIFICANT CHANGE UP (ref 43–77)
NEUTROPHILS # BLD AUTO: 5.7 K/UL — SIGNIFICANT CHANGE UP (ref 1.8–7.4)
NEUTROPHILS NFR BLD AUTO: 52.4 % — SIGNIFICANT CHANGE UP (ref 43–77)
NEUTS BAND # BLD: 0.9 % — SIGNIFICANT CHANGE UP (ref 0–6)
NRBC # FLD: 0 — SIGNIFICANT CHANGE UP
OVALOCYTES BLD QL SMEAR: SLIGHT — SIGNIFICANT CHANGE UP
PHOSPHATE SERPL-MCNC: 3.5 MG/DL — SIGNIFICANT CHANGE UP (ref 2.5–4.5)
PLATELET # BLD AUTO: 238 K/UL — SIGNIFICANT CHANGE UP (ref 150–400)
PLATELET COUNT - ESTIMATE: NORMAL — SIGNIFICANT CHANGE UP
PMV BLD: 10.1 FL — SIGNIFICANT CHANGE UP (ref 7–13)
POIKILOCYTOSIS BLD QL AUTO: SLIGHT — SIGNIFICANT CHANGE UP
POTASSIUM SERPL-MCNC: 3.9 MMOL/L — SIGNIFICANT CHANGE UP (ref 3.5–5.3)
POTASSIUM SERPL-SCNC: 3.9 MMOL/L — SIGNIFICANT CHANGE UP (ref 3.5–5.3)
PROT SERPL-MCNC: 6.8 G/DL — SIGNIFICANT CHANGE UP (ref 6–8.3)
PROTHROM AB SERPL-ACNC: 12 SEC — SIGNIFICANT CHANGE UP (ref 9.8–13.1)
RBC # BLD: 2.74 M/UL — LOW (ref 4.2–5.8)
RBC # FLD: 15.4 % — HIGH (ref 10.3–14.5)
SMUDGE CELLS # BLD: PRESENT — SIGNIFICANT CHANGE UP
SODIUM SERPL-SCNC: 133 MMOL/L — LOW (ref 135–145)
VARIANT LYMPHS # BLD: 3.5 % — SIGNIFICANT CHANGE UP
WBC # BLD: 10.87 K/UL — HIGH (ref 3.8–10.5)
WBC # FLD AUTO: 10.87 K/UL — HIGH (ref 3.8–10.5)

## 2018-05-10 PROCEDURE — 99239 HOSP IP/OBS DSCHRG MGMT >30: CPT

## 2018-05-10 PROCEDURE — 99223 1ST HOSP IP/OBS HIGH 75: CPT | Mod: GC

## 2018-05-10 RX ORDER — DOCUSATE SODIUM 100 MG
100 CAPSULE ORAL THREE TIMES A DAY
Qty: 0 | Refills: 0 | Status: DISCONTINUED | OUTPATIENT
Start: 2018-05-10 | End: 2018-05-11

## 2018-05-10 RX ORDER — ALLOPURINOL 300 MG
300 TABLET ORAL DAILY
Qty: 0 | Refills: 0 | Status: DISCONTINUED | OUTPATIENT
Start: 2018-05-10 | End: 2018-05-11

## 2018-05-10 RX ORDER — HEPARIN SODIUM 5000 [USP'U]/ML
5000 INJECTION INTRAVENOUS; SUBCUTANEOUS EVERY 8 HOURS
Qty: 0 | Refills: 0 | Status: DISCONTINUED | OUTPATIENT
Start: 2018-05-10 | End: 2018-05-11

## 2018-05-10 RX ORDER — MULTIVIT-MIN/FERROUS GLUCONATE 9 MG/15 ML
1 LIQUID (ML) ORAL DAILY
Qty: 0 | Refills: 0 | Status: DISCONTINUED | OUTPATIENT
Start: 2018-05-10 | End: 2018-05-10

## 2018-05-10 RX ORDER — LACTOBACILLUS ACIDOPHILUS 100MM CELL
1 CAPSULE ORAL DAILY
Qty: 0 | Refills: 0 | Status: DISCONTINUED | OUTPATIENT
Start: 2018-05-10 | End: 2018-05-10

## 2018-05-10 RX ORDER — ACETAMINOPHEN 500 MG
650 TABLET ORAL EVERY 6 HOURS
Qty: 0 | Refills: 0 | Status: DISCONTINUED | OUTPATIENT
Start: 2018-05-10 | End: 2018-05-11

## 2018-05-10 RX ORDER — NICOTINE POLACRILEX 2 MG
1 GUM BUCCAL DAILY
Qty: 0 | Refills: 0 | Status: DISCONTINUED | OUTPATIENT
Start: 2018-05-10 | End: 2018-05-11

## 2018-05-10 RX ORDER — TIOTROPIUM BROMIDE 18 UG/1
1 CAPSULE ORAL; RESPIRATORY (INHALATION)
Qty: 0 | Refills: 0 | COMMUNITY

## 2018-05-10 RX ORDER — VANCOMYCIN HCL 1 G
750 VIAL (EA) INTRAVENOUS EVERY 12 HOURS
Qty: 0 | Refills: 0 | Status: DISCONTINUED | OUTPATIENT
Start: 2018-05-10 | End: 2018-05-10

## 2018-05-10 RX ORDER — TAMSULOSIN HYDROCHLORIDE 0.4 MG/1
0.4 CAPSULE ORAL DAILY
Qty: 0 | Refills: 0 | Status: DISCONTINUED | OUTPATIENT
Start: 2018-05-10 | End: 2018-05-11

## 2018-05-10 RX ORDER — LACTOBACILLUS ACIDOPHILUS 100MM CELL
1 CAPSULE ORAL
Qty: 0 | Refills: 0 | Status: DISCONTINUED | OUTPATIENT
Start: 2018-05-10 | End: 2018-05-11

## 2018-05-10 RX ORDER — IPRATROPIUM/ALBUTEROL SULFATE 18-103MCG
3 AEROSOL WITH ADAPTER (GRAM) INHALATION EVERY 6 HOURS
Qty: 0 | Refills: 0 | Status: DISCONTINUED | OUTPATIENT
Start: 2018-05-10 | End: 2018-05-11

## 2018-05-10 RX ORDER — DILTIAZEM HCL 120 MG
300 CAPSULE, EXT RELEASE 24 HR ORAL DAILY
Qty: 0 | Refills: 0 | Status: DISCONTINUED | OUTPATIENT
Start: 2018-05-10 | End: 2018-05-11

## 2018-05-10 RX ORDER — MAGNESIUM SULFATE 500 MG/ML
2 VIAL (ML) INJECTION ONCE
Qty: 0 | Refills: 0 | Status: COMPLETED | OUTPATIENT
Start: 2018-05-10 | End: 2018-05-10

## 2018-05-10 RX ORDER — PIPERACILLIN AND TAZOBACTAM 4; .5 G/20ML; G/20ML
3.38 INJECTION, POWDER, LYOPHILIZED, FOR SOLUTION INTRAVENOUS EVERY 8 HOURS
Qty: 0 | Refills: 0 | Status: DISCONTINUED | OUTPATIENT
Start: 2018-05-10 | End: 2018-05-10

## 2018-05-10 RX ORDER — TIOTROPIUM BROMIDE 18 UG/1
1 CAPSULE ORAL; RESPIRATORY (INHALATION) DAILY
Qty: 0 | Refills: 0 | Status: DISCONTINUED | OUTPATIENT
Start: 2018-05-10 | End: 2018-05-11

## 2018-05-10 RX ORDER — SENNA PLUS 8.6 MG/1
2 TABLET ORAL AT BEDTIME
Qty: 0 | Refills: 0 | Status: DISCONTINUED | OUTPATIENT
Start: 2018-05-10 | End: 2018-05-11

## 2018-05-10 RX ORDER — SIMETHICONE 80 MG/1
80 TABLET, CHEWABLE ORAL EVERY 6 HOURS
Qty: 0 | Refills: 0 | Status: DISCONTINUED | OUTPATIENT
Start: 2018-05-10 | End: 2018-05-11

## 2018-05-10 RX ADMIN — Medication 100 MILLIGRAM(S): at 22:48

## 2018-05-10 RX ADMIN — PIPERACILLIN AND TAZOBACTAM 200 GRAM(S): 4; .5 INJECTION, POWDER, LYOPHILIZED, FOR SOLUTION INTRAVENOUS at 00:44

## 2018-05-10 RX ADMIN — Medication 1 TABLET(S): at 12:25

## 2018-05-10 RX ADMIN — Medication 1 PATCH: at 12:25

## 2018-05-10 RX ADMIN — Medication 3 MILLILITER(S): at 22:34

## 2018-05-10 RX ADMIN — Medication 250 MILLIGRAM(S): at 01:28

## 2018-05-10 RX ADMIN — Medication 3 MILLILITER(S): at 16:10

## 2018-05-10 RX ADMIN — Medication 300 MILLIGRAM(S): at 12:25

## 2018-05-10 RX ADMIN — Medication 60 MILLIGRAM(S): at 17:32

## 2018-05-10 RX ADMIN — Medication 3 MILLILITER(S): at 09:23

## 2018-05-10 RX ADMIN — TAMSULOSIN HYDROCHLORIDE 0.4 MILLIGRAM(S): 0.4 CAPSULE ORAL at 12:25

## 2018-05-10 RX ADMIN — HEPARIN SODIUM 5000 UNIT(S): 5000 INJECTION INTRAVENOUS; SUBCUTANEOUS at 22:48

## 2018-05-10 RX ADMIN — Medication 100 MILLIGRAM(S): at 05:22

## 2018-05-10 RX ADMIN — TIOTROPIUM BROMIDE 1 CAPSULE(S): 18 CAPSULE ORAL; RESPIRATORY (INHALATION) at 08:45

## 2018-05-10 RX ADMIN — Medication 300 MILLIGRAM(S): at 05:22

## 2018-05-10 RX ADMIN — PIPERACILLIN AND TAZOBACTAM 25 GRAM(S): 4; .5 INJECTION, POWDER, LYOPHILIZED, FOR SOLUTION INTRAVENOUS at 05:21

## 2018-05-10 RX ADMIN — Medication 50 GRAM(S): at 16:06

## 2018-05-10 NOTE — H&P ADULT - PROBLEM SELECTOR PLAN 3
Stage III. Dx 8/2017 w/EBUS + mediastinal LN bx. S/p RUL VATS lobectomy, s/p 4 cycles adjuvant cisplatin/abraxane 12/2017. C/b recurrent pleural effusion, now with PNA.  - Plan for immunotherapy, follow-up with Heme/Onc Stage III. Dx 8/2017 w/EBUS + mediastinal LN bx. S/p RUL VATS lobectomy, s/p 4 cycles adjuvant cisplatin/abraxane 12/2017. C/b recurrent pleural effusion, now with PNA.  - Plan for immunotherapy, follow-up with Heme/Onc  - Heme/Onc consult in AM

## 2018-05-10 NOTE — PROGRESS NOTE ADULT - ASSESSMENT
73M PMH Lung SCC (Stage III. Dx 8/2017 w/EBUS + mediastinal LN bx. S/p RUL VATS lobectomy, s/p 4 cycles adjuvant cisplatin/abraxane 12/2017), CLL/SLL (dx 8/2017 by EBUS w/LN bx),  pleural effusion (drained 12/2017, recurrence 3/2018 s/p thoracentesis/pleurodesis c/b hemothorax and s/p R VATs decortication 4/2/18), Paroxysmal AFib (discontinued Eliquis 2/2 epistaxis), HTN, BPH, gout, SIADH, CKDIII, who presents with progressive SOB, found to have 73M PMH Lung SCC (Stage III. Dx 8/2017 w/EBUS + mediastinal LN bx. S/p RUL VATS lobectomy, s/p 4 cycles adjuvant cisplatin/abraxane 12/2017), CLL/SLL (dx 8/2017 by EBUS w/LN bx),  pleural effusion (drained 12/2017, recurrence 3/2018 s/p thoracentesis/pleurodesis c/b hemothorax and s/p R VATs decortication 4/2/18), Paroxysmal AFib (discontinued Eliquis 2/2 epistaxis), HTN, BPH, gout, SIADH, CKDIII, who presents with progressive SOB, with CT concerning for lymphangitic spread.

## 2018-05-10 NOTE — H&P ADULT - NSHPSOCIALHISTORY_GEN_ALL_CORE
Former smoker, currently on nicotine patch. ~60 pack year smoking history.  Previous history of alcohol abuse, last drink 2016.  No history of drug use  Lives at home with his wife.  Retired .

## 2018-05-10 NOTE — CONSULT NOTE ADULT - PROBLEM SELECTOR RECOMMENDATION 9
- no cough, fever, chills. D/W med team, low suspicion for infection/pneumonia.  -Likely secondary to POD vs lymphangitic spread. Recc starting Prednisone 60 mg daily.  -PPI with steroids.  -Recent ECHO was normal.

## 2018-05-10 NOTE — H&P ADULT - PROBLEM SELECTOR PLAN 1
Primarily due to PNA with recurrent R pleural effusion. Patient's symptoms of Orthopnea, DONNELLY, without noted cough are more consistent with CHF, particularly in the setting of being s/p chemo. However, given his very recent TTE (4/23, on [employer]) demonstrating normal LV/RV size/dimentions and systolic function, in addition to Pro-BNP of only 714, lack of LE swelling, and CT findings more consistent w/unilateral PNA w/ground glass opacities, CHF is less likely. PE ruled out.   - C/w Vanc/Zosyn  - Consider repeat TTE, evaluate for PAH  - C/w spiriva PRN  - Nasal cannula PRN  - Pulm consult in AM

## 2018-05-10 NOTE — H&P ADULT - NSHPPHYSICALEXAM_GEN_ALL_CORE
.  VITAL SIGNS:  T(C): 37.4 (05-10-18 @ 02:06), Max: 37.4 (05-09-18 @ 20:02)  T(F): 99.3 (05-10-18 @ 02:06), Max: 99.4 (05-09-18 @ 20:02)  HR: 70 (05-10-18 @ 02:06) (57 - 70)  BP: 139/589 (05-10-18 @ 02:06) (125/63 - 139/589)  BP(mean): --  RR: 18 (05-10-18 @ 02:06) (16 - 20)  SpO2: 99% (05-10-18 @ 02:06) (87% - 99%)  Wt(kg): --    PHYSICAL EXAM:    Constitutional: resting comfortably in bed; NAD  Head: NC/AT  Eyes: PERRL, EOMI, anicteric sclera  ENT: no nasal discharge; uvula midline, no oropharyngeal erythema or exudates; MMM  Neck: supple; no JVD or thyromegaly  Respiratory: no retractions. Crackles most notably at RLL.  Cardiac: +S1/S2; RRR; no murmur  Gastrointestinal: soft, NT/ND; no rebound or guarding; +BSx4  Back: spine midline, no bony tenderness or step-offs; no CVAT B/L  Extremities: no clubbing or cyanosis; no peripheral edema  Musculoskeletal: NROM x4; no joint swelling, tenderness or erythema  Vascular: 2+ radial, femoral, DP/PT pulses B/L  Dermatologic: skin warm, dry and intact; no rashes, wounds, or scars  Lymphatic: no submandibular or cervical LAD  Neurologic: AAOx3; CNII-XII grossly intact; no focal deficits  Psychiatric: affect and characteristics of appearance, verbalizations, behaviors are appropriate

## 2018-05-10 NOTE — H&P ADULT - NSHPOUTPATIENTPROVIDERS_GEN_ALL_CORE
Dr. Pravin Feliciano (Heme/Onc)  Dr. Robson Foster (Thoracic Surgery)  Dr. Saad Ward (Cards)  Dr. Robson Gallardo (Gastroenterology) Dr. Sid Chow (Pulm)  Dr. Pravin Feliciano (Heme/Onc)  Dr. Robson Foster (Thoracic Surgery)  Dr. Saad Ward (Cards)  Dr. Robson Gallardo (Gastroenterology)

## 2018-05-10 NOTE — CONSULT NOTE ADULT - PROBLEM SELECTOR RECOMMENDATION 2
-Will f.u at [facility] to start Immunotherapy.    Please page at [phone] with questions or concerns.

## 2018-05-10 NOTE — CONSULT NOTE ADULT - ASSESSMENT
Mr. Clay is a 74 y/o M, with h/o lung cancer with recent progression noted on PET scan, admitted with shortness of breath Mr. Clay is a 74 y/o M, with h/o lung cancer with recent progression noted on PET scan, admitted with shortness of breath.

## 2018-05-10 NOTE — CONSULT NOTE ADULT - ASSESSMENT
sob  has right sided crackles  imaging evidence of PNA  anbx     PAF  off a/c due to frequent nose bleeds    anemia  Monitor hemoglobin, transfuse as needed. sob  has right sided crackles  imaging evidence of PNA  anbx     PAF  off a/c due to frequent nose bleeds  cont cardizem     anemia  Monitor hemoglobin, transfuse as needed.

## 2018-05-10 NOTE — H&P ADULT - PROBLEM SELECTOR PLAN 4
Patient s/p chemo for lung ca and CLL, plan for immunotherapy.  - Follow-up with Heme/Onc outpatient

## 2018-05-10 NOTE — PROGRESS NOTE ADULT - SUBJECTIVE AND OBJECTIVE BOX
Margot Sara PGY 1  Pager: [phone]/ [phone]  Patient is a 73y old  Male who presents with a chief complaint of DONNELLY, R/O Pneumonia (10 May 2018 02:36)      SUBJECTIVE / OVERNIGHT EVENTS:  Patient seen and examined at bedside. Patient reports he has no complaints this morning and is doing well. He does state that when he lies flat, he feels SOB, but in an upright position does not endorse any shortness of breath.        Other Review of Systems Negative.    MEDICATIONS  (STANDING):  ALBUTerol/ipratropium for Nebulization 3 milliLiter(s) Nebulizer every 6 hours  allopurinol 300 milliGRAM(s) Oral daily  diltiazem    milliGRAM(s) Oral daily  docusate sodium 100 milliGRAM(s) Oral three times a day  heparin  Injectable 5000 Unit(s) SubCutaneous every 8 hours  lactobacillus acidophilus 1 Tablet(s) Oral two times a day with meals  magnesium sulfate  IVPB 2 Gram(s) IV Intermittent once  multivitamin 1 Tablet(s) Oral daily  nicotine -   7 mG/24Hr(s) Patch 1 patch Transdermal daily  tamsulosin 0.4 milliGRAM(s) Oral daily  tiotropium 18 MICROgram(s) Capsule 1 Capsule(s) Inhalation daily    MEDICATIONS  (PRN):  acetaminophen   Tablet 650 milliGRAM(s) Oral every 6 hours PRN For Temp greater than 38 C (100.4 F)  senna 2 Tablet(s) Oral at bedtime PRN Constipation  simethicone 80 milliGRAM(s) Chew every 6 hours PRN Indigestion      OBJECTIVE:    Vital Signs Last 24 Hrs  T(C): 36.6 (10 May 2018 13:58), Max: 37.4 (09 May 2018 20:02)  T(F): 97.9 (10 May 2018 13:58), Max: 99.4 (09 May 2018 20:02)  HR: 69 (10 May 2018 13:58) (57 - 70)  BP: 129/64 (10 May 2018 13:58) (125/63 - 139/59)  BP(mean): --  RR: 18 (10 May 2018 13:58) (16 - 20)  SpO2: 100% (10 May 2018 13:58) (87% - 100%)    CAPILLARY BLOOD GLUCOSE        I&O's Summary    09 May 2018 07:01  -  10 May 2018 07:00  --------------------------------------------------------  IN: 0 mL / OUT: 500 mL / NET: -500 mL        PHYSICAL EXAM:  GENERAL: NAD, well-developed  HEAD:  Atraumatic, Normocephalic  EYES: EOMI, PERRLA, conjunctiva and sclera clear  NECK: Supple, No JVD  CHEST/LUNG: right lower fine crackles, velcro like  HEART: Regular rate and rhythm; No murmurs, rubs, or gallops  ABDOMEN: Soft, Nontender, Nondistended; Bowel sounds present  EXTREMITIES:  2+ Peripheral Pulses, No clubbing, cyanosis, or edema  PSYCH: AAOx3  NEUROLOGY: non-focal  SKIN: No rashes or lesions    LABS:                        7.9    10.87 )-----------( 238      ( 10 May 2018 06:08 )             25.1     Auto Eosinophil # 0.21  / Auto Eosinophil % 1.9   / Auto Neutrophil # 5.70  / Auto Neutrophil % 52.4  / BANDS % 0.9                          8.4    12.87 )-----------( 265      ( 09 May 2018 20:50 )             26.7     Auto Eosinophil # 0.21  / Auto Eosinophil % 1.6   / Auto Neutrophil # 6.46  / Auto Neutrophil % 50.2  / BANDS % x        05-10    133<L>  |  95<L>  |  16  ----------------------------<  91  3.9   |  22  |  1.19  05-09    134<L>  |  96<L>  |  20  ----------------------------<  99  4.3   |  24  |  1.22    Ca    9.1      10 May 2018 06:08  Mg     1.6     05-10  Phos  3.5     05-10  TPro  6.8  /  Alb  3.0<L>  /  TBili  0.4  /  DBili  x   /  AST  12  /  ALT  6   /  AlkPhos  105  05-10  TPro  7.3  /  Alb  3.3  /  TBili  0.3  /  DBili  x   /  AST  15  /  ALT  5   /  AlkPhos  111  05-09    PT/INR - ( 10 May 2018 06:08 )   PT: 12.0 SEC;   INR: 1.08          PTT - ( 10 May 2018 06:08 )  PTT:30.7 SEC              ABG:     VBG: ( 20:50 ) - VBG - pH: 7.39  | pCO2: 42    | pO2: 31    | Lactate: 1.0

## 2018-05-10 NOTE — H&P ADULT - NSHPREVIEWOFSYSTEMS_GEN_ALL_CORE
Review of Systems:   CONSTITUTIONAL: +fatigue, + decreased appetite. No fevers or chills.  EYES: No eye pain, visual disturbances, or discharge  ENMT:  No difficulty hearing, tinnitus, vertigo; No sinus or throat pain  NECK: No pain or stiffness  RESPIRATORY: + Shortness of breath. No cough, wheezing, chills or hemoptysis  CARDIOVASCULAR: + Orthopnea, + DONNELLY. No chest pain, palpitations, dizziness, or leg swelling  GASTROINTESTINAL: No abdominal or epigastric pain. No nausea, vomiting, or hematemesis; No diarrhea or constipation. No melena or hematochezia.  GENITOURINARY: No dysuria, frequency, hematuria, or incontinence  NEUROLOGICAL: No headaches, memory loss, loss of strength, numbness, or tremors  SKIN: No itching, burning, rashes, or lesions   LYMPH NODES: No enlarged glands  ENDOCRINE: No heat or cold intolerance; No hair loss  PSYCHIATRIC: No depression, anxiety, mood swings  HEME/LYMPH: No easy bruising, or bleeding gums  ALLERY AND IMMUNOLOGIC: No hives or eczema

## 2018-05-10 NOTE — H&P ADULT - PROBLEM SELECTOR PLAN 2
CT evidence of PNA, elevated WBC. HR suppressed by high dose CCB.  - Follow-up BCx  - Continue with Vanc/Zosyn for HCAP.  - Trend for fevers

## 2018-05-10 NOTE — CONSULT NOTE ADULT - SUBJECTIVE AND OBJECTIVE BOX
CHIEF COMPLAINT:Patient is a 73y old  Male who presents with a chief complaint of DONNELLY, R/O Pneumonia (10 May 2018 02:36)      HISTORY OF PRESENT ILLNESS:  This is a pleasant gentleman with history as below presented with sob , worse with laying flat  no fever, no cough no chills     PAST MEDICAL & SURGICAL HISTORY:  Squamous cell carcinoma of lung, stage III  TIA (transient ischemic attack): 1999  Localized enlarged lymph nodes  Solitary pulmonary nodule  HTN (hypertension)  Alcohol abuse: Sober since 01/2016- attending AA meeting weekly  Depression, unspecified depression type  Gout  Essential hypertension  H/O hemorrhoidectomy: 1967  History of tonsillectomy: as a child  History of appendectomy: 1958  H/O left knee surgery: 1995          MEDICATIONS:  diltiazem    milliGRAM(s) Oral daily  heparin  Injectable 5000 Unit(s) SubCutaneous every 8 hours  tamsulosin 0.4 milliGRAM(s) Oral daily      ALBUTerol/ipratropium for Nebulization 3 milliLiter(s) Nebulizer every 6 hours  tiotropium 18 MICROgram(s) Capsule 1 Capsule(s) Inhalation daily    acetaminophen   Tablet 650 milliGRAM(s) Oral every 6 hours PRN    docusate sodium 100 milliGRAM(s) Oral three times a day  senna 2 Tablet(s) Oral at bedtime PRN  simethicone 80 milliGRAM(s) Chew every 6 hours PRN    allopurinol 300 milliGRAM(s) Oral daily    multivitamin 1 Tablet(s) Oral daily      FAMILY HISTORY:  Family history of CVA (Mother)      Non-contributory    SOCIAL HISTORY:    No tobacco, drugs or etoh    Allergies    No Known Allergies    Intolerances    	    REVIEW OF SYSTEMS:  as above  The rest of the 14 points ROS reviewed and except above they are unremarkable.        PHYSICAL EXAM:  T(C): 36.6 (05-10-18 @ 13:58), Max: 37.4 (05-09-18 @ 20:02)  HR: 62 (05-10-18 @ 16:11) (57 - 70)  BP: 129/64 (05-10-18 @ 13:58) (125/63 - 139/59)  RR: 18 (05-10-18 @ 13:58) (16 - 20)  SpO2: 98% (05-10-18 @ 16:11) (87% - 100%)  Wt(kg): --  I&O's Summary    09 May 2018 07:01  -  10 May 2018 07:00  --------------------------------------------------------  IN: 0 mL / OUT: 500 mL / NET: -500 mL        JVP: Normal  Neck: supple  Lung: right lower lung insp crackles   CV: S1 S2 , Murmur:  Abd: soft  Ext: No edema  neuro: Awake / alert  Psych: flat affect  Skin: normal     LABS/DATA:    TELEMETRY: 	    ECG:  	   	  CARDIAC MARKERS:                                  7.9    10.87 )-----------( 238      ( 10 May 2018 06:08 )             25.1     05-10    133<L>  |  95<L>  |  16  ----------------------------<  91  3.9   |  22  |  1.19    Ca    9.1      10 May 2018 06:08  Phos  3.5     05-10  Mg     1.6     05-10    TPro  6.8  /  Alb  3.0<L>  /  TBili  0.4  /  DBili  x   /  AST  12  /  ALT  6   /  AlkPhos  105  05-10    proBNP: Serum Pro-Brain Natriuretic Peptide: 714.3 pg/mL (05-09 @ 20:50)    Lipid Profile:   HgA1c:   TSH:       < from: CT Angio Chest w/ IV Cont (05.09.18 @ 21:52) >  IMPRESSION:   No pulmonary embolus as clinically questioned.    Small right-sided pleural effusion with adjacent intralobular septal   thickening, right lower lobe consolidations, and additional groundglass   opacities.  Lymphadenopathy, unchanged as listed above.    < end of copied text >

## 2018-05-10 NOTE — H&P ADULT - PROBLEM SELECTOR PLAN 6
[id number] score 3. Patient on rate control with Cardizem 360 mg daily. Recently taken off Eliquis by his cardiologist for recurrent epistaxis. Patient currently not in AFib per EKG.  - Decreased Cardizem from 360 mg daily to 300 mg daily given borderline bradycardia (~58-59 bpm) and appropriate BP. Trend HR.  - Holding Eliquis as per patient's discussions with cardiologist. D/w patient risks of being off anticoagulation given history of AFib.

## 2018-05-10 NOTE — H&P ADULT - NSHPLABSRESULTS_GEN_ALL_CORE
All labs personally reviewed: WBC 12.8, Hgb 8.4, Plt 265, Na 134, Cr 1.22, INR 1.02, proBNP 714, LFTs wnl.     All Imaging personally reviewed: CT Angio Chest demonstrated no PE. Small right-sided pleural effusion with adjacent intralobular septal thickening, right lower lobe consolidations, and additional ground glass opacities.    EKG personally reviewed: Sinus bradycardic 59 bpm, patient is not in AFib at this time. Normal intervals, normal axis, QTc 423, no TWI or ST elevations.

## 2018-05-10 NOTE — H&P ADULT - PROBLEM SELECTOR PLAN 5
R pleural effusion in setting of R sided malignancy, s/p drainage, recurrence, s/p thoracentesis/pleurodesis and s/p R VATs decortication. R pleural effusion noted on CT, may be contributing to SOB.  - Pulmonary consult in AM.  - Possible pleurex eval.

## 2018-05-10 NOTE — CONSULT NOTE ADULT - SUBJECTIVE AND OBJECTIVE BOX
HPI:  73M PMH Lung SCC (Stage III. Dx 8/2017 w/EBUS + mediastinal LN bx. S/p RUL VATS lobectomy, s/p 4 cycles adjuvant cisplatin/abraxane 12/2017), CLL/SLL (dx 8/2017 by EBUS w/LN bx),  pleural effusion (drained 12/2017, recurrence 3/2018 s/p thoracentesis/pleurodesis c/b hemothorax and s/p R VATs decortication 4/2/18), Paroxysmal AFib (discontinued Eliquis 2/2 epistaxis), HTN, BPH, gout, SIADH, CKDIII, who presents with progressive SOB. The patient states that he has had increasing shortness of breath over the past 1.5 weeks. His symptoms seem to be worst when he is lying down flat, and relieved by elevating his head. He also has DONNELLY when walking. He denies LE swelling, palpitations, chest pressure, pleuritic CP, N/V, diaphoresis, calf pain, fevers, chills, cough, sick contacts, congestion. He also endorses significant fatigue for 3 weeks. He went to his CT surgeon earlier this week who took CXR and stated there were no changes, but recommended that he see his Pulmonologist to be considered for supplemental O2. At the office, he was oxygenating appropriately, had several tests including walking with oxygen at which he did not desaturate and thus did not qualify for home O2. He was prescribed Stiolto in place of his Spiriva. Yesterday his wife noted he was lying around the house, sleeping a lot, with very low energy, and she called the Pulmonologist who suggested the come to the ED.   Of note, patient recently had an echocardiogram 4/23/18 ([facility]) that demonstrated normal LV/RV size and systolic function, with mild MR and TR and dilated atrium.    Initial ED Vitals: T 99.4, HR 58, /63, RR 20, SpO2 99% on RA. The patient was walked around in the ED, and shortly after stopping he desaturated to 87%.  Initial labs: WBC 12.8, Hgb 8.4, Plt 265, Na 134, Cr 1.22, INR 1.02, proBNP 714, LFTs wnl.   Initial Imaging: CT Angio Chest demonstrated no PE. Small right-sided pleural effusion with adjacent intralobular septal thickening, right lower lobe consolidations, and additional ground glass opacities.    In the ED, the patient received Vanc/Zosyn. BCx x2 were drawn. (10 May 2018 02:36)    PAST MEDICAL & SURGICAL HISTORY:  Squamous cell carcinoma of lung, stage III  TIA (transient ischemic attack): 1999  Localized enlarged lymph nodes  Solitary pulmonary nodule  HTN (hypertension)  Alcohol abuse: Sober since 01/2016- attending AA meeting weekly  Depression, unspecified depression type  Gout  Essential hypertension  H/O hemorrhoidectomy: 1967  History of tonsillectomy: as a child  History of appendectomy: 1958  H/O left knee surgery: 1995    FAMILY HISTORY:  Family history of CVA (Mother)    Social History  MEDICATIONS  (STANDING):  ALBUTerol/ipratropium for Nebulization 3 milliLiter(s) Nebulizer every 6 hours  allopurinol 300 milliGRAM(s) Oral daily  diltiazem    milliGRAM(s) Oral daily  docusate sodium 100 milliGRAM(s) Oral three times a day  heparin  Injectable 5000 Unit(s) SubCutaneous every 8 hours  lactobacillus acidophilus 1 Tablet(s) Oral two times a day with meals  magnesium sulfate  IVPB 2 Gram(s) IV Intermittent once  multivitamin 1 Tablet(s) Oral daily  nicotine -   7 mG/24Hr(s) Patch 1 patch Transdermal daily  tamsulosin 0.4 milliGRAM(s) Oral daily  tiotropium 18 MICROgram(s) Capsule 1 Capsule(s) Inhalation daily    MEDICATIONS  (PRN):  acetaminophen   Tablet 650 milliGRAM(s) Oral every 6 hours PRN For Temp greater than 38 C (100.4 F)  senna 2 Tablet(s) Oral at bedtime PRN Constipation  simethicone 80 milliGRAM(s) Chew every 6 hours PRN Indigestion    No Known Allergies    REVIEW OF SYSTEMS      General:	    Skin/Breast:  	  Ophthalmologic:  	  ENMT:	    Respiratory and Thorax:  	  Cardiovascular:	    Gastrointestinal:	    Genitourinary:	    Musculoskeletal:	    Neurological:	    Psychiatric:	    Hematology/Lymphatics:	    Endocrine:	    Allergic/Immunologic:	  Vital Signs Last 24 Hrs  T(C): 36.4 (10 May 2018 05:13), Max: 37.4 (09 May 2018 20:02)  T(F): 97.6 (10 May 2018 05:13), Max: 99.4 (09 May 2018 20:02)  HR: 60 (10 May 2018 09:23) (57 - 70)  BP: 128/60 (10 May 2018 05:13) (125/63 - 139/59)  BP(mean): --  RR: 18 (10 May 2018 05:13) (16 - 20)  SpO2: 98% (10 May 2018 09:23) (87% - 100%)  Physical Examination  Constitutional: Alert, oriented X3  Eyes: Normal  ENMT: normal  Neck: No lymphadneopathy  Respiratory: b/l clear to auscultation  Cardiovascular: S1,S2,M0  Abdomen: Soft, non tender, BS present  Gastrointestinal: soft, non tender, BS present  Extremities: soft, no edema  Neurological: intact  Skin: no petechiae  Musculoskeletal: normal  Psychiatric: normal                            7.9    10.87 )-----------( 238      ( 10 May 2018 06:08 )             25.1     05-10    133<L>  |  95<L>  |  16  ----------------------------<  91  3.9   |  22  |  1.19    Ca    9.1      10 May 2018 06:08  Phos  3.5     05-10  Mg     1.6     05-10    TPro  6.8  /  Alb  3.0<L>  /  TBili  0.4  /  DBili  x   /  AST  12  /  ALT  6   /  AlkPhos  105  05-10      Radiology  Assessment and Plan HPI:  73M PMH Lung SCC (Stage III. Dx 8/2017 w/EBUS + mediastinal LN bx. S/p RUL VATS lobectomy, s/p 4 cycles adjuvant cisplatin/abraxane 12/2017), CLL/SLL (dx 8/2017 by EBUS w/LN bx),  pleural effusion (drained 12/2017, recurrence 3/2018 s/p thoracentesis/pleurodesis c/b hemothorax and s/p R VATs decortication 4/2/18), Paroxysmal AFib (discontinued Eliquis 2/2 epistaxis), HTN, BPH, gout, SIADH, CKDIII, who presents with progressive SOB. The patient states that he has had increasing shortness of breath over the past 1.5 weeks. His symptoms seem to be worst when he is lying down flat, and relieved by elevating his head. He also has DONNELLY when walking. He denies LE swelling, palpitations, chest pressure, pleuritic CP, N/V, diaphoresis, calf pain, fevers, chills, cough, sick contacts, congestion. He also endorses significant fatigue for 3 weeks. He went to his CT surgeon earlier this week who took CXR and stated there were no changes, but recommended that he see his Pulmonologist to be considered for supplemental O2. At the office, he was oxygenating appropriately, had several tests including walking with oxygen at which he did not desaturate and thus did not qualify for home O2. He was prescribed Stiolto in place of his Spiriva. Yesterday his wife noted he was lying around the house, sleeping a lot, with very low energy, and she called the Pulmonologist who suggested the come to the ED.   Of note, patient recently had an echocardiogram 4/23/18 ([facility]) that demonstrated normal LV/RV size and systolic function, with mild MR and TR and dilated atrium.    Initial ED Vitals: T 99.4, HR 58, /63, RR 20, SpO2 99% on RA. The patient was walked around in the ED, and shortly after stopping he desaturated to 87%.  Initial labs: WBC 12.8, Hgb 8.4, Plt 265, Na 134, Cr 1.22, INR 1.02, proBNP 714, LFTs wnl.   Initial Imaging: CT Angio Chest demonstrated no PE. Small right-sided pleural effusion with adjacent intralobular septal thickening, right lower lobe consolidations, and additional ground glass opacities.    In the ED, the patient received Vanc/Zosyn. BCx x2 were drawn    PAST MEDICAL & SURGICAL HISTORY:  Squamous cell carcinoma of lung, stage III  TIA (transient ischemic attack): 1999  Localized enlarged lymph nodes  Solitary pulmonary nodule  HTN (hypertension)  Alcohol abuse: Sober since 01/2016- attending AA meeting weekly  Depression, unspecified depression type  Gout  Essential hypertension  H/O hemorrhoidectomy: 1967  History of tonsillectomy: as a child  History of appendectomy: 1958  H/O left knee surgery: 1995    FAMILY HISTORY:  Family history of CVA (Mother)    Social History  MEDICATIONS  (STANDING):  ALBUTerol/ipratropium for Nebulization 3 milliLiter(s) Nebulizer every 6 hours  allopurinol 300 milliGRAM(s) Oral daily  diltiazem    milliGRAM(s) Oral daily  docusate sodium 100 milliGRAM(s) Oral three times a day  heparin  Injectable 5000 Unit(s) SubCutaneous every 8 hours  lactobacillus acidophilus 1 Tablet(s) Oral two times a day with meals  magnesium sulfate  IVPB 2 Gram(s) IV Intermittent once  multivitamin 1 Tablet(s) Oral daily  nicotine -   7 mG/24Hr(s) Patch 1 patch Transdermal daily  tamsulosin 0.4 milliGRAM(s) Oral daily  tiotropium 18 MICROgram(s) Capsule 1 Capsule(s) Inhalation daily    MEDICATIONS  (PRN):  acetaminophen   Tablet 650 milliGRAM(s) Oral every 6 hours PRN For Temp greater than 38 C (100.4 F)  senna 2 Tablet(s) Oral at bedtime PRN Constipation  simethicone 80 milliGRAM(s) Chew every 6 hours PRN Indigestion    No Known Allergies    REVIEW OF SYSTEMS    10 points ROS is negative except for the ones in HPI.    Vital Signs Last 24 Hrs  T(C): 36.4 (10 May 2018 05:13), Max: 37.4 (09 May 2018 20:02)  T(F): 97.6 (10 May 2018 05:13), Max: 99.4 (09 May 2018 20:02)  HR: 60 (10 May 2018 09:23) (57 - 70)  BP: 128/60 (10 May 2018 05:13) (125/63 - 139/59)  BP(mean): --  RR: 18 (10 May 2018 05:13) (16 - 20)  SpO2: 98% (10 May 2018 09:23) (87% - 100%)    Physical Examination  Constitutional: Alert, oriented X3  Eyes: Normal  ENMT: normal  Neck: No lymphadneopathy  Respiratory: b/l clear to auscultation  Cardiovascular: S1,S2,M0  Abdomen: Soft, non tender, BS present  Gastrointestinal: soft, non tender, BS present  Extremities: soft, no edema  Neurological: intact  Skin: no petechiae  Musculoskeletal: normal  Psychiatric: normal                            7.9    10.87 )-----------( 238      ( 10 May 2018 06:08 )             25.1     05-10    133<L>  |  95<L>  |  16  ----------------------------<  91  3.9   |  22  |  1.19    Ca    9.1      10 May 2018 06:08  Phos  3.5     05-10  Mg     1.6     05-10    TPro  6.8  /  Alb  3.0<L>  /  TBili  0.4  /  DBili  x   /  AST  12  /  ALT  6   /  AlkPhos  105  05-10      Radiology  Assessment and Plan

## 2018-05-10 NOTE — PHYSICAL THERAPY INITIAL EVALUATION ADULT - PERTINENT HX OF CURRENT PROBLEM, REHAB EVAL
pt is 74 y/o male admitted with history of Lung SCC ( stage 3 diagnosed 8/2017 ), presents with DONNELLY

## 2018-05-10 NOTE — H&P ADULT - ASSESSMENT
73M PMH Lung SCC (Stage III. Dx 8/2017 w/EBUS + mediastinal LN bx. S/p RUL VATS lobectomy, s/p 4 cycles adjuvant cisplatin/abraxane 12/2017, CLL/SLL (dx 8/2017 by EBUS w/LN bx),  pleural effusion (drained 12/2017, recurrence 3/2018 s/p thoracentesis/pleurodesis c/b hemothorax and s/p R VATs decortication 4/2/18), Paroxysmal AFib (discontinued Eliquis 2/2 epistaxis), HTN, BPH, gout, SIADH, CKDIII, who presents with progressive SOB, found to have recurrence of R pleural effusion with RLL PNA. 73M PMH Lung SCC (Stage III. Dx 8/2017 w/EBUS + mediastinal LN bx. S/p RUL VATS lobectomy, s/p 4 cycles adjuvant cisplatin/abraxane 12/2017), CLL/SLL (dx 8/2017 by EBUS w/LN bx),  pleural effusion (drained 12/2017, recurrence 3/2018 s/p thoracentesis/pleurodesis c/b hemothorax and s/p R VATs decortication 4/2/18), Paroxysmal AFib (discontinued Eliquis 2/2 epistaxis), HTN, BPH, gout, SIADH, CKDIII, who presents with progressive SOB, found to have recurrence of R pleural effusion with RLL PNA.

## 2018-05-10 NOTE — H&P ADULT - ATTENDING COMMENTS
74 y/o male HX of Squamous Cell Lung CA, S/P S/P RUL VATS Lobectomy, S/P chemo TX, HX of CLL, anemia, Recurrent Rt Pleural effusion, S/P Thoracentesis in the past complicated by Hemothorax, HX of Paroxysmal A Fib not on ASA or Anticoagulation due to Epistaxis, HTN, BPH, Gout, CKD, HX of SIADH, C/O SOB, fatigue, DONNELLY, SOB x 1-2 weeks, no fever, no legs edema, no CP, no fever, no cough, CTPA No PE, R/O Pneumonia, pt is EX Smoker, A+O x 3, + Hypoxemia in the ER, WBC 12.87, Hgb 8.4, Na 134,     Hem ONC consult, Pulmonary consult, fall/aspiration precaution, DVT Prophylaxis: SQ Heparin, on Allopurinol, Cardizem CD, Nicotine Patch, Spiriva, IV Zosyn, IV Vanco, Bacid, Duoneb, Flomax  F/U CBC, CMP, Cultures,     Case D/W Pt, HS,    Pt was seen by me, Dr. DIANN Good on 5/10/18.

## 2018-05-10 NOTE — H&P ADULT - PROBLEM SELECTOR PLAN 9
Patient with hx of hyponatremia attributed to SIADH. Sodium currently 134.  - Continue to monitor  - Fluid restrict to 1.2L daily

## 2018-05-10 NOTE — H&P ADULT - HISTORY OF PRESENT ILLNESS
73M PMH Lung SCC (Stage III. Dx 8/2017 w/EBUS + mediastinal LN bx. S/p RUL VATS lobectomy, s/p 4 cycles adjuvant cisplatin/abraxane 12/2017, CLL/SLL (dx 8/2017 by EBUS w/LN bx),  pleural effusion (drained 12/2017, recurrence 3/2018 s/p thoracentesis/pleurodesis c/b hemothorax and s/p R VATs decortication 4/2/18), AFib (formerly on Eliquis, self-discontinued 2/2 nose bleeds), HTN, BPH, gout, SIADH, CKDIII, who presents with progressive SOB. 73M PMH Lung SCC (Stage III. Dx 8/2017 w/EBUS + mediastinal LN bx. S/p RUL VATS lobectomy, s/p 4 cycles adjuvant cisplatin/abraxane 12/2017, CLL/SLL (dx 8/2017 by EBUS w/LN bx),  pleural effusion (drained 12/2017, recurrence 3/2018 s/p thoracentesis/pleurodesis c/b hemothorax and s/p R VATs decortication 4/2/18), Paroxysmal AFib (discontinued Eliquis 2/2 epistaxis), HTN, BPH, gout, SIADH, CKDIII, who presents with progressive SOB. The patient states that he has had increasing shortness of breath over the past 1.5 weeks. His symptoms seem to be worst when he is lying down flat, and relieved by elevating his head. He also has DONNELLY when walking. He denies LE swelling, palpitations, chest pressure, pleuritic CP, N/V, diaphoresis, calf pain, fevers, chills, cough, sick contacts, congestion. He also endorses significant fatigue for 3 weeks. He went to his CT surgeon earlier this week who took CXR and stated there were no changes, but recommended that he see his Pulmonologist to be considered for supplemental O2. At the office, he was oxygenating appropriately, had several tests including walking with oxygen at which he did not desaturate and thus did not qualify for home O2. He was prescribed Stiolto in place of his Spiriva. Yesterday his wife noted he was lying around the house, sleeping a lot, with very low energy, and she called the Pulmonologist who suggested the come to the ED.   Of note, patient recently had an echocardiogram 4/23/18 ([facility]) that demonstrated normal LV/RV size and systolic function, with mild MR and TR and dilated atrium.    Initial ED Vitals: T 99.4, HR 58, /63, RR 20, SpO2 99% on RA. The patient was walked around in the ED, and shortly after stopping he desaturated to 87%.  Initial labs: WBC 12.8, Hgb 8.4, Plt 265, Na 134, Cr 1.22, INR 1.02, proBNP 714, LFTs wnl.   Initial Imaging: CT Angio Chest demonstrated no PE. Small right-sided pleural effusion with adjacent intralobular septal thickening, right lower lobe consolidations, and additional ground glass opacities.    In the ED, the patient received Vanc/Zosyn. BCx x2 were drawn. 73M PMH Lung SCC (Stage III. Dx 8/2017 w/EBUS + mediastinal LN bx. S/p RUL VATS lobectomy, s/p 4 cycles adjuvant cisplatin/abraxane 12/2017), CLL/SLL (dx 8/2017 by EBUS w/LN bx),  pleural effusion (drained 12/2017, recurrence 3/2018 s/p thoracentesis/pleurodesis c/b hemothorax and s/p R VATs decortication 4/2/18), Paroxysmal AFib (discontinued Eliquis 2/2 epistaxis), HTN, BPH, gout, SIADH, CKDIII, who presents with progressive SOB. The patient states that he has had increasing shortness of breath over the past 1.5 weeks. His symptoms seem to be worst when he is lying down flat, and relieved by elevating his head. He also has DONNELLY when walking. He denies LE swelling, palpitations, chest pressure, pleuritic CP, N/V, diaphoresis, calf pain, fevers, chills, cough, sick contacts, congestion. He also endorses significant fatigue for 3 weeks. He went to his CT surgeon earlier this week who took CXR and stated there were no changes, but recommended that he see his Pulmonologist to be considered for supplemental O2. At the office, he was oxygenating appropriately, had several tests including walking with oxygen at which he did not desaturate and thus did not qualify for home O2. He was prescribed Stiolto in place of his Spiriva. Yesterday his wife noted he was lying around the house, sleeping a lot, with very low energy, and she called the Pulmonologist who suggested the come to the ED.   Of note, patient recently had an echocardiogram 4/23/18 ([facility]) that demonstrated normal LV/RV size and systolic function, with mild MR and TR and dilated atrium.    Initial ED Vitals: T 99.4, HR 58, /63, RR 20, SpO2 99% on RA. The patient was walked around in the ED, and shortly after stopping he desaturated to 87%.  Initial labs: WBC 12.8, Hgb 8.4, Plt 265, Na 134, Cr 1.22, INR 1.02, proBNP 714, LFTs wnl.   Initial Imaging: CT Angio Chest demonstrated no PE. Small right-sided pleural effusion with adjacent intralobular septal thickening, right lower lobe consolidations, and additional ground glass opacities.    In the ED, the patient received Vanc/Zosyn. BCx x2 were drawn.

## 2018-05-11 ENCOUNTER — TRANSCRIPTION ENCOUNTER (OUTPATIENT)
Age: 73
End: 2018-05-11

## 2018-05-11 VITALS
TEMPERATURE: 98 F | OXYGEN SATURATION: 100 % | RESPIRATION RATE: 18 BRPM | SYSTOLIC BLOOD PRESSURE: 140 MMHG | DIASTOLIC BLOOD PRESSURE: 65 MMHG | HEART RATE: 73 BPM

## 2018-05-11 LAB
BUN SERPL-MCNC: 19 MG/DL — SIGNIFICANT CHANGE UP (ref 7–23)
CALCIUM SERPL-MCNC: 9.2 MG/DL — SIGNIFICANT CHANGE UP (ref 8.4–10.5)
CHLORIDE SERPL-SCNC: 96 MMOL/L — LOW (ref 98–107)
CO2 SERPL-SCNC: 21 MMOL/L — LOW (ref 22–31)
CREAT SERPL-MCNC: 1.26 MG/DL — SIGNIFICANT CHANGE UP (ref 0.5–1.3)
GLUCOSE SERPL-MCNC: 166 MG/DL — HIGH (ref 70–99)
HCT VFR BLD CALC: 24.8 % — LOW (ref 39–50)
HGB BLD-MCNC: 7.8 G/DL — LOW (ref 13–17)
MAGNESIUM SERPL-MCNC: 2 MG/DL — SIGNIFICANT CHANGE UP (ref 1.6–2.6)
MCHC RBC-ENTMCNC: 29.3 PG — SIGNIFICANT CHANGE UP (ref 27–34)
MCHC RBC-ENTMCNC: 31.5 % — LOW (ref 32–36)
MCV RBC AUTO: 93.2 FL — SIGNIFICANT CHANGE UP (ref 80–100)
NRBC # FLD: 0 — SIGNIFICANT CHANGE UP
PHOSPHATE SERPL-MCNC: 4.7 MG/DL — HIGH (ref 2.5–4.5)
PLATELET # BLD AUTO: 251 K/UL — SIGNIFICANT CHANGE UP (ref 150–400)
PMV BLD: 10.6 FL — SIGNIFICANT CHANGE UP (ref 7–13)
POTASSIUM SERPL-MCNC: 4.6 MMOL/L — SIGNIFICANT CHANGE UP (ref 3.5–5.3)
POTASSIUM SERPL-SCNC: 4.6 MMOL/L — SIGNIFICANT CHANGE UP (ref 3.5–5.3)
RBC # BLD: 2.66 M/UL — LOW (ref 4.2–5.8)
RBC # FLD: 15.2 % — HIGH (ref 10.3–14.5)
SODIUM SERPL-SCNC: 133 MMOL/L — LOW (ref 135–145)
SPECIMEN SOURCE: SIGNIFICANT CHANGE UP
SPECIMEN SOURCE: SIGNIFICANT CHANGE UP
WBC # BLD: 6.96 K/UL — SIGNIFICANT CHANGE UP (ref 3.8–10.5)
WBC # FLD AUTO: 6.96 K/UL — SIGNIFICANT CHANGE UP (ref 3.8–10.5)

## 2018-05-11 PROCEDURE — 99233 SBSQ HOSP IP/OBS HIGH 50: CPT | Mod: GC

## 2018-05-11 RX ORDER — PANTOPRAZOLE SODIUM 20 MG/1
1 TABLET, DELAYED RELEASE ORAL
Qty: 30 | Refills: 0 | OUTPATIENT
Start: 2018-05-11 | End: 2018-06-09

## 2018-05-11 RX ORDER — PANTOPRAZOLE SODIUM 20 MG/1
40 TABLET, DELAYED RELEASE ORAL
Qty: 0 | Refills: 0 | Status: DISCONTINUED | OUTPATIENT
Start: 2018-05-11 | End: 2018-05-11

## 2018-05-11 RX ADMIN — Medication 3 MILLILITER(S): at 10:07

## 2018-05-11 RX ADMIN — Medication 300 MILLIGRAM(S): at 11:00

## 2018-05-11 RX ADMIN — TIOTROPIUM BROMIDE 1 CAPSULE(S): 18 CAPSULE ORAL; RESPIRATORY (INHALATION) at 11:00

## 2018-05-11 RX ADMIN — Medication 100 MILLIGRAM(S): at 06:39

## 2018-05-11 RX ADMIN — Medication 1 PATCH: at 11:02

## 2018-05-11 RX ADMIN — Medication 1 PATCH: at 11:00

## 2018-05-11 RX ADMIN — Medication 60 MILLIGRAM(S): at 06:38

## 2018-05-11 RX ADMIN — Medication 3 MILLILITER(S): at 04:35

## 2018-05-11 RX ADMIN — Medication 1 TABLET(S): at 08:45

## 2018-05-11 RX ADMIN — Medication 300 MILLIGRAM(S): at 06:39

## 2018-05-11 RX ADMIN — Medication 1 TABLET(S): at 11:00

## 2018-05-11 NOTE — DISCHARGE NOTE ADULT - CONDITIONS AT DISCHARGE
He stable, improved , alert , independent with his care; SOB is resolved except for when he has walked to the Bathroom.  He is discharged on Prednisone , Skin is intact and Instructions provided.

## 2018-05-11 NOTE — PROGRESS NOTE ADULT - PROBLEM SELECTOR PLAN 4
- R pleural effusion in setting of R sided malignancy, s/p drainage, recurrence, s/p thoracentesis/pleurodesis and s/p R VATs decortication  - evidence of small R pleural effusion- may be chronic or in setting of failure of pleurodesis
- R pleural effusion in setting of R sided malignancy, s/p drainage, recurrence, s/p thoracentesis/pleurodesis and s/p R VATs decortication  - evidence of small R pleural effusion- may be chronic or in setting of failure of pleurodesis

## 2018-05-11 NOTE — DISCHARGE NOTE ADULT - CARE PLAN
Principal Discharge DX:	Shortness of breath  Goal:	Take your steroid medications and follow up with oncology  Assessment and plan of treatment:	Your shortness of breath is possibly due to complications of your lung cancer. You have been started on prednisone, a steroid medication that can help with inflammation due to the cancer. Please take this medication as prescribed and schedule an appointment with you oncologist to help with  Secondary Diagnosis:	Stage III squamous cell carcinoma of right lung Principal Discharge DX:	Shortness of breath  Goal:	Take your steroid medications and follow up with oncology  Assessment and plan of treatment:	Your shortness of breath is possibly due to complications of your lung cancer. You have been started on prednisone, a steroid medication that can help with inflammation due to the cancer. Please take this medication as prescribed and schedule an appointment with you oncologist to help schedule gradually decreasing your steroid dose. If your shortness of breath worsens, or you begin to have cough, pain, fevers, chills, please call your doctor or return to the hospital.  Secondary Diagnosis:	Stage III squamous cell carcinoma of right lung  Assessment and plan of treatment:	Please make an appointment with your oncologist in 1-2 weeks to follow up for treatment

## 2018-05-11 NOTE — PROGRESS NOTE ADULT - PROBLEM SELECTOR PLAN 1
- CT findings suggestive of right lower lobe opacities- ground glass  - no clinical evidence of PNA at this time as patient afebrile, mild leukocytosis (lower than previous admissions) and no sputum production  - most likey secondary to lymphangitic spread of the SCC, will  follow up with onc for possible steroid administration  - as patient with orthopnea, iniital concern for heart failuure- however TTE  on  4/23 demonstrates  normal LV/RV size/dimentions and systolic function and patient without other signs of HF ( no LE edema or JVD)  - c/w spiriva
- CT findings suggestive of right lower lobe opacities- ground glass  - may be secondary to lymphagatic spread- started on prednisone 1mg/kg at 60 mg  - patient reports improvement in his orthopnea and SOB  - lymphangitic spread typically does not present with orthopnea, however  TTE  on  4/23 demonstrates  normal LV/RV size/dimentions and systolic function and patient without other signs of HF ( no LE edema or JVD)  - c/w spiriva

## 2018-05-11 NOTE — DISCHARGE NOTE ADULT - HOME CARE AGENCY
[facility] [phone] Nurse to visit on the day following discharge. Other appropriate services to be arranged thereafter. Please contact the home care agency at the above phone number if you have not heard from them by approximately 12 noon on the day after your hospital discharge.

## 2018-05-11 NOTE — DISCHARGE NOTE ADULT - PLAN OF CARE
Take your steroid medications and follow up with oncology Your shortness of breath is possibly due to complications of your lung cancer. You have been started on prednisone, a steroid medication that can help with inflammation due to the cancer. Please take this medication as prescribed and schedule an appointment with you oncologist to help with Your shortness of breath is possibly due to complications of your lung cancer. You have been started on prednisone, a steroid medication that can help with inflammation due to the cancer. Please take this medication as prescribed and schedule an appointment with you oncologist to help schedule gradually decreasing your steroid dose. If your shortness of breath worsens, or you begin to have cough, pain, fevers, chills, please call your doctor or return to the hospital. Please make an appointment with your oncologist in 1-2 weeks to follow up for treatment

## 2018-05-11 NOTE — PROGRESS NOTE ADULT - PROBLEM SELECTOR PLAN 2
- Stage III. Dx 8/2017 w/EBUS + mediastinal LN bx. S/p RUL VATS lobectomy, s/p 4 cycles adjuvant cisplatin/abraxane 12/2017. C/b recurrent pleural effusion, now with PNA  - PET scan shows progression of disease and patient to start immunotherapy as an outpatient on 5/21  - f/u heme/onc recs
- Stage III. Dx 8/2017 w/EBUS + mediastinal LN bx. S/p RUL VATS lobectomy, s/p 4 cycles adjuvant cisplatin/abraxane 12/2017. C/b recurrent pleural effusion, now with PNA  - patient to start immunotherapy for progression of disease as an outpatient  - heme/onc recs appreciated

## 2018-05-11 NOTE — PROGRESS NOTE ADULT - ATTENDING COMMENTS
Pt reports significant improvement in SOB and orthopnea. Anticipating discharge. Plan to discharge on current steroid dose with close OP onc fu for titration. Discharge time 33min

## 2018-05-11 NOTE — PROGRESS NOTE ADULT - SUBJECTIVE AND OBJECTIVE BOX
Subjective: Patient seen and examined. No new events except as noted.     SUBJECTIVE/ROS:  feels better       MEDICATIONS:  MEDICATIONS  (STANDING):  ALBUTerol/ipratropium for Nebulization 3 milliLiter(s) Nebulizer every 6 hours  allopurinol 300 milliGRAM(s) Oral daily  diltiazem    milliGRAM(s) Oral daily  docusate sodium 100 milliGRAM(s) Oral three times a day  heparin  Injectable 5000 Unit(s) SubCutaneous every 8 hours  lactobacillus acidophilus 1 Tablet(s) Oral two times a day with meals  multivitamin 1 Tablet(s) Oral daily  nicotine -   7 mG/24Hr(s) Patch 1 patch Transdermal daily  pantoprazole    Tablet 40 milliGRAM(s) Oral before breakfast  predniSONE   Tablet 60 milliGRAM(s) Oral daily  tamsulosin 0.4 milliGRAM(s) Oral daily  tiotropium 18 MICROgram(s) Capsule 1 Capsule(s) Inhalation daily      PHYSICAL EXAM:  T(C): 36.6 (05-11-18 @ 13:58), Max: 37.1 (05-10-18 @ 20:58)  HR: 73 (05-11-18 @ 13:58) (62 - 86)  BP: 140/65 (05-11-18 @ 13:58) (120/60 - 141/71)  RR: 18 (05-11-18 @ 13:58) (18 - 18)  SpO2: 100% (05-11-18 @ 13:58) (95% - 100%)  Wt(kg): --  I&O's Summary    10 May 2018 07:01  -  11 May 2018 07:00  --------------------------------------------------------  IN: 838 mL / OUT: 750 mL / NET: 88 mL          Appearance: Normal	  HEENT:   Normal oral mucosa, PERRL, EOMI	  Cardiovascular: Normal S1 S2,    Murmur:   Neck: JVP normal  Respiratory: Lungs clear to auscultation  Gastrointestinal:  Soft, Non-tender, + BS	  Skin: normal   Neuro: No gross deficits.   Psychiatry:  Mood & affect appropriate  Ext: No edema      LABS/DATA:    CARDIAC MARKERS:                                7.8    6.96  )-----------( 251      ( 11 May 2018 05:12 )             24.8     05-11    133<L>  |  96<L>  |  19  ----------------------------<  166<H>  4.6   |  21<L>  |  1.26    Ca    9.2      11 May 2018 05:12  Phos  4.7     05-11  Mg     2.0     05-11    TPro  6.8  /  Alb  3.0<L>  /  TBili  0.4  /  DBili  x   /  AST  12  /  ALT  6   /  AlkPhos  105  05-10    proBNP:   Lipid Profile:   HgA1c:   TSH:     TELE:  EKG:

## 2018-05-11 NOTE — DISCHARGE NOTE ADULT - CARE PROVIDERS DIRECT ADDRESSES
,[email or web address]

## 2018-05-11 NOTE — PROGRESS NOTE ADULT - ASSESSMENT
73M PMH Lung SCC (Stage III. Dx 8/2017 w/EBUS + mediastinal LN bx. S/p RUL VATS lobectomy, s/p 4 cycles adjuvant cisplatin/abraxane 12/2017), CLL/SLL (dx 8/2017 by EBUS w/LN bx),  pleural effusion (drained 12/2017, recurrence 3/2018 s/p thoracentesis/pleurodesis c/b hemothorax and s/p R VATs decortication 4/2/18), Paroxysmal AFib (discontinued Eliquis 2/2 epistaxis), HTN, BPH, gout, SIADH, CKDIII, who presents with progressive SOB, with CT concerning for lymphangitic spread.

## 2018-05-11 NOTE — PROGRESS NOTE ADULT - PROBLEM SELECTOR PLAN 8
- Patient with hx of hyponatremia attributed to SIADH. Sodium currently 134.  - Continue to monitor  - Fluid restrict to 1.2L daily
- Patient with hx of hyponatremia attributed to SIADH. Sodium currently 134.  - Continue to monitor  - Fluid restrict to 1.2L daily

## 2018-05-11 NOTE — PROGRESS NOTE ADULT - SUBJECTIVE AND OBJECTIVE BOX
Margot Ruiz PGY 1  Pager: [phone]/ [phone]    Patient is a 73y old  Male who presents with a chief complaint of DONNELLY, R/O Pneumonia (10 May 2018 02:36)      SUBJECTIVE / OVERNIGHT EVENTS:  Patient seen and examined at bedside.    Other Review of Systems Negative.    MEDICATIONS  (STANDING):  ALBUTerol/ipratropium for Nebulization 3 milliLiter(s) Nebulizer every 6 hours  allopurinol 300 milliGRAM(s) Oral daily  diltiazem    milliGRAM(s) Oral daily  docusate sodium 100 milliGRAM(s) Oral three times a day  heparin  Injectable 5000 Unit(s) SubCutaneous every 8 hours  lactobacillus acidophilus 1 Tablet(s) Oral two times a day with meals  multivitamin 1 Tablet(s) Oral daily  nicotine -   7 mG/24Hr(s) Patch 1 patch Transdermal daily  predniSONE   Tablet 60 milliGRAM(s) Oral daily  tamsulosin 0.4 milliGRAM(s) Oral daily  tiotropium 18 MICROgram(s) Capsule 1 Capsule(s) Inhalation daily    MEDICATIONS  (PRN):  acetaminophen   Tablet 650 milliGRAM(s) Oral every 6 hours PRN For Temp greater than 38 C (100.4 F)  senna 2 Tablet(s) Oral at bedtime PRN Constipation  simethicone 80 milliGRAM(s) Chew every 6 hours PRN Indigestion      OBJECTIVE:    Vital Signs Last 24 Hrs  T(C): 36.6 (11 May 2018 06:34), Max: 37.1 (10 May 2018 20:58)  T(F): 97.9 (11 May 2018 06:34), Max: 98.8 (10 May 2018 20:58)  HR: 71 (11 May 2018 06:34) (60 - 86)  BP: 120/60 (11 May 2018 06:34) (120/60 - 141/71)  BP(mean): --  RR: 18 (11 May 2018 06:34) (18 - 18)  SpO2: 98% (11 May 2018 06:34) (95% - 100%)    CAPILLARY BLOOD GLUCOSE        I&O's Summary    10 May 2018 07:01  -  11 May 2018 07:00  --------------------------------------------------------  IN: 838 mL / OUT: 750 mL / NET: 88 mL        PHYSICAL EXAM:  GENERAL: NAD, well-developed  HEAD:  Atraumatic, Normocephalic  EYES: EOMI, PERRLA, conjunctiva and sclera clear  NECK: Supple, No JVD  CHEST/LUNG: Clear to auscultation bilaterally; No wheeze  HEART: Regular rate and rhythm; No murmurs, rubs, or gallops  ABDOMEN: Soft, Nontender, Nondistended; Bowel sounds present  EXTREMITIES:  2+ Peripheral Pulses, No clubbing, cyanosis, or edema  PSYCH: AAOx3  NEUROLOGY: non-focal  SKIN: No rashes or lesions    LABS:                        7.8    6.96  )-----------( 251      ( 11 May 2018 05:12 )             24.8     Auto Eosinophil # x     / Auto Eosinophil % x     / Auto Neutrophil # x     / Auto Neutrophil % x     / BANDS % x                            7.9    10.87 )-----------( 238      ( 10 May 2018 06:08 )             25.1     Auto Eosinophil # 0.21  / Auto Eosinophil % 1.9   / Auto Neutrophil # 5.70  / Auto Neutrophil % 52.4  / BANDS % 0.9                          8.4    12.87 )-----------( 265      ( 09 May 2018 20:50 )             26.7     Auto Eosinophil # 0.21  / Auto Eosinophil % 1.6   / Auto Neutrophil # 6.46  / Auto Neutrophil % 50.2  / BANDS % x        05-11    133<L>  |  96<L>  |  19  ----------------------------<  166<H>  4.6   |  21<L>  |  1.26  05-10    133<L>  |  95<L>  |  16  ----------------------------<  91  3.9   |  22  |  1.19  05-09    134<L>  |  96<L>  |  20  ----------------------------<  99  4.3   |  24  |  1.22    Ca    9.2      11 May 2018 05:12  Mg     2.0     05-11  Phos  4.7     05-11  TPro  6.8  /  Alb  3.0<L>  /  TBili  0.4  /  DBili  x   /  AST  12  /  ALT  6   /  AlkPhos  105  05-10  TPro  7.3  /  Alb  3.3  /  TBili  0.3  /  DBili  x   /  AST  15  /  ALT  5   /  AlkPhos  111  05-09    PT/INR - ( 10 May 2018 06:08 )   PT: 12.0 SEC;   INR: 1.08          PTT - ( 10 May 2018 06:08 )  PTT:30.7 SEC              ABG:     VBG:       Care Discussed with Consultants/Other Providers:    RADIOLOGY & ADDITIONAL TESTS:  (Imaging Personally Reviewed) Margot Ruiz PGY 1  Pager: [phone]/ [phone]    Patient is a 73y old  Male who presents with a chief complaint of DONNELLY, R/O Pneumonia (10 May 2018 02:36)      SUBJECTIVE / OVERNIGHT EVENTS:  Patient seen and examined at bedside.    Other Review of Systems Negative.    MEDICATIONS  (STANDING):  ALBUTerol/ipratropium for Nebulization 3 milliLiter(s) Nebulizer every 6 hours  allopurinol 300 milliGRAM(s) Oral daily  diltiazem    milliGRAM(s) Oral daily  docusate sodium 100 milliGRAM(s) Oral three times a day  heparin  Injectable 5000 Unit(s) SubCutaneous every 8 hours  lactobacillus acidophilus 1 Tablet(s) Oral two times a day with meals  multivitamin 1 Tablet(s) Oral daily  nicotine -   7 mG/24Hr(s) Patch 1 patch Transdermal daily  predniSONE   Tablet 60 milliGRAM(s) Oral daily  tamsulosin 0.4 milliGRAM(s) Oral daily  tiotropium 18 MICROgram(s) Capsule 1 Capsule(s) Inhalation daily    MEDICATIONS  (PRN):  acetaminophen   Tablet 650 milliGRAM(s) Oral every 6 hours PRN For Temp greater than 38 C (100.4 F)  senna 2 Tablet(s) Oral at bedtime PRN Constipation  simethicone 80 milliGRAM(s) Chew every 6 hours PRN Indigestion      OBJECTIVE:    Vital Signs Last 24 Hrs  T(C): 36.6 (11 May 2018 06:34), Max: 37.1 (10 May 2018 20:58)  T(F): 97.9 (11 May 2018 06:34), Max: 98.8 (10 May 2018 20:58)  HR: 71 (11 May 2018 06:34) (60 - 86)  BP: 120/60 (11 May 2018 06:34) (120/60 - 141/71)  BP(mean): --  RR: 18 (11 May 2018 06:34) (18 - 18)  SpO2: 98% (11 May 2018 06:34) (95% - 100%)    CAPILLARY BLOOD GLUCOSE        I&O's Summary    10 May 2018 07:01  -  11 May 2018 07:00  --------------------------------------------------------  IN: 838 mL / OUT: 750 mL / NET: 88 mL        PHYSICAL EXAM:  GENERAL: NAD, well-developed  HEAD:  Atraumatic, Normocephalic  EYES: EOMI, PERRLA, conjunctiva and sclera clear  NECK: Supple, No JVD  CHEST/LUNG: Clear to auscultation bilaterally; No wheeze  HEART: Regular rate and rhythm; No murmurs, rubs, or gallops  ABDOMEN: Soft, Nontender, Nondistended; Bowel sounds present  EXTREMITIES:  2+ Peripheral Pulses, No clubbing, cyanosis, or edema  PSYCH: AAOx3  NEUROLOGY: non-focal  SKIN: No rashes or lesions    LABS:                        7.8    6.96  )-----------( 251      ( 11 May 2018 05:12 )             24.8     Auto Eosinophil # x     / Auto Eosinophil % x     / Auto Neutrophil # x     / Auto Neutrophil % x     / BANDS % x                            7.9    10.87 )-----------( 238      ( 10 May 2018 06:08 )             25.1     Auto Eosinophil # 0.21  / Auto Eosinophil % 1.9   / Auto Neutrophil # 5.70  / Auto Neutrophil % 52.4  / BANDS % 0.9                          8.4    12.87 )-----------( 265      ( 09 May 2018 20:50 )             26.7     Auto Eosinophil # 0.21  / Auto Eosinophil % 1.6   / Auto Neutrophil # 6.46  / Auto Neutrophil % 50.2  / BANDS % x        05-11    133<L>  |  96<L>  |  19  ----------------------------<  166<H>  4.6   |  21<L>  |  1.26  05-10    133<L>  |  95<L>  |  16  ----------------------------<  91  3.9   |  22  |  1.19  05-09    134<L>  |  96<L>  |  20  ----------------------------<  99  4.3   |  24  |  1.22    Ca    9.2      11 May 2018 05:12  Mg     2.0     05-11  Phos  4.7     05-11  TPro  6.8  /  Alb  3.0<L>  /  TBili  0.4  /  DBili  x   /  AST  12  /  ALT  6   /  AlkPhos  105  05-10  TPro  7.3  /  Alb  3.3  /  TBili  0.3  /  DBili  x   /  AST  15  /  ALT  5   /  AlkPhos  111  05-09    PT/INR - ( 10 May 2018 06:08 )   PT: 12.0 SEC;   INR: 1.08          PTT - ( 10 May 2018 06:08 )  PTT:30.7 SEC

## 2018-05-11 NOTE — DISCHARGE NOTE ADULT - HOSPITAL COURSE
73M PMH Lung SCC (Stage III. Dx 8/2017 w/EBUS + mediastinal LN bx. S/p RUL VATS lobectomy, s/p 4 cycles adjuvant cisplatin/abraxane 12/2017), CLL/SLL (dx 8/2017 by EBUS w/LN bx),  pleural effusion (drained 12/2017, recurrence 3/2018 s/p thoracentesis/pleurodesis c/b hemothorax and s/p R VATs decortication 4/2/18), Paroxysmal AFib (discontinued Eliquis 2/2 epistaxis), HTN, BPH, gout, SIADH, CKDIII, who presents with progressive SOB. The patient states that he has had increasing shortness of breath over the past 1.5 weeks. His symptoms seem to be worst when he is lying down flat, and relieved by elevating his head. He also has DONNELLY when walking. He denies LE swelling, palpitations, chest pressure, pleuritic CP, N/V, diaphoresis, calf pain, fevers, chills, cough, sick contacts, congestion. He also endorses significant fatigue for 3 weeks. He went to his CT surgeon earlier this week who took CXR and stated there were no changes, but recommended that he see his Pulmonologist to be considered for supplemental O2. At the office, he was oxygenating appropriately, had several tests including walking with oxygen at which he did not desaturate and thus did not qualify for home O2. He was prescribed Stiolto in place of his Spiriva. Yesterday his wife noted he was lying around the house, sleeping a lot, with very low energy, and she called the Pulmonologist who suggested the come to the ED.   Of note, patient recently had an echocardiogram 4/23/18 ([facility]) that demonstrated normal LV/RV size and systolic function, with mild MR and TR and dilated atrium.    Initial ED Vitals: T 99.4, HR 58, /63, RR 20, SpO2 99% on RA. The patient was walked around in the ED, and shortly after stopping he desaturated to 87%.  Initial labs: WBC 12.8, Hgb 8.4, Plt 265, Na 134, Cr 1.22, INR 1.02, proBNP 714, LFTs wnl.   Initial Imaging: CT Angio Chest demonstrated no PE. Small right-sided pleural effusion with adjacent intralobular septal thickening, right lower lobe consolidations, and additional ground glass opacities.    In the ED, the patient received Vanc/Zosyn. BCx x2 were drawn.    Hospital course  Findings on CTA were determined to be inconsistent with pneumonia and as patient denied fevers, cough, s/s of infection he was observed off antibiotics. Patient did not have evidence of LE edema, chest pain, pro BNP was 714, EKG was unremarkable and symptoms were thought to be unlikely 2/2 cardiac etiology. Given CT findings, known lymphangitic spread, positive response to bronchodilators, oncology was consulted and recommended starting prednisone 60mg for possible lymphangitic etiology. Patient tolerated this Rx well and was determined to be stable for discharge to home with home PT and was instructed to follow up with his primary oncologist. 73M PMH Lung SCC (Stage III. Dx 8/2017 w/EBUS + mediastinal LN bx. S/p RUL VATS lobectomy, s/p 4 cycles adjuvant cisplatin/abraxane 12/2017), CLL/SLL (dx 8/2017 by EBUS w/LN bx),  pleural effusion (drained 12/2017, recurrence 3/2018 s/p thoracentesis/pleurodesis c/b hemothorax and s/p R VATs decortication 4/2/18), Paroxysmal AFib (discontinued Eliquis 2/2 epistaxis), HTN, BPH, gout, SIADH, CKDIII, who presents with progressive SOB. The patient states that he has had increasing shortness of breath over the past 1.5 weeks. His symptoms seem to be worst when he is lying down flat, and relieved by elevating his head. He also has DONNELLY when walking. He denies LE swelling, palpitations, chest pressure, pleuritic CP, N/V, diaphoresis, calf pain, fevers, chills, cough, sick contacts, congestion. He also endorses significant fatigue for 3 weeks. He went to his CT surgeon earlier this week who took CXR and stated there were no changes, but recommended that he see his Pulmonologist to be considered for supplemental O2. At the office, he was oxygenating appropriately, had several tests including walking with oxygen at which he did not desaturate and thus did not qualify for home O2. He was prescribed Stiolto in place of his Spiriva. Yesterday his wife noted he was lying around the house, sleeping a lot, with very low energy, and she called the Pulmonologist who suggested the come to the ED.   Of note, patient recently had an echocardiogram 4/23/18 ([facility]) that demonstrated normal LV/RV size and systolic function, with mild MR and TR and dilated atrium.    Initial ED Vitals: T 99.4, HR 58, /63, RR 20, SpO2 99% on RA. The patient was walked around in the ED, and shortly after stopping he desaturated to 87%.  Initial labs: WBC 12.8, Hgb 8.4, Plt 265, Na 134, Cr 1.22, INR 1.02, proBNP 714, LFTs wnl.   Initial Imaging: CT Angio Chest demonstrated no PE. Small right-sided pleural effusion with adjacent intralobular septal thickening, right lower lobe consolidations, and additional ground glass opacities.    In the ED, the patient received Vanc/Zosyn. BCx x2 were drawn.    Hospital course  Findings on CTA were determined to be inconsistent with pneumonia and as patient denied fevers, cough, s/s of infection he was observed off antibiotics. Patient did not have evidence of LE edema, chest pain, pro BNP was 714, EKG was unremarkable and symptoms were thought to be unlikely 2/2 cardiac etiology. Given CT findings, known lymphangitic spread, positive response to bronchodilators, oncology was consulted and recommended starting prednisone 60mg for possible lymphangitic etiology. Patient reports significant symptomatic improvement on steroids and was determined to be stable for discharge to home with home PT and was instructed to follow up with his primary oncologist for further steroid titration

## 2018-05-11 NOTE — PROGRESS NOTE ADULT - PROBLEM SELECTOR PLAN 3
- Patient s/p chemo for lung ca and CLL, plan for immunotherapy.  - Follow-up with Heme/Onc
- Patient s/p chemo for lung ca and CLL, plan for immunotherapy for lung Ca  - Follow-up with Heme/Onc

## 2018-05-11 NOTE — DISCHARGE NOTE ADULT - MEDICATION SUMMARY - MEDICATIONS TO TAKE
I will START or STAY ON the medications listed below when I get home from the hospital:    predniSONE 20 mg oral tablet  -- 3 tab(s) by mouth once a day  -- Indication: For Shortness of breath    tamsulosin 0.4 mg oral capsule  -- 1 cap(s) by mouth once a day  -- Indication: For BPH (benign prostatic hyperplasia)    Cardizem  mg/24 hours oral capsule, extended release  -- 1 cap(s) by mouth once a day  -- It is very important that you take or use this exactly as directed.  Do not skip doses or discontinue unless directed by your doctor.  Some non-prescription drugs may aggravate your condition.  Read all labels carefully.  If a warning appears, check with your doctor before taking.  Swallow whole.  Do not crush.    -- Indication: For Paroxysmal atrial fibrillation    prochlorperazine 10 mg oral tablet  -- 1 tab(s) by mouth once, As needed, Depression/anxiety  -- Indication: For Need for prophylactic measure    allopurinol 300 mg oral tablet  -- 1 tab(s) by mouth once a day  -- Indication: For Need for prophylactic measure    Stiolto Respimat 2.5 mcg-2.5 mcg/inh inhalation aerosol  -- 2 puff(s) inhaled every 24 hours  -- Indication: For Shortness of breath    senna oral tablet  -- 2 tab(s) by mouth once a day (at bedtime)  -- Indication: For Need for prophylactic measure    docusate sodium 100 mg oral capsule  -- 1 cap(s) by mouth 3 times a day  -- Indication: For Need for prophylactic measure    simethicone 80 mg oral tablet, chewable  -- 1 tab(s) by mouth every 6 hours, As needed, Indigestion  -- Indication: For Need for prophylactic measure    pantoprazole 40 mg oral delayed release tablet  -- 1 tab(s) by mouth once a day (before a meal)  -- Indication: For Need for prophylactic measure    nicotine 7 mg/24 hr transdermal film, extended release  --  by transdermal patch   -- Indication: For Need for prophylactic measure    Centrum oral tablet  -- 1 tab(s) by mouth once a day   -- Indication: For Need for prophylactic measure

## 2018-05-11 NOTE — DISCHARGE NOTE ADULT - PATIENT PORTAL LINK FT
You can access the [facility] Patient Portal, offered by [facility], by registering with the following website: [email or web address]

## 2018-05-11 NOTE — PROGRESS NOTE ADULT - ASSESSMENT
sob  has right sided crackles  imaging evidence of PNA  anbx   improving     PAF  off a/c due to frequent nose bleeds  cont cardizem     anemia  Monitor hemoglobin, transfuse as needed.

## 2018-05-11 NOTE — DISCHARGE NOTE ADULT - CARE PROVIDER_API CALL
Robson Foster), Surgery; Thoracic Surgery  [address] [place] [address]  Phone: [phone]  Fax: [phone]    Pravin Feliciano; MBBS), Hematology; \[facility]\""ative Medicine; Medical Oncology  [address] [place] [id number]  Phone: [phone]  Fax: [phone]

## 2018-05-11 NOTE — PROGRESS NOTE ADULT - PROBLEM SELECTOR PLAN 5
- [id number] score 3 and on rate control with Cardizem 360 mg daily  - taken off eliquis  by his cardiologist for recurrent epistaxis. Patient currently not in AFib per EKG.  - Decreased Cardizem from 360 mg daily to 300 mg daily given borderline bradycardia (~58-59 bpm) and appropriate BP. Trend HR.  - Holding Eliquis as per patient's discussions with cardiologist. D/w patient risks of being off anticoagulation given history of AFib.
- [id number] score 3 and on rate control with Cardizem 360 mg daily  - taken off eliquis  by his cardiologist for recurrent epistaxis  - cards recs appreciated

## 2018-05-14 ENCOUNTER — OUTPATIENT (OUTPATIENT)
Dept: OUTPATIENT SERVICES | Facility: HOSPITAL | Age: 73
LOS: 1 days | Discharge: HOME | End: 2018-05-14

## 2018-05-14 DIAGNOSIS — Z98.890 OTHER SPECIFIED POSTPROCEDURAL STATES: Chronic | ICD-10-CM

## 2018-05-14 DIAGNOSIS — C34.90 MALIGNANT NEOPLASM OF UNSPECIFIED PART OF UNSPECIFIED BRONCHUS OR LUNG: ICD-10-CM

## 2018-05-14 DIAGNOSIS — Z90.49 ACQUIRED ABSENCE OF OTHER SPECIFIED PARTS OF DIGESTIVE TRACT: Chronic | ICD-10-CM

## 2018-05-14 DIAGNOSIS — Z90.89 ACQUIRED ABSENCE OF OTHER ORGANS: Chronic | ICD-10-CM

## 2018-05-15 ENCOUNTER — LABORATORY RESULT (OUTPATIENT)
Age: 73
End: 2018-05-15

## 2018-05-15 ENCOUNTER — APPOINTMENT (OUTPATIENT)
Dept: HEMATOLOGY ONCOLOGY | Facility: CLINIC | Age: 73
End: 2018-05-15
Payer: MEDICARE

## 2018-05-15 ENCOUNTER — APPOINTMENT (OUTPATIENT)
Dept: INFUSION THERAPY | Facility: HOSPITAL | Age: 73
End: 2018-05-15

## 2018-05-15 ENCOUNTER — RESULT REVIEW (OUTPATIENT)
Age: 73
End: 2018-05-15

## 2018-05-15 VITALS
SYSTOLIC BLOOD PRESSURE: 145 MMHG | DIASTOLIC BLOOD PRESSURE: 53 MMHG | OXYGEN SATURATION: 97 % | TEMPERATURE: 98.1 F | WEIGHT: 154.98 LBS | HEART RATE: 73 BPM | BODY MASS INDEX: 21.62 KG/M2

## 2018-05-15 LAB
BACTERIA BLD CULT: SIGNIFICANT CHANGE UP
BACTERIA BLD CULT: SIGNIFICANT CHANGE UP
EOSINOPHIL # BLD AUTO: 0 K/UL — SIGNIFICANT CHANGE UP (ref 0–0.5)
EOSINOPHIL NFR BLD AUTO: 1 % — SIGNIFICANT CHANGE UP (ref 0–6)
HCT VFR BLD CALC: 25.4 % — LOW (ref 39–50)
HGB BLD-MCNC: 8.6 G/DL — LOW (ref 13–17)
LYMPHOCYTES # BLD AUTO: 2.7 K/UL — SIGNIFICANT CHANGE UP (ref 1–3.3)
LYMPHOCYTES # BLD AUTO: 26 % — SIGNIFICANT CHANGE UP (ref 13–44)
MCHC RBC-ENTMCNC: 31.1 PG — SIGNIFICANT CHANGE UP (ref 27–34)
MCHC RBC-ENTMCNC: 33.9 G/DL — SIGNIFICANT CHANGE UP (ref 32–36)
MCV RBC AUTO: 91.7 FL — SIGNIFICANT CHANGE UP (ref 80–100)
MONOCYTES # BLD AUTO: 0.3 K/UL — SIGNIFICANT CHANGE UP (ref 0–0.9)
MONOCYTES NFR BLD AUTO: 4 % — SIGNIFICANT CHANGE UP (ref 2–14)
NEUTROPHILS # BLD AUTO: 9.6 K/UL — HIGH (ref 1.8–7.4)
NEUTROPHILS NFR BLD AUTO: 68 % — SIGNIFICANT CHANGE UP (ref 43–77)
NEUTS BAND # BLD: 1 % — SIGNIFICANT CHANGE UP (ref 0–8)
PLAT MORPH BLD: NORMAL — SIGNIFICANT CHANGE UP
PLATELET # BLD AUTO: 400 K/UL — SIGNIFICANT CHANGE UP (ref 150–400)
RBC # BLD: 2.77 M/UL — LOW (ref 4.2–5.8)
RBC # FLD: 15.3 % — HIGH (ref 10.3–14.5)
RBC BLD AUTO: SIGNIFICANT CHANGE UP
WBC # BLD: 12.7 K/UL — HIGH (ref 3.8–10.5)
WBC # FLD AUTO: 12.7 K/UL — HIGH (ref 3.8–10.5)

## 2018-05-15 PROCEDURE — 99215 OFFICE O/P EST HI 40 MIN: CPT

## 2018-05-16 DIAGNOSIS — Z51.11 ENCOUNTER FOR ANTINEOPLASTIC CHEMOTHERAPY: ICD-10-CM

## 2018-05-21 ENCOUNTER — APPOINTMENT (OUTPATIENT)
Dept: GERIATRICS | Facility: CLINIC | Age: 73
End: 2018-05-21
Payer: MEDICARE

## 2018-05-21 ENCOUNTER — APPOINTMENT (OUTPATIENT)
Dept: INFUSION THERAPY | Facility: HOSPITAL | Age: 73
End: 2018-05-21

## 2018-05-21 VITALS
OXYGEN SATURATION: 97 % | TEMPERATURE: 97.8 F | WEIGHT: 153 LBS | RESPIRATION RATE: 16 BRPM | HEART RATE: 78 BPM | BODY MASS INDEX: 21.34 KG/M2 | DIASTOLIC BLOOD PRESSURE: 78 MMHG | SYSTOLIC BLOOD PRESSURE: 120 MMHG

## 2018-05-21 PROCEDURE — 99215 OFFICE O/P EST HI 40 MIN: CPT

## 2018-05-21 PROCEDURE — 99354: CPT

## 2018-05-22 ENCOUNTER — APPOINTMENT (OUTPATIENT)
Dept: CT IMAGING | Facility: IMAGING CENTER | Age: 73
End: 2018-05-22

## 2018-05-24 ENCOUNTER — APPOINTMENT (OUTPATIENT)
Dept: RHEUMATOLOGY | Facility: CLINIC | Age: 73
End: 2018-05-24
Payer: MEDICARE

## 2018-05-24 VITALS
SYSTOLIC BLOOD PRESSURE: 150 MMHG | BODY MASS INDEX: 21.42 KG/M2 | HEIGHT: 71 IN | HEART RATE: 74 BPM | WEIGHT: 153 LBS | OXYGEN SATURATION: 97 % | DIASTOLIC BLOOD PRESSURE: 67 MMHG | RESPIRATION RATE: 16 BRPM | TEMPERATURE: 97.6 F

## 2018-05-24 DIAGNOSIS — M10.9 GOUT, UNSPECIFIED: ICD-10-CM

## 2018-05-24 PROCEDURE — 99213 OFFICE O/P EST LOW 20 MIN: CPT

## 2018-06-05 ENCOUNTER — LABORATORY RESULT (OUTPATIENT)
Age: 73
End: 2018-06-05

## 2018-06-05 ENCOUNTER — RESULT REVIEW (OUTPATIENT)
Age: 73
End: 2018-06-05

## 2018-06-05 ENCOUNTER — APPOINTMENT (OUTPATIENT)
Dept: INFUSION THERAPY | Facility: HOSPITAL | Age: 73
End: 2018-06-05

## 2018-06-05 ENCOUNTER — APPOINTMENT (OUTPATIENT)
Dept: HEMATOLOGY ONCOLOGY | Facility: CLINIC | Age: 73
End: 2018-06-05
Payer: MEDICARE

## 2018-06-05 VITALS
SYSTOLIC BLOOD PRESSURE: 150 MMHG | RESPIRATION RATE: 16 BRPM | OXYGEN SATURATION: 97 % | WEIGHT: 154.32 LBS | HEART RATE: 80 BPM | DIASTOLIC BLOOD PRESSURE: 66 MMHG | BODY MASS INDEX: 21.52 KG/M2 | TEMPERATURE: 99 F

## 2018-06-05 LAB
BASOPHILS # BLD AUTO: 0.2 K/UL — SIGNIFICANT CHANGE UP (ref 0–0.2)
EOSINOPHIL # BLD AUTO: 0.3 K/UL — SIGNIFICANT CHANGE UP (ref 0–0.5)
EOSINOPHIL NFR BLD AUTO: 1 % — SIGNIFICANT CHANGE UP (ref 0–6)
HCT VFR BLD CALC: 30.9 % — LOW (ref 39–50)
HGB BLD-MCNC: 10.1 G/DL — LOW (ref 13–17)
LYMPHOCYTES # BLD AUTO: 48 % — HIGH (ref 13–44)
LYMPHOCYTES # BLD AUTO: 6 K/UL — HIGH (ref 1–3.3)
MCHC RBC-ENTMCNC: 30.1 PG — SIGNIFICANT CHANGE UP (ref 27–34)
MCHC RBC-ENTMCNC: 32.8 G/DL — SIGNIFICANT CHANGE UP (ref 32–36)
MCV RBC AUTO: 91.7 FL — SIGNIFICANT CHANGE UP (ref 80–100)
MONOCYTES # BLD AUTO: 0.7 K/UL — SIGNIFICANT CHANGE UP (ref 0–0.9)
MONOCYTES NFR BLD AUTO: 3 % — SIGNIFICANT CHANGE UP (ref 2–14)
NEUTROPHILS # BLD AUTO: 7.5 K/UL — HIGH (ref 1.8–7.4)
NEUTROPHILS NFR BLD AUTO: 48 % — SIGNIFICANT CHANGE UP (ref 43–77)
PLAT MORPH BLD: NORMAL — SIGNIFICANT CHANGE UP
PLATELET # BLD AUTO: 176 K/UL — SIGNIFICANT CHANGE UP (ref 150–400)
RBC # BLD: 3.37 M/UL — LOW (ref 4.2–5.8)
RBC # FLD: 16.2 % — HIGH (ref 10.3–14.5)
RBC BLD AUTO: SIGNIFICANT CHANGE UP
WBC # BLD: 14.7 K/UL — HIGH (ref 3.8–10.5)
WBC # FLD AUTO: 14.7 K/UL — HIGH (ref 3.8–10.5)

## 2018-06-05 PROCEDURE — 99215 OFFICE O/P EST HI 40 MIN: CPT

## 2018-06-21 ENCOUNTER — OUTPATIENT (OUTPATIENT)
Dept: OUTPATIENT SERVICES | Facility: HOSPITAL | Age: 73
LOS: 1 days | Discharge: HOME | End: 2018-06-21

## 2018-06-21 DIAGNOSIS — Z98.890 OTHER SPECIFIED POSTPROCEDURAL STATES: Chronic | ICD-10-CM

## 2018-06-21 DIAGNOSIS — Z90.89 ACQUIRED ABSENCE OF OTHER ORGANS: Chronic | ICD-10-CM

## 2018-06-21 DIAGNOSIS — C34.90 MALIGNANT NEOPLASM OF UNSPECIFIED PART OF UNSPECIFIED BRONCHUS OR LUNG: ICD-10-CM

## 2018-06-21 DIAGNOSIS — Z90.49 ACQUIRED ABSENCE OF OTHER SPECIFIED PARTS OF DIGESTIVE TRACT: Chronic | ICD-10-CM

## 2018-06-26 ENCOUNTER — LABORATORY RESULT (OUTPATIENT)
Age: 73
End: 2018-06-26

## 2018-06-26 ENCOUNTER — RESULT REVIEW (OUTPATIENT)
Age: 73
End: 2018-06-26

## 2018-06-26 ENCOUNTER — APPOINTMENT (OUTPATIENT)
Dept: INFUSION THERAPY | Facility: HOSPITAL | Age: 73
End: 2018-06-26

## 2018-06-26 LAB
BASOPHILS # BLD AUTO: 0.1 K/UL — SIGNIFICANT CHANGE UP (ref 0–0.2)
BASOPHILS NFR BLD AUTO: 0.5 % — SIGNIFICANT CHANGE UP (ref 0–2)
EOSINOPHIL # BLD AUTO: 0.1 K/UL — SIGNIFICANT CHANGE UP (ref 0–0.5)
EOSINOPHIL NFR BLD AUTO: 0.7 % — SIGNIFICANT CHANGE UP (ref 0–6)
HCT VFR BLD CALC: 30.6 % — LOW (ref 39–50)
HGB BLD-MCNC: 9.6 G/DL — LOW (ref 13–17)
LYMPHOCYTES # BLD AUTO: 22.4 % — SIGNIFICANT CHANGE UP (ref 13–44)
LYMPHOCYTES # BLD AUTO: 3.1 K/UL — SIGNIFICANT CHANGE UP (ref 1–3.3)
MCHC RBC-ENTMCNC: 28 PG — SIGNIFICANT CHANGE UP (ref 27–34)
MCHC RBC-ENTMCNC: 31.2 G/DL — LOW (ref 32–36)
MCV RBC AUTO: 89.8 FL — SIGNIFICANT CHANGE UP (ref 80–100)
MONOCYTES # BLD AUTO: 0.7 K/UL — SIGNIFICANT CHANGE UP (ref 0–0.9)
MONOCYTES NFR BLD AUTO: 4.8 % — SIGNIFICANT CHANGE UP (ref 2–14)
NEUTROPHILS # BLD AUTO: 9.8 K/UL — HIGH (ref 1.8–7.4)
NEUTROPHILS NFR BLD AUTO: 71.6 % — SIGNIFICANT CHANGE UP (ref 43–77)
PLATELET # BLD AUTO: 345 K/UL — SIGNIFICANT CHANGE UP (ref 150–400)
RBC # BLD: 3.41 M/UL — LOW (ref 4.2–5.8)
RBC # FLD: 15.7 % — HIGH (ref 10.3–14.5)
WBC # BLD: 13.6 K/UL — HIGH (ref 3.8–10.5)
WBC # FLD AUTO: 13.6 K/UL — HIGH (ref 3.8–10.5)

## 2018-06-27 DIAGNOSIS — Z51.11 ENCOUNTER FOR ANTINEOPLASTIC CHEMOTHERAPY: ICD-10-CM

## 2018-06-28 RX ORDER — ALLOPURINOL 300 MG/1
300 TABLET ORAL DAILY
Qty: 90 | Refills: 3 | Status: ACTIVE | COMMUNITY
Start: 2017-01-12 | End: 1900-01-01

## 2018-07-08 ENCOUNTER — FORM ENCOUNTER (OUTPATIENT)
Age: 73
End: 2018-07-08

## 2018-07-09 ENCOUNTER — APPOINTMENT (OUTPATIENT)
Dept: NUCLEAR MEDICINE | Facility: IMAGING CENTER | Age: 73
End: 2018-07-09
Payer: MEDICARE

## 2018-07-09 ENCOUNTER — OUTPATIENT (OUTPATIENT)
Dept: OUTPATIENT SERVICES | Facility: HOSPITAL | Age: 73
LOS: 1 days | End: 2018-07-09
Payer: MEDICARE

## 2018-07-09 DIAGNOSIS — Z98.890 OTHER SPECIFIED POSTPROCEDURAL STATES: Chronic | ICD-10-CM

## 2018-07-09 DIAGNOSIS — Z90.49 ACQUIRED ABSENCE OF OTHER SPECIFIED PARTS OF DIGESTIVE TRACT: Chronic | ICD-10-CM

## 2018-07-09 DIAGNOSIS — C78.00 SECONDARY MALIGNANT NEOPLASM OF UNSPECIFIED LUNG: ICD-10-CM

## 2018-07-09 DIAGNOSIS — Z90.89 ACQUIRED ABSENCE OF OTHER ORGANS: Chronic | ICD-10-CM

## 2018-07-09 PROCEDURE — 78815 PET IMAGE W/CT SKULL-THIGH: CPT | Mod: 26,PS

## 2018-07-09 PROCEDURE — 78815 PET IMAGE W/CT SKULL-THIGH: CPT

## 2018-07-09 PROCEDURE — A9552: CPT

## 2018-07-10 ENCOUNTER — APPOINTMENT (OUTPATIENT)
Dept: THORACIC SURGERY | Facility: CLINIC | Age: 73
End: 2018-07-10
Payer: MEDICARE

## 2018-07-10 VITALS
SYSTOLIC BLOOD PRESSURE: 142 MMHG | RESPIRATION RATE: 16 BRPM | OXYGEN SATURATION: 97 % | HEART RATE: 107 BPM | BODY MASS INDEX: 21.62 KG/M2 | WEIGHT: 155 LBS | DIASTOLIC BLOOD PRESSURE: 74 MMHG

## 2018-07-10 PROCEDURE — 99213 OFFICE O/P EST LOW 20 MIN: CPT

## 2018-07-16 ENCOUNTER — APPOINTMENT (OUTPATIENT)
Dept: GERIATRICS | Facility: CLINIC | Age: 73
End: 2018-07-16
Payer: MEDICARE

## 2018-07-16 VITALS
DIASTOLIC BLOOD PRESSURE: 70 MMHG | OXYGEN SATURATION: 98 % | RESPIRATION RATE: 15 BRPM | TEMPERATURE: 98.1 F | HEIGHT: 71 IN | WEIGHT: 160.25 LBS | BODY MASS INDEX: 22.44 KG/M2 | SYSTOLIC BLOOD PRESSURE: 120 MMHG | HEART RATE: 84 BPM

## 2018-07-16 DIAGNOSIS — Z72.0 TOBACCO USE: ICD-10-CM

## 2018-07-16 DIAGNOSIS — I10 ESSENTIAL (PRIMARY) HYPERTENSION: ICD-10-CM

## 2018-07-16 DIAGNOSIS — C34.90 MALIGNANT NEOPLASM OF UNSPECIFIED PART OF UNSPECIFIED BRONCHUS OR LUNG: ICD-10-CM

## 2018-07-16 DIAGNOSIS — I48.0 PAROXYSMAL ATRIAL FIBRILLATION: ICD-10-CM

## 2018-07-16 PROBLEM — R59.0 LOCALIZED ENLARGED LYMPH NODES: Chronic | Status: ACTIVE | Noted: 2017-07-31

## 2018-07-16 PROBLEM — R91.1 SOLITARY PULMONARY NODULE: Chronic | Status: ACTIVE | Noted: 2017-07-31

## 2018-07-16 PROBLEM — G45.9 TRANSIENT CEREBRAL ISCHEMIC ATTACK, UNSPECIFIED: Chronic | Status: ACTIVE | Noted: 2017-08-16

## 2018-07-16 PROCEDURE — 99214 OFFICE O/P EST MOD 30 MIN: CPT

## 2018-07-16 RX ORDER — DOCUSATE SODIUM 100 MG/1
100 CAPSULE ORAL
Qty: 180 | Refills: 0 | Status: ACTIVE | COMMUNITY
Start: 2018-07-16

## 2018-07-16 RX ORDER — PREDNISONE 20 MG/1
20 TABLET ORAL
Qty: 60 | Refills: 3 | Status: DISCONTINUED | COMMUNITY
Start: 2018-05-16 | End: 2018-07-16

## 2018-07-17 ENCOUNTER — LABORATORY RESULT (OUTPATIENT)
Age: 73
End: 2018-07-17

## 2018-07-17 ENCOUNTER — RESULT REVIEW (OUTPATIENT)
Age: 73
End: 2018-07-17

## 2018-07-17 ENCOUNTER — APPOINTMENT (OUTPATIENT)
Dept: HEMATOLOGY ONCOLOGY | Facility: CLINIC | Age: 73
End: 2018-07-17
Payer: MEDICARE

## 2018-07-17 ENCOUNTER — APPOINTMENT (OUTPATIENT)
Dept: INFUSION THERAPY | Facility: HOSPITAL | Age: 73
End: 2018-07-17

## 2018-07-17 VITALS
RESPIRATION RATE: 16 BRPM | WEIGHT: 159.99 LBS | DIASTOLIC BLOOD PRESSURE: 63 MMHG | TEMPERATURE: 99.5 F | OXYGEN SATURATION: 94 % | BODY MASS INDEX: 22.32 KG/M2 | HEART RATE: 77 BPM | SYSTOLIC BLOOD PRESSURE: 163 MMHG

## 2018-07-17 DIAGNOSIS — R53.83 OTHER FATIGUE: ICD-10-CM

## 2018-07-17 DIAGNOSIS — D64.9 ANEMIA, UNSPECIFIED: ICD-10-CM

## 2018-07-17 DIAGNOSIS — R59.0 LOCALIZED ENLARGED LYMPH NODES: ICD-10-CM

## 2018-07-17 LAB
BASOPHILS # BLD AUTO: 0.1 K/UL — SIGNIFICANT CHANGE UP (ref 0–0.2)
BASOPHILS NFR BLD AUTO: 0.4 % — SIGNIFICANT CHANGE UP (ref 0–2)
EOSINOPHIL # BLD AUTO: 0.2 K/UL — SIGNIFICANT CHANGE UP (ref 0–0.5)
EOSINOPHIL NFR BLD AUTO: 1.6 % — SIGNIFICANT CHANGE UP (ref 0–6)
HCT VFR BLD CALC: 29.6 % — LOW (ref 39–50)
HGB BLD-MCNC: 10.1 G/DL — LOW (ref 13–17)
LYMPHOCYTES # BLD AUTO: 22.3 % — SIGNIFICANT CHANGE UP (ref 13–44)
LYMPHOCYTES # BLD AUTO: 3.5 K/UL — HIGH (ref 1–3.3)
MCHC RBC-ENTMCNC: 29.6 PG — SIGNIFICANT CHANGE UP (ref 27–34)
MCHC RBC-ENTMCNC: 34 G/DL — SIGNIFICANT CHANGE UP (ref 32–36)
MCV RBC AUTO: 87.2 FL — SIGNIFICANT CHANGE UP (ref 80–100)
MONOCYTES # BLD AUTO: 1 K/UL — HIGH (ref 0–0.9)
MONOCYTES NFR BLD AUTO: 6.5 % — SIGNIFICANT CHANGE UP (ref 2–14)
NEUTROPHILS # BLD AUTO: 10.9 K/UL — HIGH (ref 1.8–7.4)
NEUTROPHILS NFR BLD AUTO: 69.3 % — SIGNIFICANT CHANGE UP (ref 43–77)
PLATELET # BLD AUTO: 373 K/UL — SIGNIFICANT CHANGE UP (ref 150–400)
RBC # BLD: 3.4 M/UL — LOW (ref 4.2–5.8)
RBC # FLD: 15.7 % — HIGH (ref 10.3–14.5)
WBC # BLD: 15.7 K/UL — HIGH (ref 3.8–10.5)
WBC # FLD AUTO: 15.7 K/UL — HIGH (ref 3.8–10.5)

## 2018-07-17 PROCEDURE — 99215 OFFICE O/P EST HI 40 MIN: CPT

## 2018-07-24 ENCOUNTER — APPOINTMENT (OUTPATIENT)
Dept: OTOLARYNGOLOGY | Facility: CLINIC | Age: 73
End: 2018-07-24

## 2018-07-27 ENCOUNTER — OUTPATIENT (OUTPATIENT)
Dept: OUTPATIENT SERVICES | Facility: HOSPITAL | Age: 73
LOS: 1 days | Discharge: HOME | End: 2018-07-27

## 2018-07-27 DIAGNOSIS — Z98.890 OTHER SPECIFIED POSTPROCEDURAL STATES: Chronic | ICD-10-CM

## 2018-07-27 DIAGNOSIS — Z90.49 ACQUIRED ABSENCE OF OTHER SPECIFIED PARTS OF DIGESTIVE TRACT: Chronic | ICD-10-CM

## 2018-07-27 DIAGNOSIS — Z90.89 ACQUIRED ABSENCE OF OTHER ORGANS: Chronic | ICD-10-CM

## 2018-07-27 DIAGNOSIS — C34.90 MALIGNANT NEOPLASM OF UNSPECIFIED PART OF UNSPECIFIED BRONCHUS OR LUNG: ICD-10-CM

## 2018-07-30 ENCOUNTER — FORM ENCOUNTER (OUTPATIENT)
Age: 73
End: 2018-07-30

## 2018-07-31 ENCOUNTER — APPOINTMENT (OUTPATIENT)
Dept: RADIOLOGY | Facility: IMAGING CENTER | Age: 73
End: 2018-07-31
Payer: MEDICARE

## 2018-07-31 ENCOUNTER — APPOINTMENT (OUTPATIENT)
Dept: HEMATOLOGY ONCOLOGY | Facility: CLINIC | Age: 73
End: 2018-07-31
Payer: MEDICARE

## 2018-07-31 ENCOUNTER — OUTPATIENT (OUTPATIENT)
Dept: OUTPATIENT SERVICES | Facility: HOSPITAL | Age: 73
LOS: 1 days | End: 2018-07-31
Payer: MEDICARE

## 2018-07-31 ENCOUNTER — APPOINTMENT (OUTPATIENT)
Dept: OTOLARYNGOLOGY | Facility: CLINIC | Age: 73
End: 2018-07-31

## 2018-07-31 VITALS
OXYGEN SATURATION: 93 % | DIASTOLIC BLOOD PRESSURE: 78 MMHG | HEART RATE: 91 BPM | TEMPERATURE: 98 F | RESPIRATION RATE: 16 BRPM | SYSTOLIC BLOOD PRESSURE: 161 MMHG | BODY MASS INDEX: 21.52 KG/M2 | WEIGHT: 154.32 LBS

## 2018-07-31 DIAGNOSIS — Z98.890 OTHER SPECIFIED POSTPROCEDURAL STATES: Chronic | ICD-10-CM

## 2018-07-31 DIAGNOSIS — C78.00 SECONDARY MALIGNANT NEOPLASM OF UNSPECIFIED LUNG: ICD-10-CM

## 2018-07-31 DIAGNOSIS — Z90.49 ACQUIRED ABSENCE OF OTHER SPECIFIED PARTS OF DIGESTIVE TRACT: Chronic | ICD-10-CM

## 2018-07-31 DIAGNOSIS — Z90.89 ACQUIRED ABSENCE OF OTHER ORGANS: Chronic | ICD-10-CM

## 2018-07-31 PROCEDURE — 99214 OFFICE O/P EST MOD 30 MIN: CPT

## 2018-07-31 PROCEDURE — 71046 X-RAY EXAM CHEST 2 VIEWS: CPT

## 2018-07-31 PROCEDURE — 71046 X-RAY EXAM CHEST 2 VIEWS: CPT | Mod: 26

## 2018-07-31 RX ORDER — PREDNISONE 20 MG/1
20 TABLET ORAL DAILY
Qty: 90 | Refills: 0 | Status: ACTIVE | COMMUNITY
Start: 2018-07-31 | End: 1900-01-01

## 2018-08-03 ENCOUNTER — APPOINTMENT (OUTPATIENT)
Dept: GERIATRICS | Facility: CLINIC | Age: 73
End: 2018-08-03
Payer: MEDICARE

## 2018-08-03 VITALS
HEART RATE: 67 BPM | WEIGHT: 158.73 LBS | DIASTOLIC BLOOD PRESSURE: 61 MMHG | TEMPERATURE: 98.3 F | SYSTOLIC BLOOD PRESSURE: 152 MMHG | RESPIRATION RATE: 16 BRPM | BODY MASS INDEX: 22.14 KG/M2 | OXYGEN SATURATION: 94 %

## 2018-08-03 DIAGNOSIS — K59.00 CONSTIPATION, UNSPECIFIED: ICD-10-CM

## 2018-08-03 DIAGNOSIS — Z51.5 ENCOUNTER FOR PALLIATIVE CARE: ICD-10-CM

## 2018-08-03 DIAGNOSIS — C34.91 MALIGNANT NEOPLASM OF UNSPECIFIED PART OF RIGHT BRONCHUS OR LUNG: ICD-10-CM

## 2018-08-03 DIAGNOSIS — F32.9 MAJOR DEPRESSIVE DISORDER, SINGLE EPISODE, UNSPECIFIED: ICD-10-CM

## 2018-08-03 PROCEDURE — 99215 OFFICE O/P EST HI 40 MIN: CPT

## 2018-08-07 ENCOUNTER — RESULT REVIEW (OUTPATIENT)
Age: 73
End: 2018-08-07

## 2018-08-07 ENCOUNTER — LABORATORY RESULT (OUTPATIENT)
Age: 73
End: 2018-08-07

## 2018-08-07 ENCOUNTER — APPOINTMENT (OUTPATIENT)
Dept: INFUSION THERAPY | Facility: HOSPITAL | Age: 73
End: 2018-08-07

## 2018-08-07 ENCOUNTER — APPOINTMENT (OUTPATIENT)
Dept: HEMATOLOGY ONCOLOGY | Facility: CLINIC | Age: 73
End: 2018-08-07
Payer: MEDICARE

## 2018-08-07 VITALS
BODY MASS INDEX: 21.83 KG/M2 | RESPIRATION RATE: 16 BRPM | WEIGHT: 156.53 LBS | TEMPERATURE: 97.9 F | HEART RATE: 80 BPM | SYSTOLIC BLOOD PRESSURE: 156 MMHG | DIASTOLIC BLOOD PRESSURE: 68 MMHG | OXYGEN SATURATION: 93 %

## 2018-08-07 DIAGNOSIS — F41.9 ANXIETY DISORDER, UNSPECIFIED: ICD-10-CM

## 2018-08-07 LAB
BASOPHILS # BLD AUTO: 0.1 K/UL — SIGNIFICANT CHANGE UP (ref 0–0.2)
EOSINOPHIL # BLD AUTO: 0.2 K/UL — SIGNIFICANT CHANGE UP (ref 0–0.5)
EOSINOPHIL NFR BLD AUTO: 1 % — SIGNIFICANT CHANGE UP (ref 0–6)
HCT VFR BLD CALC: 31.8 % — LOW (ref 39–50)
HGB BLD-MCNC: 10.2 G/DL — LOW (ref 13–17)
LYMPHOCYTES # BLD AUTO: 21 % — SIGNIFICANT CHANGE UP (ref 13–44)
LYMPHOCYTES # BLD AUTO: 8.5 K/UL — HIGH (ref 1–3.3)
MCHC RBC-ENTMCNC: 27.8 PG — SIGNIFICANT CHANGE UP (ref 27–34)
MCHC RBC-ENTMCNC: 32 G/DL — SIGNIFICANT CHANGE UP (ref 32–36)
MCV RBC AUTO: 86.9 FL — SIGNIFICANT CHANGE UP (ref 80–100)
METAMYELOCYTES # FLD: 1 % — HIGH (ref 0–0)
MONOCYTES # BLD AUTO: 1.4 K/UL — HIGH (ref 0–0.9)
MONOCYTES NFR BLD AUTO: 8 % — SIGNIFICANT CHANGE UP (ref 2–14)
NEUTROPHILS # BLD AUTO: 17.1 K/UL — HIGH (ref 1.8–7.4)
NEUTROPHILS NFR BLD AUTO: 68 % — SIGNIFICANT CHANGE UP (ref 43–77)
NEUTS BAND # BLD: 1 % — SIGNIFICANT CHANGE UP (ref 0–8)
PLAT MORPH BLD: NORMAL — SIGNIFICANT CHANGE UP
PLATELET # BLD AUTO: 438 K/UL — HIGH (ref 150–400)
RBC # BLD: 3.66 M/UL — LOW (ref 4.2–5.8)
RBC # FLD: 16.1 % — HIGH (ref 10.3–14.5)
RBC BLD AUTO: SIGNIFICANT CHANGE UP
WBC # BLD: 27.3 K/UL — HIGH (ref 3.8–10.5)
WBC # FLD AUTO: 27.3 K/UL — HIGH (ref 3.8–10.5)

## 2018-08-07 PROCEDURE — 99215 OFFICE O/P EST HI 40 MIN: CPT

## 2018-08-07 RX ORDER — CITALOPRAM HYDROBROMIDE 20 MG/1
20 TABLET, FILM COATED ORAL DAILY
Qty: 30 | Refills: 6 | Status: ACTIVE | COMMUNITY
Start: 2018-04-25 | End: 1900-01-01

## 2018-08-07 RX ORDER — ALPRAZOLAM 0.25 MG/1
0.25 TABLET ORAL
Qty: 60 | Refills: 0 | Status: ACTIVE | COMMUNITY
Start: 2018-08-07 | End: 1900-01-01

## 2018-08-08 DIAGNOSIS — Z51.11 ENCOUNTER FOR ANTINEOPLASTIC CHEMOTHERAPY: ICD-10-CM

## 2018-08-09 PROBLEM — K59.00 CONSTIPATION: Status: ACTIVE | Noted: 2018-05-24

## 2018-08-09 PROBLEM — F32.9 DEPRESSION: Status: ACTIVE | Noted: 2017-12-11

## 2018-08-09 PROBLEM — Z51.5 ENCOUNTER FOR PALLIATIVE CARE: Status: ACTIVE | Noted: 2018-05-24

## 2018-08-09 PROBLEM — C34.91 NON-SMALL CELL CANCER OF RIGHT LUNG: Status: ACTIVE | Noted: 2017-09-24

## 2018-08-09 NOTE — ASU PATIENT PROFILE, ADULT - BLOOD AVOIDANCE/RESTRICTIONS, PROFILE
none Transposition Flap Text: The defect edges were debeveled with a #15 scalpel blade.  Given the location of the defect and the proximity to free margins a transposition flap was deemed most appropriate.  Using a sterile surgical marker, an appropriate transposition flap was drawn incorporating the defect.    The area thus outlined was incised deep to adipose tissue with a #15 scalpel blade.  The skin margins were undermined to an appropriate distance in all directions utilizing iris scissors.

## 2018-08-16 ENCOUNTER — OUTPATIENT (OUTPATIENT)
Dept: OUTPATIENT SERVICES | Facility: HOSPITAL | Age: 73
LOS: 1 days | Discharge: HOME | End: 2018-08-16

## 2018-08-16 ENCOUNTER — OTHER (OUTPATIENT)
Age: 73
End: 2018-08-16

## 2018-08-16 DIAGNOSIS — Z90.89 ACQUIRED ABSENCE OF OTHER ORGANS: Chronic | ICD-10-CM

## 2018-08-16 DIAGNOSIS — Z90.49 ACQUIRED ABSENCE OF OTHER SPECIFIED PARTS OF DIGESTIVE TRACT: Chronic | ICD-10-CM

## 2018-08-16 DIAGNOSIS — Z98.890 OTHER SPECIFIED POSTPROCEDURAL STATES: Chronic | ICD-10-CM

## 2018-08-16 DIAGNOSIS — C34.90 MALIGNANT NEOPLASM OF UNSPECIFIED PART OF UNSPECIFIED BRONCHUS OR LUNG: ICD-10-CM

## 2018-08-18 ENCOUNTER — INPATIENT (INPATIENT)
Facility: HOSPITAL | Age: 73
LOS: 2 days | Discharge: HOME HEALTH | End: 2018-08-21
Attending: HOSPITALIST | Admitting: HOSPITALIST
Payer: MEDICARE

## 2018-08-18 VITALS
HEART RATE: 85 BPM | OXYGEN SATURATION: 100 % | RESPIRATION RATE: 22 BRPM | SYSTOLIC BLOOD PRESSURE: 139 MMHG | TEMPERATURE: 99 F | DIASTOLIC BLOOD PRESSURE: 64 MMHG

## 2018-08-18 DIAGNOSIS — Z90.49 ACQUIRED ABSENCE OF OTHER SPECIFIED PARTS OF DIGESTIVE TRACT: Chronic | ICD-10-CM

## 2018-08-18 DIAGNOSIS — Z98.890 OTHER SPECIFIED POSTPROCEDURAL STATES: Chronic | ICD-10-CM

## 2018-08-18 DIAGNOSIS — Z90.89 ACQUIRED ABSENCE OF OTHER ORGANS: Chronic | ICD-10-CM

## 2018-08-18 LAB
ALBUMIN SERPL ELPH-MCNC: 2.8 G/DL — LOW (ref 3.3–5)
ALP SERPL-CCNC: 115 U/L — SIGNIFICANT CHANGE UP (ref 40–120)
ALT FLD-CCNC: 16 U/L — SIGNIFICANT CHANGE UP (ref 4–41)
AST SERPL-CCNC: 18 U/L — SIGNIFICANT CHANGE UP (ref 4–40)
BASOPHILS # BLD AUTO: 0.05 K/UL — SIGNIFICANT CHANGE UP (ref 0–0.2)
BASOPHILS NFR BLD AUTO: 0.3 % — SIGNIFICANT CHANGE UP (ref 0–2)
BILIRUB SERPL-MCNC: 0.4 MG/DL — SIGNIFICANT CHANGE UP (ref 0.2–1.2)
BUN SERPL-MCNC: 25 MG/DL — HIGH (ref 7–23)
CALCIUM SERPL-MCNC: 8.7 MG/DL — SIGNIFICANT CHANGE UP (ref 8.4–10.5)
CHLORIDE SERPL-SCNC: 96 MMOL/L — LOW (ref 98–107)
CO2 SERPL-SCNC: 22 MMOL/L — SIGNIFICANT CHANGE UP (ref 22–31)
CREAT SERPL-MCNC: 1.21 MG/DL — SIGNIFICANT CHANGE UP (ref 0.5–1.3)
EOSINOPHIL # BLD AUTO: 0.03 K/UL — SIGNIFICANT CHANGE UP (ref 0–0.5)
EOSINOPHIL NFR BLD AUTO: 0.2 % — SIGNIFICANT CHANGE UP (ref 0–6)
GLUCOSE SERPL-MCNC: 189 MG/DL — HIGH (ref 70–99)
HCT VFR BLD CALC: 32.4 % — LOW (ref 39–50)
HGB BLD-MCNC: 10.3 G/DL — LOW (ref 13–17)
IMM GRANULOCYTES # BLD AUTO: 0.53 # — SIGNIFICANT CHANGE UP
IMM GRANULOCYTES NFR BLD AUTO: 2.8 % — HIGH (ref 0–1.5)
LYMPHOCYTES # BLD AUTO: 17.6 % — SIGNIFICANT CHANGE UP (ref 13–44)
LYMPHOCYTES # BLD AUTO: 3.37 K/UL — HIGH (ref 1–3.3)
MCHC RBC-ENTMCNC: 27.8 PG — SIGNIFICANT CHANGE UP (ref 27–34)
MCHC RBC-ENTMCNC: 31.8 % — LOW (ref 32–36)
MCV RBC AUTO: 87.6 FL — SIGNIFICANT CHANGE UP (ref 80–100)
MONOCYTES # BLD AUTO: 0.67 K/UL — SIGNIFICANT CHANGE UP (ref 0–0.9)
MONOCYTES NFR BLD AUTO: 3.5 % — SIGNIFICANT CHANGE UP (ref 2–14)
NEUTROPHILS # BLD AUTO: 14.55 K/UL — HIGH (ref 1.8–7.4)
NEUTROPHILS NFR BLD AUTO: 75.6 % — SIGNIFICANT CHANGE UP (ref 43–77)
NRBC # FLD: 0 — SIGNIFICANT CHANGE UP
PLATELET # BLD AUTO: 281 K/UL — SIGNIFICANT CHANGE UP (ref 150–400)
PMV BLD: 10.4 FL — SIGNIFICANT CHANGE UP (ref 7–13)
POTASSIUM SERPL-MCNC: 5.2 MMOL/L — SIGNIFICANT CHANGE UP (ref 3.5–5.3)
POTASSIUM SERPL-SCNC: 5.2 MMOL/L — SIGNIFICANT CHANGE UP (ref 3.5–5.3)
PROT SERPL-MCNC: 5.9 G/DL — LOW (ref 6–8.3)
RBC # BLD: 3.7 M/UL — LOW (ref 4.2–5.8)
RBC # FLD: 17.1 % — HIGH (ref 10.3–14.5)
SODIUM SERPL-SCNC: 132 MMOL/L — LOW (ref 135–145)
TROPONIN T, HIGH SENSITIVITY: 16 NG/L — SIGNIFICANT CHANGE UP (ref ?–14)
WBC # BLD: 19.2 K/UL — HIGH (ref 3.8–10.5)
WBC # FLD AUTO: 19.2 K/UL — HIGH (ref 3.8–10.5)

## 2018-08-18 PROCEDURE — 71046 X-RAY EXAM CHEST 2 VIEWS: CPT | Mod: 26

## 2018-08-18 PROCEDURE — 71275 CT ANGIOGRAPHY CHEST: CPT | Mod: 26

## 2018-08-18 RX ORDER — SODIUM CHLORIDE 9 MG/ML
1000 INJECTION INTRAMUSCULAR; INTRAVENOUS; SUBCUTANEOUS ONCE
Qty: 0 | Refills: 0 | Status: COMPLETED | OUTPATIENT
Start: 2018-08-18 | End: 2018-08-18

## 2018-08-18 RX ADMIN — SODIUM CHLORIDE 1000 MILLILITER(S): 9 INJECTION INTRAMUSCULAR; INTRAVENOUS; SUBCUTANEOUS at 19:20

## 2018-08-18 NOTE — ED PROVIDER NOTE - ATTENDING CONTRIBUTION TO CARE
I performed a face to face history and physical exam of the patient and discussed their management with the resident. I reviewed the resident's note and agree with the documented findings and plan of care. 74 y/o male with Stage 4 lung Cancer, s/p RUL lobectomy, hx of R pleural effusion, HTN, CKDIII, p/w dyspnea, pt states that he feels weakness, fatigue today, then SOB, no fever, no chills, no chest pain, on exam pt hypoxic sat 90%, R lung decreased breath sounds, RRR, abd:soft nt/nd, ext:-c/c/e 1) CBC, CMP, CE, Troponin, VBG 2)EKG, CXR 3)CTA 4)admit

## 2018-08-18 NOTE — ED ADULT NURSE NOTE - CHIEF COMPLAINT QUOTE
Stage 4 lung CA pt previous pleural effusions requiring thoracentesis. pt reports SOB x 3 days however able to speak full sentences. LS crackles on the r side which is site of effusions

## 2018-08-18 NOTE — ED ADULT NURSE NOTE - NSIMPLEMENTINTERV_GEN_ALL_ED
Implemented All Universal Safety Interventions:  [place] to call system. Call bell, personal items and telephone within reach. Instruct patient to call for assistance. Room bathroom lighting operational. Non-slip footwear when patient is off stretcher. Physically safe environment: no spills, clutter or unnecessary equipment. Stretcher in lowest position, wheels locked, appropriate side rails in place.

## 2018-08-18 NOTE — ED PROVIDER NOTE - OBJECTIVE STATEMENT
73M h/o lung SCC (Stage 4,) s/p RUL lobectomy, R pleural effusion, paroxysmal AFib (diltiazem), HTN, CKDIII presenting with dyspnea. Notes dyspnea on exertion for past 3 days a/w fatigue and malaise. Denies F, cough, CP, abd pain, N/V/D, recent travel, sick contacts.

## 2018-08-18 NOTE — ED ADULT NURSE NOTE - OBJECTIVE STATEMENT
Pt presents to rm 13, A&Ox3, ambulatory at baseline w/ assistance, pmhx of lung cancer, s/p lobectomy, resolved afib-no longer on a/c and HTN, last immunotherapy 2wks ago, here for evaluation of worsening SOB, believes symptoms are caused by immunotherapy, SpO2 on room air 91% slight increase in work of breathing, placed on 2L NC SpO2 94%, clear lung sounds. Denies chest pain, palpitations, diaphoresis, headaches, fevers, dizziness, nausea, vomiting, diarrhea, or urinary symptoms at this time. IV established in left AC with a 20G, labs drawn and sent, call bell in reach, warm blanket provided, bed in lowest position, side rails up x2, MD evaluation in progress, pt on telemetry- NSR@79. Will continue to monitor.

## 2018-08-18 NOTE — ED PROVIDER NOTE - MEDICAL DECISION MAKING DETAILS
73M w/ above history p/w dyspnea on exertion, fatigue, malaise for past 3 days, no assoc fever/cough, CP, concerning for repeat pleural effusion vs PNA  -labs, xr, ekg 73M w/ above history p/w dyspnea on exertion, fatigue, malaise for past 3 days, no assoc fever/cough, CP, concerning for repeat pleural effusion vs PNA  -labs (low sodium, no change from last time, alb 2.8, creatinine stable), xr, ekg (no remarkable finding) 73M w/ above history p/w dyspnea on exertion, fatigue, malaise for past 3 days, no assoc fever/cough, CP, concerning for repeat pleural effusion, PNA, vs PE.  -labs (low sodium, no change from last time, alb 2.8, creatinine stable), xr, ekg (no remarkable finding)

## 2018-08-18 NOTE — ED PROVIDER NOTE - TOBACCO USE
no loss of consciousness, no gait abnormality, no headache, no sensory deficits, and no weakness. Former smoker

## 2018-08-19 DIAGNOSIS — I48.91 UNSPECIFIED ATRIAL FIBRILLATION: ICD-10-CM

## 2018-08-19 DIAGNOSIS — E22.2 SYNDROME OF INAPPROPRIATE SECRETION OF ANTIDIURETIC HORMONE: ICD-10-CM

## 2018-08-19 DIAGNOSIS — M10.9 GOUT, UNSPECIFIED: ICD-10-CM

## 2018-08-19 DIAGNOSIS — R06.00 DYSPNEA, UNSPECIFIED: ICD-10-CM

## 2018-08-19 DIAGNOSIS — E87.5 HYPERKALEMIA: ICD-10-CM

## 2018-08-19 DIAGNOSIS — C34.90 MALIGNANT NEOPLASM OF UNSPECIFIED PART OF UNSPECIFIED BRONCHUS OR LUNG: ICD-10-CM

## 2018-08-19 DIAGNOSIS — I10 ESSENTIAL (PRIMARY) HYPERTENSION: ICD-10-CM

## 2018-08-19 DIAGNOSIS — Z29.9 ENCOUNTER FOR PROPHYLACTIC MEASURES, UNSPECIFIED: ICD-10-CM

## 2018-08-19 DIAGNOSIS — R09.89 OTHER SPECIFIED SYMPTOMS AND SIGNS INVOLVING THE CIRCULATORY AND RESPIRATORY SYSTEMS: ICD-10-CM

## 2018-08-19 LAB
B PERT DNA SPEC QL NAA+PROBE: SIGNIFICANT CHANGE UP
C PNEUM DNA SPEC QL NAA+PROBE: NOT DETECTED — SIGNIFICANT CHANGE UP
FLUAV H1 2009 PAND RNA SPEC QL NAA+PROBE: NOT DETECTED — SIGNIFICANT CHANGE UP
FLUAV H1 RNA SPEC QL NAA+PROBE: NOT DETECTED — SIGNIFICANT CHANGE UP
FLUAV H3 RNA SPEC QL NAA+PROBE: NOT DETECTED — SIGNIFICANT CHANGE UP
FLUAV SUBTYP SPEC NAA+PROBE: SIGNIFICANT CHANGE UP
FLUBV RNA SPEC QL NAA+PROBE: NOT DETECTED — SIGNIFICANT CHANGE UP
HADV DNA SPEC QL NAA+PROBE: NOT DETECTED — SIGNIFICANT CHANGE UP
HCOV 229E RNA SPEC QL NAA+PROBE: NOT DETECTED — SIGNIFICANT CHANGE UP
HCOV HKU1 RNA SPEC QL NAA+PROBE: NOT DETECTED — SIGNIFICANT CHANGE UP
HCOV NL63 RNA SPEC QL NAA+PROBE: NOT DETECTED — SIGNIFICANT CHANGE UP
HCOV OC43 RNA SPEC QL NAA+PROBE: NOT DETECTED — SIGNIFICANT CHANGE UP
HMPV RNA SPEC QL NAA+PROBE: NOT DETECTED — SIGNIFICANT CHANGE UP
HPIV1 RNA SPEC QL NAA+PROBE: NOT DETECTED — SIGNIFICANT CHANGE UP
HPIV2 RNA SPEC QL NAA+PROBE: NOT DETECTED — SIGNIFICANT CHANGE UP
HPIV3 RNA SPEC QL NAA+PROBE: NOT DETECTED — SIGNIFICANT CHANGE UP
HPIV4 RNA SPEC QL NAA+PROBE: NOT DETECTED — SIGNIFICANT CHANGE UP
LDH SERPL L TO P-CCNC: 236 U/L — HIGH (ref 135–225)
M PNEUMO DNA SPEC QL NAA+PROBE: NOT DETECTED — SIGNIFICANT CHANGE UP
PHOSPHATE SERPL-MCNC: 3.8 MG/DL — SIGNIFICANT CHANGE UP (ref 2.5–4.5)
RSV RNA SPEC QL NAA+PROBE: NOT DETECTED — SIGNIFICANT CHANGE UP
RV+EV RNA SPEC QL NAA+PROBE: NOT DETECTED — SIGNIFICANT CHANGE UP
URATE SERPL-MCNC: 3.9 MG/DL — SIGNIFICANT CHANGE UP (ref 3.4–8.8)

## 2018-08-19 PROCEDURE — 99223 1ST HOSP IP/OBS HIGH 75: CPT | Mod: GC

## 2018-08-19 RX ORDER — ESCITALOPRAM OXALATE 10 MG/1
10 TABLET, FILM COATED ORAL AT BEDTIME
Qty: 0 | Refills: 0 | Status: DISCONTINUED | OUTPATIENT
Start: 2018-08-19 | End: 2018-08-21

## 2018-08-19 RX ORDER — ENOXAPARIN SODIUM 100 MG/ML
40 INJECTION SUBCUTANEOUS EVERY 24 HOURS
Qty: 0 | Refills: 0 | Status: DISCONTINUED | OUTPATIENT
Start: 2018-08-19 | End: 2018-08-21

## 2018-08-19 RX ORDER — DOCUSATE SODIUM 100 MG
100 CAPSULE ORAL THREE TIMES A DAY
Qty: 0 | Refills: 0 | Status: DISCONTINUED | OUTPATIENT
Start: 2018-08-19 | End: 2018-08-21

## 2018-08-19 RX ORDER — ALLOPURINOL 300 MG
300 TABLET ORAL DAILY
Qty: 0 | Refills: 0 | Status: DISCONTINUED | OUTPATIENT
Start: 2018-08-19 | End: 2018-08-21

## 2018-08-19 RX ORDER — TIOTROPIUM BROMIDE 18 UG/1
1 CAPSULE ORAL; RESPIRATORY (INHALATION) AT BEDTIME
Qty: 0 | Refills: 0 | Status: DISCONTINUED | OUTPATIENT
Start: 2018-08-19 | End: 2018-08-21

## 2018-08-19 RX ORDER — MULTIVIT-MIN/FERROUS GLUCONATE 9 MG/15 ML
1 LIQUID (ML) ORAL DAILY
Qty: 0 | Refills: 0 | Status: DISCONTINUED | OUTPATIENT
Start: 2018-08-19 | End: 2018-08-19

## 2018-08-19 RX ORDER — TIOTROPIUM BROMIDE AND OLODATEROL 3.124; 2.736 UG/1; UG/1
2 SPRAY, METERED RESPIRATORY (INHALATION)
Qty: 0 | Refills: 0 | COMMUNITY

## 2018-08-19 RX ORDER — DILTIAZEM HCL 120 MG
360 CAPSULE, EXT RELEASE 24 HR ORAL DAILY
Qty: 0 | Refills: 0 | Status: DISCONTINUED | OUTPATIENT
Start: 2018-08-19 | End: 2018-08-21

## 2018-08-19 RX ORDER — SODIUM CHLORIDE 9 MG/ML
500 INJECTION INTRAMUSCULAR; INTRAVENOUS; SUBCUTANEOUS ONCE
Qty: 0 | Refills: 0 | Status: COMPLETED | OUTPATIENT
Start: 2018-08-19 | End: 2018-08-19

## 2018-08-19 RX ORDER — NICOTINE POLACRILEX 2 MG
1 GUM BUCCAL DAILY
Qty: 0 | Refills: 0 | Status: DISCONTINUED | OUTPATIENT
Start: 2018-08-19 | End: 2018-08-21

## 2018-08-19 RX ADMIN — Medication 360 MILLIGRAM(S): at 12:05

## 2018-08-19 RX ADMIN — Medication 1 PATCH: at 12:07

## 2018-08-19 RX ADMIN — ESCITALOPRAM OXALATE 10 MILLIGRAM(S): 10 TABLET, FILM COATED ORAL at 22:04

## 2018-08-19 RX ADMIN — ENOXAPARIN SODIUM 40 MILLIGRAM(S): 100 INJECTION SUBCUTANEOUS at 12:07

## 2018-08-19 RX ADMIN — Medication 40 MILLIGRAM(S): at 12:07

## 2018-08-19 RX ADMIN — Medication 1 TABLET(S): at 12:06

## 2018-08-19 RX ADMIN — SODIUM CHLORIDE 100 MILLILITER(S): 9 INJECTION INTRAMUSCULAR; INTRAVENOUS; SUBCUTANEOUS at 09:45

## 2018-08-19 RX ADMIN — Medication 300 MILLIGRAM(S): at 12:06

## 2018-08-19 RX ADMIN — TIOTROPIUM BROMIDE 1 CAPSULE(S): 18 CAPSULE ORAL; RESPIRATORY (INHALATION) at 22:04

## 2018-08-19 NOTE — H&P ADULT - ATTENDING COMMENTS
Pt seen and examined. Case d/w patient, his wife at bedside and housestaff.   Dyspnea- Pt p/w dyspnea found to have worsening pleural effusion, likely malignant. No current infectious symptoms. CT surgery consult appreciated. Plan to consult pulm. Pt may benefit from pleurex given multiple thoracenteses in the past. F/u heme onc recs  SIADH- Monitor Na   Afib- continue cardizem. Pt not on AC reportedly had significant nosebleeds. F/u with patient re: prior discussion of AC    Rest as per resident note above

## 2018-08-19 NOTE — H&P ADULT - HISTORY OF PRESENT ILLNESS
72yo M with a PMHx of HTN, afib, SIADH and SCC of the lung (diagnosed 2017) stage IV s/p RUL resection and previous episodes of R lung effusions p/w dyspnea on exertion for the past 3-4 days. Pt endorses that he has finished twelve rounds of chemotherapy in the past and is currently on immunotherapy every 3 weeks with atezolizumab. Starting around 3-4 days ago the patient noted that he felt more tired than usual. He is usually able to walk around freely and was staying in a chair all day and only walking to the bathroom. He denies that it was worsening, but his wife was concerned and so he was sent to the ED.  He denies any fevers, chills, cough, nausea or vomiting. He denies any leg swelling. He also denies any type of pain and says he only feels short of breath. He endorses some orthopnea, but denies any heart palpitations. 74yo man PMH HTN, Afib, SIADH and SCC of the lung (diagnosed 2017) stage IV s/p RUL resection stage IIIA lung sq cell ca s/p resection , adjuvant chemo with platinum and nab paclitaxel x 4 cycles in December 2017, now on Tecentriq monotherapy, CLL and previous episodes of R lung effusions p/w dyspnea on exertion for the past 3-4 days. Pt endorses that he has finished twelve rounds of chemotherapy in the past and is currently on immunotherapy every 3 weeks with atezolizumab. Starting around 3-4 days ago the patient noted that he felt more tired than usual. He is usually able to walk around freely and was staying in a chair all day and only walking to the bathroom. He denies that it was worsening, but his wife was concerned and so he was sent to the ED.    He denies any fevers, chills, cough, nausea or vomiting. He denies any leg swelling. He also denies any type of pain and says he only feels short of breath. He endorses some orthopnea, but denies any heart palpitations or chest pain. Has had decent appetite and maintaining good po. Regular BMs 72yo man PMH HTN, Afib, SIADH and SCC of the lung (diagnosed 2017) stage IV s/p RUL resection stage IIIA lung sq cell ca s/p resection , adjuvant chemo with platinum and nab paclitaxel x 4 cycles in December 2017, now on Tecentriq monotherapy, CLL and previous episodes of R lung effusions p/w dyspnea on exertion for the past 3-4 days. Pt endorses that he has finished twelve rounds of chemotherapy in the past and is currently on immunotherapy every 3 weeks with atezolizumab. Starting around 3-4 days ago the patient noted that he felt more tired than usual. He is usually able to walk around freely and was staying in a chair all day and only walking to the bathroom. He denies that it was worsening, but his wife was concerned and so he was sent to the ED. Currently not on anticoagulations for his afib given recurrent episodes of epistaxis.    He denies any fevers, chills, cough, nausea or vomiting. He denies any leg swelling. He also denies any type of pain and says he only feels short of breath. He endorses some orthopnea, but denies any heart palpitations or chest pain. Has had decent appetite and maintaining good po. Regular BMs

## 2018-08-19 NOTE — H&P ADULT - NSHPLABSRESULTS_GEN_ALL_CORE
ICU Vital Signs Last 24 Hrs  T(C): 36.6 (19 Aug 2018 07:02), Max: 37.3 (18 Aug 2018 17:39)  T(F): 97.9 (19 Aug 2018 07:02), Max: 99.1 (18 Aug 2018 17:39)  HR: 56 (19 Aug 2018 07:02) (56 - 85)  BP: 131/77 (19 Aug 2018 07:02) (131/77 - 152/66)  BP(mean): --  ABP: --  ABP(mean): --  RR: 18 (19 Aug 2018 07:02) (18 - 23)  SpO2: 95% (19 Aug 2018 07:02) (94% - 100%)    (08-18 @ 19:09)                      10.3  19.20 )-----------( 281                 32.4    Neutrophils = 14.55 (75.6%)  Lymphocytes = 3.37 (17.6%)  Eosinophils = 0.03 (0.2%)  Basophils = 0.05 (0.3%)  Monocytes = 0.67 (3.5%)  Bands = --%    08-18    132<L>  |  96<L>  |  25<H>  ----------------------------<  189<H>  5.2   |  22  |  1.21    Ca    8.7      18 Aug 2018 19:09    TPro  5.9<L>  /  Alb  2.8<L>  /  TBili  0.4  /  DBili  x   /  AST  18  /  ALT  16  /  AlkPhos  115  08-18          RVP: Negative.    < from: CT Angio Chest w/ IV Cont (08.18.18 @ 22:43) >    IMPRESSION:   1. No evidence of pulmonary embolus to the segmental level.   2. No aneurysm of the ascending aorta.   3. No dissection of the aorta.   4. Circumferential right pleural effusion is increasing in size. Finding   worrisome for malignant pleural effusion. Small left pleural effusion.   5. Opacities in the right middle lobe and right lower lobe may represent   atelectasis or pneumonia.   6. A few nodules in the right middle lobe and right lower lobe may   represent   metastatic disease.   7. Preaortic nodes are increasing in size. Post caval/pretracheal node is   increasing in size it is now 19 x 17 mm. Subcarinal nodes are increasing   in   size now 3 x 2.6 cm.    < end of copied text > Labs and imaging personally reviewed    (08-18 @ 19:09)                      10.3  19.20 )-----------( 281                 32.4    Neutrophils = 14.55 (75.6%)  Lymphocytes = 3.37 (17.6%)  Eosinophils = 0.03 (0.2%)  Basophils = 0.05 (0.3%)  Monocytes = 0.67 (3.5%)  Bands = --%    08-18    132<L>  |  96<L>  |  25<H>  ----------------------------<  189<H>  5.2   |  22  |  1.21    Ca    8.7      18 Aug 2018 19:09    TPro  5.9<L>  /  Alb  2.8<L>  /  TBili  0.4  /  DBili  x   /  AST  18  /  ALT  16  /  AlkPhos  115  08-18          RVP: Negative.    < from: CT Angio Chest w/ IV Cont (08.18.18 @ 22:43) >    IMPRESSION:   1. No evidence of pulmonary embolus to the segmental level.   2. No aneurysm of the ascending aorta.   3. No dissection of the aorta.   4. Circumferential right pleural effusion is increasing in size. Finding   worrisome for malignant pleural effusion. Small left pleural effusion.   5. Opacities in the right middle lobe and right lower lobe may represent   atelectasis or pneumonia.   6. A few nodules in the right middle lobe and right lower lobe may   represent   metastatic disease.   7. Preaortic nodes are increasing in size. Post caval/pretracheal node is   increasing in size it is now 19 x 17 mm. Subcarinal nodes are increasing   in   size now 3 x 2.6 cm.    < end of copied text >

## 2018-08-19 NOTE — H&P ADULT - PROBLEM SELECTOR PLAN 8
Has malignancy. Lovenox 40mg daily for DVT pphx. Has malignancy and hypercoagulable 2/2 to this. Lovenox 40mg daily for DVT pphx.

## 2018-08-19 NOTE — CONSULT NOTE ADULT - SUBJECTIVE AND OBJECTIVE BOX
HPI:  71 yo male, heavy smoker, with  stage IIIA lung sq cell ca s/p resection , adjuvant chemo with platinum and nab paclitaxel x 4 cycles in December 2017, now on Tecentriq monotherapy, CLL presents with dyspnea.           PAST MEDICAL & SURGICAL HISTORY:  Squamous cell carcinoma of lung, stage IV  TIA (transient ischemic attack): 1999  Localized enlarged lymph nodes  Solitary pulmonary nodule  HTN (hypertension)  Alcohol abuse: Sober since 01/2016- attending AA meeting weekly  Depression, unspecified depression type  Gout  Essential hypertension  H/O hemorrhoidectomy: 1967  History of tonsillectomy: as a child  History of appendectomy: 1958  H/O left knee surgery: 1995      Allergies    No Known Allergies    Intolerances        MEDICATIONS  (STANDING):  allopurinol 300 milliGRAM(s) Oral daily  diltiazem    milliGRAM(s) Oral daily  enoxaparin Injectable 40 milliGRAM(s) SubCutaneous every 24 hours  multivitamin 1 Tablet(s) Oral daily  nicotine -   7 mG/24Hr(s) Patch 1 patch Transdermal daily  predniSONE   Tablet 40 milliGRAM(s) Oral daily    MEDICATIONS  (PRN):  docusate sodium 100 milliGRAM(s) Oral three times a day PRN Constipation      FAMILY HISTORY:      SOCIAL HISTORY: No EtOH, no tobacco    REVIEW OF SYSTEMS:    CONSTITUTIONAL: No weakness, fevers or chills  EYES/ENT: No visual changes;  No vertigo or throat pain   NECK: No pain or stiffness  RESPIRATORY: No cough, wheezing, hemoptysis; No shortness of breath  CARDIOVASCULAR: No chest pain or palpitations  GASTROINTESTINAL: No abdominal or epigastric pain. No nausea, vomiting, or hematemesis; No diarrhea or constipation. No melena or hematochezia.  GENITOURINARY: No dysuria, frequency or hematuria  NEUROLOGICAL: No numbness or weakness  SKIN: No itching, burning, rashes, or lesions   All other review of systems is negative unless indicated above.    Height (cm): 180.34 (08-19 @ 07:02)  Weight (kg): 64.7 (08-19 @ 07:02)  BMI (kg/m2): 19.9 (08-19 @ 07:02)  BSA (m2): 1.83 (08-19 @ 07:02)    T(F): 97.9 (08-19-18 @ 07:02), Max: 99.1 (08-18-18 @ 17:39)  HR: 56 (08-19-18 @ 07:02)  BP: 131/77 (08-19-18 @ 07:02)  RR: 18 (08-19-18 @ 07:02)  SpO2: 95% (08-19-18 @ 07:02)  Wt(kg): --    GENERAL: NAD, well-developed  HEAD:  Atraumatic, Normocephalic  EYES: EOMI, PERRLA, conjunctiva and sclera clear  NECK: Supple, No JVD  CHEST/LUNG: Clear to auscultation bilaterally; No wheeze  HEART: Regular rate and rhythm; No murmurs, rubs, or gallops  ABDOMEN: Soft, Nontender, Nondistended; Bowel sounds present  EXTREMITIES:  2+ Peripheral Pulses, No clubbing, cyanosis, or edema  NEUROLOGY: non-focal  SKIN: No rashes or lesions                          10.3   19.20 )-----------( 281      ( 18 Aug 2018 19:09 )             32.4       08-18    132<L>  |  96<L>  |  25<H>  ----------------------------<  189<H>  5.2   |  22  |  1.21    Ca    8.7      18 Aug 2018 19:09    TPro  5.9<L>  /  Alb  2.8<L>  /  TBili  0.4  /  DBili  x   /  AST  18  /  ALT  16  /  AlkPhos  115  08-18            < from: CT Angio Chest w/ IV Cont (08.18.18 @ 22:43) >    ******PRELIMINARY REPORT******    ******PRELIMINARY REPORT******            EXAM:  CT ANGIO CHEST (W)AW IC        PROCEDURE DATE:  Aug 18 2018     ******PRELIMINARY REPORT******    ******PRELIMINARY REPORT******            INTERPRETATION:  [facility] RADIOLOGIST PRELIMINARY REPORT    EXAM:    CT Angiography Chest With Intravenous Contrast     EXAM DATE/TIME:    8/18/2018 10:27 PM     CLINICAL HISTORY:    73 years old, male; Pain; Chest pain     TECHNIQUE:    Axial computed tomographic angiography images of the chest with   intravenous   contrast using CT angiography protocol.    Coronal and sagittal reformatted images were created and reviewed.    MIP reconstructed images were created and reviewed.     CONTRAST:    90 ml of omni administered intravenously.     COMPARISON:    CT ANGIO CHEST WITHOUT AND OR WITH IV CONTRAST 2018-05-09 21:51     FINDINGS:    Pulmonary arteries:  No evidence of pulmonary embolus to the segmental   level.    Aorta:  No aneurysm of the ascending aorta. No dissectionof the aorta.    Lungs:  Opacities in the right middle lobe and right lower lobe may   represent   atelectasis or pneumonia. A few nodules in the right middle lobe and   right   lower lobe may represent metastatic disease.    Pleural space:  Circumferential right pleural effusion is increasing in   size.   Finding worrisome for malignant pleural effusion. Small left pleural   effusion.    Heart: Normal. No cardiomegaly. No pericardial effusion.    Bones/joints: Unremarkable. No acute fracture.   Soft tissues: Unremarkable.    Lymph nodes:  Preaortic nodes are increasing in size. Post   caval/pretracheal   node is increasing in size it is now 19 x 17 mm. Subcarinal nodes are   increasing in size now 3 x 2.6 cm. Again noted right axillary nodes   Peripancreatic nodes and periportal nodes are again demonstrated.     IMPRESSION:   1. No evidence of pulmonary embolus to the segmental level.   2. No aneurysm of the ascending aorta.   3. No dissection of the aorta.   4. Circumferential right pleural effusion is increasing in size. Finding   worrisome for malignant pleural effusion. Small left pleural effusion.   5. Opacities in the right middle lobe and right lower lobe may represent   atelectasis or pneumonia.   6. A few nodules in the right middle lobe and right lower lobe may   represent   metastatic disease.   7. Preaortic nodes are increasing in size. Post caval/pretracheal node is   increasing in size it is now 19 x 17 mm. Subcarinal nodes are increasing   in   size now 3 x 2.6 cm.    < end of copied text >    < from: NM PET/CT Onc FDG Skull to Thigh, Subsq (07.09.18 @ 11:06) >  EXAM:  PETCT [facility] ONC FDG SUBS        PROCEDURE DATE:  07/09/2018           INTERPRETATION:  PROCEDURE:  PET/CT SKULL BASE-MID THIGH IMAGING     CLINICAL INFORMATION: 73-year-old male referred for follow-up of   metastatic right lung cancer (poorly differentiated non-small cell lung   cancer) on immunotherapy. Patient also has CLL/small lymphocytic   lymphoma. Assess therapy response. PET/CT is done as part of the   subsequent treatment strategy evaluation.    RADIOPHARMACEUTICAL:  13.15 mCi F-18, FDG, I.V.    TECHNIQUE:  Fasting blood sugar measured prior to injection of   radiopharmaceutical was 93 mg/dl. Following intravenous injection of the   radiopharmaceutical and an uptake period of approximately 70 minutes,   FDG-PET/CT was obtained on a [employer] 710 from skull base to mid   thigh. CT was performed during shallow respiration. The CT protocol was   optimized for PET attenuation correction and anatomic localization. The   CT protocol was not designed to produce and cannotreplace   state-of-the-art diagnostic CT images with specific imaging protocols for   different body parts and indications. Images were reviewed on a dedicated   workstation using multiplanar reconstruction.    The standardized uptake values (SUV) are normalized to patient body   weight and indicate the highest activity concentration (SUVmax) in a   given disease site. All image numbers refer to axial image number.    COMPARISON:  PET/CT dated 4/19/2018.    OTHER STUDIES USED FOR CORRELATION: MRIof brain dated 4/19/2018.    FINDINGS:     HEAD/NECK: Physiologic FDG activity in visualized brain.    A mildly FDG-avid right level IIb cervical lymph node is unchanged,   measuring 1.1 x 0.9 cm with SUV 1.7 (image 33); previous SUV 2.3.   Previously noted minimally FDG-avid subcentimeter left level 3 node has   resolved. New, diffuse, symmetric hypermetabolism in parotid and   submandibular glands, without corresponding abnormality on CT. For   reference, right submandibular gland demonstrates SUV 4.1 (image 35);   previous SUV 2.5.    CHEST: FDG-avid bilateral supraclavicular, axillary, and retropectoral   lymph nodes are not significantly changed or are slightly decreased in   size, and are increased, decreased, or similar in metabolism. Reference   right supraclavicular node measures 1.7 x 1.6 cm with SUV 7.1 (image 49);   previously 1.5 x 1.4 cm with SUV 3.6. Reference left retropectoral node   measures 2.1 x 0.9 cm with SUV 1.2 (image 59); previously 2.8 x 1.1 cm   with SUV 3.9. Reference right axillary node measures 1.5 x 0.8 cm with   SUV 2.8 (image 63); previous 1.7 x 1.1 cm with SUV 3.1.    Few mildly enlarged FDG-avid bilateral mediastinal lymph nodes are   increased in size, number, and metabolism. Reference subcarinal lymph   node measures 2.6 x 1.7 cm with SUV 11.3 (image 87); previously 1.4 x 1.2   cm with SUV 3.0.     LUNGS: Status post right upper lobectomy. Right lower lobe interlobular   septal thickening, bronchial thickening and groundglass opacities are   increased, concerning for lymphangitic spread of tumor (SUV 2.0; image   103; previous SUV 2.7). No left lung nodule.    PLEURA/PERICARDIUM: Diffuse FDG-avid right pleural thickening is mildly   increased (SUV 4.6; image 86; previous SUV 4.4). Small right pleural   effusion, not significantly changed. Resolution of small right   hydropneumothorax.    HEPATOBILIARY/PANCREAS: The difficult to delineate hypermetabolic   hypodensity in dome of liver, in retrospect present previously, is   increased in size and metabolism, measuring approximately 1.5 cm with SUV   9.3 (image 118); previous SUV 3.5, and difficult to delineate on CT.   Liver SUV mean is 2.3; previously 2.4.    SPLEEN: Physiologic FDG activity. Normal in size.    ADRENAL GLANDS: No abnormal FDG activity. No nodule.    KIDNEYS/URINARY BLADDER: Physiologic FDG excretion by both kidneys.   Bilateral renal cysts,  unchanged.    REPRODUCTIVE ORGANS: No abnormal FDG activity.    ABDOMINOPELVIC NODES: Enlarged FDG-avid retroperitoneal and pelvic lymph   nodes are similar or slightly increased in size and similar, or increased   in metabolism. Reference periportal node measures 2.5 x 1.6 cm with SUV   3.8 (image 144); previously 2.5 x 1.6 cm with SUV 3.9. A hypermetabolic   aortocaval lymph node measures 1.7 x 1.3 cm with SUV 7.0 (image 147);   previously 1.3 x 1.0 cm with SUV 2.9.    BOWEL/PERITONEUM/MESENTERY: No abnormal FDG activity.    BONES/SOFT TISSUES: Physiologic FDG activity in visualized osseous   structures.    IMPRESSION: Abnormal FDG-PET/CT scan:    1. Hypermetabolic lesion in dome of liver, increased in size and   metabolism as compared to prior study dated 4/19/2018, is compatible with   metastasis.    2. Interval increase in right lower lobe interlobular septal thickening   and groundglass opacities, concerning for lymphangitic spread of tumor.    3. Mildly increased FDG-avid right pleural thickening is concerning for   metastatic disease.    4. FDG-avid lymph nodes in neck, thorax, retroperitoneum, and pelvis,   similar or increased in size, and similar, or increased in metabolism,   are nonspecific.           < end of copied text >

## 2018-08-19 NOTE — CONSULT NOTE ADULT - SUBJECTIVE AND OBJECTIVE BOX
CHIEF COMPLAINT:Patient is a 73y old  Male who presents with a chief complaint of   SOB  HISTORY OF PRESENT ILLNESS:  This is a pleasant gentleman known to me with history as below  PAF not on a/c due to multiple nose bleed   lung ca stage 4 s/p rul lobectomy   admitted with sob  no cp  no cough  no dizziness  no palpitation     PAST MEDICAL & SURGICAL HISTORY:  Squamous cell carcinoma of lung, stage III  TIA (transient ischemic attack): 1999  Localized enlarged lymph nodes  Solitary pulmonary nodule  HTN (hypertension)  Alcohol abuse: Sober since 01/2016- attending AA meeting weekly  Depression, unspecified depression type  Gout  Essential hypertension  H/O hemorrhoidectomy: 1967  History of tonsillectomy: as a child  History of appendectomy: 1958  H/O left knee surgery: 1995          MEDICATIONS:    reviewed               FAMILY HISTORY:  Family history of CVA      Non-contributory    SOCIAL HISTORY:    No tobacco, drugs or etoh    Allergies    No Known Allergies    Intolerances    	    REVIEW OF SYSTEMS:  as above  The rest of the 14 points ROS reviewed and except above they are unremarkable.        PHYSICAL EXAM:  T(C): 36.6 (08-19-18 @ 01:58), Max: 37.3 (08-18-18 @ 17:39)  HR: 65 (08-19-18 @ 01:58) (65 - 85)  BP: 139/58 (08-19-18 @ 01:58) (136/57 - 152/66)  RR: 18 (08-19-18 @ 01:58) (18 - 23)  SpO2: 97% (08-19-18 @ 01:58) (94% - 100%)  Wt(kg): --  I&O's Summary      JVP: Normal  Neck: supple  Lung: dec bs on right side   CV: S1 S2 , Murmur:  Abd: soft  Ext: No edema  neuro: Awake / alert  Psych: flat affect  Skin: normal     LABS/DATA:    TELEMETRY: 	    ECG:  	   	  CARDIAC MARKERS:                                      10.3   19.20 )-----------( 281      ( 18 Aug 2018 19:09 )             32.4     08-18    132<L>  |  96<L>  |  25<H>  ----------------------------<  189<H>  5.2   |  22  |  1.21    Ca    8.7      18 Aug 2018 19:09    TPro  5.9<L>  /  Alb  2.8<L>  /  TBili  0.4  /  DBili  x   /  AST  18  /  ALT  16  /  AlkPhos  115  08-18    proBNP:   Lipid Profile:   HgA1c:   TSH:         < from: Transthoracic Echocardiogram (08.19.17 @ 12:18) >  ------------------------------------------------------------------------  CONCLUSIONS:  1. Mitral annular calcification, otherwise normal mitral  valve. Mild mitral regurgitation.  2. Normal left ventricular internal dimensions and wall  thicknesses.  3. Normal left ventricular systolic function. No segmental  wall motion abnormalities.  4. Normal right ventricular size and function.  5. Normal tricuspid valve.    Minimal tricuspid  regurgitation.  6. Estimated pulmonary artery systolic pressure equals 40  mm Hg, assuming right atrial pressure equals 10  mm Hg,  consistent with mild pulmonary hypertension.    < end of copied text >

## 2018-08-19 NOTE — CONSULT NOTE ADULT - ATTENDING COMMENTS
pt well known to me for many years and seen on multiple other admissions.  Would involve Dr. Foster in CT Surgery to see if thoracentesis and drainage of fluid can help improve dyspnea.  Awaiting final CT report

## 2018-08-19 NOTE — H&P ADULT - PROBLEM SELECTOR PLAN 5
Potassium of 5.2.  - Will repeat the BMP in the afternoon. Gentle hydration with 500ml of fluid now.

## 2018-08-19 NOTE — H&P ADULT - PROBLEM SELECTOR PLAN 4
Pulse feels irregularly irregular here suggesting return to afib status.  - Will continue cardizem 360mg daily. Pulse feels irregularly irregular here suggesting return to afib status.  - Will continue cardizem 360mg daily.  - Monitor HR

## 2018-08-19 NOTE — H&P ADULT - NSHPREVIEWOFSYSTEMS_GEN_ALL_CORE
REVIEW OF SYSTEMS      General:	Denies fevers or chills. Denies any recent weight loss.    Skin/Breast: Denies skin changes or rashes.  	  Ophthalmologic: Denies any visual changes or blurriness  	  ENMT: Denies any hearing changes or oral ulcers.    Respiratory and Thorax: Endorses SoB and orthopnea as above.  	  Cardiovascular: See HPI above. Endorses afib.    Gastrointestinal: Denies any changes in bowel movements.    Genitourinary: Denies any urinary changes.    Musculoskeletal: Endorses weakness as above.    Neurological: Denies any focal weakness.    Psychiatric: Denies any mood changes.

## 2018-08-19 NOTE — H&P ADULT - NSHPSOCIALHISTORY_GEN_ALL_CORE
States that he stopped smoking and drinking 1 year ago. Denies any IV drug use.  and lives with his wife. States that he stopped smoking and drinking 1 year ago. Denies any IV drug use.

## 2018-08-19 NOTE — H&P ADULT - PMH
Alcohol abuse  Sober since 01/2016- attending AA meeting weekly  Depression, unspecified depression type    Essential hypertension    Gout    HTN (hypertension)    Localized enlarged lymph nodes    Solitary pulmonary nodule    Squamous cell carcinoma of lung, stage IV    TIA (transient ischemic attack)  1999

## 2018-08-19 NOTE — H&P ADULT - PROBLEM SELECTOR PLAN 2
- S/P chemotherapy and now 5 treatments of Atezolizumab. CT angio shows enlarging lymph nodes around the aorta and several other areas suggesting progression.  - Heme/Onc consulted and will f/u with their recs.

## 2018-08-19 NOTE — CONSULT NOTE ADULT - ASSESSMENT
73 yo male, heavy smoker, with  stage IIIA lung sq cell ca s/p resection , adjuvant chemo with platinum and nab paclitaxel x 4 cycles in December 2017, now on Tecentriq monotherapy, CLL presents with dyspnea.     Dyspnea- PE vs PNA vs related to POD vs pneumonitis  prelim CT chest done showing no PE  ,right pleural effusion is increasing in size and small left pleural effusion. He has opacities in the right middle lobe and right lower lobe may represent  atelectasis or pneumonia.   on prednisone 60mg PO daily as outpatient    Squamous cell lung cancer- Stage IIIA .    s/p resection , adjuvant chemo with platinum and nab paclitaxel x 4 cycles in December 2017, now on Tecentriq monotherapy.  had a PET scan done in july 2018 and plan was to arrange biopsy of hypermetabolic liver lesion 71 yo male, heavy smoker, with  stage IIIA lung sq cell ca s/p resection , adjuvant chemo with platinum and nab paclitaxel x 4 cycles in December 2017, now on Tecentriq monotherapy, CLL presents with dyspnea.     Dyspnea- PE vs PNA vs related to POD vs pneumonitis  prelim CT chest done showing no PE  ,right pleural effusion is increasing in size and small left pleural effusion. He has opacities in the right middle lobe and right lower lobe may represent  atelectasis or pneumonia.   continue steroid taper for now     Squamous cell lung cancer- Stage IIIA .    s/p resection , adjuvant chemo with platinum and nab paclitaxel x 4 cycles in December 2017, now on Tecentriq monotherapy.  had a PET scan done in july 2018 and plan was to arrange biopsy of hypermetabolic liver lesion 71 yo male, heavy smoker, with  stage IIIA lung sq cell ca s/p resection , adjuvant chemo with platinum and nab paclitaxel x 4 cycles in December 2017, now on Tecentriq monotherapy, CLL presents with dyspnea.     Dyspnea- PE vs PNA vs related to POD vs pneumonitis  prelim CT chest done showing no PE  ,right pleural effusion is increasing in size and small left pleural effusion. He has opacities in the right middle lobe and right lower lobe may represent  atelectasis or pneumonia.   continue steroid taper for now . patient says he is on prednisone 40mg PO daily now    Squamous cell lung cancer- Stage IIIA .    s/p resection , adjuvant chemo with platinum and nab paclitaxel x 4 cycles in December 2017, now on Tecentriq monotherapy.  had a PET scan done in july 2018 and plan was to arrange biopsy of hypermetabolic liver lesion 71 yo male, heavy smoker, with  stage IIIA lung sq cell ca s/p resection , adjuvant chemo with platinum and nab paclitaxel x 4 cycles in December 2017, now on Tecentriq monotherapy, CLL presents with dyspnea.     Dyspnea- PE vs PNA vs related to POD vs pneumonitis  prelim CT chest done showing no PE  ,right pleural effusion is increasing in size and small left pleural effusion. He has opacities in the right middle lobe and right lower lobe may represent  atelectasis or pneumonia.   continue steroid taper for now . patient says he is on prednisone 40mg PO daily now  have Dr. Foster ( thoracic surg)  see     Squamous cell lung cancer- Stage IIIA .    s/p resection , adjuvant chemo with platinum and nab paclitaxel x 4 cycles in December 2017, now on Tecentriq monotherapy.  had a PET scan done in july 2018 and plan was to arrange biopsy of hypermetabolic liver lesion

## 2018-08-19 NOTE — H&P ADULT - ASSESSMENT
Pt is a 72yo M with PMHx of Stage IV SCC of the lung (s/p chemotherapy, immunotherapy and RUL resection) with previous episodes of pleural effusion requiring drainage, afib, SIADH and HTN p/w dyspnea for 4 days duration and a finding of an enlarged pleural effusion on CT chest.

## 2018-08-19 NOTE — H&P ADULT - PROBLEM SELECTOR PLAN 1
Likely secondary to enlarging pleural effusion on the right side. CT angio ruled out any PE.  - CT surgery consulted for chest tube placement.  - Will continue home meds of Spiriva as a PRN. Likely secondary to enlarging malignant pleural effusion on the right side. CT angio ruled out any PE.  - CT surgery consulted for drainage and possible chest tube placement.  - Will continue home meds of Spiriva as a PRN.

## 2018-08-19 NOTE — GOALS OF CARE CONVERSATION - PERSONAL ADVANCE DIRECTIVE - CONVERSATION DETAILS
Pt stated that he wants his daughter to be his health care proxy; however, pt currently has capacity to make decisions and stated that in the case of his heart stopping he does not want to be resuscitated via CPR and that if his breathing should severely worsen; he would not want to be intubated.

## 2018-08-19 NOTE — CONSULT NOTE ADULT - SUBJECTIVE AND OBJECTIVE BOX
HPI:  72yo M with a PMHx of HTN, afib, SIADH and SCC of the lung (diagnosed 2017) stage IV s/p RUL resection and previous episodes of R lung effusions p/w dyspnea on exertion for the past 3-4 days. Pt endorses that he has finished twelve rounds of chemotherapy in the past and is currently on immunotherapy every 3 weeks with atezolizumab. Starting around 3-4 days ago the patient noted that he felt more tired than usual. He is usually able to walk around freely and was staying in a chair all day and only walking to the bathroom. He denies that it was worsening, but his wife was concerned and so he was sent to the ED.  He denies any fevers, chills, cough, nausea or vomiting. He denies any leg swelling. He also denies any type of pain and says he only feels short of breath. He endorses some orthopnea, but denies any heart palpitations. (19 Aug 2018 08:36)      PAST MEDICAL & SURGICAL HISTORY:  Squamous cell carcinoma of lung, stage IV  TIA (transient ischemic attack): 1999  Localized enlarged lymph nodes  Solitary pulmonary nodule  HTN (hypertension)  Alcohol abuse: Sober since 01/2016- attending AA meeting weekly  Depression, unspecified depression type  Gout  Essential hypertension  H/O hemorrhoidectomy: 1967  History of tonsillectomy: as a child  History of appendectomy: 1958  H/O left knee surgery: 1995      REVIEW OF SYSTEMS      General:No Weight change/ Fatigue/ HA/Dizzy	    Skin/Breast: No Rashes/ Lesions/ Masses  	  Ophthalmologic: No Blurry vision/ Glaucoma/ Blindness  	  ENMT: No Hearing loss/ Drainage/ Lesions	    Respiratory and Thorax: No Cough/ Wheezing/ SOB/ Hemoptysis/ Sputum production  	  Cardiovascular: No Chest pain/ Palpitations/ Diaphoresis	    Gastrointestinal: No Nausea/ Vomiting/ Constipation/ Appetite Change	    Genitourinary: No Heamturia/ Dysuria/ Frequency change/ Impotence	    Musculoskeletal: No Pain/ Weakness/ Claudication	    Neurological: No Seizures/ TIA/CVA/ Parastesias	    Psychiatric: No Dementia/ Depression/ SI/HI	    Hematology/Lymphatics: No hx of bleeding/ Edema	    Endocrine:	No Hyperglycemia/ Hypoglycemia    Allergic/Immunologic:	 No Anaphylaxis/ Intolerance/ Recent illnesses    MEDICATIONS  (STANDING):  allopurinol 300 milliGRAM(s) Oral daily  diltiazem    milliGRAM(s) Oral daily  enoxaparin Injectable 40 milliGRAM(s) SubCutaneous every 24 hours  escitalopram 10 milliGRAM(s) Oral at bedtime  multivitamin 1 Tablet(s) Oral daily  nicotine -   7 mG/24Hr(s) Patch 1 patch Transdermal daily  predniSONE   Tablet 40 milliGRAM(s) Oral daily  tiotropium 18 MICROgram(s) Capsule 1 Capsule(s) Inhalation at bedtime    MEDICATIONS  (PRN):  docusate sodium 100 milliGRAM(s) Oral three times a day PRN Constipation      Allergies    No Known Allergies    Intolerances        SOCIAL HISTORY:  Occupation:  Smoking Hx: denies  Etoh Hx: denies  IVDA Hx: denies    FAMILY HISTORY:    unless noted, no significant family hx with Mother, Father, Siblings    Vital Signs Last 24 Hrs  T(C): 36.9 (19 Aug 2018 12:38), Max: 37.3 (18 Aug 2018 17:39)  T(F): 98.4 (19 Aug 2018 12:38), Max: 99.1 (18 Aug 2018 17:39)  HR: 77 (19 Aug 2018 12:38) (56 - 85)  BP: 151/51 (19 Aug 2018 12:38) (131/77 - 152/66)  BP(mean): --  RR: 18 (19 Aug 2018 12:38) (18 - 23)  SpO2: 95% (19 Aug 2018 12:38) (94% - 100%)    General: WN/WD NAD  Neurology: Awake, nonfocal, WEBBER x 4  Eyes: Scleras clear, PERRLA/ EOMI, Gross vision intact  ENT:Gross hearing intact, grossly patent pharynx, no stridor  Neck: Neck supple, trachea midline, No JVD,   Respiratory: CTA B/L, No wheezing, rales, rhonchi  CV: RRR, S1S2, no murmurs, rubs or gallops  Abdominal: Soft, NT, ND +BS,   Extremities: No edema, + peripheral pulses  Skin: No Rashes, Hematoma, Ecchymosis  Lymphatic: No Neck, axilla, groin LAD  Psych: Oriented x 3, normal affect  Incisions:   Tubes:    LABS:                        10.3   19.20 )-----------( 281      ( 18 Aug 2018 19:09 )             32.4     08-18    132<L>  |  96<L>  |  25<H>  ----------------------------<  189<H>  5.2   |  22  |  1.21    Ca    8.7      18 Aug 2018 19:09  Phos  3.8     08-18    TPro  5.9<L>  /  Alb  2.8<L>  /  TBili  0.4  /  DBili  x   /  AST  18  /  ALT  16  /  AlkPhos  115  08-18          RADIOLOGY & ADDITIONAL STUDIES:    ASSESSMENT:   73yMalePAST MEDICAL & SURGICAL HISTORY:  Squamous cell carcinoma of lung, stage IV  TIA (transient ischemic attack): 1999  Localized enlarged lymph nodes  Solitary pulmonary nodule  HTN (hypertension)  Alcohol abuse: Sober since 01/2016- attending AA meeting weekly  Depression, unspecified depression type  Gout  Essential hypertension  H/O hemorrhoidectomy: 1967  History of tonsillectomy: as a child  History of appendectomy: 1958  H/O left knee surgery: 1995  HEALTH ISSUES - PROBLEM Dx:  Suspected pulmonary embolism  Prophylactic measure: Prophylactic measure  Gout: Gout  SIADH (syndrome of inappropriate ADH production): SIADH (syndrome of inappropriate ADH production)  Hyperkalemia: Hyperkalemia  Atrial fibrillation: Atrial fibrillation  HTN (hypertension): HTN (hypertension)  Squamous cell carcinoma of lung, stage IV: Squamous cell carcinoma of lung, stage IV  Dyspnea: Dyspnea      HEALTH ISSUES - R/O PROBLEM Dx:      PLAN: HPI:  72yo M with a PMHx of HTN, afib, SIADH and SCC of the lung (diagnosed 2017) stage IV s/p RUL resection and previous episodes of R lung effusions p/w dyspnea on exertion for the past 3-4 days. Pt endorses that he has finished twelve rounds of chemotherapy in the past and is currently on immunotherapy every 3 weeks with atezolizumab. Starting around 3-4 days ago the patient noted that he felt more tired than usual. He is usually able to walk around freely and was staying in a chair all day and only walking to the bathroom. He denies that it was worsening, but his wife was concerned and so he was sent to the ED.  He denies any fevers, chills, cough, nausea or vomiting. He denies any leg swelling. He also denies any type of pain and says he only feels short of breath. He endorses some orthopnea, but denies any heart palpitations. (19 Aug 2018 08:36)      PAST MEDICAL & SURGICAL HISTORY:  Squamous cell carcinoma of lung, stage IV  TIA (transient ischemic attack): 1999  Localized enlarged lymph nodes  Solitary pulmonary nodule  HTN (hypertension)  Alcohol abuse: Sober since 01/2016- attending AA meeting weekly  Depression, unspecified depression type  Gout  Essential hypertension  H/O hemorrhoidectomy: 1967  History of tonsillectomy: as a child  History of appendectomy: 1958  H/O left knee surgery: 1995      REVIEW OF SYSTEMS      General:No Weight change/ Fatigue/ HA/Dizzy	    Skin/Breast: No Rashes/ Lesions/ Masses  	  Ophthalmologic: No Blurry vision/ Glaucoma/ Blindness  	  ENMT: No Hearing loss/ Drainage/ Lesions	    Respiratory and Thorax: No Cough/ Wheezing/ SOB/ Hemoptysis/ Sputum production  	  Cardiovascular: No Chest pain/ Palpitations/ Diaphoresis	    Gastrointestinal: No Nausea/ Vomiting/ Constipation/ Appetite Change	    Genitourinary: No Heamturia/ Dysuria/ Frequency change/ Impotence	    Musculoskeletal: No Pain/ Weakness/ Claudication	    Neurological: No Seizures/ TIA/CVA/ Parastesias	    Psychiatric: No Dementia/ Depression/ SI/HI	    Hematology/Lymphatics: No hx of bleeding/ Edema	    Endocrine:	No Hyperglycemia/ Hypoglycemia    Allergic/Immunologic:	 No Anaphylaxis/ Intolerance/ Recent illnesses    MEDICATIONS  (STANDING):  allopurinol 300 milliGRAM(s) Oral daily  diltiazem    milliGRAM(s) Oral daily  enoxaparin Injectable 40 milliGRAM(s) SubCutaneous every 24 hours  escitalopram 10 milliGRAM(s) Oral at bedtime  multivitamin 1 Tablet(s) Oral daily  nicotine -   7 mG/24Hr(s) Patch 1 patch Transdermal daily  predniSONE   Tablet 40 milliGRAM(s) Oral daily  tiotropium 18 MICROgram(s) Capsule 1 Capsule(s) Inhalation at bedtime    MEDICATIONS  (PRN):  docusate sodium 100 milliGRAM(s) Oral three times a day PRN Constipation      Allergies    No Known Allergies    Intolerances        SOCIAL HISTORY:  Occupation: retired  Smoking Hx: former, quit 1 yr ago  Etoh Hx: former, quit 1 year ago  IVDA Hx: denies    FAMILY HISTORY:    unless noted, no significant family hx with Mother, Father, Siblings    Vital Signs Last 24 Hrs  T(C): 36.9 (19 Aug 2018 12:38), Max: 37.3 (18 Aug 2018 17:39)  T(F): 98.4 (19 Aug 2018 12:38), Max: 99.1 (18 Aug 2018 17:39)  HR: 77 (19 Aug 2018 12:38) (56 - 85)  BP: 151/51 (19 Aug 2018 12:38) (131/77 - 152/66)  BP(mean): --  RR: 18 (19 Aug 2018 12:38) (18 - 23)  SpO2: 95% (19 Aug 2018 12:38) (94% - 100%)    General: WN/WD NAD  Neurology: Awake, nonfocal, WEBBER x 4  Eyes: Scleras clear, PERRLA/ EOMI, Gross vision intact  ENT:Gross hearing intact, grossly patent pharynx, no stridor  Neck: Neck supple, trachea midline, No JVD,   Respiratory: decreased breath sounds B/L  CV: RRR, S1S2, no murmurs, rubs or gallops  Abdominal: Soft, NT, ND +BS,   Extremities: No edema, + peripheral pulses  Skin: No Rashes, Hematoma, Ecchymosis  Lymphatic: No Neck, axilla, groin LAD  Psych: Oriented x 3, normal affect      LABS:                        10.3   19.20 )-----------( 281      ( 18 Aug 2018 19:09 )             32.4     08-18    132<L>  |  96<L>  |  25<H>  ----------------------------<  189<H>  5.2   |  22  |  1.21    Ca    8.7      18 Aug 2018 19:09  Phos  3.8     08-18    TPro  5.9<L>  /  Alb  2.8<L>  /  TBili  0.4  /  DBili  x   /  AST  18  /  ALT  16  /  AlkPhos  115  08-18          RADIOLOGY & ADDITIONAL STUDIES:  < from: CT Angio Chest w/ IV Cont (08.18.18 @ 22:43) >    EXAM:  CT ANGIO CHEST (W)AW IC        PROCEDURE DATE:  Aug 18 2018         INTERPRETATION:  CLINICAL INFORMATION: Hypoxia, shortness of breath.   History of lung cancer.    CTA of the chest was performed following a pulmonary embolism protocol.   100 mls of Omnipaque 350 was administered intravenously without   complication and 0 mls were discarded. Sagittal and coronal   reconstructions were obtained. Axial maximum intensity projection images   were obtained.    COMPARISON: Chest x-ray 8/18/2018. PET/CT 7/9/2018. CT angiogram chest   5/9/2018..    CHEST:    Thyroid: Unremarkable    Heart:  Unremarkable.    No pulmonary embolism.      Lymph nodes: Enlarged mediastinal lymph nodes consistent with metastatic   disease. These have increased insize since the previous exam on   7/9/2018. For example a chain of prevascular nodes have enlarged with one   measuring at least 1.7 cm in dimension, previously 0.6 cm. There are   enlarged right axillary and subpectoral lymph nodes which have increased   in size and are consistent with metastatic disease.      Lungs and Pleura:   Status post right upper lobectomy. There is diffuse pleural thickening   which was FDG avid on the previous PET/CT. This likely represents   metastatic disease.  There is diffuse nodular thickening of the   peribronchial and septal regions of the right lung consistent with   lymphangitic spread of disease. There are small bilateral pleural   effusions left greater than right. There is left basilar atelectasis.  Airways: Patent.    Visualized abdominal structures:   2.4 cm lesion in the right liver has increased in size..      Osseous structures:   Degenerative changes.    IMPRESSION:    No pulmonary embolism.    Worsening metastatic disease with increase in lymphadenopathy, probable   lymphangitic spread of disease throughout the right lung, and probable   diffuse right pleural disease. Increase in size of hepatic metastasis.          [facility] RADI[facility] PRELIMINARY ADDENDUM REPORT      Addendum created by Fortunato Meier MD on 8/19/2018 10:02:53 AM EDT     THIS REPORT CONTAINS FINDINGS THAT MAY BE CRITICAL TO PATIENT CARE. The   findings were verbally communicated via telephone conference with Dr Petty at 10:02 AM EDT on 8/19/2018. The findings were acknowledged   and   understood.  Initial report created on 8/19/2018 8:04:52 AM EDT     EXAM:    CT Angiography Chest With Intravenous Contrast     EXAM DATE/TIME:    8/18/2018 10:27 PM     CLINICAL HISTORY:    73 years old, male; Pain; Chest pain     TECHNIQUE:    Axial computed tomographic angiography images of the chest with   intravenous   contrast using CT angiography protocol.    Coronal and sagittal reformatted images were created and reviewed.    MIP reconstructed images were created and reviewed.     CONTRAST:    90 ml of omni administered intravenously.     COMPARISON:    CT ANGIO CHEST WITHOUT AND OR WITH IV CONTRAST 2018-05-09 21:51     FINDINGS:    Pulmonary arteries:  No evidence of pulmonary embolus to the segmental   level.    Aorta:  No aneurysm of the ascending aorta. No dissection of the aorta.    Lungs:  Opacities in the right middle lobe and right lower lobe may   represent   atelectasis or pneumonia. A few nodules in the right middle lobe and   right   lower lobe may represent metastatic disease.    Pleural space:  Circumferential right pleural effusion is increasing in   size.   Finding worrisome for malignant pleural effusion. Small left pleural   effusion.    Heart: Normal. No cardiomegaly. No pericardial effusion.    Bones/joints: Unremarkable. No acute fracture.    Soft tissues: Unremarkable.    Lymph nodes:  Preaortic nodes are increasing in size. Post   caval/pretracheal   node is increasing in size it is now 19 x 17 mm. Subcarinal nodes are   increasing in size now 3 x 2.6 cm. Again noted right axillary nodes   Peripancreatic nodes and periportal nodes are again demonstrated.     IMPRESSION:   1. No evidence of pulmonary embolus to the segmental level.   2. No aneurysm of the ascending aorta.   3. No dissection of the aorta.   4. Circumferential right pleural effusion is increasing in size. Finding   worrisome for malignant pleural effusion. Small left pleural effusion.   5. Opacities in the right middle lobe and right lower lobe may represent   atelectasis or pneumonia.   6. A few nodules in the right middle lobe and right lower lobe may   represent   metastatic disease.   7. Preaortic nodes are increasing in size. Post caval/pretracheal node is   increasing in size it is now 19 x 17 mm. Subcarinal nodes are increasing   in   size now 3 x 2.6 cm.                  LEA TESFAYE M.D., ATTENDING RADIOLOGIST  This document has been electronically signed. Aug 19 2018 12:30PM                  < end of copied text >                  ASSESSMENT:   73yMalePAST MEDICAL & SURGICAL HISTORY:  Squamous cell carcinoma of lung, stage IV  TIA (transient ischemic attack): 1999  Localized enlarged lymph nodes  Solitary pulmonary nodule  HTN (hypertension)  Alcohol abuse: Sober since 01/2016- attending AA meeting weekly  Depression, unspecified depression type  Gout  Essential hypertension  H/O hemorrhoidectomy: 1967  History of tonsillectomy: as a child  History of appendectomy: 1958  H/O left knee surgery: 1995  HEALTH ISSUES - PROBLEM Dx:  Suspected pulmonary embolism  Prophylactic measure: Prophylactic measure  Gout: Gout  SIADH (syndrome of inappropriate ADH production): SIADH (syndrome of inappropriate ADH production)  Hyperkalemia: Hyperkalemia  Atrial fibrillation: Atrial fibrillation  HTN (hypertension): HTN (hypertension)  Squamous cell carcinoma of lung, stage IV: Squamous cell carcinoma of lung, stage IV  Dyspnea: Dyspnea      HEALTH ISSUES - R/O PROBLEM Dx: small B/L pleural effusions; left greater than right      PLAN:  scans reviewed with thoracic attending on-call, Dr De León  symptoms likely to progression of disease  no thoracic surgery intervention recommended at this point  recommend pulmonary consult  consider palliative care consult  continue care per primary team, please reconsult with questions PRIYANKA MORROW [id number]

## 2018-08-19 NOTE — H&P ADULT - NSHPPHYSICALEXAM_GEN_ALL_CORE
PHYSICAL EXAM:      Constitutional: Well nourished and in NAD.    Eyes: EOMI. PERRL    ENMT: No oral ulcers noted. No erythema either.    Neck: No JVD. Supple.    Back: No pain on palpation of the spinous processes or paraspinal muscles.    Respiratory: Crackles and decreased breath sounds in the right middle and lower lobes. Dullness to percussion up to nipple line was sitting up.    Cardiovascular: Irregularly irregular. S1+, S2+.    Gastrointestinal: Soft, non-tender, non-distended.    Extremities: Pulses 2+ throughout. Capillary refill <2s    Neurological: No focal deficits.    Skin: No rashes noted.    Musculoskeletal: Strength 5/5 throughout.    Psychiatric: AAO*3. Denies SI or HI. PHYSICAL EXAM:  Vital Signs Last 24 Hrs  T(C): 36.9 (19 Aug 2018 12:38), Max: 36.9 (19 Aug 2018 12:38)  T(F): 98.4 (19 Aug 2018 12:38), Max: 98.4 (19 Aug 2018 12:38)  HR: 77 (19 Aug 2018 12:38) (56 - 77)  BP: 151/51 (19 Aug 2018 12:38) (131/77 - 152/62)  BP(mean): --  RR: 18 (19 Aug 2018 12:38) (18 - 20)  SpO2: 95% (19 Aug 2018 12:38) (95% - 97%)    Constitutional: Well nourished and in NAD.    Eyes: EOMI. PERRL    ENMT: No oral ulcers noted. No erythema either.    Neck: No JVD. Supple.    Back: No pain on palpation of the spinous processes or paraspinal muscles.    Respiratory: Crackles and decreased breath sounds in the right middle and lower lobes. Dullness to percussion up to nipple line was sitting up.    Cardiovascular: Irregularly irregular. S1+, S2+.    Gastrointestinal: Soft, non-tender, non-distended.    Extremities: Pulses 2+ throughout. Capillary refill <2s    Neurological: No focal deficits.    Skin: No rashes noted.    Musculoskeletal: Strength 5/5 throughout.    Psychiatric: AAO*3. Denies SI or HI.

## 2018-08-19 NOTE — CONSULT NOTE ADULT - ASSESSMENT
SOB  elevated wbc  ? PNA and Effusion  fu CT of chest  consider pulm eval    PAF  Hr stable   on cardizem 360   off a/c due to multiple epistaxis     HTN  stable

## 2018-08-19 NOTE — GOALS OF CARE CONVERSATION - PERSONAL ADVANCE DIRECTIVE - DECISION MAKER
Copaxone -  Approved - Ready to fill       Authorization Number: N/A   Valid from 12/30/17 to 12/31/18      Please send script to [facility]       Pharmacy Coverage [employer] Part D    Patient's Co-Pay: $unknown       Co pay Assistance Information    Patient not eligible for assistance.          The following information (i.e. Labs) will be required for reauthorization: n/a       Additional Information:    reauthorization       Shayla Richards    12/18/17      Patient has refills of medication, does not need new prescription at this time. Will relay info back to Shayla Richards. Nothing further needed from writer.   
Patient

## 2018-08-20 PROBLEM — C34.90 MALIGNANT NEOPLASM OF UNSPECIFIED PART OF UNSPECIFIED BRONCHUS OR LUNG: Chronic | Status: INACTIVE | Noted: 2018-01-09 | Resolved: 2018-08-19

## 2018-08-20 LAB
ALBUMIN SERPL ELPH-MCNC: 2.5 G/DL — LOW (ref 3.3–5)
ALP SERPL-CCNC: 102 U/L — SIGNIFICANT CHANGE UP (ref 40–120)
ALT FLD-CCNC: 12 U/L — SIGNIFICANT CHANGE UP (ref 4–41)
AST SERPL-CCNC: 13 U/L — SIGNIFICANT CHANGE UP (ref 4–40)
BASOPHILS # BLD AUTO: 0.01 K/UL — SIGNIFICANT CHANGE UP (ref 0–0.2)
BASOPHILS NFR BLD AUTO: 0.1 % — SIGNIFICANT CHANGE UP (ref 0–2)
BILIRUB SERPL-MCNC: 0.2 MG/DL — SIGNIFICANT CHANGE UP (ref 0.2–1.2)
BUN SERPL-MCNC: 22 MG/DL — SIGNIFICANT CHANGE UP (ref 7–23)
CALCIUM SERPL-MCNC: 8.6 MG/DL — SIGNIFICANT CHANGE UP (ref 8.4–10.5)
CHLORIDE SERPL-SCNC: 99 MMOL/L — SIGNIFICANT CHANGE UP (ref 98–107)
CO2 SERPL-SCNC: 23 MMOL/L — SIGNIFICANT CHANGE UP (ref 22–31)
CREAT SERPL-MCNC: 1.04 MG/DL — SIGNIFICANT CHANGE UP (ref 0.5–1.3)
EOSINOPHIL # BLD AUTO: 0.12 K/UL — SIGNIFICANT CHANGE UP (ref 0–0.5)
EOSINOPHIL NFR BLD AUTO: 0.9 % — SIGNIFICANT CHANGE UP (ref 0–6)
GLUCOSE SERPL-MCNC: 111 MG/DL — HIGH (ref 70–99)
HCT VFR BLD CALC: 30.6 % — LOW (ref 39–50)
HGB BLD-MCNC: 9.6 G/DL — LOW (ref 13–17)
IMM GRANULOCYTES # BLD AUTO: 0.18 # — SIGNIFICANT CHANGE UP
IMM GRANULOCYTES NFR BLD AUTO: 1.3 % — SIGNIFICANT CHANGE UP (ref 0–1.5)
LYMPHOCYTES # BLD AUTO: 29.4 % — SIGNIFICANT CHANGE UP (ref 13–44)
LYMPHOCYTES # BLD AUTO: 4.06 K/UL — HIGH (ref 1–3.3)
MAGNESIUM SERPL-MCNC: 1.6 MG/DL — SIGNIFICANT CHANGE UP (ref 1.6–2.6)
MCHC RBC-ENTMCNC: 27.3 PG — SIGNIFICANT CHANGE UP (ref 27–34)
MCHC RBC-ENTMCNC: 31.4 % — LOW (ref 32–36)
MCV RBC AUTO: 86.9 FL — SIGNIFICANT CHANGE UP (ref 80–100)
MONOCYTES # BLD AUTO: 0.59 K/UL — SIGNIFICANT CHANGE UP (ref 0–0.9)
MONOCYTES NFR BLD AUTO: 4.3 % — SIGNIFICANT CHANGE UP (ref 2–14)
NEUTROPHILS # BLD AUTO: 8.84 K/UL — HIGH (ref 1.8–7.4)
NEUTROPHILS NFR BLD AUTO: 64 % — SIGNIFICANT CHANGE UP (ref 43–77)
NRBC # FLD: 0 — SIGNIFICANT CHANGE UP
PHOSPHATE SERPL-MCNC: 3.5 MG/DL — SIGNIFICANT CHANGE UP (ref 2.5–4.5)
PLATELET # BLD AUTO: 257 K/UL — SIGNIFICANT CHANGE UP (ref 150–400)
PMV BLD: 10.7 FL — SIGNIFICANT CHANGE UP (ref 7–13)
POTASSIUM SERPL-MCNC: 4.5 MMOL/L — SIGNIFICANT CHANGE UP (ref 3.5–5.3)
POTASSIUM SERPL-SCNC: 4.5 MMOL/L — SIGNIFICANT CHANGE UP (ref 3.5–5.3)
PROT SERPL-MCNC: 5.8 G/DL — LOW (ref 6–8.3)
RBC # BLD: 3.52 M/UL — LOW (ref 4.2–5.8)
RBC # FLD: 17 % — HIGH (ref 10.3–14.5)
SODIUM SERPL-SCNC: 134 MMOL/L — LOW (ref 135–145)
WBC # BLD: 13.8 K/UL — HIGH (ref 3.8–10.5)
WBC # FLD AUTO: 13.8 K/UL — HIGH (ref 3.8–10.5)

## 2018-08-20 PROCEDURE — 99233 SBSQ HOSP IP/OBS HIGH 50: CPT | Mod: GC

## 2018-08-20 PROCEDURE — 99223 1ST HOSP IP/OBS HIGH 75: CPT | Mod: GC

## 2018-08-20 RX ORDER — FUROSEMIDE 40 MG
40 TABLET ORAL ONCE
Qty: 0 | Refills: 0 | Status: COMPLETED | OUTPATIENT
Start: 2018-08-20 | End: 2018-08-20

## 2018-08-20 RX ORDER — FUROSEMIDE 40 MG
40 TABLET ORAL ONCE
Qty: 0 | Refills: 0 | Status: DISCONTINUED | OUTPATIENT
Start: 2018-08-20 | End: 2018-08-20

## 2018-08-20 RX ORDER — TAMSULOSIN HYDROCHLORIDE 0.4 MG/1
0.4 CAPSULE ORAL AT BEDTIME
Qty: 0 | Refills: 0 | Status: DISCONTINUED | OUTPATIENT
Start: 2018-08-20 | End: 2018-08-21

## 2018-08-20 RX ADMIN — ESCITALOPRAM OXALATE 10 MILLIGRAM(S): 10 TABLET, FILM COATED ORAL at 22:19

## 2018-08-20 RX ADMIN — TAMSULOSIN HYDROCHLORIDE 0.4 MILLIGRAM(S): 0.4 CAPSULE ORAL at 23:09

## 2018-08-20 RX ADMIN — Medication 300 MILLIGRAM(S): at 12:29

## 2018-08-20 RX ADMIN — Medication 50 MILLIGRAM(S): at 05:44

## 2018-08-20 RX ADMIN — ENOXAPARIN SODIUM 40 MILLIGRAM(S): 100 INJECTION SUBCUTANEOUS at 12:29

## 2018-08-20 RX ADMIN — Medication 1 TABLET(S): at 12:30

## 2018-08-20 RX ADMIN — Medication 360 MILLIGRAM(S): at 05:44

## 2018-08-20 RX ADMIN — Medication 1 PATCH: at 12:30

## 2018-08-20 RX ADMIN — Medication 40 MILLIGRAM(S): at 17:59

## 2018-08-20 RX ADMIN — TIOTROPIUM BROMIDE 1 CAPSULE(S): 18 CAPSULE ORAL; RESPIRATORY (INHALATION) at 22:20

## 2018-08-20 RX ADMIN — Medication 1 TABLET(S): at 22:19

## 2018-08-20 NOTE — CONSULT NOTE ADULT - ATTENDING COMMENTS
I have seen and examined the patient. I agree with the above history, physical exam, and plan of care except for as detailed below.    74 y/o M w/metastatic squamous cell carcinoma of the lung now on PDL1 inhibitor admitted for progressive dyspnea and hypoxemia. Suspect progression of disease as CT scan is consistent with lymphangitic spread. Patient with chronic R sided pleural effusion which appears smaller than previous. Patient is chronically on high dose steroids which makes PDL1 inhibitor induced pneumonitis unlikely. PCP is also a possibility, however less likely as patient does not appear actively infected.     - Trial of diuresis to see if this improves symptoms/O2 requirement  - Wean O2 as tolerated, will likely need to be discharged with home oxygen  - Continue prednisone  - Recommend starting PCP prophylaxis with bactrim

## 2018-08-20 NOTE — CONSULT NOTE ADULT - SUBJECTIVE AND OBJECTIVE BOX
HPI:  72yo man PMH HTN, Afib, stage IIIA SCC of the lung (diagnosed 2017) s/p RUL resection s/p adjuvant chemo with platinum and nab paclitaxel x 4 cycles in December 2017, now on Tecentriq monotherapy (started in 05/2018), CLL and previous episodes of R lung effusions s/p thoracentesis  p/w dyspnea on exertion for the past 3-4 days. Pt endorses that he has finished twelve rounds of chemotherapy in the past and is currently on immunotherapy every 3 weeks with atezolizumab. Starting around 3-4 days ago the patient noted that he felt more tired than usual and notes dyspnea especially with exertion. He is usually able to walk around freely and was staying in a chair all day and only walking to the bathroom. He denies that it was worsening, but his wife was concerned and so he was sent to the ED. Currently not on anticoagulations for his afib given recurrent episodes of epistaxis.    He denies any fevers, chills, cough, nausea or vomiting. He denies any leg swelling. He also denies any type of pain and says he only feels short of breath. He endorses some orthopnea, but denies any heart palpitations or chest pain. Has had decent appetite and maintaining good po. Regular BMs     Hospital Course:    Patient requiring supplemental oxygen but has not suffered on episodes of hypoxemia on 2L/min NC. Prednisone dose increased from 40 to 50 mg QD. CT Angio negative for PE but noted for small right PLEF and ground glass opacities that have been present on prior imaging.       PAST MEDICAL & SURGICAL HISTORY:  Squamous cell carcinoma of lung, stage IV  TIA (transient ischemic attack): 1999  Localized enlarged lymph nodes  Solitary pulmonary nodule  HTN (hypertension)  Alcohol abuse: Sober since 01/2016- attending AA meeting weekly  Depression, unspecified depression type  Gout  Essential hypertension  H/O hemorrhoidectomy: 1967  History of tonsillectomy: as a child  History of appendectomy: 1958  H/O left knee surgery: 1995      Allergies: NKDA    Social History: Former smoker quit 9 months prior. >60 pack year hx. Former EtOH use. No recent travel or animal exposure.     FH:      MEDICATIONS  (STANDING):  allopurinol 300 milliGRAM(s) Oral daily  diltiazem    milliGRAM(s) Oral daily  enoxaparin Injectable 40 milliGRAM(s) SubCutaneous every 24 hours  escitalopram 10 milliGRAM(s) Oral at bedtime  multivitamin 1 Tablet(s) Oral daily  nicotine -   7 mG/24Hr(s) Patch 1 patch Transdermal daily  predniSONE   Tablet 50 milliGRAM(s) Oral daily  tiotropium 18 MICROgram(s) Capsule 1 Capsule(s) Inhalation at bedtime    MEDICATIONS  (PRN):  docusate sodium 100 milliGRAM(s) Oral three times a day PRN Constipation        CAPILLARY BLOOD GLUCOSE        I&O's Summary      PHYSICAL EXAM:  GENERAL: no acute respiratory distress on NC  HEAD:  Atraumatic, Normocephalic  EYES: EOMI, PERRLA, conjunctiva and sclera clear  NECK: Supple, No JVD  CHEST/LUNG: Clear to auscultation bilaterally; No wheeze  HEART: Regular rate and rhythm; No murmurs, rubs, or gallops  ABDOMEN: Soft, Nontender, Nondistended; Bowel sounds present  EXTREMITIES:  2+ Peripheral Pulses, No clubbing, cyanosis, or edema  PSYCH: AAOx3  NEUROLOGY: non-focal  SKIN: No rashes or lesions    LABS:                        9.6    13.80 )-----------( 257      ( 20 Aug 2018 05:50 )             30.6     08-20    134<L>  |  99  |  22  ----------------------------<  111<H>  4.5   |  23  |  1.04    Ca    8.6      20 Aug 2018 05:50  Phos  3.5     08-20  Mg     1.6     08-20    TPro  5.8<L>  /  Alb  2.5<L>  /  TBili  0.2  /  DBili  x   /  AST  13  /  ALT  12  /  AlkPhos  102  08-20 HPI:    72yo man PMH HTN, Afib, stage IIIA SCC of the lung (diagnosed 2017) s/p RUL resection s/p adjuvant chemo with platinum and nab paclitaxel x 4 cycles in December 2017, now on Tecentriq monotherapy (started in 05/2018), CLL and previous episodes of R lung effusions s/p thoracentesis  p/w dyspnea on exertion for the past 3-4 days. Pt endorses that he has finished twelve rounds of chemotherapy in the past and is currently on immunotherapy every 3 weeks with atezolizumab. Starting around 3-4 days ago the patient noted that he felt more tired than usual and notes dyspnea especially with exertion. He is usually able to walk around freely and was staying in a chair all day and only walking to the bathroom. He denies that it was worsening, but his wife was concerned and so he was sent to the ED. Currently not on anticoagulations for his afib given recurrent episodes of epistaxis.    He denies any fevers, chills, cough, nausea or vomiting. He denies any leg swelling. He also denies any type of pain and says he only feels short of breath. He endorses some orthopnea, but denies any heart palpitations or chest pain.    Patient has undergone two previous VATS assisted thoracentesis with 3900cc of hemothorax drained in 04/2018.    Hospital Course:    Patient requiring supplemental oxygen but has not suffered on episodes of hypoxemia on 2L/min NC. Prednisone dose increased from 40 to 50 mg QD. CT Angio negative for PE but noted for small right PLEF and ground glass opacities that have been present on prior imaging.     On exam, patient comfortable in no acute distress. Saturating 96% on 2L/min with desaturation to 91% on RA.       PAST MEDICAL & SURGICAL HISTORY:  Squamous cell carcinoma of lung, stage IV  TIA (transient ischemic attack): 1999  Localized enlarged lymph nodes  Solitary pulmonary nodule  HTN (hypertension)  Alcohol abuse: Sober since 01/2016- attending AA meeting weekly  Depression, unspecified depression type  Gout  Essential hypertension  H/O hemorrhoidectomy: 1967  History of tonsillectomy: as a child  History of appendectomy: 1958  H/O left knee surgery: 1995      Allergies: NKDA    Social History: ; lives with wife. Former smoker quit 9 months prior. >60 pack year hx. Former EtOH use. No recent travel or animal exposure.     FH: No pertinent.       MEDICATIONS  (STANDING):  allopurinol 300 milliGRAM(s) Oral daily  diltiazem    milliGRAM(s) Oral daily  enoxaparin Injectable 40 milliGRAM(s) SubCutaneous every 24 hours  escitalopram 10 milliGRAM(s) Oral at bedtime  multivitamin 1 Tablet(s) Oral daily  nicotine -   7 mG/24Hr(s) Patch 1 patch Transdermal daily  predniSONE   Tablet 50 milliGRAM(s) Oral daily  tiotropium 18 MICROgram(s) Capsule 1 Capsule(s) Inhalation at bedtime    MEDICATIONS  (PRN):  docusate sodium 100 milliGRAM(s) Oral three times a day PRN Constipation        CAPILLARY BLOOD GLUCOSE        I&O's Summary      PHYSICAL EXAM:  GENERAL: no acute respiratory distress on NC  HEAD:  Atraumatic, Normocephalic  EYES: PERRLA, conjunctiva and sclera clear  NECK: Supple, No JVD  CHEST/LUNG: Basilar Rales L > R. Course breath sounds throughout.   HEART: Regular rate and rhythm; No murmurs, rubs, or gallops  ABDOMEN: Soft, Nontender, Nondistended; Bowel sounds present  EXTREMITIES:  2+ Peripheral Pulses, No clubbing, cyanosis, or edema  PSYCH: AAOx3  NEUROLOGY: non-focal  SKIN: No rashes or lesions    LABS:                        9.6    13.80 )-----------( 257      ( 20 Aug 2018 05:50 )             30.6     08-20    134<L>  |  99  |  22  ----------------------------<  111<H>  4.5   |  23  |  1.04    Ca    8.6      20 Aug 2018 05:50  Phos  3.5     08-20  Mg     1.6     08-20    TPro  5.8<L>  /  Alb  2.5<L>  /  TBili  0.2  /  DBili  x   /  AST  13  /  ALT  12  /  AlkPhos  102  08-20    RADIOLOGY:    IMPRESSION:   1. No evidence of pulmonary embolus to the segmental level.   2. No aneurysm of the ascending aorta.   3. No dissection of the aorta.   4. Circumferential right pleural effusion is increasing in size. Finding   worrisome for malignant pleural effusion. Small left pleural effusion.   5. Opacities in the right middle lobe and right lower lobe may represent   atelectasis or pneumonia.   6. A few nodules in the right middle lobe and right lower lobe may   represent   metastatic disease.   7. Preaortic nodes are increasing in size. Post caval/pretracheal node is   increasing in size it is now 19 x 17 mm. Subcarinal nodes are increasing   in   size now 3 x 2.6 cm. HPI:    72yo man PMH HTN, Afib, stage IIIA SCC of the lung (diagnosed 2017) s/p RUL resection s/p adjuvant chemo with platinum and nab paclitaxel x 4 cycles in December 2017, now on Tecentriq monotherapy (started in 05/2018), CLL and previous episodes of R lung effusions s/p thoracentesis  p/w dyspnea on exertion for the past 3-4 days. Pt endorses that he has finished twelve rounds of chemotherapy in the past and is currently on immunotherapy every 3 weeks with atezolizumab. Starting around 3-4 days ago the patient noted that he felt more tired than usual and notes dyspnea especially with exertion. He is usually able to walk around freely and was staying in a chair all day and only walking to the bathroom. He denies that it was worsening, but his wife was concerned and so he was sent to the ED. Currently not on anticoagulations for his afib given recurrent episodes of epistaxis.    He denies any fevers, chills, cough, nausea or vomiting. He denies any leg swelling. He also denies any type of pain and says he only feels short of breath. He endorses some orthopnea, but denies any heart palpitations or chest pain.    Patient has undergone two previous VATS assisted thoracentesis with 3900cc of hemothorax drained in 04/2018.    Hospital Course:    Patient requiring supplemental oxygen but has not suffered on episodes of hypoxemia on 2L/min NC. Prednisone dose increased from 40 to 50 mg QD. CT Angio negative for PE but noted for small right PLEF and ground glass opacities that have been present on prior imaging.     On exam, patient comfortable in no acute distress. Saturating 96% on 2L/min with desaturation to 91% on RA.       PAST MEDICAL & SURGICAL HISTORY:  Squamous cell carcinoma of lung, stage IV  TIA (transient ischemic attack): 1999  Localized enlarged lymph nodes  Solitary pulmonary nodule  HTN (hypertension)  Alcohol abuse: Sober since 01/2016- attending AA meeting weekly  Depression, unspecified depression type  Gout  Essential hypertension  H/O hemorrhoidectomy: 1967  History of tonsillectomy: as a child  History of appendectomy: 1958  H/O left knee surgery: 1995      Allergies: NKDA    Social History: ; lives with wife. Former smoker quit 9 months prior. >60 pack year hx. Former EtOH use. No recent travel or animal exposure.     FH: No pertinent.       MEDICATIONS  (STANDING):  allopurinol 300 milliGRAM(s) Oral daily  diltiazem    milliGRAM(s) Oral daily  enoxaparin Injectable 40 milliGRAM(s) SubCutaneous every 24 hours  escitalopram 10 milliGRAM(s) Oral at bedtime  multivitamin 1 Tablet(s) Oral daily  nicotine -   7 mG/24Hr(s) Patch 1 patch Transdermal daily  predniSONE   Tablet 50 milliGRAM(s) Oral daily  tiotropium 18 MICROgram(s) Capsule 1 Capsule(s) Inhalation at bedtime    MEDICATIONS  (PRN):  docusate sodium 100 milliGRAM(s) Oral three times a day PRN Constipation        CAPILLARY BLOOD GLUCOSE        I&O's Summary      PHYSICAL EXAM:  GENERAL: no acute respiratory distress on NC  HEAD:  Atraumatic, Normocephalic  EYES: PERRLA, conjunctiva and sclera clear  NECK: Supple, No JVD  CHEST/LUNG: Basilar Rales L > R. Course breath sounds throughout.   HEART: Regular rate and rhythm; No murmurs, rubs, or gallops  ABDOMEN: Soft, Nontender, Nondistended; Bowel sounds present  EXTREMITIES:  2+ Peripheral Pulses, No clubbing, cyanosis, or edema  PSYCH: AAOx3  NEUROLOGY: non-focal  SKIN: No rashes or lesions    LABS:                        9.6    13.80 )-----------( 257      ( 20 Aug 2018 05:50 )             30.6     08-20    134<L>  |  99  |  22  ----------------------------<  111<H>  4.5   |  23  |  1.04    Ca    8.6      20 Aug 2018 05:50  Phos  3.5     08-20  Mg     1.6     08-20    TPro  5.8<L>  /  Alb  2.5<L>  /  TBili  0.2  /  DBili  x   /  AST  13  /  ALT  12  /  AlkPhos  102  08-20    RVP: Negative.     RADIOLOGY:    IMPRESSION:   1. No evidence of pulmonary embolus to the segmental level.   2. No aneurysm of the ascending aorta.   3. No dissection of the aorta.   4. Circumferential right pleural effusion is increasing in size. Finding   worrisome for malignant pleural effusion. Small left pleural effusion.   5. Opacities in the right middle lobe and right lower lobe may represent   atelectasis or pneumonia.   6. A few nodules in the right middle lobe and right lower lobe may   represent   metastatic disease.   7. Preaortic nodes are increasing in size. Post caval/pretracheal node is   increasing in size it is now 19 x 17 mm. Subcarinal nodes are increasing   in   size now 3 x 2.6 cm. HPI:    74yo man PMH HTN, Afib, stage IIIA SCC of the lung (diagnosed 2017) s/p RUL resection s/p adjuvant chemo with platinum and nab paclitaxel x 4 cycles in December 2017, now on Tecentriq monotherapy (started in 05/2018), CLL and previous episodes of R lung effusions s/p thoracentesis  p/w dyspnea on exertion for the past 3-4 days. Pt endorses that he has finished twelve rounds of chemotherapy in the past and is currently on immunotherapy every 3 weeks with atezolizumab. Starting around 3-4 days ago the patient noted that he felt more tired than usual and notes dyspnea especially with exertion. He is usually able to walk around freely and was staying in a chair all day and only walking to the bathroom. He denies that it was worsening, but his wife was concerned and so he was sent to the ED. Currently not on anticoagulations for his afib given recurrent episodes of epistaxis.    He denies any fevers, chills, cough, nausea or vomiting. He denies any leg swelling. He also denies any type of pain and says he only feels short of breath. He endorses some orthopnea, but denies any heart palpitations or chest pain.    Patient has undergone two previous VATS assisted thoracentesis with 3900cc of hemothorax drained in 04/2018.    Hospital Course:    Patient requiring supplemental oxygen but has not suffered on episodes of hypoxemia on 2L/min NC. Prednisone dose increased from 40 to 50 mg QD. CT Angio negative for PE but notable for circumferential right PLEF and ground glass opacities that have been present on prior imaging.     On exam, patient comfortable in no acute distress. Saturating 96% on 2L/min with desaturation to 91% on RA. States he feels similar to admission in terms of exertional dyspnea.       PAST MEDICAL & SURGICAL HISTORY:  Squamous cell carcinoma of lung, stage IV  TIA (transient ischemic attack): 1999  Localized enlarged lymph nodes  Solitary pulmonary nodule  HTN (hypertension)  Alcohol abuse: Sober since 01/2016- attending AA meeting weekly  Depression, unspecified depression type  Gout  Essential hypertension  H/O hemorrhoidectomy: 1967  History of tonsillectomy: as a child  History of appendectomy: 1958  H/O left knee surgery: 1995      Allergies: NKDA    Social History: ; lives with wife. Former smoker quit 9 months prior. >60 pack year hx. Former EtOH use. No recent travel or animal exposure.     FH: No pertinent.       MEDICATIONS  (STANDING):  allopurinol 300 milliGRAM(s) Oral daily  diltiazem    milliGRAM(s) Oral daily  enoxaparin Injectable 40 milliGRAM(s) SubCutaneous every 24 hours  escitalopram 10 milliGRAM(s) Oral at bedtime  multivitamin 1 Tablet(s) Oral daily  nicotine -   7 mG/24Hr(s) Patch 1 patch Transdermal daily  predniSONE   Tablet 50 milliGRAM(s) Oral daily  tiotropium 18 MICROgram(s) Capsule 1 Capsule(s) Inhalation at bedtime    MEDICATIONS  (PRN):  docusate sodium 100 milliGRAM(s) Oral three times a day PRN Constipation        CAPILLARY BLOOD GLUCOSE        I&O's Summary      PHYSICAL EXAM:  GENERAL: no acute respiratory distress on NC  HEAD:  Atraumatic, Normocephalic  EYES: PERRLA, conjunctiva and sclera clear  NECK: Supple, No JVD  CHEST/LUNG: Basilar Rales L > R. Course breath sounds throughout.   HEART: Regular rate and rhythm; No murmurs, rubs, or gallops  ABDOMEN: Soft, Nontender, Nondistended; Bowel sounds present  EXTREMITIES:  2+ Peripheral Pulses, No clubbing, cyanosis, or edema  PSYCH: AAOx3  NEUROLOGY: non-focal  SKIN: No rashes or lesions    LABS:                        9.6    13.80 )-----------( 257      ( 20 Aug 2018 05:50 )             30.6     08-20    134<L>  |  99  |  22  ----------------------------<  111<H>  4.5   |  23  |  1.04    Ca    8.6      20 Aug 2018 05:50  Phos  3.5     08-20  Mg     1.6     08-20    TPro  5.8<L>  /  Alb  2.5<L>  /  TBili  0.2  /  DBili  x   /  AST  13  /  ALT  12  /  AlkPhos  102  08-20    RVP: Negative.     RADIOLOGY:    IMPRESSION:   1. No evidence of pulmonary embolus to the segmental level.   2. No aneurysm of the ascending aorta.   3. No dissection of the aorta.   4. Circumferential right pleural effusion is increasing in size. Finding   worrisome for malignant pleural effusion. Small left pleural effusion.   5. Opacities in the right middle lobe and right lower lobe may represent   atelectasis or pneumonia.   6. A few nodules in the right middle lobe and right lower lobe may   represent   metastatic disease.   7. Preaortic nodes are increasing in size. Post caval/pretracheal node is   increasing in size it is now 19 x 17 mm. Subcarinal nodes are increasing   in   size now 3 x 2.6 cm. HPI:    72yo man PMH HTN, Afib, stage IIIA SCC of the lung (diagnosed 2017) s/p RUL resection s/p adjuvant chemo with platinum and nab paclitaxel x 4 cycles in December 2017, now on Tecentriq monotherapy (started in 05/2018) and previous episodes of R lung effusions s/p thoracentesis p/w dyspnea on exertion for the past week. Pt endorses that he has finished twelve rounds of chemotherapy in the past and is currently on immunotherapy every 3 weeks with atezolizumab. Starting around 3-4 days ago the patient noted that he felt more tired than usual and notes dyspnea especially with exertion. He is usually able to walk around freely and was staying in a chair all day and only walking to the bathroom. He denies that it was worsening, but his wife was concerned and so he was sent to the ED. Currently not on anticoagulations for his afib given recurrent episodes of epistaxis.    He denies any fevers, chills, cough, nausea or vomiting. He denies any leg swelling. He also denies any type of pain and says he only feels short of breath. He endorses some orthopnea, but denies any heart palpitations or chest pain.    Patient has undergone two previous VATS assisted thoracentesis with 3900cc of hemothorax drained in 04/2018.    Hospital Course:    Patient requiring supplemental oxygen but has not suffered on episodes of hypoxemia on 2L/min NC. Prednisone dose increased from 40 to 50 mg QD. CT Angio negative for PE but notable for circumferential right PLEF and ground glass opacities that have been present on prior imaging.     On exam, patient comfortable in no acute distress. Saturating 96% on 2L/min with desaturation to 91% on RA. States he feels similar to admission in terms of exertional dyspnea.       PAST MEDICAL & SURGICAL HISTORY:  Squamous cell carcinoma of lung, stage IV  TIA (transient ischemic attack): 1999  Localized enlarged lymph nodes  Solitary pulmonary nodule  HTN (hypertension)  Alcohol abuse: Sober since 01/2016- attending AA meeting weekly  Depression, unspecified depression type  Gout  Essential hypertension  H/O hemorrhoidectomy: 1967  History of tonsillectomy: as a child  History of appendectomy: 1958  H/O left knee surgery: 1995      Allergies: NKDA    Social History: ; lives with wife. Former smoker quit 9 months prior. >60 pack year hx. Former EtOH use. No recent travel or animal exposure.     FH: No pertinent.       MEDICATIONS  (STANDING):  allopurinol 300 milliGRAM(s) Oral daily  diltiazem    milliGRAM(s) Oral daily  enoxaparin Injectable 40 milliGRAM(s) SubCutaneous every 24 hours  escitalopram 10 milliGRAM(s) Oral at bedtime  multivitamin 1 Tablet(s) Oral daily  nicotine -   7 mG/24Hr(s) Patch 1 patch Transdermal daily  predniSONE   Tablet 50 milliGRAM(s) Oral daily  tiotropium 18 MICROgram(s) Capsule 1 Capsule(s) Inhalation at bedtime    MEDICATIONS  (PRN):  docusate sodium 100 milliGRAM(s) Oral three times a day PRN Constipation        CAPILLARY BLOOD GLUCOSE        I&O's Summary      PHYSICAL EXAM:  GENERAL: no acute respiratory distress on NC  HEAD:  Atraumatic, Normocephalic  EYES: PERRLA, conjunctiva and sclera clear  NECK: Supple, No JVD  CHEST/LUNG: Basilar Rales L > R. Course breath sounds throughout.   HEART: Regular rate and rhythm; No murmurs, rubs, or gallops  ABDOMEN: Soft, Nontender, Nondistended; Bowel sounds present  EXTREMITIES:  2+ Peripheral Pulses, No clubbing, cyanosis, or edema  PSYCH: AAOx3  NEUROLOGY: non-focal  SKIN: No rashes or lesions    LABS:                        9.6    13.80 )-----------( 257      ( 20 Aug 2018 05:50 )             30.6     08-20    134<L>  |  99  |  22  ----------------------------<  111<H>  4.5   |  23  |  1.04    Ca    8.6      20 Aug 2018 05:50  Phos  3.5     08-20  Mg     1.6     08-20    TPro  5.8<L>  /  Alb  2.5<L>  /  TBili  0.2  /  DBili  x   /  AST  13  /  ALT  12  /  AlkPhos  102  08-20    RVP: Negative.     RADIOLOGY:    IMPRESSION:   1. No evidence of pulmonary embolus to the segmental level.   2. No aneurysm of the ascending aorta.   3. No dissection of the aorta.   4. Circumferential right pleural effusion is increasing in size. Finding   worrisome for malignant pleural effusion. Small left pleural effusion.   5. Opacities in the right middle lobe and right lower lobe may represent   atelectasis or pneumonia.   6. A few nodules in the right middle lobe and right lower lobe may   represent   metastatic disease.   7. Preaortic nodes are increasing in size. Post caval/pretracheal node is   increasing in size it is now 19 x 17 mm. Subcarinal nodes are increasing   in   size now 3 x 2.6 cm. HPI:    74yo man PMH HTN, Afib, stage IIIA SCC of the lung (diagnosed 2017) s/p RUL resection s/p adjuvant chemo with platinum and nab paclitaxel x 4 cycles in December 2017, now on Tecentriq monotherapy (started in 05/2018) and previous episodes of R lung effusions s/p thoracentesis p/w dyspnea on exertion for the past week. Pt endorses that he has finished twelve rounds of chemotherapy in the past and is currently on immunotherapy every 3 weeks with atezolizumab. Starting around 3-4 days ago the patient noted that he felt more tired than usual and notes dyspnea especially with exertion. He is usually able to walk around freely and was staying in a chair all day and only walking to the bathroom. He denies that it was worsening, but his wife was concerned and so he was sent to the ED. Currently not on anticoagulations for his afib given recurrent episodes of epistaxis.    He denies any fevers, chills, cough, nausea or vomiting. He denies any leg swelling. He also denies any type of pain and says he only feels short of breath. He endorses some orthopnea, but denies any heart palpitations or chest pain.    Patient has undergone previous VATS assisted decortication with 3900cc of hemothorax drained in 04/2018.    Hospital Course:    Patient requiring supplemental oxygen but has not suffered on episodes of hypoxemia on 2L/min NC. Prednisone dose increased from 40 to 50 mg QD. CT Angio negative for PE but notable for circumferential right PLEF and ground glass opacities that have been present on prior imaging.     On exam, patient comfortable in no acute distress. Saturating 96% on 2L/min with desaturation to 91% on RA. States he feels similar to admission in terms of exertional dyspnea.       PAST MEDICAL & SURGICAL HISTORY:  Squamous cell carcinoma of lung, stage IV  TIA (transient ischemic attack): 1999  Localized enlarged lymph nodes  Solitary pulmonary nodule  HTN (hypertension)  Alcohol abuse: Sober since 01/2016- attending AA meeting weekly  Depression, unspecified depression type  Gout  Essential hypertension  H/O hemorrhoidectomy: 1967  History of tonsillectomy: as a child  History of appendectomy: 1958  H/O left knee surgery: 1995      Allergies: NKDA    Social History: ; lives with wife. Former smoker quit 9 months prior. >60 pack year hx. Former EtOH use. No recent travel or animal exposure.     FH: No pertinent.       MEDICATIONS  (STANDING):  allopurinol 300 milliGRAM(s) Oral daily  diltiazem    milliGRAM(s) Oral daily  enoxaparin Injectable 40 milliGRAM(s) SubCutaneous every 24 hours  escitalopram 10 milliGRAM(s) Oral at bedtime  multivitamin 1 Tablet(s) Oral daily  nicotine -   7 mG/24Hr(s) Patch 1 patch Transdermal daily  predniSONE   Tablet 50 milliGRAM(s) Oral daily  tiotropium 18 MICROgram(s) Capsule 1 Capsule(s) Inhalation at bedtime    MEDICATIONS  (PRN):  docusate sodium 100 milliGRAM(s) Oral three times a day PRN Constipation        CAPILLARY BLOOD GLUCOSE        I&O's Summary      PHYSICAL EXAM:  GENERAL: no acute respiratory distress on NC  HEAD:  Atraumatic, Normocephalic  EYES: PERRLA, conjunctiva and sclera clear  NECK: Supple, No JVD  CHEST/LUNG: Basilar Rales L > R. Course breath sounds throughout.   HEART: Regular rate and rhythm; No murmurs, rubs, or gallops  ABDOMEN: Soft, Nontender, Nondistended; Bowel sounds present  EXTREMITIES:  2+ Peripheral Pulses, No clubbing, cyanosis, or edema  PSYCH: AAOx3  NEUROLOGY: non-focal  SKIN: No rashes or lesions    LABS:                        9.6    13.80 )-----------( 257      ( 20 Aug 2018 05:50 )             30.6     08-20    134<L>  |  99  |  22  ----------------------------<  111<H>  4.5   |  23  |  1.04    Ca    8.6      20 Aug 2018 05:50  Phos  3.5     08-20  Mg     1.6     08-20    TPro  5.8<L>  /  Alb  2.5<L>  /  TBili  0.2  /  DBili  x   /  AST  13  /  ALT  12  /  AlkPhos  102  08-20    RVP: Negative.     RADIOLOGY:    IMPRESSION:   1. No evidence of pulmonary embolus to the segmental level.   2. No aneurysm of the ascending aorta.   3. No dissection of the aorta.   4. Circumferential right pleural effusion is increasing in size. Finding   worrisome for malignant pleural effusion. Small left pleural effusion.   5. Opacities in the right middle lobe and right lower lobe may represent   atelectasis or pneumonia.   6. A few nodules in the right middle lobe and right lower lobe may   represent   metastatic disease.   7. Preaortic nodes are increasing in size. Post caval/pretracheal node is   increasing in size it is now 19 x 17 mm. Subcarinal nodes are increasing   in   size now 3 x 2.6 cm.

## 2018-08-20 NOTE — PROGRESS NOTE ADULT - SUBJECTIVE AND OBJECTIVE BOX
Subjective: Patient seen and examined. No new events except as noted.     SUBJECTIVE/ROS:    has sob     MEDICATIONS:  MEDICATIONS  (STANDING):  allopurinol 300 milliGRAM(s) Oral daily  diltiazem    milliGRAM(s) Oral daily  enoxaparin Injectable 40 milliGRAM(s) SubCutaneous every 24 hours  escitalopram 10 milliGRAM(s) Oral at bedtime  multivitamin 1 Tablet(s) Oral daily  nicotine -   7 mG/24Hr(s) Patch 1 patch Transdermal daily  predniSONE   Tablet 50 milliGRAM(s) Oral daily  tiotropium 18 MICROgram(s) Capsule 1 Capsule(s) Inhalation at bedtime      PHYSICAL EXAM:  T(C): 36.4 (08-20-18 @ 05:04), Max: 36.9 (08-19-18 @ 12:38)  HR: 70 (08-20-18 @ 05:04) (64 - 77)  BP: 134/79 (08-20-18 @ 05:04) (133/61 - 151/51)  RR: 18 (08-20-18 @ 05:04) (17 - 18)  SpO2: 97% (08-20-18 @ 05:04) (95% - 97%)  Wt(kg): --  I&O's Summary      JVP: Normal  Neck: supple  Lung: rhonchi   CV: S1 S2 , Murmur:  Abd: soft  Ext: No edema  neuro: Awake / alert  Psych: flat affect  Skin: normal       LABS/DATA:    CARDIAC MARKERS:                                9.6    13.80 )-----------( 257      ( 20 Aug 2018 05:50 )             30.6     08-20    134<L>  |  99  |  22  ----------------------------<  111<H>  4.5   |  23  |  1.04    Ca    8.6      20 Aug 2018 05:50  Phos  3.5     08-20  Mg     1.6     08-20    TPro  5.8<L>  /  Alb  2.5<L>  /  TBili  0.2  /  DBili  x   /  AST  13  /  ALT  12  /  AlkPhos  102  08-20    proBNP:   Lipid Profile:   HgA1c:   TSH:     TELE:  EKG:

## 2018-08-20 NOTE — CONSULT NOTE ADULT - ASSESSMENT
72yo man PMH HTN, Afib, stage IIIA SCC of the lung (diagnosed 2017) s/p RUL resection s/p adjuvant chemo with platinum and nab paclitaxel x 4 cycles in December 2017, now on Tecentriq monotherapy (started in 05/2018), CLL and previous episodes of R lung effusions s/p VATS-assisted thoracentesis presents with dyspnea likely 2/2 to progression of malignancy w/evidence of lymphangitic spread & stable right circumferential PLEF vs. possible contribution on pneumonitis from PDL-1 infusion therapy.    Recommendations    -continue steroids  -no acute intervention from pulmonology perspective  -would have patient evaluated for home oxygen use given possible further desaturation with ambulation.     NOTE INCOMPLETE FINAL RECOMMENDATIONS PENDING DISCUSSION WITH ATTENDING    Olegario Paige D.O.  PGY-2 EM/IM  Pager #[phone] 74yo man PMH HTN, Afib, stage IIIA SCC of the lung (diagnosed 2017) s/p RUL resection s/p adjuvant chemo with platinum and nab paclitaxel x 4 cycles in December 2017, now on Tecentriq monotherapy (started in 05/2018), CLL and previous episodes of R lung effusions s/p VATS-assisted thoracentesis presents with dyspnea likely 2/2 to progression of malignancy w/evidence of lymphangitic spread & stable right circumferential PLEF vs. possible contribution on pneumonitis from PDL-1 infusion therapy.    Recommendations    -continue steroids  -no acute intervention from pulmonology perspective  -would have patient evaluated for home oxygen use given possible further desaturation with    ambulation.   -trial of diuretics; would give Lasix 40 IVP and assess for any symptomatic improvement.   -start Bactrim ppx given chronic steroid use in the setting of malignancy & immunotherapy      Olegario Paige D.O.  PGY-2 EM/IM  Pager #[phone] 72yo man PMH HTN, Afib, stage IIIA SCC of the lung (diagnosed 2017) s/p RUL resection s/p adjuvant chemo with platinum and nab paclitaxel x 4 cycles in December 2017, now on Tecentriq monotherapy (started in 05/2018) and previous episodes of R lung effusions s/p VATS-assisted decortication presents with dyspnea likely 2/2 to progression of malignancy w/evidence of lymphangitic spread & stable right circumferential PLEF vs. possible contribution on pneumonitis from PDL-1 infusion therapy.    Recommendations    -continue steroids  -no acute intervention from pulmonology perspective  -would have patient evaluated for home oxygen use given possible further desaturation with    ambulation.   -trial of diuretics; would give Lasix 40 IVP and assess for any symptomatic improvement.   -start Bactrim ppx given chronic steroid use in the setting of malignancy & immunotherapy      Olegario Paige D.O.  PGY-2 EM/IM  Pager #[phone]

## 2018-08-20 NOTE — PROGRESS NOTE ADULT - SUBJECTIVE AND OBJECTIVE BOX
Patient is a 73y old  Male who presents with a chief complaint of 72yo M with a PMHx of HTN, afib and SCC (stage IV) of the lung p/w dyspnea on exertion. (19 Aug 2018 08:36)        SUBJECTIVE / OVERNIGHT EVENTS: No acute overnight events. Pt endorses that he feels a little better, but that he still feels short of breath. Denies any fevers, chills, chest pain, abdominal pain or nausea/vomiting.      MEDICATIONS  (STANDING):  allopurinol 300 milliGRAM(s) Oral daily  diltiazem    milliGRAM(s) Oral daily  enoxaparin Injectable 40 milliGRAM(s) SubCutaneous every 24 hours  escitalopram 10 milliGRAM(s) Oral at bedtime  multivitamin 1 Tablet(s) Oral daily  nicotine -   7 mG/24Hr(s) Patch 1 patch Transdermal daily  predniSONE   Tablet 50 milliGRAM(s) Oral daily  tiotropium 18 MICROgram(s) Capsule 1 Capsule(s) Inhalation at bedtime    MEDICATIONS  (PRN):  docusate sodium 100 milliGRAM(s) Oral three times a day PRN Constipation        CAPILLARY BLOOD GLUCOSE        I&O's Summary      PHYSICAL EXAM  GENERAL: NAD, well-developed  HEAD:  Atraumatic, Normocephalic  EYES: EOMI, PERRLA, conjunctiva and sclera clear  NECK: Supple, No JVD  CHEST/LUNG: Inspiratory fine crackles in the RLL with decreased breath sounds.  HEART: Regular rate and rhythm; No murmurs, rubs, or gallops  ABDOMEN: Soft, Nontender, Nondistended; Bowel sounds present  EXTREMITIES:  2+ Peripheral Pulses, No clubbing, cyanosis, or edema  PSYCH: AAOx3  SKIN: No rashes or lesions    LABS:                        9.6    13.80 )-----------( 257      ( 20 Aug 2018 05:50 )             30.6     08-20    134<L>  |  99  |  22  ----------------------------<  111<H>  4.5   |  23  |  1.04    Ca    8.6      20 Aug 2018 05:50  Phos  3.5     08-20  Mg     1.6     08-20    TPro  5.8<L>  /  Alb  2.5<L>  /  TBili  0.2  /  DBili  x   /  AST  13  /  ALT  12  /  AlkPhos  102  08-20              RADIOLOGY & ADDITIONAL TESTS:    Imaging Personally Reviewed:  Consultant(s) Notes Reviewed:    Care Discussed with Consultants/Other Providers:

## 2018-08-20 NOTE — CHART NOTE - NSCHARTNOTEFT_GEN_A_CORE
72yo man PMH HTN, Afib, SIADH and SCC of the lung (diagnosed 2017) stage IV s/p RUL resection presenting with worsening shortness of breath and progression of his lymphagitic spread of disease on repeat imaging here on 8/19/18. Patient has a walking saturation of 85% and had 87% at rest. Without oxygen, patient feels tachypneic and short of breath.    Alva Petty MD  PGY-2, EM   Pager: [phone] 74yo man PMH HTN, Afib, SIADH and SCC of the lung (diagnosed 2017) stage IV s/p RUL resection presenting with worsening shortness of breath and progression of his lymphagitic spread of disease on repeat imaging here on 8/19/18. Patient has a walking saturation of 85% and had 87% at rest, both on room air. Without oxygen, patient feels tachypneic and short of breath. Patient will require portable oxygen to go home with.    Alva Petty MD  PGY-2, EM   Pager: [phone]

## 2018-08-21 ENCOUNTER — TRANSCRIPTION ENCOUNTER (OUTPATIENT)
Age: 73
End: 2018-08-21

## 2018-08-21 VITALS — TEMPERATURE: 99 F

## 2018-08-21 PROCEDURE — 99232 SBSQ HOSP IP/OBS MODERATE 35: CPT | Mod: GC

## 2018-08-21 PROCEDURE — 99239 HOSP IP/OBS DSCHRG MGMT >30: CPT

## 2018-08-21 RX ORDER — FUROSEMIDE 40 MG
40 TABLET ORAL DAILY
Qty: 0 | Refills: 0 | Status: DISCONTINUED | OUTPATIENT
Start: 2018-08-21 | End: 2018-08-21

## 2018-08-21 RX ORDER — TIOTROPIUM BROMIDE 18 UG/1
1 CAPSULE ORAL; RESPIRATORY (INHALATION)
Qty: 0 | Refills: 0 | COMMUNITY
Start: 2018-08-21

## 2018-08-21 RX ORDER — ESCITALOPRAM OXALATE 10 MG/1
1 TABLET, FILM COATED ORAL
Qty: 0 | Refills: 0 | COMMUNITY
Start: 2018-08-21

## 2018-08-21 RX ORDER — FUROSEMIDE 40 MG
1 TABLET ORAL
Qty: 0 | Refills: 0 | COMMUNITY
Start: 2018-08-21

## 2018-08-21 RX ORDER — FUROSEMIDE 40 MG
1 TABLET ORAL
Qty: 30 | Refills: 0 | OUTPATIENT
Start: 2018-08-21 | End: 2018-09-19

## 2018-08-21 RX ORDER — MULTIVIT-MIN/FERROUS GLUCONATE 9 MG/15 ML
1 LIQUID (ML) ORAL
Qty: 0 | Refills: 0 | COMMUNITY

## 2018-08-21 RX ADMIN — Medication 1 TABLET(S): at 12:31

## 2018-08-21 RX ADMIN — Medication 1 PATCH: at 12:46

## 2018-08-21 RX ADMIN — Medication 300 MILLIGRAM(S): at 12:31

## 2018-08-21 RX ADMIN — Medication 1 PATCH: at 12:30

## 2018-08-21 RX ADMIN — ENOXAPARIN SODIUM 40 MILLIGRAM(S): 100 INJECTION SUBCUTANEOUS at 12:31

## 2018-08-21 RX ADMIN — Medication 360 MILLIGRAM(S): at 05:34

## 2018-08-21 RX ADMIN — Medication 40 MILLIGRAM(S): at 13:41

## 2018-08-21 RX ADMIN — Medication 50 MILLIGRAM(S): at 05:34

## 2018-08-21 NOTE — PROGRESS NOTE ADULT - PROBLEM SELECTOR PLAN 4
Pulse feels irregularly irregular here suggesting return to afib status.  - Will continue cardizem 360mg daily.  - Monitor HR
Pulse feels irregularly irregular here suggesting return to afib status.  - Will continue cardizem 360mg daily.  - Monitor HR

## 2018-08-21 NOTE — DISCHARGE NOTE ADULT - MEDICATION SUMMARY - MEDICATIONS TO TAKE
I will START or STAY ON the medications listed below when I get home from the hospital:    Home oxygen  -- Please use the oxygen continuiously. Required for persistent hypoxia <88% oxygen on room air.   -- Indication: For Dyspnea    predniSONE 10 mg oral tablet  -- 5 tab(s) by mouth once a day from 8/21-8/24  4 tabs  from 8/25-8/27  3 tabs  from 8/28-8/30  2 tabs from 8/31-9/2  1 tab  from 9/3-9/5    -- It is very important that you take or use this exactly as directed.  Do not skip doses or discontinue unless directed by your doctor.  Obtain medical advice before taking any non-prescription drugs as some may affect the action of this medication.  Take with food or milk.    -- Indication: For Dyspnea    Cardizem  mg/24 hours oral capsule, extended release  -- 1 cap(s) by mouth once a day  -- It is very important that you take or use this exactly as directed.  Do not skip doses or discontinue unless directed by your doctor.  Some non-prescription drugs may aggravate your condition.  Read all labels carefully.  If a warning appears, check with your doctor before taking.  Swallow whole.  Do not crush.    -- Indication: For Atrial fibrillation    escitalopram 10 mg oral tablet  -- 1 tab(s) by mouth once a day (at bedtime)  -- Indication: For Depression, unspecified depression type    allopurinol 300 mg oral tablet  -- 1 tab(s) by mouth once a day  -- Indication: For Gout    tiotropium 18 mcg inhalation capsule  -- 1 cap(s) inhaled once a day (at bedtime)  -- Indication: For Dyspnea    furosemide 40 mg oral tablet  -- 1 tab(s) by mouth once a day  -- Indication: For Dyspnea    docusate sodium 100 mg oral capsule  -- 1 cap(s) by mouth 3 times a day, As Needed  -- Indication: For constipation    nicotine 7 mg/24 hr transdermal film, extended release  --  by transdermal patch   -- Indication: For Smoking cessation    sulfamethoxazole-trimethoprim 400 mg-80 mg oral tablet  -- 1 tab(s) by mouth once a day  -- Indication: For Prophylactic measure    Multiple Vitamins oral tablet  -- 1 tab(s) by mouth once a day  -- Indication: For Prophylactic measure

## 2018-08-21 NOTE — DISCHARGE NOTE ADULT - SECONDARY DIAGNOSIS.
Essential hypertension Depression, unspecified depression type Squamous cell carcinoma of lung, stage IV SIADH (syndrome of inappropriate ADH production)

## 2018-08-21 NOTE — PROGRESS NOTE ADULT - ATTENDING COMMENTS
I have seen and examined the patient. I agree with the above history, physical exam, and plan of care except for as detailed below.    72 y/o M w/metastatic squamous cell carcinoma of the lung now on PDL1 inhibitor admitted for progressive dyspnea and hypoxemia. Suspect progression of disease as CT scan is consistent with lymphangitic spread. Patient with chronic R sided pleural effusion which appears smaller than previous. Patient is chronically on high dose steroids which makes PDL1 inhibitor induced pneumonitis unlikely. PCP is also a possibility, however less likely as patient does not appear actively infected. Reports improvement in respiratory status today following diuresis.    - Continue lasix as patient reports improvement with diuresis  - Wean O2 as tolerated, will likely need to be discharged with home oxygen  - Continue prednisone  - Recommend starting PCP prophylaxis with bactrim
Pt was seen and examed at bedside with resident.  72 yo M PMHx Stage IV SCC of the lung (s/p chemotherapy, immunotherapy and RUL resection) with previous episodes of pleural effusion s/p VATS-assisted thoracentesis, afib, SIADH and HTN A/w dyspnea, found an enlarged pleural effusion on CT chest. Oncology, CT surgery and pulm consult appreciated, no surgical intervention, chronic R pleural effusion no need for thoracentesis. Pt symptoms like are related with progression of the malignancy. Oral steroid, trial of diuretics. Pt need home O2, to be set up by CM. Started PCP prophylaxis with bactrim. Pt DNR/DNI.   D/c planning home.
Pt was seen and examed at bedside with resident.  72 yo M PMHx Stage IV SCC of the lung (s/p chemotherapy, immunotherapy and RUL resection) with previous episodes of pleural effusion s/p VATS-assisted thoracentesis, afib, SIADH and HTN A/w dyspnea, found an enlarged pleural effusion on CT chest. Oncology, CT surgery and pulm consult appreciated, no surgical intervention, chronic R pleural effusion no need for thoracentesis. Pt symptoms like are related with progression of the malignancy. Oral steroid, as Pt improved on trial of diuretics, will also continue lasix oral, suspect R heart failure with progress of malignancy? (Echo 2017 normal LVEF). Pt need home O2, to be set up by CM. Started PCP prophylaxis with bactrim. Pt DNR/DNI.   D/c planning home pending home O2 supple, discussed with CM. Explained as above with Pt and wife. Discharge time 35 min.

## 2018-08-21 NOTE — PROGRESS NOTE ADULT - PROBLEM SELECTOR PLAN 6
Likely secondary to SCC of the lung. Na 134 now.  - Will continue to obtain BMP and monitor.
Likely secondary to SCC of the lung. Na 134 now.

## 2018-08-21 NOTE — PROGRESS NOTE ADULT - PROBLEM SELECTOR PLAN 1
Likely secondary to enlarging malignant pleural effusion on the right side and worsening lymphangitic spread of his SCC. CT angio ruled out any PE.  - As per CT surgery; effusion too small to be drained. Likely dyspnea secondary to worsening ca.  - Continue with prednisone 50mg
Likely secondary to enlarging malignant pleural effusion on the right side. CT angio ruled out any PE.  - As per CT surgery; effusion too small to be drained. Likely dyspnea secondary to worsening ca.  - Continue with prednisone 50mg

## 2018-08-21 NOTE — PROGRESS NOTE ADULT - PROBLEM SELECTOR PLAN 8
Has malignancy and hypercoagulable 2/2 to this. Lovenox 40mg daily for DVT pphx.
Has malignancy and hypercoagulable 2/2 to this. Lovenox 40mg daily for DVT pphx.

## 2018-08-21 NOTE — DISCHARGE NOTE ADULT - DURABLE MEDICAL EQUIPMENT AGENCY
[facility] [place] [phone] - Oxygen delivered to pt's home at 8/21. Portable tank delivered to bedside 8/21

## 2018-08-21 NOTE — DISCHARGE NOTE ADULT - CARE PLAN
Principal Discharge DX:	Dyspnea  Goal:	To get your breathing back to normal  Assessment and plan of treatment:	You were found to be short of breath when you arrived to the hospital. A CT scan showed that you have worsening disease of your squamous cell carcinoma with lymphangitic spread of your disease and more fluid around the lung. Your breathing improved with lasix, but it was still felt that you would require oxygen at home. You should use oxygen at home and follow up with your primary care doctor within 48hours of arriving at home.  Secondary Diagnosis:	Essential hypertension  Goal:	To keep your Systolic BP < 140  Assessment and plan of treatment:	Please continue to take your home medications as prescribed.  Secondary Diagnosis:	Depression, unspecified depression type  Goal:	To keep your mood stable.  Assessment and plan of treatment:	Please continue to take your citalopram 10mg once a day.  Secondary Diagnosis:	Squamous cell carcinoma of lung, stage IV  Secondary Diagnosis:	SIADH (syndrome of inappropriate ADH production) Principal Discharge DX:	Dyspnea  Goal:	To get your breathing back to normal  Assessment and plan of treatment:	You were found to be short of breath when you arrived to the hospital. A CT scan showed that you have worsening disease of your squamous cell carcinoma with lymphangitic spread of your disease and more fluid around the lung. Your breathing improved with lasix, but it was still felt that you would require oxygen at home. You should use oxygen at home and follow up with your primary care doctor within 48hours of arriving at home.  Secondary Diagnosis:	Essential hypertension  Goal:	To keep your Systolic BP < 140  Assessment and plan of treatment:	Please continue to take your home medications as prescribed.  Secondary Diagnosis:	Depression, unspecified depression type  Goal:	To keep your mood stable.  Assessment and plan of treatment:	Please continue to take your citalopram 10mg once a day.  Secondary Diagnosis:	Squamous cell carcinoma of lung, stage IV  Goal:	To keep your condition stable  Assessment and plan of treatment:	Your cancer has spread as stated above. Please follow up with Dr. Caballero for further workup and further treatment.  Secondary Diagnosis:	SIADH (syndrome of inappropriate ADH production)  Goal:	To keep your sodium normal  Assessment and plan of treatment:	Your sodium while in the hospital was at 132 and 134. This was felt to be close to normal so no medications were given. Please continue to follow up with Dr. Caballero.

## 2018-08-21 NOTE — DISCHARGE NOTE ADULT - HOME CARE AGENCY
[facility] [place] [facility] [phone]. Nurse to visit on the day following discharge. Other appropriate services to be arranged thereafter.   Please contact the home care agency at the above phone number if you have not heard from them by approximately 12 noon on the day after your hospital discharge.

## 2018-08-21 NOTE — DISCHARGE NOTE ADULT - COMMUNITY RESOURCES
Transportation confirmed 8/21/18 6PM P/U WITH [facility] ([phone])  ELVIRA CONFIRMED TRIP #[id number]

## 2018-08-21 NOTE — DISCHARGE NOTE ADULT - CARE PROVIDER_API CALL
Sedrick Caballero), Hematology; Internal Medicine; Medical Oncology  [address] [place] [address]  Phone: [phone]  Fax: [phone]

## 2018-08-21 NOTE — PROGRESS NOTE ADULT - SUBJECTIVE AND OBJECTIVE BOX
Patient is a 73y old  Male who presents with a chief complaint of 72yo M with a PMHx of HTN, afib and SCC (stage IV) of the lung p/w dyspnea on exertion. (19 Aug 2018 08:36)      SUBJECTIVE / OVERNIGHT EVENTS: Patient seen and examined. Remains stable on NC. Desaturated yesterday afternoon with ambulation; however, feels better today s/p Lasix.     MEDICATIONS  (STANDING):  allopurinol 300 milliGRAM(s) Oral daily  diltiazem    milliGRAM(s) Oral daily  enoxaparin Injectable 40 milliGRAM(s) SubCutaneous every 24 hours  escitalopram 10 milliGRAM(s) Oral at bedtime  furosemide    Tablet 40 milliGRAM(s) Oral daily  multivitamin 1 Tablet(s) Oral daily  nicotine -   7 mG/24Hr(s) Patch 1 patch Transdermal daily  predniSONE   Tablet 50 milliGRAM(s) Oral daily  tiotropium 18 MICROgram(s) Capsule 1 Capsule(s) Inhalation at bedtime  trimethoprim   80 mG/sulfamethoxazole 400 mG 1 Tablet(s) Oral daily    MEDICATIONS  (PRN):  docusate sodium 100 milliGRAM(s) Oral three times a day PRN Constipation        CAPILLARY BLOOD GLUCOSE        I&O's Summary      PHYSICAL EXAM:  GENERAL: no acute respiratory distress; comfortable on NC  HEAD:  Atraumatic, Normocephalic  NECK: Supple, No JVD  CHEST/LUNG: Bibasilar rales otherwise CTA  HEART: Regular rate and rhythm; No murmurs, rubs, or gallops  ABDOMEN: Soft, Nontender, Nondistended; Bowel sounds present  EXTREMITIES:  2+ Peripheral Pulses, No clubbing, cyanosis, or edema  PSYCH: AAOx3  NEUROLOGY: non-focal  SKIN: No rashes or lesions    LABS:    WBC Trend: 13.80<--, 19.20<--  Hb Trend: 9.6<--, 10.3<--  Plt Trend: 257<--, 281<--  08-20    134<L>  |  99  |  22  ----------------------------<  111<H>  4.5   |  23  |  1.04    Ca    8.6      20 Aug 2018 05:50  Phos  3.5     08-20  Mg     1.6     08-20    TPro  5.8<L>  /  Alb  2.5<L>  /  TBili  0.2  /  DBili  x   /  AST  13  /  ALT  12  /  AlkPhos  102  08-20    Creatinine Trend: 1.04<--, 1.21<--

## 2018-08-21 NOTE — DISCHARGE NOTE ADULT - PATIENT PORTAL LINK FT
You can access the [facility] Patient Portal, offered by [facility], by registering with the following website: [email or web address]

## 2018-08-21 NOTE — PROGRESS NOTE ADULT - ASSESSMENT
72yo man PMH HTN, Afib, stage IIIA SCC of the lung (diagnosed 2017) s/p RUL resection s/p adjuvant chemo with platinum and nab paclitaxel x 4 cycles in December 2017, now on Tecentriq monotherapy (started in 05/2018) and previous episodes of R lung effusions s/p VATS-assisted decortication presents with dyspnea likely 2/2 to progression of malignancy w/evidence of lymphangitic spread & stable right circumferential PLEF with  less likely contribution on pneumonitis from PDL-1 infusion therapy.    Recommendations    -continue steroids  -continue arrangements for home oxygen   -start PO Lasix given symptomatic improvement with diuresis   -c/w Bactrim ppx given chronic steroid use in the setting of malignancy & immunotherapy      Olegario Paige D.O.  PGY-2 EM/IM  Pager #[phone]
Pt is a 72yo M with PMHx of Stage IV SCC of the lung (s/p chemotherapy, immunotherapy and RUL resection) with previous episodes of pleural effusion requiring drainage, afib, SIADH and HTN p/w dyspnea for 4 days duration and a finding of an enlarged pleural effusion on CT chest.
SOB  improving  fu with pulm  on oxygen and steroids     PAF  Hr stable   on cardizem 360   off a/c due to multiple epistaxis     HTN  stable
sob  suggest pulm eval     PAF  off a/c due to frequent nose bleeds  cont cardizem     anemia  Monitor hemoglobin, transfuse as needed.
Pt is a 74yo M with PMHx of Stage IV SCC of the lung (s/p chemotherapy, immunotherapy and RUL resection) with previous episodes of pleural effusion requiring drainage, afib, SIADH and HTN p/w dyspnea for 4 days duration and a finding of an enlarged pleural effusion on CT chest.

## 2018-08-21 NOTE — DISCHARGE NOTE ADULT - PLAN OF CARE
To get your breathing back to normal You were found to be short of breath when you arrived to the hospital. A CT scan showed that you have worsening disease of your squamous cell carcinoma with lymphangitic spread of your disease and more fluid around the lung. Your breathing improved with lasix, but it was still felt that you would require oxygen at home. You should use oxygen at home and follow up with your primary care doctor within 48hours of arriving at home. To keep your Systolic BP < 140 Please continue to take your home medications as prescribed. To keep your mood stable. Please continue to take your citalopram 10mg once a day. To keep your condition stable Your cancer has spread as stated above. Please follow up with Dr. Caballero for further workup and further treatment. To keep your sodium normal Your sodium while in the hospital was at 132 and 134. This was felt to be close to normal so no medications were given. Please continue to follow up with Dr. Caballero.

## 2018-08-21 NOTE — PROGRESS NOTE ADULT - SUBJECTIVE AND OBJECTIVE BOX
Patient is a 73y old  Male who presents with a chief complaint of 72yo M with a PMHx of HTN, afib and SCC (stage IV) of the lung p/w dyspnea on exertion. (19 Aug 2018 08:36)        SUBJECTIVE / OVERNIGHT EVENTS: No acute overnight events. He endorses that he believes his breathing is better today and otherwise denies any fevers, chills, chest pain, shortness of breath, abdominal pain or stool/urinary changes. Endorses that he has been getting up and going to the bathroom.      MEDICATIONS  (STANDING):  allopurinol 300 milliGRAM(s) Oral daily  diltiazem    milliGRAM(s) Oral daily  enoxaparin Injectable 40 milliGRAM(s) SubCutaneous every 24 hours  escitalopram 10 milliGRAM(s) Oral at bedtime  multivitamin 1 Tablet(s) Oral daily  nicotine -   7 mG/24Hr(s) Patch 1 patch Transdermal daily  predniSONE   Tablet 50 milliGRAM(s) Oral daily  tiotropium 18 MICROgram(s) Capsule 1 Capsule(s) Inhalation at bedtime  trimethoprim   80 mG/sulfamethoxazole 400 mG 1 Tablet(s) Oral daily    MEDICATIONS  (PRN):  docusate sodium 100 milliGRAM(s) Oral three times a day PRN Constipation        CAPILLARY BLOOD GLUCOSE        I&O's Summary      PHYSICAL EXAM  GENERAL: NAD, well-developed  HEAD:  Atraumatic, Normocephalic  EYES: EOMI, PERRLA, conjunctiva and sclera clear  NECK: Supple, No JVD  CHEST/LUNG: Clear to auscultation bilaterally; No wheeze  HEART: Regular rate and rhythm; No murmurs, rubs, or gallops  ABDOMEN: Soft, Nontender, Nondistended; Bowel sounds present  EXTREMITIES:  2+ Peripheral Pulses, No clubbing, cyanosis, or edema  PSYCH: AAOx3  SKIN: No rashes or lesions    LABS:                        9.6    13.80 )-----------( 257      ( 20 Aug 2018 05:50 )             30.6     08-20    134<L>  |  99  |  22  ----------------------------<  111<H>  4.5   |  23  |  1.04    Ca    8.6      20 Aug 2018 05:50  Phos  3.5     08-20  Mg     1.6     08-20    TPro  5.8<L>  /  Alb  2.5<L>  /  TBili  0.2  /  DBili  x   /  AST  13  /  ALT  12  /  AlkPhos  102  08-20 Patient is a 73y old  Male who presents with a chief complaint of 72yo M with a PMHx of HTN, afib and SCC (stage IV) of the lung p/w dyspnea on exertion. (19 Aug 2018 08:36)        SUBJECTIVE / OVERNIGHT EVENTS: No acute overnight events. He endorses that he believes his breathing is better today and otherwise denies any fevers, chills, chest pain, shortness of breath, abdominal pain or stool/urinary changes. Endorses that he has been getting up and going to the bathroom.      MEDICATIONS  (STANDING):  allopurinol 300 milliGRAM(s) Oral daily  diltiazem    milliGRAM(s) Oral daily  enoxaparin Injectable 40 milliGRAM(s) SubCutaneous every 24 hours  escitalopram 10 milliGRAM(s) Oral at bedtime  multivitamin 1 Tablet(s) Oral daily  nicotine -   7 mG/24Hr(s) Patch 1 patch Transdermal daily  predniSONE   Tablet 50 milliGRAM(s) Oral daily  tiotropium 18 MICROgram(s) Capsule 1 Capsule(s) Inhalation at bedtime  trimethoprim   80 mG/sulfamethoxazole 400 mG 1 Tablet(s) Oral daily    MEDICATIONS  (PRN):  docusate sodium 100 milliGRAM(s) Oral three times a day PRN Constipation        CAPILLARY BLOOD GLUCOSE        I&O's Summary      PHYSICAL EXAM  GENERAL: NAD, well-developed  HEAD:  Atraumatic, Normocephalic  EYES: EOMI, PERRLA, conjunctiva and sclera clear  NECK: Supple, No JVD  CHEST/LUNG: Fine inspiratory crackles heard best in the RLL. Slightly duller on percussion of the RLL.  HEART: Regular rate and rhythm; No murmurs, rubs, or gallops  ABDOMEN: Soft, Nontender, Nondistended; Bowel sounds present  EXTREMITIES:  2+ Peripheral Pulses, No clubbing, cyanosis, or edema  PSYCH: AAOx3  SKIN: No rashes or lesions    LABS:                        9.6    13.80 )-----------( 257      ( 20 Aug 2018 05:50 )             30.6     08-20    134<L>  |  99  |  22  ----------------------------<  111<H>  4.5   |  23  |  1.04    Ca    8.6      20 Aug 2018 05:50  Phos  3.5     08-20  Mg     1.6     08-20    TPro  5.8<L>  /  Alb  2.5<L>  /  TBili  0.2  /  DBili  x   /  AST  13  /  ALT  12  /  AlkPhos  102  08-20

## 2018-08-21 NOTE — PROGRESS NOTE ADULT - SUBJECTIVE AND OBJECTIVE BOX
Subjective: Patient seen and examined. No new events except as noted.     SUBJECTIVE/ROS:  feels better today       MEDICATIONS:  MEDICATIONS  (STANDING):  allopurinol 300 milliGRAM(s) Oral daily  diltiazem    milliGRAM(s) Oral daily  enoxaparin Injectable 40 milliGRAM(s) SubCutaneous every 24 hours  escitalopram 10 milliGRAM(s) Oral at bedtime  furosemide    Tablet 40 milliGRAM(s) Oral daily  multivitamin 1 Tablet(s) Oral daily  nicotine -   7 mG/24Hr(s) Patch 1 patch Transdermal daily  predniSONE   Tablet 50 milliGRAM(s) Oral daily  tiotropium 18 MICROgram(s) Capsule 1 Capsule(s) Inhalation at bedtime  trimethoprim   80 mG/sulfamethoxazole 400 mG 1 Tablet(s) Oral daily      PHYSICAL EXAM:  T(C): 37.2 (08-21-18 @ 15:13), Max: 37.2 (08-21-18 @ 15:13)  HR: 73 (08-21-18 @ 13:39) (60 - 74)  BP: 135/67 (08-21-18 @ 13:39) (135/67 - 146/61)  RR: 16 (08-21-18 @ 13:39) (16 - 18)  SpO2: 96% (08-21-18 @ 13:39) (95% - 97%)  Wt(kg): --  I&O's Summary      Weight (kg): 64.7 (08-21 @ 05:28)    JVP: Normal  Neck: supple  Lung: clear   CV: S1 S2 , Murmur:  Abd: soft  Ext: No edema  neuro: Awake / alert  Psych: flat affect  Skin: normal     LABS/DATA:    CARDIAC MARKERS:                                9.6    13.80 )-----------( 257      ( 20 Aug 2018 05:50 )             30.6     08-20    134<L>  |  99  |  22  ----------------------------<  111<H>  4.5   |  23  |  1.04    Ca    8.6      20 Aug 2018 05:50  Phos  3.5     08-20  Mg     1.6     08-20    TPro  5.8<L>  /  Alb  2.5<L>  /  TBili  0.2  /  DBili  x   /  AST  13  /  ALT  12  /  AlkPhos  102  08-20    proBNP:   Lipid Profile:   HgA1c:   TSH:     TELE:  EKG:

## 2018-08-21 NOTE — PROGRESS NOTE ADULT - PROBLEM SELECTOR PLAN 2
- S/P chemotherapy and now 5 treatments of Atezolizumab. CT angio shows enlarging lymph nodes around the aorta and several other areas suggesting progression.  - Heme/Onc consulted and stated NTD.
- S/P chemotherapy and now 5 treatments of Atezolizumab. CT angio shows enlarging lymph nodes around the aorta and several other areas suggesting progression.  - Heme/Onc consulted and stated NTD.

## 2018-08-21 NOTE — DISCHARGE NOTE ADULT - HOSPITAL COURSE
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 The dyspnea was sudden in onset and has not been gradually improving or worsening. The patient is usually able to walk around his house at baseline and now was becoming short of breath when walking to the bathroom. His wife called EMS and he arrived in the hospital. He had CT angiogram which did not show any pulmonary embolism, but did show increased lymphangitic spread of his cancer as well as a slight increase in pulmonic effusion. CT surgery and pulmonology both stated that the fluid was too small to be drained and so he was managed with lasix. His symptoms improved, but he was still requiring 2l NC when laying in bed. A walk test showed that he would desat to 85% when walking. This was all felt to be due to worsening progression of his disease and he was discharged with oxygen at home and with a new prescription for daily bactrim given his chronic steroid use. 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 The dyspnea was sudden in onset and has not been gradually improving or worsening. The patient is usually able to walk around his house at baseline and now was becoming short of breath when walking to the bathroom. His wife called EMS and he arrived in the hospital. He had CT angiogram which did not show any pulmonary embolism, but did show increased lymphangitic spread of his cancer as well as a slight increase in pulmonic effusion. CT surgery and pulmonology both stated that the fluid was too small to be drained and so he was managed with lasix. His symptoms improved, but he was still requiring 2l NC when laying in bed. A walk test showed that he would desat to 85% when walking. This was all felt to be due to worsening progression of his disease and he was discharged with oxygen at home and with a new prescription for daily bactrim given his chronic steroid use. Pt DNR/DNI.

## 2018-08-22 ENCOUNTER — INPATIENT (INPATIENT)
Facility: HOSPITAL | Age: 73
LOS: 1 days | Discharge: HOME HEALTH | End: 2018-08-24
Attending: HOSPITALIST | Admitting: HOSPITALIST
Payer: MEDICARE

## 2018-08-22 VITALS
RESPIRATION RATE: 20 BRPM | TEMPERATURE: 99 F | OXYGEN SATURATION: 95 % | SYSTOLIC BLOOD PRESSURE: 146 MMHG | DIASTOLIC BLOOD PRESSURE: 70 MMHG | HEART RATE: 110 BPM

## 2018-08-22 DIAGNOSIS — Z98.890 OTHER SPECIFIED POSTPROCEDURAL STATES: Chronic | ICD-10-CM

## 2018-08-22 DIAGNOSIS — Z90.89 ACQUIRED ABSENCE OF OTHER ORGANS: Chronic | ICD-10-CM

## 2018-08-22 DIAGNOSIS — Z90.49 ACQUIRED ABSENCE OF OTHER SPECIFIED PARTS OF DIGESTIVE TRACT: Chronic | ICD-10-CM

## 2018-08-22 LAB
ALBUMIN SERPL ELPH-MCNC: 3.1 G/DL — LOW (ref 3.3–5)
ALP SERPL-CCNC: 101 U/L — SIGNIFICANT CHANGE UP (ref 40–120)
ALT FLD-CCNC: 10 U/L — SIGNIFICANT CHANGE UP (ref 4–41)
APTT BLD: 26.5 SEC — LOW (ref 27.5–37.4)
AST SERPL-CCNC: 14 U/L — SIGNIFICANT CHANGE UP (ref 4–40)
BASOPHILS # BLD AUTO: 0.03 K/UL — SIGNIFICANT CHANGE UP (ref 0–0.2)
BASOPHILS NFR BLD AUTO: 0.2 % — SIGNIFICANT CHANGE UP (ref 0–2)
BILIRUB SERPL-MCNC: 0.4 MG/DL — SIGNIFICANT CHANGE UP (ref 0.2–1.2)
BUN SERPL-MCNC: 29 MG/DL — HIGH (ref 7–23)
CALCIUM SERPL-MCNC: 8.7 MG/DL — SIGNIFICANT CHANGE UP (ref 8.4–10.5)
CHLORIDE SERPL-SCNC: 95 MMOL/L — LOW (ref 98–107)
CO2 SERPL-SCNC: 26 MMOL/L — SIGNIFICANT CHANGE UP (ref 22–31)
CREAT SERPL-MCNC: 1.28 MG/DL — SIGNIFICANT CHANGE UP (ref 0.5–1.3)
EOSINOPHIL # BLD AUTO: 0.03 K/UL — SIGNIFICANT CHANGE UP (ref 0–0.5)
EOSINOPHIL NFR BLD AUTO: 0.2 % — SIGNIFICANT CHANGE UP (ref 0–6)
GLUCOSE SERPL-MCNC: 171 MG/DL — HIGH (ref 70–99)
HCT VFR BLD CALC: 32 % — LOW (ref 39–50)
HGB BLD-MCNC: 10.3 G/DL — LOW (ref 13–17)
IMM GRANULOCYTES # BLD AUTO: 0.18 # — SIGNIFICANT CHANGE UP
IMM GRANULOCYTES NFR BLD AUTO: 1.2 % — SIGNIFICANT CHANGE UP (ref 0–1.5)
INR BLD: 0.99 — SIGNIFICANT CHANGE UP (ref 0.88–1.17)
LYMPHOCYTES # BLD AUTO: 17 % — SIGNIFICANT CHANGE UP (ref 13–44)
LYMPHOCYTES # BLD AUTO: 2.55 K/UL — SIGNIFICANT CHANGE UP (ref 1–3.3)
MCHC RBC-ENTMCNC: 27.9 PG — SIGNIFICANT CHANGE UP (ref 27–34)
MCHC RBC-ENTMCNC: 32.2 % — SIGNIFICANT CHANGE UP (ref 32–36)
MCV RBC AUTO: 86.7 FL — SIGNIFICANT CHANGE UP (ref 80–100)
MONOCYTES # BLD AUTO: 0.55 K/UL — SIGNIFICANT CHANGE UP (ref 0–0.9)
MONOCYTES NFR BLD AUTO: 3.7 % — SIGNIFICANT CHANGE UP (ref 2–14)
NEUTROPHILS # BLD AUTO: 11.65 K/UL — HIGH (ref 1.8–7.4)
NEUTROPHILS NFR BLD AUTO: 77.7 % — HIGH (ref 43–77)
NRBC # FLD: 0 — SIGNIFICANT CHANGE UP
PLATELET # BLD AUTO: 239 K/UL — SIGNIFICANT CHANGE UP (ref 150–400)
PMV BLD: 10.1 FL — SIGNIFICANT CHANGE UP (ref 7–13)
POTASSIUM SERPL-MCNC: 4.4 MMOL/L — SIGNIFICANT CHANGE UP (ref 3.5–5.3)
POTASSIUM SERPL-SCNC: 4.4 MMOL/L — SIGNIFICANT CHANGE UP (ref 3.5–5.3)
PROT SERPL-MCNC: 6.3 G/DL — SIGNIFICANT CHANGE UP (ref 6–8.3)
PROTHROM AB SERPL-ACNC: 11.4 SEC — SIGNIFICANT CHANGE UP (ref 9.8–13.1)
RBC # BLD: 3.69 M/UL — LOW (ref 4.2–5.8)
RBC # FLD: 16.5 % — HIGH (ref 10.3–14.5)
SODIUM SERPL-SCNC: 132 MMOL/L — LOW (ref 135–145)
WBC # BLD: 14.99 K/UL — HIGH (ref 3.8–10.5)
WBC # FLD AUTO: 14.99 K/UL — HIGH (ref 3.8–10.5)

## 2018-08-22 PROCEDURE — 71275 CT ANGIOGRAPHY CHEST: CPT | Mod: 26

## 2018-08-22 PROCEDURE — 71045 X-RAY EXAM CHEST 1 VIEW: CPT | Mod: 26

## 2018-08-22 RX ORDER — SODIUM CHLORIDE 9 MG/ML
500 INJECTION INTRAMUSCULAR; INTRAVENOUS; SUBCUTANEOUS ONCE
Qty: 0 | Refills: 0 | Status: COMPLETED | OUTPATIENT
Start: 2018-08-22 | End: 2018-08-22

## 2018-08-22 RX ADMIN — Medication 10 MILLIGRAM(S): at 22:10

## 2018-08-22 RX ADMIN — Medication 160 MILLIGRAM(S): at 22:10

## 2018-08-22 RX ADMIN — SODIUM CHLORIDE 500 MILLILITER(S): 9 INJECTION INTRAMUSCULAR; INTRAVENOUS; SUBCUTANEOUS at 18:09

## 2018-08-22 RX ADMIN — SODIUM CHLORIDE 500 MILLILITER(S): 9 INJECTION INTRAMUSCULAR; INTRAVENOUS; SUBCUTANEOUS at 18:00

## 2018-08-22 NOTE — ED PROVIDER NOTE - PROGRESS NOTE DETAILS
CTA for PE examination, stage III CKD, saline is given before CTA but due to SOB, pleural effusion, 500 mL is given. Tachycardic, presets with SOB with low SpO2 (92-95). CTA for PE examination, stage III CKD, saline is given before CTA but due to SOB, pleural effusion, 500 mL is given. Pt requests to have medications that were added on his discharge since pt did not have time to  these prescriptions, pred-10mg, ST (80mg, 400mg) Accepted for admission to hospitalist. MAR textpage sent.

## 2018-08-22 NOTE — ED ADULT TRIAGE NOTE - CHIEF COMPLAINT QUOTE
Pt c/o SOB and low grade fever at home. Pt discharged from here yesterday for dyspnea. HX stage 4 squamous cell cancer.

## 2018-08-22 NOTE — ED PROVIDER NOTE - MEDICAL DECISION MAKING DETAILS
PGY1/MD Juliet. 73M with endstage lung cancer, dyspnea. Discharged yesterday. Consider new onset pneumonia, PE, pleural effusion. Plan for labs, blood cx, CTA, cxr, reassess.

## 2018-08-22 NOTE — ED ADULT NURSE REASSESSMENT NOTE - NS ED NURSE REASSESS COMMENT FT1
Pt. returned from CT, VS as noted. Will continue to monitor. Pt. returned from CT, VS as noted. Pt. received with 18 gauge IV in left forearm. Will continue to monitor.

## 2018-08-22 NOTE — ED PROVIDER NOTE - PSH
H/O hemorrhoidectomy  1967  H/O left knee surgery  1995  History of appendectomy  1958  History of tonsillectomy  as a child
Vital Signs Last 24 Hrs  T(C): 37.1 (08 Jan 2018 21:33), Max: 38.7 (08 Jan 2018 15:51)  T(F): 98.7 (08 Jan 2018 21:33), Max: 101.6 (08 Jan 2018 15:51)  HR: 128 (08 Jan 2018 21:33) (128 - 152)  BP: 94/69 (08 Jan 2018 21:33) (94/69 - 97/72)  BP(mean): --  RR: 28 (08 Jan 2018 21:33) (28 - 32)  SpO2: 100% (08 Jan 2018 21:33) (100% - 100%)    • CONSTITUTIONAL: - - -  • Appearance: irritable when not in mother's side/arms  • Development: well developed  • Distress: no apparent  • HEENMT: Mouth with normal mucosa. Throat has no vesicles, no oropharyngeal exudates and uvula is midline.  • EYES: Clear bilaterally, pupils equal, round and reactive to light.  • CARDIAC: Normal rate, regular rhythm.  Heart sounds S1, S2.  No murmurs, rubs or gallops.  • RESPIRATORY: Breath sounds are clear, no distress present, no wheeze, rales, rhonchi or tachypnea. Normal rate and effort.  • GASTROINTESTINAL: Abdomen soft, non-tender and non-distended without organomegaly or masses. Normal bowel sounds.  • MUSCULOSKELETAL: - - -  • Musculoskeletal Exam: normal range of motion, Inability to bear weight on legs for longer than a few seconds without crying.  • SKIN: No evidence of rash.

## 2018-08-22 NOTE — ED PROVIDER NOTE - CONSTITUTIONAL, MLM
normal... Well appearing, well nourished, awake, alert, oriented to person, place, time/situation and in fatigue.

## 2018-08-22 NOTE — ED PROVIDER NOTE - OBJECTIVE STATEMENT
PGY1/MD Juliet. 74 yo M h/o lung SCC (stage 4) s/p RUL lobectomy, R pleural effusion, paroxysmal AFib (diltiazem), HTN, CKDIII, presents with SOB. Pt discharged yesterday morning, feeling ok, eating dinner (ensure), found him having SOB this morning. He's in fatigue all the time. No fever, no chest pain, no hemoptysis.

## 2018-08-22 NOTE — ED PROVIDER NOTE - NSTIMEPROVIDERCAREINITIATE_GEN_ER
22-Aug-2018 15:26 Alert and oriented to person, place, time/situation. normal mood and affect. no apparent risk to self or others.

## 2018-08-22 NOTE — ED PROVIDER NOTE - NS ED ROS FT
PGY1/MD Juliet.   Constitutional: no fevers, no chills.  Nose: no nasal congestion.  Mouth/Throat: no sore throat.  Cardiovascular: no chest pain.  Respiratory: +shortness of breath, no wheezing, no cough  Gastrointestinal: no nausea, vomiting, diarrhea or abdominal pain.  MSK: no flank pain, no back pain.  Genitourinary: no dysuria, no hematuria.  Skin: no rashes.  Neuro: no headache, normal gait.  Psychiatric: no known mental health issues. never

## 2018-08-22 NOTE — ED PROVIDER NOTE - ATTENDING CONTRIBUTION TO CARE
I agree with the above H&P.  Briefly this is a 73 year old with stage 4 IV squamous cell ca with mets to lung recently admitted for sob presents with worsening sob after being discharged 1 day ago. patient dnr/dni.  will check for pna new pe and if all wnl admit for possible hospice placement as patient was unable to be cared for at home.

## 2018-08-23 ENCOUNTER — TRANSCRIPTION ENCOUNTER (OUTPATIENT)
Age: 73
End: 2018-08-23

## 2018-08-23 DIAGNOSIS — R06.02 SHORTNESS OF BREATH: ICD-10-CM

## 2018-08-23 DIAGNOSIS — R53.81 OTHER MALAISE: ICD-10-CM

## 2018-08-23 DIAGNOSIS — C34.90 MALIGNANT NEOPLASM OF UNSPECIFIED PART OF UNSPECIFIED BRONCHUS OR LUNG: ICD-10-CM

## 2018-08-23 DIAGNOSIS — R06.09 OTHER FORMS OF DYSPNEA: ICD-10-CM

## 2018-08-23 DIAGNOSIS — F32.9 MAJOR DEPRESSIVE DISORDER, SINGLE EPISODE, UNSPECIFIED: ICD-10-CM

## 2018-08-23 DIAGNOSIS — M1A.9XX0 CHRONIC GOUT, UNSPECIFIED, WITHOUT TOPHUS (TOPHI): ICD-10-CM

## 2018-08-23 DIAGNOSIS — I10 ESSENTIAL (PRIMARY) HYPERTENSION: ICD-10-CM

## 2018-08-23 DIAGNOSIS — Z51.5 ENCOUNTER FOR PALLIATIVE CARE: ICD-10-CM

## 2018-08-23 DIAGNOSIS — Z29.9 ENCOUNTER FOR PROPHYLACTIC MEASURES, UNSPECIFIED: ICD-10-CM

## 2018-08-23 LAB
BUN SERPL-MCNC: 27 MG/DL — HIGH (ref 7–23)
CALCIUM SERPL-MCNC: 8.3 MG/DL — LOW (ref 8.4–10.5)
CHLORIDE SERPL-SCNC: 95 MMOL/L — LOW (ref 98–107)
CO2 SERPL-SCNC: 23 MMOL/L — SIGNIFICANT CHANGE UP (ref 22–31)
CREAT SERPL-MCNC: 1.1 MG/DL — SIGNIFICANT CHANGE UP (ref 0.5–1.3)
GLUCOSE SERPL-MCNC: 143 MG/DL — HIGH (ref 70–99)
HCT VFR BLD CALC: 29.7 % — LOW (ref 39–50)
HGB BLD-MCNC: 9.2 G/DL — LOW (ref 13–17)
MAGNESIUM SERPL-MCNC: 1.6 MG/DL — SIGNIFICANT CHANGE UP (ref 1.6–2.6)
MCHC RBC-ENTMCNC: 27.1 PG — SIGNIFICANT CHANGE UP (ref 27–34)
MCHC RBC-ENTMCNC: 31 % — LOW (ref 32–36)
MCV RBC AUTO: 87.6 FL — SIGNIFICANT CHANGE UP (ref 80–100)
NRBC # FLD: 0 — SIGNIFICANT CHANGE UP
PHOSPHATE SERPL-MCNC: 4.4 MG/DL — SIGNIFICANT CHANGE UP (ref 2.5–4.5)
PLATELET # BLD AUTO: 228 K/UL — SIGNIFICANT CHANGE UP (ref 150–400)
PMV BLD: 10.3 FL — SIGNIFICANT CHANGE UP (ref 7–13)
POTASSIUM SERPL-MCNC: 4.5 MMOL/L — SIGNIFICANT CHANGE UP (ref 3.5–5.3)
POTASSIUM SERPL-SCNC: 4.5 MMOL/L — SIGNIFICANT CHANGE UP (ref 3.5–5.3)
RBC # BLD: 3.39 M/UL — LOW (ref 4.2–5.8)
RBC # FLD: 16.6 % — HIGH (ref 10.3–14.5)
SODIUM SERPL-SCNC: 132 MMOL/L — LOW (ref 135–145)
WBC # BLD: 9.86 K/UL — SIGNIFICANT CHANGE UP (ref 3.8–10.5)
WBC # FLD AUTO: 9.86 K/UL — SIGNIFICANT CHANGE UP (ref 3.8–10.5)

## 2018-08-23 PROCEDURE — 12345: CPT | Mod: NC

## 2018-08-23 PROCEDURE — 99223 1ST HOSP IP/OBS HIGH 75: CPT

## 2018-08-23 RX ORDER — FUROSEMIDE 40 MG
40 TABLET ORAL DAILY
Qty: 0 | Refills: 0 | Status: DISCONTINUED | OUTPATIENT
Start: 2018-08-23 | End: 2018-08-24

## 2018-08-23 RX ORDER — DOCUSATE SODIUM 100 MG
100 CAPSULE ORAL THREE TIMES A DAY
Qty: 0 | Refills: 0 | Status: DISCONTINUED | OUTPATIENT
Start: 2018-08-23 | End: 2018-08-24

## 2018-08-23 RX ORDER — ENOXAPARIN SODIUM 100 MG/ML
40 INJECTION SUBCUTANEOUS EVERY 24 HOURS
Qty: 0 | Refills: 0 | Status: DISCONTINUED | OUTPATIENT
Start: 2018-08-23 | End: 2018-08-24

## 2018-08-23 RX ORDER — ALLOPURINOL 300 MG
1 TABLET ORAL
Qty: 0 | Refills: 0 | COMMUNITY

## 2018-08-23 RX ORDER — TIOTROPIUM BROMIDE 18 UG/1
1 CAPSULE ORAL; RESPIRATORY (INHALATION) AT BEDTIME
Qty: 0 | Refills: 0 | Status: DISCONTINUED | OUTPATIENT
Start: 2018-08-23 | End: 2018-08-24

## 2018-08-23 RX ORDER — DILTIAZEM HCL 120 MG
360 CAPSULE, EXT RELEASE 24 HR ORAL DAILY
Qty: 0 | Refills: 0 | Status: DISCONTINUED | OUTPATIENT
Start: 2018-08-23 | End: 2018-08-24

## 2018-08-23 RX ORDER — ESCITALOPRAM OXALATE 10 MG/1
10 TABLET, FILM COATED ORAL DAILY
Qty: 0 | Refills: 0 | Status: DISCONTINUED | OUTPATIENT
Start: 2018-08-23 | End: 2018-08-24

## 2018-08-23 RX ORDER — ALLOPURINOL 300 MG
300 TABLET ORAL DAILY
Qty: 0 | Refills: 0 | Status: DISCONTINUED | OUTPATIENT
Start: 2018-08-23 | End: 2018-08-24

## 2018-08-23 RX ORDER — NICOTINE POLACRILEX 2 MG
1 GUM BUCCAL DAILY
Qty: 0 | Refills: 0 | Status: DISCONTINUED | OUTPATIENT
Start: 2018-08-23 | End: 2018-08-24

## 2018-08-23 RX ADMIN — Medication 50 MILLIGRAM(S): at 05:41

## 2018-08-23 RX ADMIN — Medication 300 MILLIGRAM(S): at 11:48

## 2018-08-23 RX ADMIN — ESCITALOPRAM OXALATE 10 MILLIGRAM(S): 10 TABLET, FILM COATED ORAL at 11:48

## 2018-08-23 RX ADMIN — Medication 40 MILLIGRAM(S): at 05:41

## 2018-08-23 RX ADMIN — Medication 360 MILLIGRAM(S): at 05:46

## 2018-08-23 RX ADMIN — Medication 1 TABLET(S): at 11:48

## 2018-08-23 RX ADMIN — TIOTROPIUM BROMIDE 1 CAPSULE(S): 18 CAPSULE ORAL; RESPIRATORY (INHALATION) at 23:10

## 2018-08-23 RX ADMIN — Medication 1 PATCH: at 11:36

## 2018-08-23 RX ADMIN — Medication 1 TABLET(S): at 11:47

## 2018-08-23 NOTE — DISCHARGE NOTE ADULT - COMMUNITY RESOURCES
[facility] [place] [phone] trip #[id number] p/u  [facility] [phone]   [facility] [phone]   [facility] 33 p/u

## 2018-08-23 NOTE — PROGRESS NOTE ADULT - SUBJECTIVE AND OBJECTIVE BOX
Patient is a 73y old  Male who presents with a chief complaint of Shortness of breath (23 Aug 2018 03:12)        SUBJECTIVE / OVERNIGHT EVENTS: No acute events. Pt was recently discharged from my service for worsening dyspnea that was secondary to likely secondary to worsening Squamous cell carcinoma. Pt went home and slept well, but the following day stated he felt like he had a generalized malaise. He denies any fevers, worsening shortness of breath, nausea, vomiting, diarrhea, or dysuria. He had two episodes of emesis since discharge, but he states he thinks it's because he coughed too much.       MEDICATIONS  (STANDING):  allopurinol 300 milliGRAM(s) Oral daily  diltiazem    milliGRAM(s) Oral daily  enoxaparin Injectable 40 milliGRAM(s) SubCutaneous every 24 hours  escitalopram 10 milliGRAM(s) Oral daily  furosemide    Tablet 40 milliGRAM(s) Oral daily  multivitamin 1 Tablet(s) Oral daily  nicotine -   7 mG/24Hr(s) Patch 1 patch Transdermal daily  predniSONE   Tablet   Oral   predniSONE   Tablet 50 milliGRAM(s) Oral daily  tiotropium 18 MICROgram(s) Capsule 1 Capsule(s) Inhalation at bedtime  trimethoprim   80 mG/sulfamethoxazole 400 mG 1 Tablet(s) Oral daily    MEDICATIONS  (PRN):  docusate sodium 100 milliGRAM(s) Oral three times a day PRN Constipation        CAPILLARY BLOOD GLUCOSE        I&O's Summary      PHYSICAL EXAM  GENERAL: NAD, well-developed  HEAD:  Atraumatic, Normocephalic  EYES: EOMI, PERRLA, conjunctiva and sclera clear  NECK: Supple, No JVD  CHEST/LUNG: Decreased breath sounds in the right lower lung field. Hyperresonant on tactile fremitus in RLL.   HEART: Regular rate and rhythm; No murmurs, rubs, or gallops  ABDOMEN: Soft, Nontender, Nondistended; Bowel sounds present  EXTREMITIES:  2+ Peripheral Pulses, No clubbing, cyanosis, or edema  PSYCH: AAOx3  SKIN: No rashes or lesions    LABS:                        9.2    9.86  )-----------( 228      ( 23 Aug 2018 06:30 )             29.7     08-23    132<L>  |  95<L>  |  27<H>  ----------------------------<  143<H>  4.5   |  23  |  1.10    Ca    8.3<L>      23 Aug 2018 06:30  Phos  4.4     08-23  Mg     1.6     08-23    TPro  6.3  /  Alb  3.1<L>  /  TBili  0.4  /  DBili  x   /  AST  14  /  ALT  10  /  AlkPhos  101  08-22    PT/INR - ( 22 Aug 2018 17:50 )   PT: 11.4 SEC;   INR: 0.99          PTT - ( 22 Aug 2018 17:50 )  PTT:26.5 SEC

## 2018-08-23 NOTE — CONSULT NOTE ADULT - ASSESSMENT
73M with history of Stage III a Squamous cell cancer of the lung - currently receiving immunotherapy and CLL, palliative consulted for goals of care and symptom management.

## 2018-08-23 NOTE — H&P ADULT - ASSESSMENT
This is a 73M with history as above who presents to the hospital with c/o worsening shortness of breath at home, likely 2/2 disease progression vs not having prednisone/bactrim at home.

## 2018-08-23 NOTE — H&P ADULT - NSHPLABSRESULTS_GEN_ALL_CORE
LABS and ADDITIONAL STUDIES:                        10.3   14.99 )-----------( 239      ( 22 Aug 2018 16:00 )             32.0     08-22    132<L>  |  95<L>  |  29<H>  ----------------------------<  171<H>  4.4   |  26  |  1.28    Ca    8.7      22 Aug 2018 16:00    TPro  6.3  /  Alb  3.1<L>  /  TBili  0.4  /  DBili  x   /  AST  14  /  ALT  10  /  AlkPhos  101  08-22    LIVER FUNCTIONS - ( 22 Aug 2018 16:00 )  Alb: 3.1 g/dL / Pro: 6.3 g/dL / ALK PHOS: 101 u/L / ALT: 10 u/L / AST: 14 u/L / GGT: x           PT/INR - ( 22 Aug 2018 17:50 )   PT: 11.4 SEC;   INR: 0.99     PTT - ( 22 Aug 2018 17:50 )  PTT:26.5 SEC    < from: CT Angio Chest w/ IV Cont (08.22.18 @ 22:30) >  FINDINGS:    LUNGS AND LARGE AIRWAYS: Status post right upper lobectomy with associated postsurgical changes. Mild debris noted in the trachea. Bilateral intralobular septal thickening, worse in the right lower lobe which may represent lymphangitic spread of malignancy versus edema.   PLEURA: Diffuse thickening of the right pleura which may be secondary to metastases. Small right and trace left pleural effusion with adjacent atelectasis.  VESSELS: No pulmonary embolism. Atheromatous disease of the aorta. Coronary artery calcifications.  HEART: Heart size is normal. No pericardial effusion.  MEDIASTINUM AND JACKELINE: Extensive mediastinal lymphadenopathy. For reference, an enlarged subcarinal lymph node is identified measuring 2.6 x 2.7 cm.  CHEST WALL AND LOWER NECK: Within normal limits.  UPPER ABDOMEN: Enlarged gastrohepatic lymph nodes consistent with patient's known history of metastatic lung cancer.  BONES: Degenerative changes of the spine.    IMPRESSION:    No pulmonary embolism.    Status post right upper lobectomy with bilateral interlobular septal thickening which may be secondary to pulmonary edema versus lymphangitic spread of malignancy.    Diffuse right pleural thickening, likely due to metastasis.    Additional findings as above.    < end of copied text >

## 2018-08-23 NOTE — DISCHARGE NOTE ADULT - MEDICATION SUMMARY - MEDICATIONS TO TAKE
I will START or STAY ON the medications listed below when I get home from the hospital:    Home oxygen  -- Please use the oxygen continuiously. Required for persistent hypoxia <88% oxygen on room air.   -- Indication: For Dyspnea on exertion    predniSONE 10 mg oral tablet  -- 5 tab(s) by mouth once a day from 8/21-8/24  4 tabs  from 8/25-8/27  3 tabs  from 8/28-8/30  2 tabs from 8/31-9/2  1 tab  from 9/3-9/5    -- It is very important that you take or use this exactly as directed.  Do not skip doses or discontinue unless directed by your doctor.  Obtain medical advice before taking any non-prescription drugs as some may affect the action of this medication.  Take with food or milk.    -- Indication: For Dyspnea on exertion    Cardizem  mg/24 hours oral capsule, extended release  -- 1 cap(s) by mouth once a day  -- It is very important that you take or use this exactly as directed.  Do not skip doses or discontinue unless directed by your doctor.  Some non-prescription drugs may aggravate your condition.  Read all labels carefully.  If a warning appears, check with your doctor before taking.  Swallow whole.  Do not crush.    -- Indication: For Afib    escitalopram 10 mg oral tablet  -- 1 tab(s) by mouth once a day (at bedtime)  -- Indication: For Depression, unspecified depression type    allopurinol 300 mg oral tablet  -- 1 tab(s) by mouth once a day  -- Indication: For Gout    tiotropium 18 mcg inhalation capsule  -- 1 cap(s) inhaled once a day (at bedtime)  -- Indication: For Dyspnea on exertion    furosemide 40 mg oral tablet  -- 1 tab(s) by mouth once a day  -- Indication: For Dyspnea on exertion    docusate sodium 100 mg oral capsule  -- 1 cap(s) by mouth 3 times a day, As Needed  -- Indication: For Constipation    nicotine 7 mg/24 hr transdermal film, extended release  --  by transdermal patch   -- Indication: For Smoking Cessation    sulfamethoxazole-trimethoprim 400 mg-80 mg oral tablet  -- 1 tab(s) by mouth once a day  -- Indication: For Prophylaxis    Multiple Vitamins oral tablet  -- 1 tab(s) by mouth once a day  -- Indication: For Prophylaxis

## 2018-08-23 NOTE — H&P ADULT - NSHPREVIEWOFSYSTEMS_GEN_ALL_CORE
REVIEW OF SYSTEMS:    CONSTITUTIONAL: No weakness, fevers or chills  EYES: No visual changes or eye discharge  ENT: No rhinorrhea or sore throat  NECK: No pain or stiffness  RESPIRATORY: No cough, wheezing, hemoptysis; +Dyspnea/SOB  CARDIOVASCULAR: No chest pain or palpitations; No lower extremity edema  GASTROINTESTINAL: No abdominal or epigastric pain. No nausea, vomiting, or hematemesis; No diarrhea or constipation. No melena or hematochezia.  BACK: No back pain  GENITOURINARY: No dysuria, frequency or hematuria  NEUROLOGICAL: No numbness or weakness  SKIN: No itching, burning, rashes, or lesions   All other review of systems is negative unless indicated above.

## 2018-08-23 NOTE — CONSULT NOTE ADULT - PROBLEM SELECTOR RECOMMENDATION 9
Patient of Dr. Feliciano as an outpatient.  Patient with an appointment on 8/28/18.  Patient reports that he would like to pursue further immunotherapy and plans on following up with Dr. Feliciano.  Continue supportive care.

## 2018-08-23 NOTE — PROGRESS NOTE ADULT - PROBLEM SELECTOR PLAN 6
- lovenox for DVT ppx  - nicotine patch  - Asked about his feelings about hospice. Pt states that he wants all treatment at home. Will discuss with palliative about home hospice possibility.

## 2018-08-23 NOTE — PROGRESS NOTE ADULT - PROBLEM SELECTOR PLAN 2
- For cycle 6  of immunotherapy in ~2 weeks  - States that he doesn't really want to continue immunotherapy and so will contact palliative and have further goals of care discussion.

## 2018-08-23 NOTE — DISCHARGE NOTE ADULT - CARE PROVIDER_API CALL
Pravin Feliciano (MD; MBBS), Hematology; [facility]; Medical Oncology  [address] [place] [phone]  Phone: [phone]  Fax: [phone]

## 2018-08-23 NOTE — DISCHARGE NOTE ADULT - HOSPITAL COURSE
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 Pt was seen at this hospital recently for worsening dyspnea and was started on oxygen at home, daily bactrim for prophylaxis and daily lasix 40mg. He felt fine for 1 day after discharge and then felt unusual and vomited twice. He was unable to explain his condition and just stated he felt malaise. He had another CT angio of the chest completed which did not show a clot. He was continued on his previous treatments while in the hospital and he improved on his own. He was discharged with instructions of close follow up with Dr. Mayorga. He changed his mind about DNR/DNI status multiple times during this admission. He admitted that he wanted a break from his immunotherapy to enjoy his life. He was offered home hospice, but was not interested at this time. 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 Pt was seen at this hospital recently for worsening dyspnea and was started on oxygen at home, daily bactrim for prophylaxis and daily lasix 40mg. He felt fine for 1 day after discharge and then felt unusual and vomited twice. He was unable to explain his condition and just stated he felt malaise. He had another CT angio of the chest completed which did not show a clot. He was continued on his previous treatments while in the hospital and he improved on his own. He was discharged with instructions of close follow up with Dr. Mayorga. He changed his mind about DNR/DNI status multiple times during this admission. He admitted that he wanted a break from his immunotherapy to enjoy his life. He was offered home hospice, but was not interested at this time. Pt is already following with palliative in [facility], on going GO discussion, with further f/u with oncology.

## 2018-08-23 NOTE — H&P ADULT - NSHPSOCIALHISTORY_GEN_ALL_CORE
, lives with wife  Former smoker, smoked 1 PPD for 60 years, quit ~6 months ago  Former EtOH user, quit >1 year ago  No IVDU

## 2018-08-23 NOTE — CONSULT NOTE ADULT - PROBLEM SELECTOR RECOMMENDATION 2
Patient reports a recent admission (discharged Tuesday) for dyspnea - which he was discharged on home O2 and was home briefly.  He states the RN came from home care and he had a hard time sitting up and was acutely short of breath, prompting his returning to the hospital.  Currently, the patient is sitting in the chair and states that overall he feels better but reports that ambulating to the bathroom causes him to become dyspneic.  The use of opioids to minimize his symptoms, with the intention of improving his quality of life (patient sits the majority of the day due to fear of becoming dyspneic with movement) was discussed at length with patient and patient's wife.  Patient reports that we would like to ambulate with the oxygen, as he has found this helpful and assess how his breathing is today.  Declined initiation of opioids at this time.  Education provided and patient and his wife are aware of its availability in the future.  Patient also reports that the use of steroids have been helpful in minimizing his dyspnea.  He sees Dr. Cornell as an outpatient for symptom management and has an appointment on 8/31/18.  O2 as needed.  Prednisone in place.

## 2018-08-23 NOTE — H&P ADULT - PMH
Alcohol abuse  Sober since 01/2016- attending AA meeting weekly  Depression, unspecified depression type    Essential hypertension    Gout    HTN (hypertension)    Localized enlarged lymph nodes    Solitary pulmonary nodule    Squamous cell carcinoma of lung, stage IV  s/p RUL lobectomy, currently on immunotherapy  TIA (transient ischemic attack)  1999

## 2018-08-23 NOTE — PROGRESS NOTE ADULT - PROBLEM SELECTOR PLAN 1
- Likely 2/2 disease progression (though his CT does not look significantly changed from prior 4 days ago) vs not having prednisone/bactrim at home  - Currently said that his symptoms are improved and that he is at baseline, would therefore c/w NC, will place on prednisone and c/w bactrim as ppx  - c/w home O2 and lasix.

## 2018-08-23 NOTE — H&P ADULT - PROBLEM SELECTOR PLAN 1
- Likely 2/2 disease progression (though his CT does not look significantly changed from prior 4 days ago) vs not having prednisone/bactrim at home  - Currently said that his symptoms are improved, would therefore c/w NC, will place on prednisone and c/w bactrim as ppx  - Will also c/w lasix for possible edema though patient without other signs of fluid overload  - c/w home O2

## 2018-08-23 NOTE — DISCHARGE NOTE ADULT - PATIENT PORTAL LINK FT
You can access the [facility] Patient Portal, offered by [facility], by registering with the following website: [email or web address]

## 2018-08-23 NOTE — CONSULT NOTE ADULT - SUBJECTIVE AND OBJECTIVE BOX
HPI: Obtained from H&P  This is a 73M with history of Squamous cell cancer of the lung s/p RUL lung resection (currently on immunotherapy s/p 5 cycles, next dose due n ~2 weeks), HTN, Gout, AFib, and TIA (without deficits) who presents to the hospital with complaints of worsening SOB at home. Patient was recently hospitalized from 8/19 - 8/21 for SOB and was noted to have lymphangitic spread in his lungs and he was started on a prednisone taper and bactrim ppx and discharged home with home O2. Patient said that he was doing well while at home until the morning of 8/22 when he woke up with significant SOB. he was evaluated by a home nurse who then called EMS to bring him to the hospital. Patient said that his dyspnea has improved while being in the hospital. He denied any fevers/chills, cough, rhinorrhea/sore throat, or other complaints at home other than his SOB. Said that he was unable to fill his new medications after discharge but did have his supplemental oxygen at home and was able to use that.     Currently, patient sitting in chair in no acute distress       PERTINENT PMH REVIEWED:  [x ] YES [ ] NO           SOCIAL HISTORY:  Significant other/partner:  [x ] YES  [ ] NO            Children:  [x ] YES  [ ] NO                   Mu-ism/Spirituality: Jewish   Substance hx:  [ ] YES   [x ] NO           Tobacco hx:  [x ] YES  - quit 3 mths ago            Alcohol hx: [ ] YES  [ ] NO        Home Opioid hx:  [ ] YES  [ x] NO   Living Situation: [ x] Home  [ ] Long term care  [ ] Rehab    FAMILY HISTORY:    [ x] Family history non contributory     BASELINE ADLs (prior to admission):  Independent [x ] moderately [ ] fully   Dependent   [ ] moderately [ ] fully    Code Status: DNR                      MOLST  [x ] YES [ ] NO      Health Care Proxy [ x] YES  [ ] NO      [ ] Surrogate  [x ] HCP  [ ] Guardian:    Colette Melendez                                                             Phone#: [phone]    Allergies    No Known Allergies    Intolerances        MEDICATIONS  (STANDING):  allopurinol 300 milliGRAM(s) Oral daily  diltiazem    milliGRAM(s) Oral daily  enoxaparin Injectable 40 milliGRAM(s) SubCutaneous every 24 hours  escitalopram 10 milliGRAM(s) Oral daily  furosemide    Tablet 40 milliGRAM(s) Oral daily  multivitamin 1 Tablet(s) Oral daily  nicotine -   7 mG/24Hr(s) Patch 1 patch Transdermal daily  predniSONE   Tablet   Oral   predniSONE   Tablet 50 milliGRAM(s) Oral daily  tiotropium 18 MICROgram(s) Capsule 1 Capsule(s) Inhalation at bedtime  trimethoprim   80 mG/sulfamethoxazole 400 mG 1 Tablet(s) Oral daily    MEDICATIONS  (PRN):  docusate sodium 100 milliGRAM(s) Oral three times a day PRN Constipation      PRESENT SYMPTOMS:  Source: [x ] Patient   [ ] Family   [ ] Team     Pain: [ ] YES [x ] NO  Dyspnea: Denies currently.  +DONNELLY - mild to moderate with ambulation, improved with the addition of home O2 this week    Anxiety: [ ] YES [x ] NO  Fatigue: [ ] YES [x ] NO   Nausea: [ ] YES [x ] NO  Loss of appetite: [ ] YES [x ] NO   Constipation: [ ] YES [x ] NO     Other Symptoms:  [x ] All other review of systems negative   [ ] Unable to obtain due to poor mentation     Does patient meet criteria for Severe Protein Calorie Malnutrition?  Yes [ ]  No [ ]  PPSV 30% or below [ ]  Anasarca [ ]  Albumin < 2 [ ] Catabolic State [ ] Poor nutritional intake [ ] Significant weight loss [ ]      Palliative Performance Status Version 2:     60-70%  ECOG - 3       Vital Signs Last 24 Hrs  T(C): 36.2 (23 Aug 2018 13:23), Max: 37.1 (22 Aug 2018 14:37)  T(F): 97.1 (23 Aug 2018 13:23), Max: 98.8 (22 Aug 2018 14:37)  HR: 66 (23 Aug 2018 13:23) (65 - 110)  BP: 132/87 (23 Aug 2018 13:23) (127/73 - 149/68)  BP(mean): --  RR: 18 (23 Aug 2018 13:23) (18 - 20)  SpO2: 94% (23 Aug 2018 13:23) (92% - 97%)    Physical Exam:    General: [x ] Alert,  A&O x 4    [ ] lethargic   [ ] Agitated   [ ] Cachexia   Lungs: [x ] Clear [ ] Rhonchi  [ ] Crackles [ ] Wheezing [ ] Tachypnea  [ ] Audible excessive secretions    Cardiovascular:  [ x] Regular rate and rhythm  [ ] Irregular [ ] Tachycardia   [ ] Bradycardia   Abdomen: [ x] Soft  [ ] Distended  [x ] +BS  [x ] Non tender [ ] Tender  [ ]PEG   [ ] NGT   Last BM: 8/23/18    Genitourinary: [x ] Normal [ ] Incontinent   [ ] Oliguria/Anuria   [ ] Espinoza  Musculoskeletal:  [ ] Normal   [x ] Generalized weakness  [ ] Bedbound  [ ] Edema   Neurological: [x ] No focal deficits  [ ] Cognitive impairment     Skin: 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      LABS:                        9.2    9.86  )-----------( 228      ( 23 Aug 2018 06:30 )             29.7     08-23    132<L>  |  95<L>  |  27<H>  ----------------------------<  143<H>  4.5   |  23  |  1.10    Ca    8.3<L>      23 Aug 2018 06:30  Phos  4.4     08-23  Mg     1.6     08-23    TPro  6.3  /  Alb  3.1<L>  /  TBili  0.4  /  DBili  x   /  AST  14  /  ALT  10  /  AlkPhos  101  08-22    PT/INR - ( 22 Aug 2018 17:50 )   PT: 11.4 SEC;   INR: 0.99          PTT - ( 22 Aug 2018 17:50 )  PTT:26.5 SEC    I&O's Summary      RADIOLOGY & ADDITIONAL STUDIES:  Reviewed  CT angio 8/22/18  IMPRESSION:    No pulmonary embolism.    Status post right upper lobectomy with bilateral interlobular septal   thickening which may be secondary to pulmonary edema versus lymphangitic   spread of malignancy.    Diffuse right pleural thickening, likely due to metastasis.    Additional findings as above.      Referrals:  Hospice [ ]   Chaplaincy [ ]    Child Life [ ]   Social Work [ ]   Case Management [ ]   Holistic Therapy [ ]

## 2018-08-23 NOTE — H&P ADULT - NSHPPHYSICALEXAM_GEN_ALL_CORE
Vital Signs Last 24 Hrs  T(C): 36.5 (23 Aug 2018 01:13), Max: 37.1 (22 Aug 2018 14:37)  T(F): 97.7 (23 Aug 2018 01:13), Max: 98.8 (22 Aug 2018 14:37)  HR: 74 (23 Aug 2018 01:13) (68 - 110)  BP: 149/68 (23 Aug 2018 01:13) (127/83 - 149/68)  BP(mean): --  RR: 19 (23 Aug 2018 01:13) (19 - 20)  SpO2: 92% (23 Aug 2018 01:13) (92% - 97%)    GENERAL: No acute distress, well-developed  HEAD:  Atraumatic, Normocephalic  ENT: EOMI, PERRLA, conjunctiva and sclera clear, Neck supple, No JVD, moist mucosa  CHEST/LUNG: Clear to auscultation bilaterally; No wheeze, equal breath sounds bilaterally   BACK: No spinal tenderness  HEART: Regular rate and rhythm; No murmurs, rubs, or gallops  ABDOMEN: Soft, Nontender, Nondistended; Bowel sounds present  EXTREMITIES:  No clubbing, cyanosis, or edema  PSYCH: Nl behavior, nl affect  NEUROLOGY: AAOx3, non-focal  SKIN: Normal color, No rashes or lesions

## 2018-08-23 NOTE — DISCHARGE NOTE ADULT - CARE PLAN
Principal Discharge DX:	Shortness of breath  Goal:	To keep you comfortable  Assessment and plan of treatment:	You had a moment of worsening shortness of breath at home. The workup that was done here did not show any infection or clot in your lungs and you returned to your baseline on your own. This was mostly thought to be due to  Secondary Diagnosis:	Essential hypertension  Secondary Diagnosis:	Chronic gout without tophus, unspecified cause, unspecified site  Secondary Diagnosis:	Depression, unspecified depression type  Secondary Diagnosis:	Squamous cell carcinoma of right lung Principal Discharge DX:	Shortness of breath  Goal:	To keep you comfortable  Assessment and plan of treatment:	You had a moment of worsening shortness of breath at home. The workup that was done here did not show any infection or clot in your lungs and you returned to your baseline on your own. This was mostly thought to be due to progression of your cancer. Please continue to use your oxygen at home and take your lasix and bactrim daily. Please f/u with your oncology doctor: Dr. Mayorga on 8/28 for further follow up.  Secondary Diagnosis:	Essential hypertension  Goal:	Systolic < 140  Assessment and plan of treatment:	Your blood pressure was well controlled during your hospital stay and you should continue to take your home medications as prescribed.  Secondary Diagnosis:	Chronic gout without tophus, unspecified cause, unspecified site  Goal:	To keep your gout from flaring up  Assessment and plan of treatment:	You had no issues with your gout during this hospital admission. Please continue to take your allopurinol 300mg every day.  Secondary Diagnosis:	Depression, unspecified depression type  Goal:	To keep your mood stable.  Assessment and plan of treatment:	You have a history of fighting with depression and should continue to take your daily escitalopram. Please f/u with your primary care doctor if any of your symptoms should change.  Secondary Diagnosis:	Squamous cell carcinoma of right lung  Goal:	To keep you stable  Assessment and plan of treatment:	You endorsed a wish to hold off on immunotherapy at home. You should talk with your family and Dr. Mayorga on your next appointment about what the next course would be.

## 2018-08-23 NOTE — DISCHARGE NOTE ADULT - SECONDARY DIAGNOSIS.
Essential hypertension Chronic gout without tophus, unspecified cause, unspecified site Depression, unspecified depression type Squamous cell carcinoma of right lung

## 2018-08-23 NOTE — H&P ADULT - HISTORY OF PRESENT ILLNESS
This is a 73M with history of Squamous cell cancer of the lung s/p RUL lung resection (currently on immunotherapy s/p 5 cycles, next dose due n ~2 weeks), HTN, Gout, AFib, and TIA (without deficits) who presents to the hospital with complaints of worsening SOB at home. Patient was recently hospitalized from 8/19 - 8/21 for SOB and was noted to have lymphagitic spread in his lungs This is a 73M with history of Squamous cell cancer of the lung s/p RUL lung resection (currently on immunotherapy s/p 5 cycles, next dose due n ~2 weeks), HTN, Gout, AFib, and TIA (without deficits) who presents to the hospital with complaints of worsening SOB at home. Patient was recently hospitalized from 8/19 - 8/21 for SOB and was noted to have lymphangitic spread in his lungs and he was started on a prednisone taper and bactrim ppx and discharged home with home O2. Patient said that he was doing well while at home until the morning of 8/22 when he woke up with significant SOB. he was evaluated by a home nurse who then called EMS to bring him to the hospital. Patient said that his dyspnea has improved while being in the hospital. He denied any fevers/chills, cough, rhinorrhea/sore throat, or other complaints at home other than his SOB. Said that he was unable to fill his new medications after discharge but did have his supplemental oxygen at home and was able to use that.     In the ED, his vitals on arrival were T 98.8, P 110, /70, R 20, O2 sat 95% on NC. His lab work and imaging did not show any significant changes from his prior levels. He was given Bactrim 800-160,  cc x1, and prednisone 10mg PO x1. He was admitted to medicine.     No family history pertinent to this admission.

## 2018-08-23 NOTE — DISCHARGE NOTE ADULT - PLAN OF CARE
To keep you comfortable You had a moment of worsening shortness of breath at home. The workup that was done here did not show any infection or clot in your lungs and you returned to your baseline on your own. This was mostly thought to be due to You had a moment of worsening shortness of breath at home. The workup that was done here did not show any infection or clot in your lungs and you returned to your baseline on your own. This was mostly thought to be due to progression of your cancer. Please continue to use your oxygen at home and take your lasix and bactrim daily. Please f/u with your oncology doctor: Dr. Mayorga on 8/28 for further follow up. Systolic < 140 Your blood pressure was well controlled during your hospital stay and you should continue to take your home medications as prescribed. To keep your gout from flaring up You had no issues with your gout during this hospital admission. Please continue to take your allopurinol 300mg every day. To keep your mood stable. You have a history of fighting with depression and should continue to take your daily escitalopram. Please f/u with your primary care doctor if any of your symptoms should change. To keep you stable You endorsed a wish to hold off on immunotherapy at home. You should talk with your family and Dr. Mayorga on your next appointment about what the next course would be.

## 2018-08-23 NOTE — CHART NOTE - NSCHARTNOTEFT_GEN_A_CORE
Pt seen and examined  Full note to follow  The plan is for resumption of DMT  Current treatment plan is inconsistent with hospice  Minimal symptom burden    Thank you for the consult.  Feel free to reconsult when clinical needs arise

## 2018-08-23 NOTE — ED ADULT NURSE REASSESSMENT NOTE - NS ED NURSE REASSESS COMMENT FT1
Break coverage- Pt resting comfortably at this time. Denies any c/o pain. Denies SOB. VSS as documented. Report given off to RN on unit. Awaiting transport. no acute distress.

## 2018-08-24 VITALS
DIASTOLIC BLOOD PRESSURE: 71 MMHG | OXYGEN SATURATION: 94 % | HEART RATE: 75 BPM | TEMPERATURE: 98 F | RESPIRATION RATE: 19 BRPM | SYSTOLIC BLOOD PRESSURE: 138 MMHG

## 2018-08-24 DIAGNOSIS — Z71.89 OTHER SPECIFIED COUNSELING: ICD-10-CM

## 2018-08-24 PROCEDURE — 99239 HOSP IP/OBS DSCHRG MGMT >30: CPT

## 2018-08-24 RX ADMIN — Medication 300 MILLIGRAM(S): at 11:13

## 2018-08-24 RX ADMIN — Medication 40 MILLIGRAM(S): at 05:30

## 2018-08-24 RX ADMIN — Medication 100 MILLIGRAM(S): at 12:35

## 2018-08-24 RX ADMIN — Medication 1 PATCH: at 11:14

## 2018-08-24 RX ADMIN — ESCITALOPRAM OXALATE 10 MILLIGRAM(S): 10 TABLET, FILM COATED ORAL at 11:13

## 2018-08-24 RX ADMIN — Medication 50 MILLIGRAM(S): at 05:29

## 2018-08-24 RX ADMIN — Medication 1 TABLET(S): at 12:33

## 2018-08-24 RX ADMIN — Medication 1 TABLET(S): at 11:13

## 2018-08-24 NOTE — PROGRESS NOTE ADULT - SUBJECTIVE AND OBJECTIVE BOX
Patient is a 73y old  Male who presents with a chief complaint of Shortness of breath (23 Aug 2018 17:47)        SUBJECTIVE / OVERNIGHT EVENTS: No acute overnight events. Pt endorses that he feels well today. He denies any fevers, chills, chest pain, shortness of breath, abdominal pain or other changes.      MEDICATIONS  (STANDING):  allopurinol 300 milliGRAM(s) Oral daily  diltiazem    milliGRAM(s) Oral daily  enoxaparin Injectable 40 milliGRAM(s) SubCutaneous every 24 hours  escitalopram 10 milliGRAM(s) Oral daily  furosemide    Tablet 40 milliGRAM(s) Oral daily  multivitamin 1 Tablet(s) Oral daily  nicotine -   7 mG/24Hr(s) Patch 1 patch Transdermal daily  predniSONE   Tablet   Oral   tiotropium 18 MICROgram(s) Capsule 1 Capsule(s) Inhalation at bedtime  trimethoprim   80 mG/sulfamethoxazole 400 mG 1 Tablet(s) Oral daily    MEDICATIONS  (PRN):  docusate sodium 100 milliGRAM(s) Oral three times a day PRN Constipation        CAPILLARY BLOOD GLUCOSE        I&O's Summary      PHYSICAL EXAM  GENERAL: NAD, well-developed  HEAD:  Atraumatic, Normocephalic  EYES: EOMI, PERRLA, conjunctiva and sclera clear  NECK: Supple, No JVD  CHEST/LUNG: Clear to auscultation bilaterally; Decreased breath sounds in the RLL.  HEART: Regular rate and rhythm; No murmurs, rubs, or gallops  ABDOMEN: Soft, Nontender, Nondistended; Bowel sounds present  EXTREMITIES:  2+ Peripheral Pulses, No clubbing, cyanosis, or edema  PSYCH: AAOx3  SKIN: No rashes or lesions    LABS:                        9.2    9.86  )-----------( 228      ( 23 Aug 2018 06:30 )             29.7     08-23    132<L>  |  95<L>  |  27<H>  ----------------------------<  143<H>  4.5   |  23  |  1.10    Ca    8.3<L>      23 Aug 2018 06:30  Phos  4.4     08-23  Mg     1.6     08-23    TPro  6.3  /  Alb  3.1<L>  /  TBili  0.4  /  DBili  x   /  AST  14  /  ALT  10  /  AlkPhos  101  08-22    PT/INR - ( 22 Aug 2018 17:50 )   PT: 11.4 SEC;   INR: 0.99          PTT - ( 22 Aug 2018 17:50 )  PTT:26.5 SEC

## 2018-08-24 NOTE — PROGRESS NOTE ADULT - PROBLEM SELECTOR PLAN 4
Please refer to goals of care documentation in [place].  Patient has a follow-up with Dr. Cornell as an outpatient for symptom management on 8/31/18. Psychosocial support provided.

## 2018-08-24 NOTE — PROVIDER CONTACT NOTE (OTHER) - ACTION/TREATMENT ORDERED:
Provider aware, HR retaken a few minutes later and was 61 and O2 stat on NC @ 2L/min was 96%, provider aware Provider aware, HR retaken a few minutes later and was 61, provider aware

## 2018-08-24 NOTE — PROGRESS NOTE ADULT - SUBJECTIVE AND OBJECTIVE BOX
INTERVAL HPI/OVERNIGHT EVENTS:  Reports mild DONNELLY     Allergies    No Known Allergies    Intolerances        Code Status: DNR         PRESENT SYMPTOMS:   SOURCE:  [x ] Patient   [ ] Family   [ ] Team     Pain: Denies      Dyspnea Denies at rest. Reports mild DONNELLY (with oxygen)   Anxiety:  [ ] YES [ x] NO  Fatigue: [ ] YES [x ] NO  Nausea: [ ] YES [x ] NO  Loss of Appetite: [ ] YES [ x] NO  Constipation [ ] YES   [x ] No     OTHER SYMPTOMS:  [x ] All other ROS negative     [ ] Unable to obtain due to poor mentation    Does the patient meet criteria for Severe Protein Calorie Malnutrition?  Yes [ ]  No [ ]   PPSV 30% or below [ ]  Anasarca [ ]  Albumin <2 [ ]  Catabolic State [ ]  Poor nutritional intake [ ]  Significant weight loss [ ]     MEDICATIONS  (STANDING):  allopurinol 300 milliGRAM(s) Oral daily  diltiazem    milliGRAM(s) Oral daily  enoxaparin Injectable 40 milliGRAM(s) SubCutaneous every 24 hours  escitalopram 10 milliGRAM(s) Oral daily  furosemide    Tablet 40 milliGRAM(s) Oral daily  multivitamin 1 Tablet(s) Oral daily  nicotine -   7 mG/24Hr(s) Patch 1 patch Transdermal daily  predniSONE   Tablet   Oral   tiotropium 18 MICROgram(s) Capsule 1 Capsule(s) Inhalation at bedtime  trimethoprim   80 mG/sulfamethoxazole 400 mG 1 Tablet(s) Oral daily    MEDICATIONS  (PRN):  docusate sodium 100 milliGRAM(s) Oral three times a day PRN Constipation      Palliative Performance Status Version 2:  60-70%  ECOG - 3       Physical Exam:    General: [x ] Alert,  A&O x 4       Lungs: [x ] Clear - anteriorly   Cardiovascular:  [x ] Regular rate and rhythm  [ ] Irregular [ ] Tachycardia   [ ] Bradycardia   Abdomen: [x ] Soft  [ ] Distended  [x ] +BS  [x ] Non tender [ ] Tender  [ ]PEG   [ ] NGT   Last BM:   8/22/18  Genitourinary:  [x ] Normal [ ] Incontinent   [ ] Oliguria/Anuria   [ ] Espinoza  Musculoskeletal:  [ ] Normal   [x ] Generalized weakness  [ ] Bedbound  [ ] Edema   Neurological: [x ] No focal deficits  [ ] Cognitive impairment     Skin: [x ] Normal - lipoma to right elbow     Vital Signs Last 24 Hrs  T(C): 36.4 (24 Aug 2018 05:21), Max: 36.4 (23 Aug 2018 21:39)  T(F): 97.5 (24 Aug 2018 05:21), Max: 97.6 (23 Aug 2018 21:39)  HR: 58 (24 Aug 2018 05:21) (58 - 66)  BP: 137/70 (24 Aug 2018 05:21) (130/64 - 137/70)  BP(mean): --  RR: 18 (24 Aug 2018 05:21) (18 - 18)  SpO2: 94% (24 Aug 2018 05:21) (94% - 96%)    LABS:                        9.2    9.86  )-----------( 228      ( 23 Aug 2018 06:30 )             29.7     08-23    132<L>  |  95<L>  |  27<H>  ----------------------------<  143<H>  4.5   |  23  |  1.10    Ca    8.3<L>      23 Aug 2018 06:30  Phos  4.4     08-23  Mg     1.6     08-23    TPro  6.3  /  Alb  3.1<L>  /  TBili  0.4  /  DBili  x   /  AST  14  /  ALT  10  /  AlkPhos  101  08-22    PT/INR - ( 22 Aug 2018 17:50 )   PT: 11.4 SEC;   INR: 0.99          PTT - ( 22 Aug 2018 17:50 )  PTT:26.5 SEC    I&O's Summary      RADIOLOGY & ADDITIONAL STUDIES:

## 2018-08-24 NOTE — GOALS OF CARE CONVERSATION - PERSONAL ADVANCE DIRECTIVE - CONVERSATION DETAILS
I spoke with the patient  He would not like to be resuscitated in the event of arrest/dysrhythmia leading to arrest  I apprised him of the importance of having this documented in the physical chart and EMR.  He agreed to have this entered in the EMR  He stated that paperwork was filled out  Rapport building and emotional support  Discussed outpatient follow up  ACP Time spent 16 min

## 2018-08-24 NOTE — CONSULT NOTE ADULT - SUBJECTIVE AND OBJECTIVE BOX
CHIEF COMPLAINT:Patient is a 73y old  Male who presents with a chief complaint of Shortness of breath (23 Aug 2018 17:47)      HISTORY OF PRESENT ILLNESS:    This is a pleasant gentleman with history as below presented with sob     PAST MEDICAL & SURGICAL HISTORY:  Squamous cell carcinoma of lung, stage IV: s/p RUL lobectomy, currently on immunotherapy  TIA (transient ischemic attack): 1999  Localized enlarged lymph nodes  Solitary pulmonary nodule  HTN (hypertension)  Alcohol abuse: Sober since 01/2016- attending AA meeting weekly  Depression, unspecified depression type  Gout  Essential hypertension  H/O hemorrhoidectomy: 1967  History of tonsillectomy: as a child  History of appendectomy: 1958  H/O left knee surgery: 1995          MEDICATIONS:  diltiazem    milliGRAM(s) Oral daily  enoxaparin Injectable 40 milliGRAM(s) SubCutaneous every 24 hours  furosemide    Tablet 40 milliGRAM(s) Oral daily    trimethoprim   80 mG/sulfamethoxazole 400 mG 1 Tablet(s) Oral daily    tiotropium 18 MICROgram(s) Capsule 1 Capsule(s) Inhalation at bedtime    escitalopram 10 milliGRAM(s) Oral daily    docusate sodium 100 milliGRAM(s) Oral three times a day PRN    allopurinol 300 milliGRAM(s) Oral daily  predniSONE   Tablet   Oral     multivitamin 1 Tablet(s) Oral daily      FAMILY HISTORY:      Non-contributory    SOCIAL HISTORY:    No tobacco, drugs or etoh    Allergies    No Known Allergies    Intolerances    	    REVIEW OF SYSTEMS:  as above  The rest of the 14 points ROS reviewed and except above they are unremarkable.        PHYSICAL EXAM:  T(C): 36.4 (08-24-18 @ 14:52), Max: 36.4 (08-23-18 @ 21:39)  HR: 75 (08-24-18 @ 14:52) (58 - 75)  BP: 138/71 (08-24-18 @ 14:52) (130/64 - 138/71)  RR: 19 (08-24-18 @ 14:52) (18 - 19)  SpO2: 94% (08-24-18 @ 14:52) (94% - 96%)  Wt(kg): --  I&O's Summary      Appearance: Normal	  HEENT:   Normal oral mucosa, PERRL, EOMI	  Cardiovascular: Normal S1 S2,    Murmur:   Neck: JVP normal  Respiratory: Lungs clear to auscultation  Gastrointestinal:  Soft, Non-tender, + BS	  Skin: normal   Neuro: No gross deficits.   Psychiatry:  Mood & affect appropriate  Ext: No edema    LABS/DATA:    TELEMETRY: 	    ECG:  	   	  CARDIAC MARKERS:                                      9.2    9.86  )-----------( 228      ( 23 Aug 2018 06:30 )             29.7     08-23    132<L>  |  95<L>  |  27<H>  ----------------------------<  143<H>  4.5   |  23  |  1.10    Ca    8.3<L>      23 Aug 2018 06:30  Phos  4.4     08-23  Mg     1.6     08-23    TPro  6.3  /  Alb  3.1<L>  /  TBili  0.4  /  DBili  x   /  AST  14  /  ALT  10  /  AlkPhos  101  08-22    proBNP:   Lipid Profile:   HgA1c:   TSH:

## 2018-08-24 NOTE — PROVIDER CONTACT NOTE (OTHER) - ACTION/TREATMENT ORDERED:
Provider aware, hold am dose of diltiazem  mg PO as per order and ok to give furosemide 40mg PO as per Dr. Ron

## 2018-08-24 NOTE — PROGRESS NOTE ADULT - PROBLEM SELECTOR PLAN 2
Patient reports mild DONNELLY ambulating in ruiz - but reports it was tolerable and that his dyspnea is overall improved with O2 via nasal cannula.  No further interventions at this time.  Prednisone and Spiriva in place.

## 2018-08-24 NOTE — CONSULT NOTE ADULT - ASSESSMENT
SOB  improving  fu with pulm and onc for furhter managment     PAF  Hr stable   on cardizem 360   off a/c due to multiple epistaxis     HTN  stable

## 2018-08-24 NOTE — PROGRESS NOTE ADULT - ATTENDING COMMENTS
Agree with above
Pt was seen and examed at bedside with resident.  72 yo M PMHx Stage IV SCC of the lung (s/p chemotherapy, immunotherapy and RUL resection) with previous episodes of pleural effusion s/p VATS-assisted thoracentesis, afib, SIADH and HTN A/w dyspnea, found an enlarged pleural effusion on CT chest. Oncology, CT surgery and pulm consult appreciated, no surgical intervention, chronic R pleural effusion no need for thoracentesis. Pt was readmitted for similar symptoms, repeat CTA chest no PE, with pulmonary edema versus lymphangitic spread of malignancy.   Pt symptoms like are related with progression of the malignancy. continue Oral steroid, and diuretics, suspect R heart failure with progress of malignancy? (Echo 2017 normal LVEF). Pt need home O2, set up by CM. continue PCP prophylaxis with bactrim.   Pt DNR/DNI, overall prognosis guarded. Palliative consult appreciated, Pt and family still interested in immunotherapy. requested to see heme-onco, will consult. will consider low dose morphine to help with respiratory distress, and encourage ambulating with O2 supply.   D/c planning home if Pt stable, Explained with Pt, wife and daughter.
Pt was seen and examed at bedside with resident.  74 yo M PMHx Stage IV SCC of the lung (s/p chemotherapy, immunotherapy and RUL resection) with previous episodes of pleural effusion s/p VATS-assisted thoracentesis, afib, SIADH and HTN A/w dyspnea, found an enlarged pleural effusion on CT chest. Oncology, CT surgery and pulm consult appreciated, no surgical intervention, chronic R pleural effusion no need for thoracentesis. Pt was readmitted for similar symptoms, repeat CTA chest no PE, with pulmonary edema versus lymphangitic spread of malignancy.   Pt symptoms likely are related with progression of the malignancy. continue Oral steroid, and diuretics, suspect R heart failure with progress of malignancy? (Echo 2017 normal LVEF). Pt need home O2, set up by CM. continue PCP prophylaxis with bactrim.   Pt DNR/DNI, overall prognosis guarded. Palliative consult appreciated, Pt and family still interested in immunotherapy. want to see heme-onco. Pt dose not want low dose morphine to help with respiratory distress. Encourage ambulating with O2 supply.   D/c home today, explained Pt condition with daughter, HCP. she is considering hospice, Pt already following palliative in [facility]. Final decision pending. discharge time 40 min.

## 2018-08-27 ENCOUNTER — INBOUND DOCUMENT (OUTPATIENT)
Age: 73
End: 2018-08-27

## 2018-08-28 ENCOUNTER — APPOINTMENT (OUTPATIENT)
Dept: HEMATOLOGY ONCOLOGY | Facility: CLINIC | Age: 73
End: 2018-08-28
Payer: MEDICARE

## 2018-08-28 ENCOUNTER — LABORATORY RESULT (OUTPATIENT)
Age: 73
End: 2018-08-28

## 2018-08-28 ENCOUNTER — APPOINTMENT (OUTPATIENT)
Dept: INFUSION THERAPY | Facility: HOSPITAL | Age: 73
End: 2018-08-28

## 2018-08-28 VITALS
DIASTOLIC BLOOD PRESSURE: 80 MMHG | OXYGEN SATURATION: 91 % | WEIGHT: 152.12 LBS | RESPIRATION RATE: 18 BRPM | TEMPERATURE: 98 F | HEART RATE: 102 BPM | SYSTOLIC BLOOD PRESSURE: 146 MMHG | BODY MASS INDEX: 21.22 KG/M2

## 2018-08-28 DIAGNOSIS — J91.0 MALIGNANT PLEURAL EFFUSION: ICD-10-CM

## 2018-08-28 DIAGNOSIS — C78.00 SECONDARY MALIGNANT NEOPLASM OF UNSPECIFIED LUNG: ICD-10-CM

## 2018-08-28 DIAGNOSIS — C91.90 LYMPHOID LEUKEMIA, UNSPECIFIED NOT HAVING ACHIEVED REMISSION: ICD-10-CM

## 2018-08-28 PROCEDURE — 99215 OFFICE O/P EST HI 40 MIN: CPT

## 2018-08-29 ENCOUNTER — FORM ENCOUNTER (OUTPATIENT)
Age: 73
End: 2018-08-29

## 2018-08-30 ENCOUNTER — RESULT REVIEW (OUTPATIENT)
Age: 73
End: 2018-08-30

## 2018-08-30 ENCOUNTER — APPOINTMENT (OUTPATIENT)
Dept: ULTRASOUND IMAGING | Facility: IMAGING CENTER | Age: 73
End: 2018-08-30
Payer: MEDICARE

## 2018-08-30 ENCOUNTER — OUTPATIENT (OUTPATIENT)
Dept: OUTPATIENT SERVICES | Facility: HOSPITAL | Age: 73
LOS: 1 days | End: 2018-08-30
Payer: MEDICARE

## 2018-08-30 DIAGNOSIS — C78.00 SECONDARY MALIGNANT NEOPLASM OF UNSPECIFIED LUNG: ICD-10-CM

## 2018-08-30 DIAGNOSIS — Z98.890 OTHER SPECIFIED POSTPROCEDURAL STATES: Chronic | ICD-10-CM

## 2018-08-30 DIAGNOSIS — Z90.49 ACQUIRED ABSENCE OF OTHER SPECIFIED PARTS OF DIGESTIVE TRACT: Chronic | ICD-10-CM

## 2018-08-30 DIAGNOSIS — Z90.89 ACQUIRED ABSENCE OF OTHER ORGANS: Chronic | ICD-10-CM

## 2018-08-30 PROCEDURE — 88341 IMHCHEM/IMCYTCHM EA ADD ANTB: CPT

## 2018-08-30 PROCEDURE — 76942 ECHO GUIDE FOR BIOPSY: CPT

## 2018-08-30 PROCEDURE — 47000 NEEDLE BIOPSY OF LIVER PERQ: CPT

## 2018-08-30 PROCEDURE — 88342 IMHCHEM/IMCYTCHM 1ST ANTB: CPT

## 2018-08-30 PROCEDURE — 76942 ECHO GUIDE FOR BIOPSY: CPT | Mod: 26

## 2018-08-30 PROCEDURE — 88307 TISSUE EXAM BY PATHOLOGIST: CPT

## 2018-08-31 ENCOUNTER — APPOINTMENT (OUTPATIENT)
Dept: GERIATRICS | Facility: CLINIC | Age: 73
End: 2018-08-31

## 2018-09-03 PROBLEM — R60.0 BILATERAL EDEMA OF LOWER EXTREMITY: Status: RESOLVED | Noted: 2017-05-09 | Resolved: 2018-09-03

## 2018-09-06 ENCOUNTER — APPOINTMENT (OUTPATIENT)
Dept: HEMATOLOGY ONCOLOGY | Facility: CLINIC | Age: 73
End: 2018-09-06
Payer: MEDICARE

## 2018-09-06 VITALS
SYSTOLIC BLOOD PRESSURE: 158 MMHG | WEIGHT: 153.22 LBS | BODY MASS INDEX: 21.37 KG/M2 | OXYGEN SATURATION: 91 % | TEMPERATURE: 98.3 F | DIASTOLIC BLOOD PRESSURE: 68 MMHG | RESPIRATION RATE: 18 BRPM | HEART RATE: 105 BPM

## 2018-09-06 PROCEDURE — 99214 OFFICE O/P EST MOD 30 MIN: CPT

## 2018-09-08 LAB — SURGICAL PATHOLOGY STUDY: SIGNIFICANT CHANGE UP

## 2018-09-12 ENCOUNTER — EMERGENCY (EMERGENCY)
Facility: HOSPITAL | Age: 73
LOS: 1 days | Discharge: HOME | End: 2018-09-12
Attending: EMERGENCY MEDICINE | Admitting: EMERGENCY MEDICINE
Payer: MEDICARE

## 2018-09-12 VITALS
TEMPERATURE: 98 F | OXYGEN SATURATION: 100 % | RESPIRATION RATE: 16 BRPM | SYSTOLIC BLOOD PRESSURE: 124 MMHG | DIASTOLIC BLOOD PRESSURE: 64 MMHG | HEART RATE: 104 BPM

## 2018-09-12 VITALS
SYSTOLIC BLOOD PRESSURE: 124 MMHG | OXYGEN SATURATION: 95 % | TEMPERATURE: 99 F | DIASTOLIC BLOOD PRESSURE: 82 MMHG | RESPIRATION RATE: 21 BRPM | HEART RATE: 111 BPM

## 2018-09-12 DIAGNOSIS — Z90.49 ACQUIRED ABSENCE OF OTHER SPECIFIED PARTS OF DIGESTIVE TRACT: Chronic | ICD-10-CM

## 2018-09-12 DIAGNOSIS — Z90.89 ACQUIRED ABSENCE OF OTHER ORGANS: Chronic | ICD-10-CM

## 2018-09-12 DIAGNOSIS — Z98.890 OTHER SPECIFIED POSTPROCEDURAL STATES: Chronic | ICD-10-CM

## 2018-09-12 LAB
ALBUMIN SERPL ELPH-MCNC: 2.9 G/DL — LOW (ref 3.3–5)
ALP SERPL-CCNC: 124 U/L — HIGH (ref 40–120)
ALT FLD-CCNC: 14 U/L — SIGNIFICANT CHANGE UP (ref 4–41)
ANISOCYTOSIS BLD QL: SLIGHT — SIGNIFICANT CHANGE UP
APPEARANCE UR: CLEAR — SIGNIFICANT CHANGE UP
AST SERPL-CCNC: 19 U/L — SIGNIFICANT CHANGE UP (ref 4–40)
BACTERIA # UR AUTO: NEGATIVE — SIGNIFICANT CHANGE UP
BASE EXCESS BLDV CALC-SCNC: 3.3 MMOL/L — SIGNIFICANT CHANGE UP
BASOPHILS # BLD AUTO: 0.08 K/UL — SIGNIFICANT CHANGE UP (ref 0–0.2)
BASOPHILS NFR BLD AUTO: 0.5 % — SIGNIFICANT CHANGE UP (ref 0–2)
BASOPHILS NFR SPEC: 0.9 % — SIGNIFICANT CHANGE UP (ref 0–2)
BILIRUB SERPL-MCNC: 0.6 MG/DL — SIGNIFICANT CHANGE UP (ref 0.2–1.2)
BILIRUB UR-MCNC: NEGATIVE — SIGNIFICANT CHANGE UP
BLASTS # FLD: 0 % — SIGNIFICANT CHANGE UP (ref 0–0)
BLOOD GAS VENOUS - CREATININE: 1.21 MG/DL — SIGNIFICANT CHANGE UP (ref 0.5–1.3)
BLOOD UR QL VISUAL: NEGATIVE — SIGNIFICANT CHANGE UP
BUN SERPL-MCNC: 28 MG/DL — HIGH (ref 7–23)
CALCIUM SERPL-MCNC: 8.7 MG/DL — SIGNIFICANT CHANGE UP (ref 8.4–10.5)
CHLORIDE BLDV-SCNC: 105 MMOL/L — SIGNIFICANT CHANGE UP (ref 96–108)
CHLORIDE SERPL-SCNC: 100 MMOL/L — SIGNIFICANT CHANGE UP (ref 98–107)
CO2 SERPL-SCNC: 23 MMOL/L — SIGNIFICANT CHANGE UP (ref 22–31)
COLOR SPEC: YELLOW — SIGNIFICANT CHANGE UP
CREAT SERPL-MCNC: 1.24 MG/DL — SIGNIFICANT CHANGE UP (ref 0.5–1.3)
EOSINOPHIL # BLD AUTO: 0.09 K/UL — SIGNIFICANT CHANGE UP (ref 0–0.5)
EOSINOPHIL NFR BLD AUTO: 0.5 % — SIGNIFICANT CHANGE UP (ref 0–6)
EOSINOPHIL NFR FLD: 0 % — SIGNIFICANT CHANGE UP (ref 0–6)
GAS PNL BLDV: 133 MMOL/L — LOW (ref 136–146)
GLUCOSE BLDV-MCNC: 101 — HIGH (ref 70–99)
GLUCOSE SERPL-MCNC: 97 MG/DL — SIGNIFICANT CHANGE UP (ref 70–99)
GLUCOSE UR-MCNC: NEGATIVE — SIGNIFICANT CHANGE UP
HCO3 BLDV-SCNC: 26 MMOL/L — SIGNIFICANT CHANGE UP (ref 20–27)
HCT VFR BLD CALC: 33.5 % — LOW (ref 39–50)
HCT VFR BLDV CALC: 33.7 % — LOW (ref 39–51)
HGB BLD-MCNC: 10.4 G/DL — LOW (ref 13–17)
HGB BLDV-MCNC: 10.9 G/DL — LOW (ref 13–17)
HYALINE CASTS # UR AUTO: NEGATIVE — SIGNIFICANT CHANGE UP
IMM GRANULOCYTES # BLD AUTO: 0.82 # — SIGNIFICANT CHANGE UP
IMM GRANULOCYTES NFR BLD AUTO: 4.9 % — HIGH (ref 0–1.5)
KETONES UR-MCNC: NEGATIVE — SIGNIFICANT CHANGE UP
LACTATE BLDV-MCNC: 1.3 MMOL/L — SIGNIFICANT CHANGE UP (ref 0.5–2)
LEUKOCYTE ESTERASE UR-ACNC: NEGATIVE — SIGNIFICANT CHANGE UP
LYMPHOCYTES # BLD AUTO: 33.1 % — SIGNIFICANT CHANGE UP (ref 13–44)
LYMPHOCYTES # BLD AUTO: 5.5 K/UL — HIGH (ref 1–3.3)
LYMPHOCYTES NFR SPEC AUTO: 18.7 % — SIGNIFICANT CHANGE UP (ref 13–44)
MCHC RBC-ENTMCNC: 27.4 PG — SIGNIFICANT CHANGE UP (ref 27–34)
MCHC RBC-ENTMCNC: 31 % — LOW (ref 32–36)
MCV RBC AUTO: 88.2 FL — SIGNIFICANT CHANGE UP (ref 80–100)
METAMYELOCYTES # FLD: 0 % — SIGNIFICANT CHANGE UP (ref 0–1)
MICROCYTES BLD QL: SLIGHT — SIGNIFICANT CHANGE UP
MONOCYTES # BLD AUTO: 0.73 K/UL — SIGNIFICANT CHANGE UP (ref 0–0.9)
MONOCYTES NFR BLD AUTO: 4.4 % — SIGNIFICANT CHANGE UP (ref 2–14)
MONOCYTES NFR BLD: 6.3 % — SIGNIFICANT CHANGE UP (ref 2–9)
MYELOCYTES NFR BLD: 1.8 % — HIGH (ref 0–0)
NEUTROPHIL AB SER-ACNC: 69.6 % — SIGNIFICANT CHANGE UP (ref 43–77)
NEUTROPHILS # BLD AUTO: 9.41 K/UL — HIGH (ref 1.8–7.4)
NEUTROPHILS NFR BLD AUTO: 56.6 % — SIGNIFICANT CHANGE UP (ref 43–77)
NEUTS BAND # BLD: 0 % — SIGNIFICANT CHANGE UP (ref 0–6)
NITRITE UR-MCNC: NEGATIVE — SIGNIFICANT CHANGE UP
NRBC # FLD: 0 — SIGNIFICANT CHANGE UP
OTHER - HEMATOLOGY %: 0 — SIGNIFICANT CHANGE UP
OVALOCYTES BLD QL SMEAR: SLIGHT — SIGNIFICANT CHANGE UP
PCO2 BLDV: 47 MMHG — SIGNIFICANT CHANGE UP (ref 41–51)
PH BLDV: 7.39 PH — SIGNIFICANT CHANGE UP (ref 7.32–7.43)
PH UR: 6 — SIGNIFICANT CHANGE UP (ref 5–8)
PLATELET # BLD AUTO: 263 K/UL — SIGNIFICANT CHANGE UP (ref 150–400)
PLATELET COUNT - ESTIMATE: NORMAL — SIGNIFICANT CHANGE UP
PMV BLD: 10.1 FL — SIGNIFICANT CHANGE UP (ref 7–13)
PO2 BLDV: 26 MMHG — LOW (ref 35–40)
POIKILOCYTOSIS BLD QL AUTO: SLIGHT — SIGNIFICANT CHANGE UP
POTASSIUM BLDV-SCNC: 4.1 MMOL/L — SIGNIFICANT CHANGE UP (ref 3.4–4.5)
POTASSIUM SERPL-MCNC: 4.4 MMOL/L — SIGNIFICANT CHANGE UP (ref 3.5–5.3)
POTASSIUM SERPL-SCNC: 4.4 MMOL/L — SIGNIFICANT CHANGE UP (ref 3.5–5.3)
PROMYELOCYTES # FLD: 0 % — SIGNIFICANT CHANGE UP (ref 0–0)
PROT SERPL-MCNC: 6.3 G/DL — SIGNIFICANT CHANGE UP (ref 6–8.3)
PROT UR-MCNC: 30 — SIGNIFICANT CHANGE UP
RBC # BLD: 3.8 M/UL — LOW (ref 4.2–5.8)
RBC # FLD: 17.4 % — HIGH (ref 10.3–14.5)
RBC CASTS # UR COMP ASSIST: SIGNIFICANT CHANGE UP (ref 0–?)
SAO2 % BLDV: 37.8 % — LOW (ref 60–85)
SMUDGE CELLS # BLD: PRESENT — SIGNIFICANT CHANGE UP
SODIUM SERPL-SCNC: 138 MMOL/L — SIGNIFICANT CHANGE UP (ref 135–145)
SP GR SPEC: 1.02 — SIGNIFICANT CHANGE UP (ref 1–1.04)
SQUAMOUS # UR AUTO: SIGNIFICANT CHANGE UP
TROPONIN T, HIGH SENSITIVITY: 26 NG/L — SIGNIFICANT CHANGE UP (ref ?–14)
UROBILINOGEN FLD QL: NORMAL — SIGNIFICANT CHANGE UP
VARIANT LYMPHS # BLD: 2.7 % — SIGNIFICANT CHANGE UP
WBC # BLD: 16.63 K/UL — HIGH (ref 3.8–10.5)
WBC # FLD AUTO: 16.63 K/UL — HIGH (ref 3.8–10.5)
WBC UR QL: SIGNIFICANT CHANGE UP (ref 0–?)

## 2018-09-12 PROCEDURE — 71275 CT ANGIOGRAPHY CHEST: CPT | Mod: 26

## 2018-09-12 PROCEDURE — 99284 EMERGENCY DEPT VISIT MOD MDM: CPT

## 2018-09-12 PROCEDURE — 71046 X-RAY EXAM CHEST 2 VIEWS: CPT | Mod: 26

## 2018-09-12 PROCEDURE — 70450 CT HEAD/BRAIN W/O DYE: CPT | Mod: 26

## 2018-09-12 NOTE — ED ADULT NURSE REASSESSMENT NOTE - NS ED NURSE REASSESS COMMENT FT1
Pt. is alert and oriented x 4 vss. No c/o pain no respiratory distress, cardiac monitor in place will continue to monitor

## 2018-09-12 NOTE — ED PROVIDER NOTE - OBJECTIVE STATEMENT
HPI: Pt is a 73 y.o male w/ PMHx of lung SCC (stage 4) s/p RUL lobectomy (2017), Hx previous R pleural effusion requiring thoracentesis, paroxysmal AFib (diltiazem), HTN, CKDIII who presents to ED s/p fall last night and c.o sob. As per patient and family state that patient has been feeling sob at baseline since dx with lung cancer but last night patient felt increased sob, requiring usage of home O2 at 2L. Admits some relief with use of oxygen at home. However continued to feel sob this morning. Also patient admits he got up to use bathroom last night and felt little dizzy and states he then fell to ground, denies loc or hitting head. States he was able to get himself back into bed. P HPI: Pt is a 73 y.o male w/ PMHx of lung SCC (stage 4) s/p RUL lobectomy (2017), Hx previous R pleural effusion requiring thoracentesis, paroxysmal AFib (diltiazem), HTN, CKDIII who presents to ED s/p unwitnessed fall last night and c.o sob. As per patient and family state that patient has been feeling sob at baseline since dx with lung cancer but last night patient felt increased sob, requiring usage of home O2 at 2L. Admits some relief with use of oxygen at home. However continued to feel sob this morning. Also patient admits he got up to use bathroom last night and felt little dizzy and states he then fell to ground, denies loc or hitting head. States he was able to get himself back into bed. Wife states patient seemed "zoned out" this morning and she felt she had to repeat herself to him.  Family spoke with Zoë Wright this morning who recommended further eval in ED. Pt currently states he is feeling well, only feels slightly sob. Denies numbness or tingling, weakness, cp, pleuritic pain, hx DVT/PE, n/v/d, abdominal pain, back pain, neck pain, changes in vision, slurred speech, dysuria, hematuria, fevers, chills or any other complaints.      Patient was seen here and admitted recently for similar c/o of SOB and d/c home on abx and prednisone 40 mg PO. Patient DNR/DNI. Palliative Care on-board.     Patients Pulmonologist Tobias  surgeon- Dr. Foster  Oncology-Dr. Feliciano (zoë)

## 2018-09-12 NOTE — ED PROVIDER NOTE - ATTENDING CONTRIBUTION TO CARE
HPI: Pt is a 73 y.o male w/ PMHx of lung SCC (stage 4) s/p RUL lobectomy (2017), Hx previous R pleural effusion requiring thoracentesis,  c.o sob. As per patient and family state that patient has been feeling sob at baseline since dx with lung cancer but last night patient felt increased sob, requiring usage of home O2 at 2L. Admits some relief with use of oxygen at home.  No focal weakness.  Pt sent by jeancarlos.    Patient was seen here and admitted recently for similar c/o of SOB and d/c home on abx and prednisone 40 mg PO. Patient DNR/DNI.  Pt seen by palliative care in ED, will undergo home hospice care.     pe ncat  lungs diminshed on r  stable on oxygen  abd soft      imp cancer progression--palliative care intervention

## 2018-09-12 NOTE — ED PROVIDER NOTE - PLAN OF CARE
Patient advised to follow up with  PRIMARY CARE DOCTOR IN 1-2 DAYS AND CONTACT HOSPICE CARE and told to return to the emergency department immediately for any new or concerning symptoms such OR ANY OTHER COMPLAINTS. Patient agrees with plan.    Continue all home medications  Utilize oxygen home therapy as directed

## 2018-09-12 NOTE — ED ADULT NURSE NOTE - OBJECTIVE STATEMENT
Received pt. in room 10 alert and oriented x 4, vss. No c/o pain no respiratory distress. Presenting to the ER complaining of shortness of breath. Pt. have a history of lung cancer, HTN, TIA, A-fib. Pt. stated "I have been having shortness of breath for 2 weeks and today it got worse". Pt. is comfortable at rest no discomfort verbalized that he experience shortness of breath with activities. Pt. stated that he fell at home last night  on his back no loss of consciousness, no bruises or lacerations noted no pain in extremities. Pt. ambulate with a walker at home and has weakness in both legs. Placed on cardiac monitor, 2 liters nasal cannula oxygen in place, labs sent will continue to monitor.

## 2018-09-12 NOTE — ED PROVIDER NOTE - PHYSICAL EXAMINATION
Vital signs reviewed.   CONSTITUTIONAL: Well-appearing; well-nourished; in no apparent distress. Non-toxic appearing.   HEAD: Normocephalic, atraumatic.  EYES: PERRL, EOM intact, conjunctiva and sclera WNL.  ENT: normal nose; no rhinorrhea;   NECK/LYMPH: Supple; non-tender; no midline tenderness noted.   CARD: Normal S1, S2; no murmurs, rubs, or gallops noted. Mild tachycardia.   RESP: Normal chest excursion with respiration; breath sounds clear and equal bilaterally; no wheezes, rhonchi, or rales.  ABD/GI: soft, non-distended; non-tender; no palpable organomegaly, no pulsatile mass.  EXT/MS: moves all extremities; distal pulses are normal, no pedal edema. No bony tenderness. No crepitus or obvious deformities. No midline tenderness noted.   SKIN: Normal for age and race; warm; dry; good turgor; no apparent lesions or exudate noted.  NEURO: Awake, alert, oriented x 3, no gross deficits,  no motor or sensory deficit noted.  PSYCH: Normal mood; appropriate affect.

## 2018-09-12 NOTE — ED ADULT NURSE NOTE - NSIMPLEMENTINTERV_GEN_ALL_ED
Implemented All Fall with Harm Risk Interventions:  [place] to call system. Call bell, personal items and telephone within reach. Instruct patient to call for assistance. Room bathroom lighting operational. Non-slip footwear when patient is off stretcher. Physically safe environment: no spills, clutter or unnecessary equipment. Stretcher in lowest position, wheels locked, appropriate side rails in place. Provide visual cue, wrist band, yellow gown, etc. Monitor gait and stability. Monitor for mental status changes and reorient to person, place, and time. Review medications for side effects contributing to fall risk. Reinforce activity limits and safety measures with patient and family. Provide visual clues: red socks.

## 2018-09-12 NOTE — GOALS OF CARE CONVERSATION - PERSONAL ADVANCE DIRECTIVE - CONVERSATION DETAILS
[facility]:  Was asked by PCT to see this pt in the ER & evaluate for home hospice svces.  HCN program explained in detail to pt, wife & dtr.  Pt in agreement w POC & also w DNR and signed consents.   Wife & dtr also in agreement.  Pt was approved for home hospice svces by [facility] MD.      Pt came into ER today because his O2 saturation was low after having used O2 all last night at home.   His Concentrator may be malfunctioning.  [facility] will send in new unit this evening.  HCN nurse will make home visit this evening to review program & safe O2 use.
Met with Mr. Melendez with spouse and daughter at bedside in the emergency. Pt is known to [facility] palliative care team and [facility].  Discussed pt's overall medical history including trajectory of disease, treatment options, transition of care options and advanced care planning. Pt states that he has had multiple lines of chemotherapy and immunotherapy and states he no longer wants to continue disease targeted treatment. When asked what is most important to patient he states his wife. Discussed with pt and family transition to home hospice with a detailed description of the program and the philosophy of care provided. Pt and family are in agreement with home hospice and request referral. Referral made.   Emotional support provided to pt and family regarding transition to hospice care, anticipatory grief and bereavement and end of life concerns.

## 2018-09-12 NOTE — ED PROVIDER NOTE - PROGRESS NOTE DETAILS
Patient pending labs and imaging. Palliative care team at bedside to discuss plan of care with patient and family. Pt currently resting comfortably on 2 L of O2 will continue to monitor closely. Patient feeling better, no acute events in ED. Results of labs and imaging reviewed and discussed with patient and family. CT angio Negative for PE, CT head normal. Pt DNR/DNI, patient was seen and evaluated by palliative care and wishes to no longer receive disease focused treatment and wishes to undergo hospice care.  Pt hospice paperwork completed by hospice RN.  Plan as per palliative care is d/c home following no acute results. Transport offered but patients daughter states she will transport home. Will continue with D/C and patient to follow up with hospice at home. No complaints noted.

## 2018-09-12 NOTE — ED ADULT TRIAGE NOTE - CHIEF COMPLAINT QUOTE
Pt BIBEMS coming from home  for weakness/sob, pt has hx of Lung CA, last treatment was last month, pt supposed to have follow up this upcoming Tuesday.

## 2018-09-12 NOTE — ED PROVIDER NOTE - CARE PLAN
Principal Discharge DX:	Shortness of breath  Assessment and plan of treatment:	Patient advised to follow up with  PRIMARY CARE DOCTOR IN 1-2 DAYS AND CONTACT HOSPICE CARE and told to return to the emergency department immediately for any new or concerning symptoms such OR ANY OTHER COMPLAINTS. Patient agrees with plan.    Continue all home medications  Utilize oxygen home therapy as directed

## 2018-09-12 NOTE — ED ADULT NURSE NOTE - NSFALLRSKUNASSIST_ED_ALL_ED
Todays PT/INR is  20.2 / 1.7   Current Coumadin dose is 0.5 mg on M W F and 1 mg all other days  Pt would also like to know  if she could stop taking her monthly B12 injections  Please advise     Aundrea Rutherford RN no

## 2018-09-12 NOTE — ED PROVIDER NOTE - MEDICAL DECISION MAKING DETAILS
Patient is a 73 y.o male with Multiple comorbidities, SCLC (Stage 4) who presents to ED c/o SOB and s/p unwitnessed fall at home.   DDx- r/o intracranial injury/bleed. R/O PE.   Plan- palliative care consult, CT angio r/o Pe, CT head w/o con, labs, Continuous pulse ox, O2 2 L, ECG, Trop, will continue to monitor and reassess

## 2018-09-13 ENCOUNTER — OUTPATIENT (OUTPATIENT)
Dept: OUTPATIENT SERVICES | Facility: HOSPITAL | Age: 73
LOS: 1 days | Discharge: HOME | End: 2018-09-13

## 2018-09-13 DIAGNOSIS — Z90.49 ACQUIRED ABSENCE OF OTHER SPECIFIED PARTS OF DIGESTIVE TRACT: Chronic | ICD-10-CM

## 2018-09-13 DIAGNOSIS — Z98.890 OTHER SPECIFIED POSTPROCEDURAL STATES: Chronic | ICD-10-CM

## 2018-09-13 DIAGNOSIS — C34.90 MALIGNANT NEOPLASM OF UNSPECIFIED PART OF UNSPECIFIED BRONCHUS OR LUNG: ICD-10-CM

## 2018-09-13 DIAGNOSIS — Z90.89 ACQUIRED ABSENCE OF OTHER ORGANS: Chronic | ICD-10-CM

## 2018-09-17 RX ORDER — SULFAMETHOXAZOLE AND TRIMETHOPRIM 800; 160 MG/1; MG/1
800-160 TABLET ORAL
Qty: 15 | Refills: 3 | Status: ACTIVE | COMMUNITY
Start: 2018-09-17 | End: 1900-01-01

## 2018-09-18 ENCOUNTER — APPOINTMENT (OUTPATIENT)
Dept: HEMATOLOGY ONCOLOGY | Facility: CLINIC | Age: 73
End: 2018-09-18

## 2018-09-24 ENCOUNTER — CLINICAL ADVICE (OUTPATIENT)
Age: 73
End: 2018-09-24

## 2018-09-25 ENCOUNTER — APPOINTMENT (OUTPATIENT)
Dept: RHEUMATOLOGY | Facility: CLINIC | Age: 73
End: 2018-09-25

## 2018-10-04 RX ORDER — LEVOFLOXACIN 500 MG/1
500 TABLET, FILM COATED ORAL DAILY
Qty: 14 | Refills: 0 | Status: ACTIVE | COMMUNITY
Start: 2018-10-04 | End: 1900-01-01

## 2018-10-04 RX ORDER — MIRTAZAPINE 15 MG/1
15 TABLET, FILM COATED ORAL
Qty: 30 | Refills: 0 | Status: ACTIVE | COMMUNITY
Start: 2018-10-04 | End: 1900-01-01

## 2018-10-15 ENCOUNTER — APPOINTMENT (OUTPATIENT)
Dept: GERIATRICS | Facility: CLINIC | Age: 73
End: 2018-10-15

## 2018-11-21 NOTE — PROGRESS NOTE ADULT - PROBLEM SELECTOR PLAN 1
Pt with 3d of malaise, found to have fever and tachycardia. No localizing symptoms for source of infection. CXR without PNA. UA without e/o UTI. BCx negative to date. Presentation likely represents sepsis 2/2 viral infection vs PNA. He received vancomycin and cefepime in the ED. Although patient is on chemotherapy, he is not neutropenic.   - Blood cultures 12/15 NGTD, 12/16 NGTD  - f/u urine cx  - Persistent fever and sepsis despite zosyn and supportive care, received zosyn x1  - RVP negative  - plan to d/c with levaquin x5d as no source identified  - leukocytosis likely 2/2 chemo  -  s/p 1U PRBC, hg 7.8 -->9.3 Normal vision: sees adequately in most situations; can see medication labels, newsprint Na of 132 today, previous 136, concern for SIADH in setting of chemo vs hypovolemicc hyponatremia  - serum OSM at 275  - f/u ur osm, urine electrolytes  - consider cortisol level  - cont to monitor

## 2018-12-19 NOTE — PROGRESS NOTE ADULT - PROBLEM SELECTOR PLAN 6
How Severe Are Your Spot(S)?: moderate Have Your Spot(S) Been Treated In The Past?: has not been treated Hpi Title: Evaluation of a Skin Lesion Family Member: Father No leukopenia or thrombocytopenia. Anemia consistent with previous values. No concern for blast crisis at this time.   - cont to trend cbc  - f/u with Dr Feliciano  - f/u IgA, IgG, IgM  - consider transfusion as Hg decreased to 7.8 from baseline ~10

## 2019-01-11 NOTE — ASU PATIENT PROFILE, ADULT - TEACHING/LEARNING RELIGIOUS CONSIDERATIONS
PRIMARY DIAGNOSIS: GASTROENTERITIS     OUTPATIENT/OBSERVATION GOALS TO BE MET BEFORE DISCHARGE  1. Orthostatic performed: N/A     2. Tolerating PO fluid and/or antibiotics (if applicable): Yes     3. Nausea/Vomiting/Diarrhea symptoms improved: Yes     4. Pain status: Patient is non verbal       5. Return to near baseline physical activity: Yes     Discharge Planner Nurse   Safe discharge environment identified: Yes  Barriers to discharge: No       Entered by: Laura HERNÁNDEZ stable and on room air. Patient is non verbal and blind. Honey thick liquids and pureed food. Sitter present in the room.  constantly screaming an fyelling all shiftSoothing music up. Barrier cream applied to buttocks and testicles Will continue to monitor and provide cares.      Please review provider order for any additional goals. Nurse to notify provider when observation goals have been met and patient is ready for discharge.             none

## 2019-01-12 NOTE — ED ADULT NURSE NOTE - GENITOURINARY WDL
Pt returned back form dialysis pt stable at bedside tolerating lunch.   Bladder non-tender and non-distended. Urine clear yellow.

## 2019-01-15 ENCOUNTER — APPOINTMENT (OUTPATIENT)
Dept: THORACIC SURGERY | Facility: CLINIC | Age: 74
End: 2019-01-15

## 2019-02-08 NOTE — ASU PATIENT PROFILE, ADULT - SPIRITUAL CULTURAL, CURRENT SITUATION, PROFILE
Add 46235 Cpt? (Important Note: In 2017 The Use Of 46282 Is Being Tracked By Cms To Determine Future Global Period Reimbursement For Global Periods): yes
Body Location Override (Optional - Billing Will Still Be Based On Selected Body Map Location If Applicable): mid fore head
Detail Level: Zone
no

## 2019-02-21 NOTE — H&P ADULT - PROBLEM SELECTOR PLAN 3
Caller would like to update Dr. Durbin on his symptoms. Writer advised caller of callback within 24-72 hours.    Patient Name: Blayne Morris  Caller Name: Blayne  Name of Facility:   Callback Number:   Best Availability:   Can A Detailed Message Be left?   Fax Number:   Additional Info: Blayne states he has taken 3 days of the medication and he still has body chills, fever between  and a cough.  Did you confirm the message with the caller?: yes    Thank you,  Alivia Vela   - c/w [place] cardizem

## 2019-06-04 NOTE — ED PROVIDER NOTE - CPE EDP ENMT NORM
spelling as well as words and phrases that may seem inappropriate. If there are questions or concerns please feel free to contact me to clarify.
normal...

## 2019-10-17 NOTE — PHYSICAL THERAPY INITIAL EVALUATION ADULT - GENERAL OBSERVATIONS, REHAB EVAL
Arleen Person  : 2004  Primary: [address] [employer]*  Secondary:  [address] at [place] [id number] [address]  [phone]   [phone]    OUTPATIENT PHYSICAL THERAPY: Daily Treatment Note 10/17/2019  Visit Count:  8    ICD-10: Treatment Diagnosis: knee pain, left (M25.562); patellofemoral disorders, left (M22.2X2); pain in joint, ankle, left (M25.572)   Precautions/Allergies:   Patient has no allergy information on record. TREATMENT PLAN:  Effective Dates: 2019 TO 2019 (90 days). Frequency/Duration: 2 times a week for 90 Day(s)    Pre-treatment Symptoms/Complaints:  Patient reports that she is doing well and wants to go back to soccer practice. Pain: Initial: Pain Intensity 1: 0 /10 Post Session:  0/10   Medications Last Reviewed:  10/17/2019  Updated Objective Findings:  See below  ROM:      Left* Right   Extension knee +13 +15   Flexion knee 150 150   Dorsiflexion  16 14      Y-Balance:    Left  Right    Anterior reach 47 cm  51 cm   Posterior lateral 94 cm 101   Posterior medial 94 96        TREATMENT:     Therapeutic Exercise: (55 Minutes):  Exercises per grid below to improve mobility, strength and balance. Required moderate verbal cues to promote proper body mechanics. Progressed resistance, range, repetitions and complexity of movement as indicated.        10/17/2019   Activity/Exercise Parameters                 Patient education --    Bike  5 minutes warm up   treadmill Run walk protocol: 4 min walk, 1 min run    Heel prop --   Prone hang  3 minutes    Heel slide  --    SLR  --   Clamshells  --   SLR abduction  --   Dynamic warm up  1 lap each knee to chest, toy soldier, quad walk, inverted T's    Core  --   Hip hinge  Single leg hip hinge with 15 lbs 10 each    Squat   3 x 10 regular    Single leg squat  3 x 10 each side    Step ups --   Band walks  1 x 50 feet orange band at feet sidestep    SLS  --   Step downs  -- Single leg heel raise  --       [employer] Portal  Treatment/Session Summary:    · Response to Treatment:  Pt with good progress and near normal Y-balance demonstrated today, but report of pain during y-balance and single leg squats, potentially due to initiation of run/walk program today. Patient to follow up with Dr Dannie Keita tomorrow. Potentially release to limited participation following visit. · Communication/Consultation:  None today  · Equipment provided today:  None today  · Recommendations/Intent for next treatment session: Next visit will focus on progression of CKC strengthening as tolerated.     Total Treatment Billable Duration:  55 minutes  PT Patient Time In/Time Out  Time In: 1430  Time Out: 0  Bella Velasco PT    Future Appointments   Date Time Provider Luiza Lal   10/21/2019  7:30 AM Parish Quinones [facility]   10/24/2019  2:30 PM Parish Quinones, PT [facility]   10/28/2019  7:30 AM Jaimee MASTERS, PT [facility]   10/31/2019  2:30 PM Parish Quinones PT [facility]   11/4/2019  7:30 AM Jaimee MASTERS, PT [facility]   11/7/2019  2:30 PM Letha Mcneil, PT [facility] pt seen supine in bed, not in distress

## 2020-02-05 NOTE — ED PROVIDER NOTE - CHPI ED SYMPTOMS NEG
Garnica's Esophagus: Care Instructions  Your Care Instructions    The esophagus is the tube that connects the throat to the stomach. Food and liquids go through this tube. In Garnica's esophagus, the cells that line the tube change. This is usually because of gastroesophageal reflux disease (GERD). GERD causes acid from your stomach to back up into the esophagus. When you have Garnica's esophagus, you are slightly more likely to get cancer of the esophagus. So regular testing is important to watch for signs of this cancer. You can treat GERD to control your symptoms and feel better. Follow-up care is a key part of your treatment and safety. Be sure to make and go to all appointments, and call your doctor if you are having problems. It's also a good idea to know your test results and keep a list of the medicines you take. How can you care for yourself at home? · Take your medicines exactly as prescribed. Call your doctor if you think you are having a problem with your medicine. · If you take over-the-counter medicine, such as antacids or acid reducers, follow all instructions on the label. If you use these medicines often, talk with your doctor. Be careful when you take over-the-counter antacid medicines. Many of these medicines have aspirin in them. Read the label to make sure that you are not taking more than the recommended dose. Too much aspirin can be harmful. · Do not smoke or chew tobacco. Smoking can make GERD worse. If you need help quitting, talk to your doctor about stop-smoking programs and medicines. These can increase your chances of quitting for good. · Chocolate, mint, and alcohol can make GERD worse. They relax the valve between the esophagus and the stomach. · Spicy foods, foods that have a lot of acid (like tomatoes and oranges), and coffee can make GERD symptoms worse in some people.  If your symptoms are worse after you eat a certain food, you may want to stop eating that food to see if your symptoms get better. · Eat smaller meals, and more often. After eating, wait 2 to 3 hours before you lie down. · Raise the head of your bed 6 to 8 inches by putting blocks under the frame or a foam wedge under the head of the mattress. · Do not wear tight clothing around your midsection. · If you are overweight, lose weight. Even losing 5 to 10 pounds can help. When should you call for help? Call your doctor now or seek immediate medical care if:    · You have new or worse belly pain.     · You are vomiting.    Watch closely for changes in your health, and be sure to contact your doctor if:    · You have any pain or difficulty swallowing.     · You are losing weight.     · You have new or worse symptoms, such as heartburn.     · You do not get better as expected. Where can you learn more? Go to http://kailee-galina.info/. Enter L146 in the search box to learn more about \"Garnica's Esophagus: Care Instructions. \"  Current as of: November 7, 2018  Content Version: 12.2  © 5642-4660 "[employer]. Care instructions adapted under license by [employer] (which disclaims liability or warranty for this information). If you have questions about a medical condition or this instruction, always ask your healthcare professional. [address] any warranty or liability for your use of this information. DISCHARGE SUMMARY from Nurse    PATIENT INSTRUCTIONS:    After general anesthesia or intravenous sedation, for 24 hours or while taking prescription Narcotics:  · Limit your activities  · Do not drive and operate hazardous machinery  · Do not make important personal or business decisions  · Do  not drink alcoholic beverages  · If you have not urinated within 8 hours after discharge, please contact your surgeon on call.     Report the following to your surgeon:  · Excessive pain, swelling, redness or odor of or around the surgical area  · Temperature over 100.5  · Nausea and vomiting lasting longer than 4 hours or if unable to take medications  · Any signs of decreased circulation or nerve impairment to extremity: change in color, persistent  numbness, tingling, coldness or increase pain  · Any questions    *  Please give a list of your current medications to your Primary Care Provider. *  Please update this list whenever your medications are discontinued, doses are      changed, or new medications (including over-the-counter products) are added. *  Please carry medication information at all times in case of emergency situations. These are general instructions for a healthy lifestyle:    No smoking/ No tobacco products/ Avoid exposure to second hand smoke  Surgeon General's Warning:  Quitting smoking now greatly reduces serious risk to your health. Obesity, smoking, and sedentary lifestyle greatly increases your risk for illness    A healthy diet, regular physical exercise & weight monitoring are important for maintaining a healthy lifestyle    You may be retaining fluid if you have a history of heart failure or if you experience any of the following symptoms:  Weight gain of 3 pounds or more overnight or 5 pounds in a week, increased swelling in our hands or feet or shortness of breath while lying flat in bed. Please call your doctor as soon as you notice any of these symptoms; do not wait until your next office visit. The discharge information has been reviewed with the patient and spouse. The patient and spouse verbalized understanding. Discharge medications reviewed with the patient and spouse and appropriate educational materials and side effects teaching were provided.   ___________________________________________________________________________________________________________________________________ no abdominal pain/no cough/no shortness of breath

## 2020-03-05 NOTE — ED PROVIDER NOTE - DISPOSITION TYPE
"SUBJECTIVE:  Kvng Valdez is an 27year old No obstetric history on file. Presents for:  Chief Complaint   Patient presents with   â¢ Birth Control Counseling     pt wishes to get IUD (mirena)       History of Chief Complaint: She wants a Mirena IUD. She has had a Mirena IUD in the past.  She is on her period with a heavy flow today. Past Medical History:   Diagnosis Date   â¢ Allergy    â¢ Enlarged tonsils    â¢ IUD check up    â¢ Morbid obesity with body mass index (BMI) of 40.0 to 49.9 (CMS/HCC)    â¢  (normal spontaneous vaginal delivery)     3   â¢ RAD (reactive airway disease)    â¢ Vitamin D deficiency        Past Surgical History:   Procedure Laterality Date   â¢ Iud mirena      5 years, came out 2019   â¢ [place] tooth extraction         No current outpatient medications on file. No current facility-administered medications for this visit. ALLERGIES:  No Known Allergies    Social History     Tobacco Use   â¢ Smoking status: Never Smoker   â¢ Smokeless tobacco: Never Used   Substance Use Topics   â¢ Alcohol use: Yes     Comment: socially       Review Of Systems  Review of Systems   Constitutional: Negative. Genitourinary:        As above       OBJECTIVE:  Visit Vitals  /78 (BP Location: RUE - Right upper extremity, Patient Position: Sitting)   Pulse 84   Temp 98.1 Â°F (36.7 Â°C) (Oral)   Ht 5' 4"" (1.626 m)   Wt 130.1 kg (286 lb 13.1 oz)   LMP 2020   Breastfeeding No   BMI 49.23 kg/mÂ²     Physical Exam  Constitutional:       Appearance: Normal appearance. She is obese. Neurological:      General: No focal deficit present. Mental Status: She is alert and oriented to person, place, and time. Psychiatric:         Mood and Affect: Mood normal.         Behavior: Behavior normal.         Thought Content: Thought content normal.         Judgment: Judgment normal.   Vitals signs reviewed.          ASSESSMENT/PLAN:  Diagnoses and all orders for this visit:  Screening for STDs " (sexually transmitted diseases)  -     CHLAMYDIA/GC BY NUCLEIC ACID AMPLIFICATION  Contraceptive education  -     IUD INSERTION    We will check a urine GC and Chlamydia. She is scheduled for a Mirena IUD placement next Wednesday. She is over due for a pap smear. We will do one when we place the Mirena IUD. ADMIT

## 2020-08-04 NOTE — H&P PST ADULT - HEIGHT IN INCHES
10.5 Xeljaswantz Pregnancy And Lactation Text: This medication is Pregnancy Category D and is not considered safe during pregnancy.  The risk during breast feeding is also uncertain.

## 2020-08-12 NOTE — BRIEF OPERATIVE NOTE - POST-OP DX
[facility]        NAME: Jazmyne Solorzano is a 20 y o  female  : 1996    MRN: [id number]  DATE: 2020  TIME: 12:42 PM    Assessment and Plan   COVID-19 ruled out [Z03 [id number]]  1  COVID-19 ruled out  Novel Coronavirus (COVID-19), PCR [facility] - Office Collection         Patient Instructions     Report any new s/s  Results may take 3-7 days    Chief Complaint     Chief Complaint   Patient presents with    COVID-19     denies s/s         History of Present Illness       HPI   Requesting covid 19 testing for travel purposes  Denies any symptoms  Review of Systems   Review of Systems   Constitutional: Negative for chills and fever  Respiratory: Negative for cough, shortness of breath and wheezing  Cardiovascular: Negative for chest pain  Gastrointestinal: Negative for nausea and vomiting  Neurological: Negative for dizziness and headaches           Current Medications       Current Outpatient Medications:     EPINEPHrine (EPIPEN) 0 3 mg/0 3 mL SOAJ, Inject 0 3 mL (0 3 mg total) into a muscle once for 1 dose, Disp: 2 each, Rfl: 0    norgestimate-ethinyl estradiol (Tri-Sprintec) 0 18/0 215/0 25 MG-35 MCG per tablet, Take 1 tablet by mouth daily, Disp: 84 tablet, Rfl: 0    traZODone (DESYREL) 50 mg tablet, Take 1 tablet (50 mg total) by mouth daily at bedtime, Disp: 5 tablet, Rfl: 0    Current Allergies     Allergies as of 2020 - Reviewed 2020   Allergen Reaction Noted    Acetaminophen Hives 2016    Apple Itching 2016    Methylphenidate Other (See Comments) 2017    Other  2018            The following portions of the patient's history were reviewed and updated as appropriate: allergies, current medications, past family history, past medical history, past social history, past surgical history and problem list      Past Medical History:   Diagnosis Date    Allergic     Anxiety        Past Surgical History:   Procedure Laterality Date    WISDOM TOOTH EXTRACTION      WISDOM TOOTH EXTRACTION         Family History   Problem Relation Age of Onset    No Known Problems Mother     No Known Problems Father     No Known Problems Sister     No Known Problems Brother     COPD Maternal Grandmother     Asthma Maternal Grandmother     Melanoma Maternal Grandmother     Cancer Maternal Grandfather         Bladder    Melanoma Maternal Grandfather     Diabetes Maternal Grandfather     No Known Problems Paternal Grandmother     No Known Problems Paternal Grandfather     No Known Problems Sister     No Known Problems Sister          Medications have been verified  Objective   Pulse (!) 115   Temp 98 °F (36 7 °C)   Resp 18   SpO2 99%        Physical Exam     Physical Exam  Constitutional:       Appearance: Normal appearance  Cardiovascular:      Rate and Rhythm: Normal rate and regular rhythm  Pulmonary:      Effort: Pulmonary effort is normal       Breath sounds: Normal breath sounds  Neurological:      Mental Status: She is alert  Lung mass  08/16/2017    Active  Mandy Ruiz S

## 2020-09-23 NOTE — H&P PST ADULT - GASTROINTESTINAL
negative [Parents] : parents [Baby food] : baby food [Finger Foods] : finger foods [Normal] : Normal [In crib] : In crib Soft, non-tender, no hepatosplenomegaly, normal bowel sounds [No] : No cigarette smoke exposure [Water heater temperature set at <120 degrees F] : Water heater temperature set at <120 degrees F [Car seat in back seat] : Car seat in back seat [Carbon Monoxide Detectors] : Carbon monoxide detectors [Smoke Detectors] : Smoke detectors [Vitamin ___] : Patient takes [unfilled] vitamin daily  [Playtime] : Playtime  [Gun in Home] : No gun in home [de-identified] : breast milk

## 2020-10-05 NOTE — ED PROVIDER NOTE - CONSTITUTIONAL, MLM
1. Does the patient have a moderate to severe fever?  No  2. Has the patient had a serious reaction to a flu shot before?   No  3. Has the patient ever had Guillian [place] Syndrome within 6 weeks of a previous flu shot?  No  4. Is the patient less than 6 months of age?  No    Patient is eligible to receive the vaccine based on all questions being answered as 'No'..     normal... Well appearing, well nourished, awake, alert, oriented to person, place, time/situation and in no apparent distress.

## 2021-03-01 NOTE — PATIENT PROFILE ADULT. - HEALTHCARE QUESTIONS, PROFILE
CT RN Last Call       Situation: Patient triggered for high risk readmission. Risk for Unplanned Readmission Score at Discharge is: 13%  Patient is enrolled in High Risk Transitions in Care program.    Background:  62 year old female admitted wit weakness 2/9-2/11. HX: depression, asthma, HTN, obesity, CKD, negative covid test 2/9. Lives with        Assessment:     Writer spoke with  as patient is still having issues with speaking.  stated that her voice \"comes and goes\". No SOB, cough, fever, N/V. Her throat Is sl sore.  Patient is taking all her meds. She is opthalmology appt on 3/3. No questions or concerns.      - Primary Caregiver: patient  - Assessment Completed with:   - Patient gave permission to discuss with   - Any new AVS's were reviewed with the patient:  NA  - Since discharge, patient is feeling OK  - Activity: stayed the same  - The patient is taking all medications as prescribed.  AVS medications and  instructions were reviewed with the patient.   - Validate that any new medications were picked up:  No new meds  - Medication Review completed in Epic  No new meds  - The patient did not have questions or concerns regarding the medications prescribed.  - The patient is not having pain at this time.  - The patient's appetite is Good  - Patient is having bowel movements.  - Safety Concerns:  - Follow up care:  Upcoming appointments 3/3 with opthamology   - Assisted Patient in making the following appointments not needed  - Reviewed appropriate contacts for questions / concerns.  - Recommendation: 30 day transition program completed. Case closed.        will ask as they come up

## 2021-05-27 NOTE — H&P ADULT. - NS MD HP IMMU CONTRA FLU 1
1) Get bloodwork    Purchase Voltaren (diclofenac) 1% gel over-the-counter. It is in an orange packaging. Total dose should not exceed 32 g per day, overall affected joints.  Voltaren Gel should  be measured onto the enclosed dosing card to the appropri your body through the pads.   How to say it  [id number]-uhs   Why TENS is done  TENS can help ease:  · Nerve pain  · Pain after surgery  · Arthritis or other joint pain  · Other types of chronic body aches and pains  TENS therapy is still being mena or other metal implant in your body  Asad last reviewed this educational content on 6/1/2019  © 2449-1491 [employer]. All rights reserved. This information is not intended as a substitute for professional medical care.  Always follow your 10/2016/yes

## 2021-09-01 NOTE — PRE-OP CHECKLIST - 1.
Last Appt: 2/12/21        Last refill: NA      Last lab: NA      Next appt: NA  Approved   
Refill sent by fax from [facility].  Norethindrone Acet/eth 1/20 TB  
See PACU flow sheets for ASU pre-op v/s

## 2021-10-12 NOTE — ED ADULT NURSE NOTE - CAS EDN DISCHARGE INTERVENTIONS
[Fully active, able to carry on all pre-disease performance without restriction] : Status 0 - Fully active, able to carry on all pre-disease performance without restriction [Normal] : affect appropriate [de-identified] : patient with wide excision of left eyebrow and temple. No nodes and no nodularity. IV discontinued, cath removed intact

## 2022-02-04 NOTE — DISCHARGE NOTE ADULT - PLAN OF CARE
Spoke with son about pre procedural covid test. He verbalized understanding.   Well healed wound Increase walking distance daily  Use spirometry as directed   Follow up with Dr Foster in 2 weeks, call  for an appointment. 1 to 2 days prior to your visit obtain a cxr and bring a copy of the film with you to the appointment  Follow up with Dr Ward in one week, call for an appointment   See your pcp in 1 to 2 weeks heart rate control Continue new heart medications and blood thinner as prescribed.  See Dr. Ward Cardiologist in 1-2 weeks s/p Lung resection. Goal is wound healing. Increase walking distance daily, walk 4-5 x per day. Increase as tolerated. You may climb stairs. You may remove all dressings tomorrow then begin to shower. You may leave wounds uncovered. Suture will be removed in office.   Use spirometry as directed   Follow up with Dr Foster in 2 weeks, call  for an appointment. 1 to 2 days prior to your visit obtain a  Chest Xray and bring a copy of the film with you to the appointment

## 2022-02-20 NOTE — ED PROVIDER NOTE - NS ED NOTE AC HIGH RISK COUNTRIES
No somewhat expansive mood; focused on pain issues; talks about Methadone and Neurontin but wants something stronger for a few days; able to move fingers; does ot appar in distress when he does this.  well related.  friendly manner; Thinking is congruent with affect; no peculiarities of thinking or language use.  Not endorsing  suicidal or homicidal ideation intent or plans; no mention of auditory or visual hallucinations  .  feels he needs time to sleep and feel better.  Makes reference to getting back on Lithium.

## 2022-05-08 NOTE — PHYSICAL THERAPY INITIAL EVALUATION ADULT - SITTING BALANCE: DYNAMIC
05/08/22 1142   Tobacco Cessation Intervention   Do you use any type of tobacco product? No  (Pt stated that she quit ~5/04/2022, which is less than 1 week ago.)   Are you interested in quitting use of tobacco products? Not interested   Are you interested in Nicotine Replacement for symptom relief? No  (not at this moment)   $ Smoking Cessation Charges Smoking Cessation - Intermediate (Non-CTTS)     
good balance

## 2022-07-07 NOTE — ASU DISCHARGE PLAN (ADULT/PEDIATRIC). - ACCOMPANYING ADULT'S SIGNATURE_______________________________________
Update to Cardiology. NARCISA, Statement Selected

## 2023-06-15 NOTE — DISCHARGE NOTE ADULT - SMOKING EVEN A SINGLE PUFF INCREASES THE LIKELIHOOD OF A FULL RELAPSE, WITHDRAWAL SYMPTOMS PEAK WITHIN 1-2 WEEKS, BUT CAN PERSIST FOR MONTHS
Please order follow-up chest x-ray PA lateral in 3 to 4 weeks to ensure resolution of pneumonia- please let patient know as well  Statement Selected

## 2023-07-12 NOTE — ED ADULT TRIAGE NOTE - MEANS OF ARRIVAL
"[facility] Care Coordination Contact    Per CC, mailed client an \"Unable to Contact\" letter.    Geeta Warren  [facility]  Case Management Specialist  [phone]    "
stretcher

## 2023-09-12 NOTE — ASU PATIENT PROFILE, ADULT - NS TRANSFER PROSTHESIS:
[facility] - Optometry/Ophthalmology  [address] [place] [address]    CERTIFICATE FOR SCHOOL       September 12, 2023      Re: Sophia Palmer  [address] [place] [id number]      This is to certify that Sophia Palmer was under my care on 9/12/2023 and can return to school on 9/12/2023.       If you have any questions, please contact my office.      Sincerely,           Natty Duffy  Optometrist  P: [phone]       none

## 2023-10-05 NOTE — ASU PREOP CHECKLIST - VERIFY SURGICAL SITE/SIDE WITH PATIENT
Chief Complaint:   Encounter Diagnoses   Name Primary?    Erectile dysfunction, unspecified erectile dysfunction type Yes    Gross hematuria     Renal cyst          HPI:   10/5/23- patient is here today to discuss surgery with Cardiology.  TriMix is still causing some discomfort and not fully working.       2/28/19: 45 yo man voids about once a day since he is a MWF dialysis pt.  Saw a clot and gross hematuria about 2-3 weeks ago on one occasion.  No abd/pelvic pain and no exac/rel factors.  No urolithiasis.  Weak stream.  No  history.  Normal sexual function until recently.  Had a CT Urogram that shows no abnormalities except renal cystic function consistent with ESRD.    Allergies:  Patient has no known allergies.    Medications:  has a current medication list which includes the following prescription(s): amlodipine, amlodipine, amoxicillin-clavulanate 500-125mg, ascorbic acid (vitamin c), b complex vitamins, calcium acetate(phosphat bind), calcium acetate(phosphat bind), calcium acetate(phosphat bind), collagen (bovine), dialysis solutions, diltiazem, diltiazem, epoetin vikki, ferrous sulfate, fish oil-omega-3 fatty acids, hydralazine, hydralazine, hydrochlorothiazide, hydroxyzine hcl, hydroxyzine hcl, labetalol, labetalol, labetalol, lisinopril, multivitamin with minerals, pantoprazole, pantoprazole, pantoprazole, alprostadil, shawna-nataly rx, sildenafil, tenofovir disoproxil fumarate, triamcinolone acetonide 0.1%, shawna-nataly rx, vitamin d, warfarin, warfarin, albuterol, and omega-3 fatty acids-vitamin e, and the following Facility-Administered Medications: sodium chloride 0.9% and sodium chloride 0.9%.    Review of Systems:  General: No fever, chills, fatigability, or weight loss.  Skin: No rashes, itching, or changes in color or texture of skin.  Chest: Denies HAY, cyanosis, wheezing, cough, and sputum production.  Abdomen: Appetite fine. No weight loss. Denies diarrhea, abdominal pain, hematemesis, or blood in  stool.  Musculoskeletal: No joint stiffness or swelling. Denies back pain.  : As above.  All other review of systems negative.    PMH:   has a past medical history of Anemia, Aortic valve stenosis, Atrial fibrillation, Atrial flutter, Cardiomyopathy, CHF (congestive heart failure), Drug-induced erectile dysfunction, ESRD due to hypertension, ESRD on dialysis, GERD (gastroesophageal reflux disease), Hepatitis B, Hyperlipidemia, Hypertension, Nightmares, Obesity, DANIEL (obstructive sleep apnea) (11/12/2019), Secondary hyperparathyroidism of renal origin, Supraventricular tachycardia, Tachycardia, and Valvular regurgitation.    PSH:   has a past surgical history that includes ASD repair; Cardiac valuve replacement (02/08/2017); Radiofrequency ablation (03/13/2017); Cardiac catheterization; AV Graft Creation (Left, 03/2017); Colonoscopy (N/A, 8/27/2019); Esophagogastroduodenoscopy (N/A, 8/27/2019); Removal of graft (Left, 7/2/2020); Colonoscopy (N/A, 9/3/2020); and Right heart catheterization (Right, 8/12/2021).    FamHx: family history includes Anesthesia problems in his paternal uncle; Cancer in his mother; Diabetes in his paternal aunt and paternal aunt; Heart attack in his father; Heart failure in his paternal grandmother; Hypertension in his paternal aunt; Leukemia in his mother and paternal aunt; No Known Problems in his brother, sister, sister, and sister; Stroke in his paternal aunt; Suicide in his paternal uncle; Valvular heart disease in his maternal aunt.    SocHx:  reports that he has never smoked. He has never used smokeless tobacco. He reports that he does not drink alcohol and does not use drugs.      Physical Exam:  Vitals:    10/05/23 1151   BP: 95/61   Pulse: 96   Resp: 16       General: A&Ox3, no apparent distress, no deformities  Neck: No masses, normal thyroid  Lungs: normal inspiration, no use of accessory muscles  Heart: normal pulse, no arrhythmias  Abdomen: Soft, NT, ND  Skin: The skin is warm  and dry. No jaundice.  Ext: No c/c/e.   : 5/23- Test desc felicita, no abnormalities of epididymus. Normal penile and scrotal skin. Meatus normal.    Labs/Studies:   Cysto negative 7/20  DARELNE bilateral complex renal cysts 2/22  CT urogram hyperdense cysts, no solid lesions 7/21  PSA 0.38 11/20    Impression/Plan:       1. Gross hematuria- no recurrent hematuria, no history of smoking.  Previous structural evaluation was negative by my partner.  Patient does not void.    2. Renal cyst- appeared to be benign, no further workup warranted.    3. Erectile dysfunction- cialis 20 mg, viagra 100 mg and now TriMix is not sufficient, using up to 0.4 ml.  We have again gone over instructions, he may be injecting a little too low and lateral.  He will increase by 0.05 mL at a time, till desired effect is met.  He still would like to pursue insertion of inflatable penile prosthesis as discussed below.  Per cardiology recommendations though, he will need to be admitted to the hospital on a heparin bridge, have Cardiology involved during his entire hospital stay, also have Cardiac Anesthesiology during the procedure.  Patient is aware of these parameters and the fact that he is high-risk and would rather hold off till the 1st of the year.  Therefore I will have him evaluated by the preop clinic, to assist with cardiac recommendations.  Have him return towards the end of the year to find a date and time in January to schedule.  Call with any complaints in the meantime.    Patient understands the risks, benefits and alternatives of the above-stated procedure.  These include but are not limited to damage to the surrounding structures including the testicles, cord structures, urethra, which would require aborting of the procedure and closure with a delayed procedure in the future.  Damage to the bowel, bladder or vascular structures requiring open procedures.  Possibility for delayed catheterization or stent placement.  Possible risk of  difficulty voiding postprocedure, and requiring a Caldwell catheter.  The risk of possible urethral stricture in the future or erosion.  Risk of acute infection, requiring immediate removal.  The risk of this not working as appropriately as he would like, or having cosmetic results that are not pleasing to the patient.  The risk of significant swelling, bruising or discomfort and pain.  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  Risk of heart attack, stroke, death, DVT and PE.  Patient understanding of all the above has elected to proceed with the procedure as stated.   done/right as per pt

## 2023-11-21 NOTE — PROVIDER CONTACT NOTE (OTHER) - ASSESSMENT
Left detailed message for patient.   
Patient resting in bed; no complaints at this time.
Patient resting in bed; no complaints at this time; bladder scan showed 475 mL.
patient sitting up in bed, stated he felt better after emesis

## 2024-01-27 NOTE — ED PROVIDER NOTE - MUSCULOSKELETAL NEGATIVE STATEMENT, MLM
Pain medicine has been prescribed for you, as needed, and it often causes constipation.  Take docusate sodium (Colace) 100mg 3x daily, while taking narcotic pain medication.   For Constipation :   • Increase your water intake. Drink at least 8 glasses of water daily.  • Try adding fiber to your diet by eating fruits, vegetables and foods that are rich in grains.  • If you do experience constipation, you may take an over-the-counter laxative such as, Senokot, Miralax or  Milk of Magnesia. 
no back pain, no gout, no musculoskeletal pain, no neck pain, and no weakness.

## 2024-05-24 NOTE — ED ADULT TRIAGE NOTE - RESPIRATORY RATE (BREATHS/MIN)
Provider notified. Review dosage of bedtime Lantus. Dextrose 5% continuous fluids for a few hours since pt is NPO.  22

## 2024-05-29 NOTE — BRIEF OPERATIVE NOTE - SURGICAL START DATE/TIME
Additional Instructions: Standard
Detail Level: Detailed
Standard Counseling: I stressed the importance of regular monthly self-examinations. They should examine their entire skin surface. The buttocks, gluteal cleft, genitalia and bottom of the feet should be examined with a hand held mirror. A significant other should help them if they are comfortable with this.
02-Apr-2018

## 2024-07-09 NOTE — DISCHARGE NOTE ADULT - NSTOBACCONEVERSMOKERY/N_GEN_A
Pt from OR this morning around 1100 accompanied by OR team and anesthesia. S/p convergent. Pt awake and responsive, shaking c/o SOB. Denies pain. Recovered per ccu protocol and orders. Dr Orozco at bedside. ABG and chest xray done. Demerol given for shivering, bear hugger applied. Afib on monitor.  BP stable chest tubes placed to suction. No airleak appreciated. Pain management, medications given as ordered and PRN. Taking clear liquids and advancing diet as able. Pt continues to c/o difficulty taking a deep breath and SOB. IS only 500-750, however pt more comfortable now then initially. Family at bedside. POC discussed, questions encouraged and answered    Problem: RESPIRATORY - ADULT  Goal: Achieves optimal ventilation and oxygenation  Description: INTERVENTIONS:  - Assess for changes in respiratory status  - Assess for changes in mentation and behavior  - Position to facilitate oxygenation and minimize respiratory effort  - Oxygen supplementation based on oxygen saturation or ABGs  - Provide Smoking Cessation handout, if applicable  - Encourage broncho-pulmonary hygiene including cough, deep breathe, Incentive Spirometry  - Assess the need for suctioning and perform as needed  - Assess and instruct to report SOB or any respiratory difficulty  - Respiratory Therapy support as indicated  - Manage/alleviate anxiety  - Monitor for signs/symptoms of CO2 retention  Outcome: Progressing     Problem: SKIN/TISSUE INTEGRITY - ADULT  Goal: Incision(s), wounds(s) or drain site(s) healing without S/S of infection  Description: INTERVENTIONS:  - Assess and document risk factors for pressure ulcer development  - Assess and document skin integrity  - Assess and document dressing/incision, wound bed, drain sites and surrounding tissue  - Implement wound care per orders  - Initiate isolation precautions as appropriate  - Initiate Pressure Ulcer prevention bundle as indicated  Outcome: Progressing      Yes, Non-Core measure site...

## 2024-07-10 NOTE — PROGRESS NOTE ADULT - PROBLEM/PLAN-7
severe
DISPLAY PLAN FREE TEXT

## 2024-08-28 NOTE — DISCHARGE NOTE ADULT - BECAUSE OF A PHYSICAL, MENTAL OR EMOTIONAL CONDITION, DO YOU HAVE DIFFICULTY DOING  ERRANDS ALONE LIKE VISITING A DOCTOR'S OFFICE OR SHOPPING (15 YEARS AND OLDER)
Medications:  -Continue to take your home medications as listed on your medication list after you are discharged.    New Medications:  -None    Diet  -You may resume oral intake after you are discharged, as long you have no swallowing difficulties.    Because you have received sedation for this procedure:  -Limit activity for the remainder of the day.  -Do not smoke for at least 6 hours and until you are fully awake and alert.  -Do not drink alcoholic beverage for 24 hours.  -Do not drive for 24 hours.  -Defer important decision making until the following day.     Go to the Emergency Department if you develop:   -Bleeding  -Weakness or numbness  -Visual, gait or speech disturbance  -New chest pain, palpitations, shortness of breath, rapid heart beat, or fainting  -Fever    Follow up:  -EKG in 1 week.  -Dr. Mo or Neelam Ayala NP in 1 month. Call or message the office to schedule.    If you have a pacemaker, defibrillator or loop recorder that is monitored remotely by the [facility] clinic, you may be eligible to send in a remote transmission on the day of your EKG appointment in lieu of the EKG. You will be informed at discharge if this is an option. If you are instructed to send in a remote transmission, please send a manual transmission from your home monitor one week after your procedure and then call the clinic at [phone], option 4, to confirm that your remote transmission was received.    Any need to reschedule or cancel procedures, or any questions regarding your procedures should be addressed directly with the Arrhythmia Department Nurses at the following phone number: [phone].    
No

## 2024-12-04 NOTE — ASU DISCHARGE PLAN (ADULT/PEDIATRIC). - ASU FOLLOWUP
Who is calling? Patient  Medication name: hydrOXYzine HCl (ATARAX) 10 MG tablet   Is this a refill request? Yes  Have they contacted the pharmacy?  Yes  Pharmacy:   [facility] - [place], [place] - [address] [place] [address] [id number]  [id number] - [place], [place] - [address] [facility] [address]  [address]  [facility] [address]  Phone: [phone] Fax: [phone]   Question/Concern: Pharmacy change. Original pharmacy not covered through insurance.   Would patient like a call back? No              911 or go to the nearest Emergency Room [facility] (Adult):

## 2025-01-15 NOTE — PHYSICAL THERAPY INITIAL EVALUATION ADULT - PHYSICAL ASSIST/NONPHYSICAL ASSIST: SUPINE/SIT, REHAB EVAL
Goal:  We will do a blind weight 1st of each month and send it to [facility] for assessment.     Dessert options:   - ice cream (moose tracks)  - cheesecake    Proteins to add to snacks:  - handful of nuts  - dry roasted/frozen edamame  - Vu nut bars  - bowl of cereal with soy/regular milk  - cottage cheese?? (2% or whole)          Dietary Fiber Recommendations:  Good source of fiber = 3 grams per serving  Great source of fiber = greater than or equal to 5 grams per serving  
1 person assist

## 2025-01-18 NOTE — PATIENT PROFILE ADULT. - MEDICATIONS BROUGHT TO HOSPITAL, PROFILE
GASTROENTEROLOGY QUICK NOTE:     Bedside bidirectional endoscopy performed in the ICU. No source of active bleeding- unremarkable EGD without varices; colonoscopy with significant amount of blood clots extending to the descending colon.     Plan:  Keep NPO; Place OG tube and start GoLytely bowel prep (2L tonight and 4L tomorrow) with tentative plan for complete colonoscopy on Monday to visualize more proximal areas   If patient decompensates with concern for ongoing significant GI bleed we could consider intervention sooner   Ok to continue CTX for ppx; discontinue PPI and octreotide gtt's   Rectal bleeding is expected given colonoscopy findings of significant blood clots; please notify on-call GI if patient develops worsening hemodynamics and/or declining hemoglobin  Patient's family updated regarding the findings and plan of care going forward; they are in agreement.     DO ADRIAN Rodrigez Gastroenterology Fellow, PGY-4    no

## 2025-04-12 NOTE — PHYSICAL THERAPY INITIAL EVALUATION ADULT - REHAB POTENTIAL, PT EVAL
AVS virtually reviewed with patient in its entirety with emphasis on diet, medications, follow-up appointments and reasons to return to the ED or contact the [facility] On Call Nurse Care Line. Patient also encouraged to utilize their patient portal. Ease and convenience of use reiterated. Education complete and patient voiced understanding. All questions answered. Discharge teaching complete.     good, to achieve stated therapy goals